# Patient Record
Sex: FEMALE | Race: WHITE | NOT HISPANIC OR LATINO | Employment: OTHER | URBAN - METROPOLITAN AREA
[De-identification: names, ages, dates, MRNs, and addresses within clinical notes are randomized per-mention and may not be internally consistent; named-entity substitution may affect disease eponyms.]

---

## 2017-01-10 ENCOUNTER — PATIENT MESSAGE (OUTPATIENT)
Dept: MEDICAL GROUP | Facility: CLINIC | Age: 52
End: 2017-01-10

## 2017-01-10 ENCOUNTER — OFFICE VISIT (OUTPATIENT)
Dept: MEDICAL GROUP | Facility: CLINIC | Age: 52
End: 2017-01-10
Payer: COMMERCIAL

## 2017-01-10 VITALS
TEMPERATURE: 98.8 F | BODY MASS INDEX: 40.02 KG/M2 | WEIGHT: 255 LBS | DIASTOLIC BLOOD PRESSURE: 70 MMHG | HEIGHT: 67 IN | HEART RATE: 70 BPM | OXYGEN SATURATION: 91 % | SYSTOLIC BLOOD PRESSURE: 126 MMHG

## 2017-01-10 DIAGNOSIS — E55.9 VITAMIN D DEFICIENCY DISEASE: ICD-10-CM

## 2017-01-10 DIAGNOSIS — E03.9 IDIOPATHIC HYPOTHYROIDISM: ICD-10-CM

## 2017-01-10 DIAGNOSIS — Z15.89 METHYLENETETRAHYDROFOLATE REDUCTASE (MTHFR) GENE MUTATION: ICD-10-CM

## 2017-01-10 DIAGNOSIS — E66.9 OBESITY (BMI 35.0-39.9 WITHOUT COMORBIDITY): ICD-10-CM

## 2017-01-10 DIAGNOSIS — E04.1 THYROID NODULE: ICD-10-CM

## 2017-01-10 DIAGNOSIS — E78.5 DYSLIPIDEMIA, GOAL LDL BELOW 130: ICD-10-CM

## 2017-01-10 DIAGNOSIS — K20.0 EOSINOPHILIC ESOPHAGITIS: ICD-10-CM

## 2017-01-10 PROCEDURE — 99214 OFFICE O/P EST MOD 30 MIN: CPT | Performed by: FAMILY MEDICINE

## 2017-01-10 RX ORDER — RANITIDINE 150 MG/1
150 TABLET ORAL 2 TIMES DAILY
Qty: 60 TAB | Refills: 11 | Status: SHIPPED | OUTPATIENT
Start: 2017-01-10 | End: 2017-03-29

## 2017-01-10 NOTE — PROGRESS NOTES
CC: Here to discuss her medical situation including her eosinophilic esophagitis and genetic test results    HPI:   Tanvi presents today with the following.    1. Eosinophilic esophagitis  She has been paying attention to the foods she eats. She is not feeling as well today. She feels that is probably because she ate corn last night. She is finding that she has increased reactions to corn and soy as well as the week and lactulose which she has known about before. She continues to eat a morning fast food burrito. Discussed that this tortilla for the burrito is wheat. Discussed making home food where she knows what is in it.  Discussed the need to maintain acid reduction while she is trying to heal the esophagitis. She does not want to continue with the proton pump inhibitors. Discussed the H2 blockers may be more helpful    2. Methylenetetrahydrofolate reductase (MTHFR) gene mutation ([facility])  Discussed this gene mutation. Discussed that the bottom line for this as far as I understand it is too increased folate consumption, especially cooked greens. Discussed her other genetic results. After reviewing the genetic results she stopped the atorvastatin as she is a slow metabolizer.  She also stopped the omeprazole because it was a potential source of conflict with the atorvastatin.      3. Thyroid nodule  She had thyroid ultrasound back in August which showed a small nodule. At that time we had discussed repeat ultrasound in 6 months. Her genetic results show a 5 fold increased risk for thyroid cancer. It does not show what kind of cancer or what the baseline risk is. However overall she is due for repeat US.  She actually feels a little less fatigued than usual. She denies trouble swallowing.    4. Idiopathic hypothyroidism  She does have hypothyroidism. She continues the levothyroxin. She brings up whether she should be on a mixed T3-T4 tomorrow. Discussed with her that I do not like these drugs as they are a little  less predictable and also turn off the body's ability to make T3 out of T4. We will stick with the synthetic for now.    5. Vitamin D deficiency disease  She continues vitamin D supplementation. She has run out and is trying to find a vitamin D supplement but is gluten-free. Discussed emphasizing good calcium sources in the diet and discussing with the nutritionist when she meets with her tomorrow calcium in the diet that does not involve lactose.    6. Obesity (BMI 35.0-39.9 without comorbidity) ([facility])  Discussed her overweight. This is a major concern to her. She is hoping that with the improvement in diet her weight and overall health will improve. I think this is a good goal. She is trying to increase her activity as well.    7. Dyslipidemia, goal LDL below 130  Denies chest pain, chest pressure, palpitations or exertional shortness of breath. She is a never smoker. She is due for blood test rechecked. She hopes that with her change in diet she will not need the statins. She has elected to stop the atorvastatin for now. She has had no cardiovascular events.  I concur in this decision for now.    She also stopped the wellbutrin as she did not feel right.  She feels better off it.  She continues the sertraline.        Patient Active Problem List    Diagnosis Date Noted   • Dyslipidemia, goal LDL below 130      Priority: High   • Eosinophilic esophagitis    • Oxygen desaturation during sleep    • Thyroid nodule 10/14/2016   • Central sleep apnea 10/10/2016   • Obstructive sleep apnea 10/10/2016   • Acquired deflected nasal septum 09/09/2016   • Hypertrophy of both inferior nasal turbinates 09/09/2016   • Conductive hearing loss, unilateral 04/03/2015   • Cholesteatoma of middle ear and mastoid    • Obesity (BMI 35.0-39.9 without comorbidity) ([facility]) 07/14/2014   • Family history of early CAD    • Idiopathic hypothyroidism 07/08/2013   • Reactive airway disease with wheezing 08/17/2012   • Vitamin D deficiency disease   "  • Depression    • Lumbar disc narrowing 07/14/2009       Current Outpatient Prescriptions   Medication Sig Dispense Refill   • hydrocodone-acetaminophen (NORCO) 5-325 MG Tab per tablet      • levothyroxine (SYNTHROID) 25 MCG Tab Take 1 Tab by mouth Every morning on an empty stomach. 30 Tab 6   • beclomethasone (QVAR) 40 MCG/ACT inhaler Inhale 2 Puffs by mouth 2 Times a Day. 1 Inhaler 5   • fluticasone (FLONASE) 50 MCG/ACT nasal spray      • albuterol (PROVENTIL HFA) 108 (90 BASE) MCG/ACT Aero Soln inhalation aerosol Inhale 2 Puffs by mouth every four hours as needed for Shortness of Breath (Wheezing). 1 Inhaler 0   • omeprazole (PRILOSEC) 20 MG delayed-release capsule Take 20 mg by mouth every day.     • Misc. Devices Misc This is an order for nocturnal oxygen, 2L nasal prongs.  Dx:  Nocturnal hypoxia, lowest 59% on overnight study.  G47.34           MARIA DOLORES 99 months   NPI [id number] 1 Each 0   • sertraline (ZOLOFT) 100 MG Tab TAKE 2 TABLETS BY MOUTH EVERY  Tab 2   • atorvastatin (LIPITOR) 40 MG Tab Take 1 Tab by mouth every day. 90 Tab 3   • buPROPion SR (WELLBUTRIN SR) 200 MG SR tablet Take 1 Tab by mouth every morning. 90 Tab 3   • Cholecalciferol (VITAMIN D) 2000 UNIT CAPS Take 2,000 Units by mouth every day. 60 Cap 6     No current facility-administered medications for this visit.         Allergies as of 01/10/2017 - Kofi as Reviewed 01/10/2017   Allergen Reaction Noted   • Doxycycline  01/29/2014   • Pcn [penicillins] Unspecified 07/09/2009   • Keflex [cephalexin] Unspecified 09/21/2016        ROS: As per HPI.    /70 mmHg  Pulse 70  Temp(Src) 37.1 °C (98.8 °F)  Ht 1.702 m (5' 7.01\")  Wt 115.667 kg (255 lb)  BMI 39.93 kg/m2  SpO2 91%  LMP 02/26/1995    Physical Exam:  Gen:         Alert and oriented, No apparent distress.  Lucid and fluent.  Neck:       Full range of motion, no JVD or carotid bruits appreciated. No cervical adenopathy appreciated. No palpable thyromegaly or neck mass " appreciated.   Lungs:     Clear to auscultation bilaterally, good air movement  CV:          Regular rate and rhythm. No murmurs, rubs or gallops.               Ext:          No clubbing, cyanosis, no peripheral edema.    Her 21 and me and genetic testing results are reviewed.  Statin metabolism problems identified.  Lactose intolerance reinforced  MTHFR mutation homozygote.    EGD results and esophageal biopsy results are reviewed again.      Assessment and Plan.   51 y.o. female with the following issues.    1. Eosinophilic esophagitis  Changed to ranitidine one twice a day. Discussed dietary changes and determination of what are the triggers for her.  She will discuss this with the dietitian as well. Lab orders are discussed and placed.  - ranitidine (ZANTAC) 150 MG Tab; Take 1 Tab by mouth 2 times a day.  Dispense: 60 Tab; Refill: 11  - COMP METABOLIC PANEL; Future  - CBC WITHOUT DIFFERENTIAL; Future    2. Methylenetetrahydrofolate reductase (MTHFR) gene mutation ([facility])  Discussed increased leafy greens and other sources of folate in the diet. Discussed that bioavailability is typically better when they are cooked. Discussed incorporating cooked greens into many foods. Lab orders discussed and placed. Other genetic results are discussed in detail as well.  - COMP METABOLIC PANEL; Future    3. Thyroid nodule  Repeat ultrasound as discussed above is ordered. Lab orders discussed and placed as well.  - US-SOFT TISSUES OF HEAD - NECK; Future  - TSH; Future    4. Idiopathic hypothyroidism  Laboratory orders discussed and placed. She will continue on her current dose.  - TSH; Future    5. Vitamin D deficiency disease  Laboratory discussed and placed  - VITAMIN D,25 HYDROXY; Future    6. Obesity (BMI 35.0-39.9 without comorbidity) ([facility])  Dietary changes, increased exercise and improved fiber in the diet are discussed. She will meet with nutritionist. Lab orders are discussed and placed.  - COMP METABOLIC PANEL;  Future    7. Dyslipidemia, goal LDL below 130  No signs or symptoms of active cardiovascular disease are appreciated. She does have high lipid numbers in the past but had a reasonably good lipid numbers when her diet was better. There is the hope that with improved diet she will no longer have to take the statins. She has no history of active cardiovascular disease and it is unclear without the lipid lowering agents are necessary for her at all.   Repeat lab orders once her better diet is established are discussed.  - COMP METABOLIC PANEL; Future  - LIPID PROFILE; Future

## 2017-01-10 NOTE — MR AVS SNAPSHOT
"        Tanvi Krueger   1/10/2017 8:20 AM   Office Visit   MRN: [id number]    Department:  [facility]   Dept Phone:  [phone]    Description:  Female : 1965   Provider:  Rebecca Ferro M.D.           Reason for Visit     Results     Thyroid Problem     Gastrophageal Reflux           Allergies as of 1/10/2017     Allergen Noted Reactions    Doxycycline 2014       Wheezing; diarrhea    Pcn [Penicillins] 2009   Unspecified    Unknown reaction as child    Keflex [Cephalexin] 2016   Unspecified    Yeast Infection      You were diagnosed with     Eosinophilic esophagitis   [530.13.ICD-9-CM]       Methylenetetrahydrofolate reductase (MTHFR) gene mutation ([facility])   [845764]       Thyroid nodule   [527222]       Idiopathic hypothyroidism   [2417984]       Vitamin D deficiency disease   [531063]       Obesity (BMI 35.0-39.9 without comorbidity) ([facility])   [439764]       Dyslipidemia, goal LDL below 130   [658351]         Vital Signs     Blood Pressure Pulse Temperature Height Weight Body Mass Index    126/70 mmHg 70 37.1 °C (98.8 °F) 1.702 m (5' 7.01\") 115.667 kg (255 lb) 39.93 kg/m2    Oxygen Saturation Last Menstrual Period Smoking Status             91% 1995 Never Smoker          Basic Information     Date Of Birth Sex Race Ethnicity Preferred Language    1965 Female White Non- English      Your appointments     2017  8:00 AM   New Patient with Doc Vargas RD   [facility])    [address] [id number] [phone]           It is the patient's responsibility to check with your Insurance for benefit coverage for visit / visits.  24 hours notice is required for all appointment changes or cancellation.  Please arrive 20 min. before your appointment time  Please bring the following with you: 1)Picture Id 2) Insurance card 3) Completed Forms if New Patient  If scheduled for DIABETES VISIT please also " brin) Medications 2) Meter 3) Blood glucose logs 4) Any recent labs if you have them  If scheduled for NUTRITION VISIT please also brin) 2-3 days of detailed food intake logs 2) Blood glucose monitor and blood glucose logs (if you have them)            Mar 07, 2017  2:40 PM   Established Patient Pul with ROBYN Douglas   [facility] Pulmonary Medicine (--)    [address]  [address]  [place] [id number] [phone]            Mar 17, 2017  9:00 AM   New Patient with Ben Weldon M.D.   [facility] Neurology (--)    [address], [address]  [place] [id number] [phone]           Please bring Photo ID, Insurance Cards, All Medication Bottles and copies of any legal documents (such as Living Will, Power of ) If speaking a language besides English please bring an adult . Please arrive 30 minutes prior for check in and registration. You will be receiving a confirmation call a few days before your appointment from our automated call confirmation system.              Problem List              ICD-10-CM Priority Class Noted - Resolved    Lumbar disc narrowing M51.36   2009 - Present    Dyslipidemia, goal LDL below 130 E78.5 High  Unknown - Present    Depression F32.9   Unknown - Present    Vitamin D deficiency disease E55.9   Unknown - Present    Reactive airway disease with wheezing J45.909   2012 - Present    Idiopathic hypothyroidism E03.9   2013 - Present    Family history of early CAD Z82.49   Unknown - Present    Obesity (BMI 35.0-39.9 without comorbidity) ([facility]) E66.9   2014 - Present    Cholesteatoma of middle ear and mastoid H71.20   Unknown - Present    Conductive hearing loss, unilateral H90.2   4/3/2015 - Present    Acquired deflected nasal septum J34.2   2016 - Present    Hypertrophy of both inferior nasal turbinates J34.3   2016 - Present    Central sleep apnea G47.31   10/10/2016 - Present    Obstructive sleep apnea  G47.33   10/10/2016 - Present    Thyroid nodule E04.1   10/14/2016 - Present    Eosinophilic esophagitis K20.0   Unknown - Present    Oxygen desaturation during sleep G47.34   Unknown - Present      Health Maintenance        Date Due Completion Dates    MAMMOGRAM 3/1/2017 3/1/2016, 8/5/2014, 2/12/2014, 2/4/2014, 8/21/2012, 6/23/2011, 6/23/2011 (Prv Comp), 3/31/2006    Override on 6/23/2011: Previously completed    IMM DTaP/Tdap/Td Vaccine (2 - Td) 11/1/2022 11/1/2012    COLONOSCOPY 4/21/2024 4/21/2014            Current Immunizations     Influenza TIV (IM) 9/23/2015, 10/1/2012    Influenza Vaccine Quad Inj (Preserved) 12/12/2016  9:06 AM    Tdap Vaccine 11/1/2012      Below and/or attached are the medications your provider expects you to take. Review all of your home medications and newly ordered medications with your provider and/or pharmacist. Follow medication instructions as directed by your provider and/or pharmacist. Please keep your medication list with you and share with your provider. Update the information when medications are discontinued, doses are changed, or new medications (including over-the-counter products) are added; and carry medication information at all times in the event of emergency situations     Allergies:  DOXYCYCLINE - (reactions not documented)     PCN - Unspecified     KEFLEX - Unspecified               Medications  Valid as of: January 10, 2017 - 11:26 AM    Generic Name Brand Name Tablet Size Instructions for use    Albuterol Sulfate (Aero Soln) albuterol 108 (90 BASE) MCG/ACT Inhale 2 Puffs by mouth every four hours as needed for Shortness of Breath (Wheezing).        Beclomethasone Dipropionate (Aero Soln) QVAR 40 MCG/ACT Inhale 2 Puffs by mouth 2 Times a Day.        Cholecalciferol (Cap) Vitamin D 2000 UNITS Take 2,000 Units by mouth every day.        Fluticasone Propionate (Suspension) FLONASE 50 MCG/ACT         Hydrocodone-Acetaminophen (Tab) NORCO 5-325 MG         Levothyroxine  Sodium (Tab) SYNTHROID 25 MCG Take 1 Tab by mouth Every morning on an empty stomach.        Misc. Devices (Misc) Misc. Devices  This is an order for nocturnal oxygen, 2L nasal prongs.  Dx:  Nocturnal hypoxia, lowest 59% on overnight study.  G47.34           MARIA DOLORES 99 months   NPI [id number]        RaNITidine HCl (Tab) ZANTAC 150 MG Take 1 Tab by mouth 2 times a day.        Sertraline HCl (Tab) ZOLOFT 100 MG TAKE 2 TABLETS BY MOUTH EVERY DAY        .                 Medicines prescribed today were sent to:     [facility] - [address]    [address] [place] [address]    Phone: [phone] Fax: [phone]    Open 24 Hours?: No    [facility] - [place] - [address] [facility]    [address] [facility] [address]    Phone: [phone] Fax: [phone]    Open 24 Hours?: No      Medication refill instructions:       If your prescription bottle indicates you have medication refills left, it is not necessary to call your provider’s office. Please contact your pharmacy and they will refill your medication.    If your prescription bottle indicates you do not have any refills left, you may request refills at any time through one of the following ways: The online [employer] system (except Urgent Care), by calling your provider’s office, or by asking your pharmacy to contact your provider’s office with a refill request. Medication refills are processed only during regular business hours and may not be available until the next business day. Your provider may request additional information or to have a follow-up visit with you prior to refilling your medication.   *Please Note: Medication refills are assigned a new Rx number when refilled electronically. Your pharmacy may indicate that no refills were authorized even though a new prescription for the same medication is available at the pharmacy. Please request the medicine by name with the pharmacy  before contacting your provider for a refill.        Your To Do List     Future Labs/Procedures Complete By Expires    CBC WITHOUT DIFFERENTIAL  As directed 7/13/2017    COMP METABOLIC PANEL  As directed 1/11/2018    LIPID PROFILE  As directed 1/11/2018    TSH  As directed 1/11/2018    US-SOFT TISSUES OF HEAD - NECK  As directed 7/13/2017    VITAMIN D,25 HYDROXY  As directed 1/11/2018         MyChart Access Code: Activation code not generated  Current [facility] Status: Active

## 2017-01-11 ENCOUNTER — PATIENT MESSAGE (OUTPATIENT)
Dept: MEDICAL GROUP | Facility: CLINIC | Age: 52
End: 2017-01-11

## 2017-01-11 ENCOUNTER — NON-PROVIDER VISIT (OUTPATIENT)
Dept: HEALTH INFORMATION MANAGEMENT | Facility: MEDICAL CENTER | Age: 52
End: 2017-01-11
Payer: COMMERCIAL

## 2017-01-11 DIAGNOSIS — K20.0 EOSINOPHILIC ESOPHAGITIS: ICD-10-CM

## 2017-01-11 PROCEDURE — 97802 MEDICAL NUTRITION INDIV IN: CPT | Performed by: DIETITIAN, REGISTERED

## 2017-01-11 NOTE — PROGRESS NOTES
1/11/2017    Rebecca Ferro M.D.  51 y.o.   Time in/out: 822 - 685    Subjective:  -Wants help with elimination diet for EE    Nutrition Diagnosis (PES Statement)    Food and nutrition related knowledge deficit related to elimination diet for EE as evidenced by patient statement.    Biochemical data, medical test and procedures  Lab Results   Component Value Date/Time    GLYCOHEMOGLOBIN 5.5 03/14/2014 07:01 AM     Lab Results   Component Value Date/Time    CHOLESTEROL, 04/29/2016 07:02 AM    * 04/29/2016 07:02 AM    HDL 46 04/29/2016 07:02 AM    TRIGLYCERIDES 176* 04/29/2016 07:02 AM         Nutrition Intervention  Meal and Snack  Recommend a general/healthful diet    Comprehensive Nutrition education Instruction or training leading to in-depth nutrition related knowledge about:  Theraputic diet for elimination diet for EE    Monitoring & Evaluation Plan  Behavioral-Environmental:  Behavior:  Elimination of all 6 foods  Physical activity:  Increase as tolerated    Physical Signs / Symptoms:  Other:  Improvement of symptoms      Assessment Notes:  I helped Tanvi navigate the elimination of dairy, eggs, nuts, shellfish, soy, wheat, and corn as these can cause problems for people with EE.  I showed her different products she can try to completely eliminate these foods for the next 3-4 weeks.  After that time, she can trial some foods she had previously eliminated to see if they cause a reaction for her; corn sounds like it is something that is causing reactions and she has been eating a lot of corn in her gluten free products.  I have aksed her to keep a list of foods that she knows causes a reaction and then stay away from them once she has determined their effects on her.  She is going to try to eat foods from scratch as much as she can and if she is going to eat out she will do her research on the foods before going to the restaurant.  She is going to contact me with any questions she may have.   F/u prn.

## 2017-01-11 NOTE — MR AVS SNAPSHOT
Tanvi Krueger   2017 8:00 AM   Appointment   MRN: [id number]    Department:  Health [facility]   Dept Phone:  [phone]    Description:  Female : 1965   Provider:  Doc Vargas RD           Allergies as of 2017     Allergen Noted Reactions    Doxycycline 2014       Wheezing; diarrhea    Pcn [Penicillins] 2009   Unspecified    Unknown reaction as child    Keflex [Cephalexin] 2016   Unspecified    Yeast Infection      Vital Signs     Last Menstrual Period Smoking Status                1995 Never Smoker           Basic Information     Date Of Birth Sex Race Ethnicity Preferred Language    1965 Female White Non- English      Your appointments     2017  1:00 PM   US [id number] with [facility] US 1   [facility] IMAGING - ULTRASOUND - [facility] ([facility])    [facility]  [address]  [place] [address]   [phone]            Mar 07, 2017  2:40 PM   Established Patient Pul with ROBYN Douglas   [facility] (--)    [address]  [address]  [place] [address]   [phone]            Mar 17, 2017  9:00 AM   New Patient with Ben Weldon M.D.   [facility] (--)    [address] [facility] [address], [address]  [place] [id number] [phone]           Please bring Photo ID, Insurance Cards, All Medication Bottles and copies of any legal documents (such as Living Will, Power of ) If speaking a language besides English please bring an adult . Please arrive 30 minutes prior for check in and registration. You will be receiving a confirmation call a few days before your appointment from our automated call confirmation system.              Problem List              ICD-10-CM Priority Class Noted - Resolved    Lumbar disc narrowing M51.36   2009 - Present    Dyslipidemia, goal LDL below 130 E78.5 High  Unknown - Present    Depression F32.9   Unknown -  Present    Vitamin D deficiency disease E55.9   Unknown - Present    Reactive airway disease with wheezing J45.909   8/17/2012 - Present    Idiopathic hypothyroidism E03.9   7/8/2013 - Present    Family history of early CAD Z82.49   Unknown - Present    Obesity (BMI 35.0-39.9 without comorbidity) ([facility]) E66.9   7/14/2014 - Present    Cholesteatoma of middle ear and mastoid H71.20   Unknown - Present    Conductive hearing loss, unilateral H90.2   4/3/2015 - Present    Acquired deflected nasal septum J34.2   9/9/2016 - Present    Hypertrophy of both inferior nasal turbinates J34.3   9/9/2016 - Present    Central sleep apnea G47.31   10/10/2016 - Present    Obstructive sleep apnea G47.33   10/10/2016 - Present    Thyroid nodule E04.1   10/14/2016 - Present    Eosinophilic esophagitis K20.0   Unknown - Present    Oxygen desaturation during sleep G47.34   Unknown - Present      Health Maintenance        Date Due Completion Dates    MAMMOGRAM 3/1/2017 3/1/2016, 8/5/2014, 2/12/2014, 2/4/2014, 8/21/2012, 6/23/2011, 6/23/2011 (Prv Comp), 3/31/2006    Override on 6/23/2011: Previously completed    IMM DTaP/Tdap/Td Vaccine (2 - Td) 11/1/2022 11/1/2012    COLONOSCOPY 4/21/2024 4/21/2014            Current Immunizations     Influenza TIV (IM) 9/23/2015, 10/1/2012    Influenza Vaccine Quad Inj (Preserved) 12/12/2016  9:06 AM    Tdap Vaccine 11/1/2012      Below and/or attached are the medications your provider expects you to take. Review all of your home medications and newly ordered medications with your provider and/or pharmacist. Follow medication instructions as directed by your provider and/or pharmacist. Please keep your medication list with you and share with your provider. Update the information when medications are discontinued, doses are changed, or new medications (including over-the-counter products) are added; and carry medication information at all times in the event of emergency situations     Allergies:   DOXYCYCLINE - (reactions not documented)     PCN - Unspecified     KEFLEX - Unspecified               Medications  Valid as of: January 11, 2017 -  9:12 AM    Generic Name Brand Name Tablet Size Instructions for use    Albuterol Sulfate (Aero Soln) albuterol 108 (90 BASE) MCG/ACT Inhale 2 Puffs by mouth every four hours as needed for Shortness of Breath (Wheezing).        Beclomethasone Dipropionate (Aero Soln) QVAR 40 MCG/ACT Inhale 2 Puffs by mouth 2 Times a Day.        Cholecalciferol (Cap) Vitamin D 2000 UNITS Take 2,000 Units by mouth every day.        Fluticasone Propionate (Suspension) FLONASE 50 MCG/ACT         Hydrocodone-Acetaminophen (Tab) NORCO 5-325 MG         Levothyroxine Sodium (Tab) SYNTHROID 25 MCG Take 1 Tab by mouth Every morning on an empty stomach.        Misc. Devices (Misc) Misc. Devices  This is an order for nocturnal oxygen, 2L nasal prongs.  Dx:  Nocturnal hypoxia, lowest 59% on overnight study.  G47.34           MARIA DOLORES 99 months   NPI [id number]        RaNITidine HCl (Tab) ZANTAC 150 MG Take 1 Tab by mouth 2 times a day.        Sertraline HCl (Tab) ZOLOFT 100 MG TAKE 2 TABLETS BY MOUTH EVERY DAY        .                 Medicines prescribed today were sent to:     [facility] [place] [address]    [address] [place] [address]    Phone: [phone] Fax: [phone]    Open 24 Hours?: No    [id number] - [place] - [address]    [address] [facility] [address]    Phone: [phone] Fax: [phone]    Open 24 Hours?: No      Medication refill instructions:       If your prescription bottle indicates you have medication refills left, it is not necessary to call your provider’s office. Please contact your pharmacy and they will refill your medication.    If your prescription bottle indicates you do not have any refills left, you may request refills at any time through one of the following ways: The online  [employer] system (except Urgent Care), by calling your provider’s office, or by asking your pharmacy to contact your provider’s office with a refill request. Medication refills are processed only during regular business hours and may not be available until the next business day. Your provider may request additional information or to have a follow-up visit with you prior to refilling your medication.   *Please Note: Medication refills are assigned a new Rx number when refilled electronically. Your pharmacy may indicate that no refills were authorized even though a new prescription for the same medication is available at the pharmacy. Please request the medicine by name with the pharmacy before contacting your provider for a refill.           [employer] Access Code: Activation code not generated  Current [employer] Status: Active

## 2017-01-12 NOTE — TELEPHONE ENCOUNTER
From: Tanvi Krueger  To: Rebecca Ferro M.D.  Sent: 1/11/2017 4:01 PM PST  Subject: RE:dietitian referral    One more thing. He suggested I take a multi-vitamin. With the changes we made yesterday med/supplements my urine is now yellow. So do I need a multi-vitamin (don't want to overdo the vitamin d and B 100) and/or a probiotic for digestion issues. FYI corn has now been kicked to the curb and I'm researching menus. My  is on board!! Have a great afternoon.  ----- Message -----  From: Rebecca Ferro M.D.  Sent: 12/22/2016 7:00 PM PST  To: Tanvi Krueger  Subject: dietitian referral    I do think I found a way through the insurance to get you to a dietitian to discuss this diet. I have placed a referral.

## 2017-01-23 ENCOUNTER — PATIENT MESSAGE (OUTPATIENT)
Dept: MEDICAL GROUP | Facility: CLINIC | Age: 52
End: 2017-01-23

## 2017-01-24 ENCOUNTER — PATIENT MESSAGE (OUTPATIENT)
Dept: MEDICAL GROUP | Facility: CLINIC | Age: 52
End: 2017-01-24

## 2017-01-24 DIAGNOSIS — R22.1 THROAT SWELLING: ICD-10-CM

## 2017-01-24 DIAGNOSIS — J38.7 LARYNGEAL ULCERATION: ICD-10-CM

## 2017-01-24 NOTE — TELEPHONE ENCOUNTER
Message from patient:  I saw speech pathologist Tawny Petersen at [facility] today for an oral endoscopy. She will be providing photos and report by the end of the week. She told me to see Dr. Trinidad hedrick. I have an appt February 2nd. She saw sores all over the back of the tongue and also a raw area in the back of the tongue cavity as well as the tongue was VERY swollen. Also determined that I do have aspirations and I'm not sure what else. It's a lot to take in.      I have placed an urgent referral to Dr. Abdi.

## 2017-01-25 ENCOUNTER — PATIENT MESSAGE (OUTPATIENT)
Dept: MEDICAL GROUP | Facility: CLINIC | Age: 52
End: 2017-01-25

## 2017-01-25 NOTE — TELEPHONE ENCOUNTER
From: Tanvi Krueger  To: Rebecca Ferro M.D.  Sent: 1/25/2017 6:46 AM PST  Subject: RE:WOW     Evans here. [employer] blog People with a [id number](G) (equivalent to [id number] in the study) had nearly four times higher odds of developing V617F-positive myelofibrosis (MPN) compared to people without the disease. '[employer] recently replicated this association, though we see a smaller effect — in our database, people with a G at [id number] have about two times the odds of developing V617F-positive MPN compared to people without. I have JAK2 Gene  ----- Message -----  From: Rebecca Ferro M.D.  Sent: 1/24/2017 3:07 PM PST  To: Tanvi Krueger  Subject: WOW    Glad, very glad, that you went there. What a surprising result. I have placed and urgent referral to Dr. caceres.

## 2017-01-30 ENCOUNTER — PATIENT MESSAGE (OUTPATIENT)
Dept: MEDICAL GROUP | Facility: CLINIC | Age: 52
End: 2017-01-30

## 2017-01-30 DIAGNOSIS — E06.3 HASHIMOTO'S THYROIDITIS: ICD-10-CM

## 2017-01-31 ENCOUNTER — HOSPITAL ENCOUNTER (OUTPATIENT)
Dept: RADIOLOGY | Facility: MEDICAL CENTER | Age: 52
End: 2017-01-31
Attending: FAMILY MEDICINE
Payer: COMMERCIAL

## 2017-01-31 ENCOUNTER — PATIENT MESSAGE (OUTPATIENT)
Dept: MEDICAL GROUP | Facility: CLINIC | Age: 52
End: 2017-01-31

## 2017-01-31 DIAGNOSIS — E04.1 THYROID NODULE: ICD-10-CM

## 2017-01-31 PROCEDURE — 76536 US EXAM OF HEAD AND NECK: CPT

## 2017-02-02 ENCOUNTER — PATIENT MESSAGE (OUTPATIENT)
Dept: MEDICAL GROUP | Facility: CLINIC | Age: 52
End: 2017-02-02

## 2017-02-03 ENCOUNTER — PATIENT MESSAGE (OUTPATIENT)
Dept: MEDICAL GROUP | Facility: CLINIC | Age: 52
End: 2017-02-03

## 2017-02-03 NOTE — TELEPHONE ENCOUNTER
"From: Tanvi Krueger  To: Rebecca Ferro M.D.  Sent: 2/2/2017 10:52 PM PST  Subject: RE:Dr. Abdi    Thank you for working together to help me. Something is not right and I'm trying to be proactive and not be reactive. I will have the biopsy.. Can it wait a month or should I do it asap? Thank you for your patience and it is all overwhelming. Have a relaxing weekend.  ----- Message -----  From: Rebecca Ferro M.D.  Sent: 2/2/2017 10:39 PM PST  To: Tanvi Krueger  Subject: Dr. Abdi    He called and discussed these problems with me at length. I cannot describe how relieved I am that you do not have laryngeal ulcers. They are rare and VERY bad news. I also feel an endocrinologist to help us sort out the thyroid issue is a very good idea. I agree that true allergy to levothyroxine is very rare as this is very close to our natural hormone. So called \"natural\" compounded hormones are typically made from pig thyroid and contain a lot of off proteins which are more likely to be allergenic. Unfortunately, I do agree that he needs to biopsy that lesion to make sure you do not have a problem. I truly think this will help us sort this all out. I am sure it is overwhelming.  "

## 2017-02-04 NOTE — TELEPHONE ENCOUNTER
From: Tanvi Krueger  To: Rebecca Ferro M.D.  Sent: 2/3/2017 5:04 PM PST  Subject: RE:biopsy    It's scheduled 2/17/17 his office.  ----- Message -----  From: Rebecca Ferro M.D.  Sent: 2/3/2017 12:53 PM PST  To: Tanvi Krueger  Subject: biopsy    I would like you to get that done soon. If you must wait a month, okay. However, I would prefer sooner.

## 2017-02-06 ENCOUNTER — PATIENT MESSAGE (OUTPATIENT)
Dept: MEDICAL GROUP | Facility: CLINIC | Age: 52
End: 2017-02-06

## 2017-02-06 DIAGNOSIS — F32.A DEPRESSION, UNSPECIFIED DEPRESSION TYPE: ICD-10-CM

## 2017-02-06 RX ORDER — SERTRALINE HYDROCHLORIDE 100 MG/1
TABLET, FILM COATED ORAL
Qty: 180 TAB | Refills: 2 | Status: SHIPPED | OUTPATIENT
Start: 2017-02-06 | End: 2017-11-17 | Stop reason: SDUPTHER

## 2017-02-07 ENCOUNTER — TELEPHONE (OUTPATIENT)
Dept: PULMONOLOGY | Facility: HOSPICE | Age: 52
End: 2017-02-07

## 2017-02-07 NOTE — TELEPHONE ENCOUNTER
Regarding: Non-Urgent Medical Question  Contact: [phone]  ----- Message from Fatou Ordonez, Med Ass't sent at 2/7/2017  7:59 AM PST -----       ----- Message from Tanvi Krueger to ROBYN Douglas sent at 2/6/2017  5:14 PM -----   I have a cold and I'm having trouble wearing the avs mask as my cheeks hurt (The left one is the worst) and yes head hurts too.  Last night I tossed and turned all night.  My concern is im suppose to wear it at least 4 hrs a night. I see yara Constantino next month.  I'll send this to yara Constantino also.  Any guidance?

## 2017-02-07 NOTE — TELEPHONE ENCOUNTER
Fatou please call to clarify her symptoms. Is she using her Flonase nasal spray at night? This should help with any sinus congestion. We can also send an order to her DME to fit for a new mask if the mask is an issue. Please offer OV if she has concerns over infectious process.

## 2017-02-11 ENCOUNTER — OFFICE VISIT (OUTPATIENT)
Dept: URGENT CARE | Facility: CLINIC | Age: 52
End: 2017-02-11
Payer: COMMERCIAL

## 2017-02-11 VITALS
TEMPERATURE: 97.6 F | DIASTOLIC BLOOD PRESSURE: 78 MMHG | SYSTOLIC BLOOD PRESSURE: 118 MMHG | HEART RATE: 72 BPM | OXYGEN SATURATION: 98 % | RESPIRATION RATE: 16 BRPM

## 2017-02-11 DIAGNOSIS — J01.00 ACUTE MAXILLARY SINUSITIS, RECURRENCE NOT SPECIFIED: ICD-10-CM

## 2017-02-11 DIAGNOSIS — R05.9 COUGH: ICD-10-CM

## 2017-02-11 PROCEDURE — 99214 OFFICE O/P EST MOD 30 MIN: CPT | Performed by: PHYSICIAN ASSISTANT

## 2017-02-11 RX ORDER — SULFAMETHOXAZOLE AND TRIMETHOPRIM 800; 160 MG/1; MG/1
1 TABLET ORAL EVERY 12 HOURS
Qty: 20 TAB | Refills: 0 | Status: SHIPPED | OUTPATIENT
Start: 2017-02-11 | End: 2017-02-21

## 2017-02-11 ASSESSMENT — ENCOUNTER SYMPTOMS
SHORTNESS OF BREATH: 0
VOMITING: 0
SORE THROAT: 1
FEVER: 0
COUGH: 1
ABDOMINAL PAIN: 0
NAUSEA: 0

## 2017-02-11 NOTE — PROGRESS NOTES
Subjective:      Tanvi Krueger is a 51 y.o. female who presents with Cough            Cough  Associated symptoms include chills, ear pain, a sore throat and wheezing ( very mild). Pertinent negatives include no fever, rash or shortness of breath.   last one week of loss of voice and sorethroat, mild cough, denies fever, c/o chills, c/o sinus press and ear pain bilat, denies nausea/voimting/abdpain/rash, some diarrhea, no blood per stool, PMH of sinusitis - PMH of asthma - mild increased wheeze of last week, doesn't use inhalers for sleep apnea, bronchitis in 2015, denies PMH of pneumonia.    Review of Systems   Constitutional: Positive for chills. Negative for fever.   HENT: Positive for congestion, ear pain and sore throat.    Respiratory: Positive for cough and wheezing ( very mild). Negative for shortness of breath.    Gastrointestinal: Positive for diarrhea. Negative for nausea, vomiting, abdominal pain and blood in stool.   Skin: Negative for rash.     PMH:  has a past medical history of Dyslipidemia, goal LDL below 130; Menopausal symptoms; Lumbar disc narrowing (7/14/2009); Cholesteatoma of middle ear and mastoid(385.33) (1992 surgery); Vitamin d deficiency; Recurrent sinus infections; Hypothyroidism; Family history of early CAD; Elevated glycohemoglobin; Snoring (09-); Anesthesia; History of endometriosis; Asthma; Depression; Bowel habit changes; Hiatus hernia syndrome; Oxygen desaturation during sleep; Tonsillitis; Eosinophilic esophagitis; and History of chickenpox.  MEDS:   Current outpatient prescriptions:   •  sertraline (ZOLOFT) 100 MG Tab, TAKE 2 TABLETS BY MOUTH EVERY DAY, Disp: 180 Tab, Rfl: 2  •  ranitidine (ZANTAC) 150 MG Tab, Take 1 Tab by mouth 2 times a day., Disp: 60 Tab, Rfl: 11  •  hydrocodone-acetaminophen (NORCO) 5-325 MG Tab per tablet, , Disp: , Rfl:   •  beclomethasone (QVAR) 40 MCG/ACT inhaler, Inhale 2 Puffs by mouth 2 Times a Day., Disp: 1 Inhaler, Rfl: 5  •  fluticasone  (FLONASE) 50 MCG/ACT nasal spray, , Disp: , Rfl:   •  Cholecalciferol (VITAMIN D) 2000 UNIT CAPS, Take 2,000 Units by mouth every day., Disp: 60 Cap, Rfl: 6  •  albuterol (PROVENTIL HFA) 108 (90 BASE) MCG/ACT Aero Soln inhalation aerosol, Inhale 2 Puffs by mouth every four hours as needed for Shortness of Breath (Wheezing)., Disp: 1 Inhaler, Rfl: 0  •  Misc. Devices Misc, This is an order for nocturnal oxygen, 2L nasal prongs.  Dx:  Nocturnal hypoxia, lowest 59% on overnight study.  G47.34           MARIA DOLORES 99 months   NPI [id number], Disp: 1 Each, Rfl: 0  ALLERGIES:   Allergies   Allergen Reactions   • Doxycycline      Wheezing; diarrhea   • Pcn [Penicillins] Unspecified     Unknown reaction as child   • Keflex [Cephalexin] Unspecified     Yeast Infection     SURGHX:   Past Surgical History   Procedure Laterality Date   • Myringotomy  4/30/2009     Performed by MAXIMUS WHITE at SURGERY SAME DAY [facility] ORS   • Abdominal hysterectomy total  1997     ovaries are gone   • Tympanotomy  6/24/2010     Performed by MAXIMUS WHITE at SURGERY SAME DAY [facility] ORS   • Exam under anesthesia  6/24/2010     Performed by MAXIMUS WHITE at SURGERY SAME DAY [facility] ORS   • Myringotomy  4/20/2012     Performed by RYANNE ALICIA at SURGERY [facility]   • Ear middle exploration  4/3/2015     Performed by Naresh Abdi M.D. at SURGERY SAME DAY [facility] ORS   • Ossicular reconstruction  4/3/2015     Performed by Naresh Abdi M.D. at SURGERY SAME DAY [facility] ORS   • Septoplasty  9/9/2016     Procedure: SEPTOPLASTY;  Surgeon: Naresh Abdi M.D.;  Location: SURGERY SAME DAY [facility];  Service:    • Turbinoplasty Bilateral 9/9/2016     Procedure: TURBINOPLASTY;  Surgeon: Naresh Abdi M.D.;  Location: SURGERY SAME DAY [facility];  Service:    • Hysterectomy laparoscopy     • Arthroscopy, knee     • Tonsillectomy     • Sinuscope     • Other  2016     sinus surgery   • Thyroidectomy total Left 10/14/2016      Procedure: LEFT THROIDECTOMY;  Surgeon: Naresh Abdi M.D.;  Location: SURGERY SAME DAY [facility];  Service:      SOCHX:  reports that she has never smoked. She has never used smokeless tobacco. She reports that she does not drink alcohol or use illicit drugs.  FH: Family history was reviewed, no pertinent findings to report    I have worn a mask for the entire encounter with this patient.        Objective:     /78 mmHg  Pulse 72  Temp(Src) 36.4 °C (97.6 °F)  Resp 16  SpO2 98%  LMP 02/26/1995     Physical Exam   Constitutional: She is oriented to person, place, and time. She appears well-developed and well-nourished. No distress.   HENT:   Head: Normocephalic and atraumatic.   Right Ear: External ear and ear canal normal. Tympanic membrane is bulging. Tympanic membrane is not erythematous.   Left Ear: External ear and ear canal normal. Tympanic membrane is bulging. Tympanic membrane is not erythematous.   Nose: Right sinus exhibits maxillary sinus tenderness. Right sinus exhibits no frontal sinus tenderness. Left sinus exhibits maxillary sinus tenderness. Left sinus exhibits no frontal sinus tenderness.   Mouth/Throat: Uvula is midline and mucous membranes are normal. Oropharyngeal exudate, posterior oropharyngeal edema and posterior oropharyngeal erythema present. No tonsillar abscesses.   Eyes: Conjunctivae and lids are normal. Right eye exhibits no discharge. Left eye exhibits no discharge. No scleral icterus.   Neck: Neck supple.   Pulmonary/Chest: Effort normal and breath sounds normal. No respiratory distress. She has no decreased breath sounds. She has no wheezes. She has no rhonchi. She has no rales.   Musculoskeletal: Normal range of motion.   Lymphadenopathy:     She has cervical adenopathy.   Neurological: She is alert and oriented to person, place, and time. She is not disoriented.   Skin: Skin is warm and dry. She is not diaphoretic. No erythema. No pallor.   Psychiatric: Her speech is  normal and behavior is normal.   Nursing note and vitals reviewed.              Assessment/Plan:     1. Acute maxillary sinusitis, recurrence not specified  Supportive care is reviewed with patient/caregiver - recommend to push PO fluids and electrolytes, Nsaids/tylenol, netti pot/saline irrig, humidifier in home, flonase, ponaris, antihistamines,  take full course of Rx, take with probiotics, observe for resolution  Return to clinic with lack of resolution or progression of symptoms.    - sulfamethoxazole-trimethoprim (BACTRIM DS) 800-160 MG tablet; Take 1 Tab by mouth every 12 hours for 10 days.  Dispense: 20 Tab; Refill: 0    2. Cough

## 2017-02-11 NOTE — MR AVS SNAPSHOT
Tanvi Krueger   2017 12:00 PM   Office Visit   MRN: [id number]    Department:  [facility]   Dept Phone:  [phone]    Description:  Female : 1965   Provider:  Ganesh Payan PA-C           Reason for Visit     Cough congestion, fever, sore throat, partial voice X 1 week       Allergies as of 2017     Allergen Noted Reactions    Doxycycline 2014       Wheezing; diarrhea    Pcn [Penicillins] 2009   Unspecified    Unknown reaction as child    Keflex [Cephalexin] 2016   Unspecified    Yeast Infection      You were diagnosed with     Cough   [786.2.ICD-9-CM]       Acute maxillary sinusitis, recurrence not specified   [2069350]         Vital Signs     Blood Pressure Pulse Temperature Respirations Oxygen Saturation Last Menstrual Period    118/78 mmHg 72 36.4 °C (97.6 °F) 16 98% 1995    Smoking Status                   Never Smoker            Basic Information     Date Of Birth Sex Race Ethnicity Preferred Language    1965 Female White Non- English      Your appointments     Mar 07, 2017  2:40 PM   Established Patient Pul with ROBYN Douglas   [facility] Pulmonary Medicine (--)    [address]  [address]  [facility] [id number] [phone]            Mar 17, 2017  9:00 AM   New Patient with Ben Weldon M.D.   [facility] Neurology (--)    [address] [facility], [address]  [facility] [id number] [phone]           Please bring Photo ID, Insurance Cards, All Medication Bottles and copies of any legal documents (such as Living Will, Power of ) If speaking a language besides English please bring an adult . Please arrive 30 minutes prior for check in and registration. You will be receiving a confirmation call a few days before your appointment from our automated call confirmation system.              Problem List              ICD-10-CM Priority Class Noted - Resolved    Lumbar disc narrowing M51.36    7/14/2009 - Present    Dyslipidemia, goal LDL below 130 E78.5 High  Unknown - Present    Depression F32.9   Unknown - Present    Vitamin D deficiency disease E55.9   Unknown - Present    Reactive airway disease with wheezing J45.909   8/17/2012 - Present    Hashimoto's thyroiditis E06.3   7/8/2013 - Present    Family history of early CAD Z82.49   Unknown - Present    Obesity (BMI 35.0-39.9 without comorbidity) (HCC) E66.9   7/14/2014 - Present    Cholesteatoma of middle ear and mastoid H71.20   Unknown - Present    Conductive hearing loss, unilateral H90.2   4/3/2015 - Present    Acquired deflected nasal septum J34.2   9/9/2016 - Present    Hypertrophy of both inferior nasal turbinates J34.3   9/9/2016 - Present    Central sleep apnea G47.31   10/10/2016 - Present    Obstructive sleep apnea G47.33   10/10/2016 - Present    Thyroid nodule E04.1   10/14/2016 - Present    Eosinophilic esophagitis K20.0   Unknown - Present    Oxygen desaturation during sleep G47.34   Unknown - Present      Health Maintenance        Date Due Completion Dates    MAMMOGRAM 3/1/2017 3/1/2016, 8/5/2014, 2/12/2014, 2/4/2014, 8/21/2012, 6/23/2011, 6/23/2011 (Prv Comp), 3/31/2006    Override on 6/23/2011: Previously completed    IMM DTaP/Tdap/Td Vaccine (2 - Td) 11/1/2022 11/1/2012    COLONOSCOPY 4/21/2024 4/21/2014            Current Immunizations     Influenza TIV (IM) 9/23/2015, 10/1/2012    Influenza Vaccine Quad Inj (Preserved) 12/12/2016  9:06 AM    Tdap Vaccine 11/1/2012      Below and/or attached are the medications your provider expects you to take. Review all of your home medications and newly ordered medications with your provider and/or pharmacist. Follow medication instructions as directed by your provider and/or pharmacist. Please keep your medication list with you and share with your provider. Update the information when medications are discontinued, doses are changed, or new medications (including over-the-counter products) are  added; and carry medication information at all times in the event of emergency situations     Allergies:  DOXYCYCLINE - (reactions not documented)     PCN - Unspecified     KEFLEX - Unspecified               Medications  Valid as of: February 11, 2017 -  3:48 PM    Generic Name Brand Name Tablet Size Instructions for use    Albuterol Sulfate (Aero Soln) albuterol 108 (90 BASE) MCG/ACT Inhale 2 Puffs by mouth every four hours as needed for Shortness of Breath (Wheezing).        Beclomethasone Dipropionate (Aero Soln) QVAR 40 MCG/ACT Inhale 2 Puffs by mouth 2 Times a Day.        Cholecalciferol (Cap) Vitamin D 2000 UNITS Take 2,000 Units by mouth every day.        Fluticasone Propionate (Suspension) FLONASE 50 MCG/ACT         Hydrocodone-Acetaminophen (Tab) NORCO 5-325 MG         Misc. Devices (Misc) Misc. Devices  This is an order for nocturnal oxygen, 2L nasal prongs.  Dx:  Nocturnal hypoxia, lowest 59% on overnight study.  G47.34           MARIA DOLORES 99 months   NPI [id number]        RaNITidine HCl (Tab) ZANTAC 150 MG Take 1 Tab by mouth 2 times a day.        Sertraline HCl (Tab) ZOLOFT 100 MG TAKE 2 TABLETS BY MOUTH EVERY DAY        Sulfamethoxazole-Trimethoprim (Tab) BACTRIM -160 MG Take 1 Tab by mouth every 12 hours for 10 days.        .                 Medicines prescribed today were sent to:     [facility] [address] [place] [address] - [id number] [place] [address] & [place] [id number] [facility] [address]    Phone: [phone] Fax: [phone]    Open 24 Hours?: No      Medication refill instructions:       If your prescription bottle indicates you have medication refills left, it is not necessary to call your provider’s office. Please contact your pharmacy and they will refill your medication.    If your prescription bottle indicates you do not have any refills left, you may request refills at any time through one of the following ways: The online [employer] system (except Urgent  Care), by calling your provider’s office, or by asking your pharmacy to contact your provider’s office with a refill request. Medication refills are processed only during regular business hours and may not be available until the next business day. Your provider may request additional information or to have a follow-up visit with you prior to refilling your medication.   *Please Note: Medication refills are assigned a new Rx number when refilled electronically. Your pharmacy may indicate that no refills were authorized even though a new prescription for the same medication is available at the pharmacy. Please request the medicine by name with the pharmacy before contacting your provider for a refill.           [employer] Access Code: Activation code not generated  Current [facility] Status: Active

## 2017-02-13 ASSESSMENT — ENCOUNTER SYMPTOMS
CHILLS: 1
BLOOD IN STOOL: 0
WHEEZING: 1
DIARRHEA: 1

## 2017-02-18 ENCOUNTER — PATIENT MESSAGE (OUTPATIENT)
Dept: MEDICAL GROUP | Facility: CLINIC | Age: 52
End: 2017-02-18

## 2017-02-18 NOTE — TELEPHONE ENCOUNTER
"From: Tanvi Krueger  To: Rebecca Ferro M.D.  Sent: 2/18/2017 12:30 AM PST  Subject: RE:biopsy    Had the nasal biopsy today. He didn't like what he saw meaning he's concerned. Should have results next week. He said it could be \"inflammation, infection or tumor\". I felt physical relief when he cut that out. My  said he saw the relief instantly on my face. So now time to sleep and relax this weekend.  ----- Message -----  From: Rebecca Ferro M.D.  Sent: 2/3/2017 6:07 PM PST  To: Tanvi Krueger  Subject: biopsy    Very good, should hopefully be nothing but some inflammation. However, better to know.  "

## 2017-02-26 ENCOUNTER — PATIENT MESSAGE (OUTPATIENT)
Dept: MEDICAL GROUP | Facility: CLINIC | Age: 52
End: 2017-02-26

## 2017-03-04 ENCOUNTER — PATIENT MESSAGE (OUTPATIENT)
Dept: MEDICAL GROUP | Facility: CLINIC | Age: 52
End: 2017-03-04

## 2017-03-07 ENCOUNTER — OFFICE VISIT (OUTPATIENT)
Dept: PULMONOLOGY | Facility: HOSPICE | Age: 52
End: 2017-03-07
Payer: COMMERCIAL

## 2017-03-07 VITALS
SYSTOLIC BLOOD PRESSURE: 122 MMHG | HEIGHT: 68 IN | TEMPERATURE: 97.3 F | DIASTOLIC BLOOD PRESSURE: 78 MMHG | BODY MASS INDEX: 39.1 KG/M2 | OXYGEN SATURATION: 96 % | HEART RATE: 69 BPM | WEIGHT: 258 LBS | RESPIRATION RATE: 16 BRPM

## 2017-03-07 DIAGNOSIS — R09.02 HYPOXEMIA: ICD-10-CM

## 2017-03-07 DIAGNOSIS — J45.30 REACTIVE AIRWAY DISEASE WITH WHEEZING, MILD PERSISTENT, UNCOMPLICATED: ICD-10-CM

## 2017-03-07 DIAGNOSIS — E66.9 OBESITY (BMI 35.0-39.9 WITHOUT COMORBIDITY): ICD-10-CM

## 2017-03-07 DIAGNOSIS — C85.90 LYMPHOMA, UNSPECIFIED BODY REGION, UNSPECIFIED LYMPHOMA TYPE (HCC): ICD-10-CM

## 2017-03-07 DIAGNOSIS — G47.31 CENTRAL SLEEP APNEA: ICD-10-CM

## 2017-03-07 PROCEDURE — 99214 OFFICE O/P EST MOD 30 MIN: CPT | Performed by: NURSE PRACTITIONER

## 2017-03-07 RX ORDER — OMEPRAZOLE 20 MG/1
CAPSULE, DELAYED RELEASE ORAL
COMMUNITY
Start: 2016-12-28 | End: 2017-01-01

## 2017-03-07 RX ORDER — ATORVASTATIN CALCIUM 40 MG/1
TABLET, FILM COATED ORAL
COMMUNITY
Start: 2016-12-28 | End: 2017-01-01

## 2017-03-07 RX ORDER — LEVOTHYROXINE SODIUM 0.03 MG/1
TABLET ORAL
COMMUNITY
Start: 2017-01-13 | End: 2017-02-01

## 2017-03-07 NOTE — PATIENT INSTRUCTIONS
Plan:    1) Pending CT sinus and neck with ENT follow up. She has concerns over lymphoma in her differential. Order for CT chest with contrast. Request consult records from Dr. Abdi, ENT.   2) Continue ASV with 2 LPM 02 bleed in. Repeat CNOX with 02 bleed in. Sleep hygiene discussed. Weight loss recommended.   3) She will remain off the Qvar. Encouraged to continue Proventil HFA inhaler as needed and prior to exercise. We discussed stepwise treatment approach to Asthma.  4) Up to date on Pneumovax 23 and Influenza vaccines.  5) Follow up after CT chest and CNOX, sooner if needed.

## 2017-03-07 NOTE — PROGRESS NOTES
Chief Complaint   Patient presents with   • Apnea       HPI:  Tanvi Krueger is a 51 y.o. year old female here today for follow-up on her complex sleep apnea. PSG indicated severe obstructive sleep apnea hypopnea was identified. The AHI was 81.4, the minimum saturation 79%, and saturations were less than 90% for 72.6% of the recording. The patient underwent a Pap titration. During treatment with Pap, central apneas comprised 64.4% of the total number of events. Neither CPAP nor bilevel were effective in normalizing the respiratory events secondary to treatment emergent central apneas. She has a history of loud nocturnal snoring, frequent nocturnal awakenings and mild daytime fatigue. In lab ASV titration on 10/17/2016 indicates an incomplete titration to ASV pressures tried. On the final ASV pressure of 7/4/16 her AHI was reduced to 36.8 with a mean 02 saturation of 89.6% with a low 02 86%. She was started on ASV 10/19/2016 with an EPAP of 9 and a min/max PS of 4/16. Compliance card download indicates an AHI of 2.6 with an average use of 5-6 hours at night. She has some issues with her mask interface and was referred back to her DME to be fit for a new mask. She feels her new mask is more comfortable. Overnight oximetry was performed 12/13/2016 which indicated a mean 02 saturation of 89.8% with 90 minutes spent with saturations less than 88%. Oxygen bleed in was added at 2 LPM. She does feel she tolerates the pressure. Overall she feels she sleeps better on therapy and wakes more refreshed. She denies any morning headaches.     In February 2016 she presented to the emergency department with chest pain. She underwent a cardiac evaluation including a stress test on February 25th 2016 that demonstrated a preserved left ventricular systolic ejection fraction of 73% and apparently did not suggest significant coronary disease. In the course of her evaluation continuous nocturnal oximetry was done on June 7, 2016. That  study demonstrates cyclic drops in saturation to as low as 53% on room air. She spent 334 minutes with a saturation less than 89%.    Her past medical history is notable for Asthma and allergy with symptoms mostly during the spring and fall pollen season. She is a lifelong nonsmoker. She underwent nasal septoplasty by Dr. Abdi. PFT's 10/4/2016 indicated an FEV1 1.83 L, 59% predicted with an FEV1/FVC ratio of 74, TLC of 91% and a DLCO of 101% predicted with a significant bronchodilator response. She was placed on Qvar 40 mcg 2 puffs INH bid along with an Albuterol HFA Inhaler. She states she recently stopped all of her medications including inhalers, except for her Zoloft which she remains on. She states the inhalers were not helping her symptoms. She denies current dyspnea. She denies current cough or mucous production.  She notes rare wheezing. She feels cold weather is a trigger. She denies any fevers or chills. She does have post nasal drip and is undergoing work up with ENT. She states she had a nasopharyngeal biopsy which was abnormal and she is pending a CT neck and sinus. She would like a CT chest as the differential on the biopsy includes lymphoma and she has a significant family of cancer and is worried about this.       Past Medical History   Diagnosis Date   • Dyslipidemia, goal LDL below 130    • Menopausal symptoms    • Lumbar disc narrowing 7/14/2009   • Cholesteatoma of middle ear and mastoid(385.33) 1992 surgery     mastoidectomy, prosthesis   • Vitamin d deficiency    • Recurrent sinus infections    • Hypothyroidism    • Family history of early CAD    • Elevated glycohemoglobin    • Snoring 09-     having sleep study soon   • Anesthesia      nausea/vomiting   • History of endometriosis    • Asthma      allergy related   • Depression    • Bowel habit changes      constipation   • Hiatus hernia syndrome    • Oxygen desaturation during sleep      O2 2 liters HS   • Tonsillitis    • Eosinophilic  esophagitis    • History of chickenpox        Past Surgical History   Procedure Laterality Date   • Myringotomy  4/30/2009     Performed by MAXIMUS WHITE at SURGERY SAME DAY [facility] ORS   • Abdominal hysterectomy total  1997     ovaries are gone   • Tympanotomy  6/24/2010     Performed by MAXIMUS WHITE at SURGERY SAME DAY [facility] ORS   • Exam under anesthesia  6/24/2010     Performed by MAXIMUS WHITE at SURGERY SAME DAY [facility]   • Myringotomy  4/20/2012     Performed by RYANNE ALICIA at [facility]GERY [facility]   • Ear middle exploration  4/3/2015     Performed by Naresh Abdi M.D. at SURGERY SAME DAY [facility]   • Ossicular reconstruction  4/3/2015     Performed by Naresh Abdi M.D. at SURGERY SAME DAY [facility]   • Septoplasty  9/9/2016     Procedure: SEPTOPLASTY;  Surgeon: Naresh Abdi M.D.;  Location: SURGERY SAME DAY [facility];  Service:    • Turbinoplasty Bilateral 9/9/2016     Procedure: TURBINOPLASTY;  Surgeon: Naresh Abdi M.D.;  Location: SURGERY SAME DAY [facility];  Service:    • Hysterectomy laparoscopy     • Arthroscopy, knee     • Tonsillectomy     • Sinuscope     • Other  2016     sinus surgery   • Thyroidectomy total Left 10/14/2016     Procedure: LEFT THROIDECTOMY;  Surgeon: Naresh Abdi M.D.;  Location: SURGERY SAME DAY [facility];  Service:        Family History   Problem Relation Age of Onset   • Cancer Mother 61     lung, smoker   • Heart Disease Mother 44     early heart attack   • Heart Disease Father 60     cabg x 3   • Hyperlipidemia Father    • Psychiatry Father      schizophrenia   • Hyperlipidemia Brother    • Cancer Maternal Grandmother 62     lung, non-smoker   • Psychiatry Maternal Grandmother      depression, severe   • Psychiatry Other      depression, great uncle   • Sleep Apnea Neg Hx        Social History     Social History   • Marital Status:      Spouse Name: N/A   • Number of Children: N/A   • Years of Education: N/A      Occupational History   • Not on file.     Social History Main Topics   • Smoking status: Never Smoker    • Smokeless tobacco: Never Used   • Alcohol Use: No   • Drug Use: No   • Sexual Activity:     Partners: Male     Other Topics Concern   • Not on file     Social History Narrative     ROS:  Constitutional: Denies fevers, chills, sweats, fatigue, weight loss  Eyes: Denies glasses, vision loss, pain, drainage, double vision  Ears/Nose/Mouth/Throat: Denies  ear ache, difficulty hearing, sore throat, persistent hoarseness, decayed teeth/toothache  Cardiovascular: Denies chest pain, tightness, palpitations, swelling in feet/legs, fainting, difficulty breathing when laying down  Respiratory: See HPI   GI: Denies heartburn, difficulty swallowing, nausea, vomiting, abdominal pain, diarrhea, constipation  : Denies frequent urination, painful urination  Integumentary: Denies rashes, lumps or color changes  MSK: Denies painful joints, sore muscles, and back pain.   Neurological: Denies frequent headaches, dizziness, weakness  Sleep: See HPI       Current Outpatient Prescriptions on File Prior to Visit   Medication Sig Dispense Refill   • sertraline (ZOLOFT) 100 MG Tab TAKE 2 TABLETS BY MOUTH EVERY  Tab 2   • ranitidine (ZANTAC) 150 MG Tab Take 1 Tab by mouth 2 times a day. 60 Tab 11   • hydrocodone-acetaminophen (NORCO) 5-325 MG Tab per tablet      • beclomethasone (QVAR) 40 MCG/ACT inhaler Inhale 2 Puffs by mouth 2 Times a Day. 1 Inhaler 5   • fluticasone (FLONASE) 50 MCG/ACT nasal spray      • albuterol (PROVENTIL HFA) 108 (90 BASE) MCG/ACT Aero Soln inhalation aerosol Inhale 2 Puffs by mouth every four hours as needed for Shortness of Breath (Wheezing). 1 Inhaler 0   • Misc. Devices Misc This is an order for nocturnal oxygen, 2L nasal prongs.  Dx:  Nocturnal hypoxia, lowest 59% on overnight study.  G47.34           MARIA DOLORES 99 months   NPI [id number] 1 Each 0   • Cholecalciferol (VITAMIN D) 2000 UNIT CAPS Take  "2,000 Units by mouth every day. 60 Cap 6     No current facility-administered medications on file prior to visit.     Doxycycline; Pcn; and Keflex    Blood pressure 122/78, pulse 69, temperature 36.3 °C (97.3 °F), resp. rate 16, height 1.727 m (5' 7.99\"), weight 117.028 kg (258 lb), last menstrual period 02/26/1995, SpO2 96 %.  PE:   Appearance: Well developed, well nourished, no acute distress  Eyes: PERRL, EOM intact, sclera white, conjunctiva moist  Ears: no lesions or deformities  Hearing: grossly intact  Nose: no lesions or deformities  Oropharynx: tongue normal, posterior pharynx without erythema or exudate  Neck: supple, trachea midline, no masses   Respiratory effort: no intercostal retractions or use of accessory muscles  Lung auscultation: no rales, rhonchi or wheezes  Heart auscultation: no murmur rub or gallop  Extremities: no cyanosis or edema  Abdomen: soft ,non tender, no masses  Gait and Station: normal  Digits and nails: no clubbing, cyanosis, petechiae or nodes.  Cranial nerves: grossly intact  Skin: no rashes, lesions or ulcers noted  Orientation: Oriented to time, person and place  Mood and affect: mood and affect appropriate, normal interaction with examiner  Judgement: Intact          Assessment:  1. Central sleep apnea  OVERNIGHT OXIMETRY   2. Obesity (BMI 35.0-39.9 without comorbidity) ([facility])     3. Reactive airway disease with wheezing, mild persistent, uncomplicated     4. Lymphoma, unspecified body region, unspecified lymphoma type ([facility])  CT-CHEST (THORAX) WITH    BUN+CREAT    Undergoing work up with ENT, abnormal nasopharynx biospy with Lymphoma in differential    5. Hypoxemia  OVERNIGHT OXIMETRY         Plan:    1) Pending CT sinus and neck with ENT follow up. She has concerns over lymphoma in her differential. Order for CT chest with contrast. Request consult records from Dr. bAdi, ENT.   2) Continue ASV with 2 LPM 02 bleed in. Repeat CNOX with 02 bleed in. Sleep hygiene discussed. " Weight loss recommended.   3) She will remain off the Qvar. Encouraged to continue Proventil HFA inhaler as needed and prior to exercise. We discussed stepwise treatment approach to Asthma.  4) Up to date on Pneumovax 23 and Influenza vaccines.  5) Follow up after CT chest and CNOX, sooner if needed.

## 2017-03-07 NOTE — MR AVS SNAPSHOT
"        Tanvi Krueger   3/7/2017 2:40 PM   Office Visit   MRN: [id number]    Department:  [facility]   Dept Phone:  [phone]    Description:  Female : 1965   Provider:  ROBYN Douglas           Reason for Visit     Apnea           Allergies as of 3/7/2017     Allergen Noted Reactions    Doxycycline 2014       Wheezing; diarrhea    Pcn [Penicillins] 2009   Unspecified    Unknown reaction as child    Keflex [Cephalexin] 2016   Unspecified    Yeast Infection      You were diagnosed with     Central sleep apnea   [202342]       Obesity (BMI 35.0-39.9 without comorbidity) (Formerly [facility])   [165180]       Reactive airway disease with wheezing, mild persistent, uncomplicated   [5259879]       Lymphoma, unspecified body region, unspecified lymphoma type (CMS-HCC)   [1688391]   Undergoing work up with ENT, abnormal nasopharynx biospy with Lymphoma in differential     Hypoxemia   [799.02.ICD-9-CM]         Vital Signs     Blood Pressure Pulse Temperature Respirations Height Weight    122/78 mmHg 69 36.3 °C (97.3 °F) 16 1.727 m (5' 7.99\") 117.028 kg (258 lb)    Body Mass Index Oxygen Saturation Last Menstrual Period Smoking Status          39.24 kg/m2 96% 1995 Never Smoker         Basic Information     Date Of Birth Sex Race Ethnicity Preferred Language    1965 Female White Non- English      Your appointments     Mar 17, 2017  9:00 AM   New Patient with Ben Weldon M.D.   [facility] (--)    [address] [place] [address], [address]  [facility] [id number] [phone]           Please bring Photo ID, Insurance Cards, All Medication Bottles and copies of any legal documents (such as Living Will, Power of ) If speaking a language besides English please bring an adult . Please arrive 30 minutes prior for check in and registration. You will be receiving a confirmation call a few days before your appointment from our automated call confirmation " system.            Mar 18, 2017  8:00 AM   CT-SOFT TISSUE NECK WITH with [facility] CT 1   [facility] [place]    [address]  [place] [id number] [phone]           Usually done with contrast.            Apr 17, 2017  4:00 PM   Established Patient Pul with ROBYN Douglas   [facility] (--)    [address]  [address]  [place] [id number] [phone]            May 01, 2017  4:20 PM   New Patient with Shahbaz Jon M.D.   [facility] ([facility])    [address]  [place] [id number] [phone]           Please bring Photo ID, Insurance Cards, All Medication Bottles and copies of any legal documents (such as Living Will, Power of ) If speaking a language besides English please bring an adult . Please arrive 30 minutes prior for check in and registration. You will be receiving a confirmation call a few days before your appointment from our automated call confirmation system.              Problem List              ICD-10-CM Priority Class Noted - Resolved    Lumbar disc narrowing M51.36   7/14/2009 - Present    Dyslipidemia, goal LDL below 130 E78.5 High  Unknown - Present    Depression F32.9   Unknown - Present    Vitamin D deficiency disease E55.9   Unknown - Present    Reactive airway disease with wheezing J45.909   8/17/2012 - Present    Hashimoto's thyroiditis E06.3   7/8/2013 - Present    Family history of early CAD Z82.49   Unknown - Present    Obesity (BMI 35.0-39.9 without comorbidity) ([facility]) E66.9   7/14/2014 - Present    Cholesteatoma of middle ear and mastoid H71.20   Unknown - Present    Conductive hearing loss, unilateral H90.2   4/3/2015 - Present    Acquired deflected nasal septum J34.2   9/9/2016 - Present    Hypertrophy of both inferior nasal turbinates J34.3   9/9/2016 - Present    Central sleep apnea G47.31   10/10/2016 - Present    Obstructive sleep  apnea G47.33   10/10/2016 - Present    Thyroid nodule E04.1   10/14/2016 - Present    Eosinophilic esophagitis K20.0   Unknown - Present    Oxygen desaturation during sleep G47.34   Unknown - Present      Health Maintenance        Date Due Completion Dates    MAMMOGRAM 3/1/2017 3/1/2016, 8/5/2014, 2/12/2014, 2/4/2014, 8/21/2012, 6/23/2011, 6/23/2011 (Prv Comp), 3/31/2006    Override on 6/23/2011: Previously completed    IMM DTaP/Tdap/Td Vaccine (2 - Td) 11/1/2022 11/1/2012    COLONOSCOPY 4/21/2024 4/21/2014            Current Immunizations     Influenza TIV (IM) 9/23/2015, 10/1/2012    Influenza Vaccine Quad Inj (Preserved) 12/12/2016  9:06 AM    Tdap Vaccine 11/1/2012      Below and/or attached are the medications your provider expects you to take. Review all of your home medications and newly ordered medications with your provider and/or pharmacist. Follow medication instructions as directed by your provider and/or pharmacist. Please keep your medication list with you and share with your provider. Update the information when medications are discontinued, doses are changed, or new medications (including over-the-counter products) are added; and carry medication information at all times in the event of emergency situations     Allergies:  DOXYCYCLINE - (reactions not documented)     PCN - Unspecified     KEFLEX - Unspecified               Medications  Valid as of: March 07, 2017 -  3:22 PM    Generic Name Brand Name Tablet Size Instructions for use    Albuterol Sulfate (Aero Soln) albuterol 108 (90 BASE) MCG/ACT Inhale 2 Puffs by mouth every four hours as needed for Shortness of Breath (Wheezing).        Beclomethasone Dipropionate (Aero Soln) QVAR 40 MCG/ACT Inhale 2 Puffs by mouth 2 Times a Day.        Cholecalciferol (Cap) Vitamin D 2000 UNITS Take 2,000 Units by mouth every day.        Fluticasone Propionate (Suspension) FLONASE 50 MCG/ACT         Hydrocodone-Acetaminophen (Tab) NORCO 5-325 MG         Misc.  Devices (Misc) Misc. Devices  This is an order for nocturnal oxygen, 2L nasal prongs.  Dx:  Nocturnal hypoxia, lowest 59% on overnight study.  G47.34           MARIA DOLORES 99 months   NPI [id number]        RaNITidine HCl (Tab) ZANTAC 150 MG Take 1 Tab by mouth 2 times a day.        Sertraline HCl (Tab) ZOLOFT 100 MG TAKE 2 TABLETS BY MOUTH EVERY DAY        .                 Medicines prescribed today were sent to:     [facility] [address] [place] [address] [facility]    [address] [facility] [place] [address]    Phone: [phone] Fax: [phone]    Open 24 Hours?: No      Medication refill instructions:       If your prescription bottle indicates you have medication refills left, it is not necessary to call your provider’s office. Please contact your pharmacy and they will refill your medication.    If your prescription bottle indicates you do not have any refills left, you may request refills at any time through one of the following ways: The online [facility] system (except Urgent Care), by calling your provider’s office, or by asking your pharmacy to contact your provider’s office with a refill request. Medication refills are processed only during regular business hours and may not be available until the next business day. Your provider may request additional information or to have a follow-up visit with you prior to refilling your medication.   *Please Note: Medication refills are assigned a new Rx number when refilled electronically. Your pharmacy may indicate that no refills were authorized even though a new prescription for the same medication is available at the pharmacy. Please request the medicine by name with the pharmacy before contacting your provider for a refill.           [employer] Access Code: Activation code not generated  Current [facility] Status: Active

## 2017-03-08 ENCOUNTER — HOSPITAL ENCOUNTER (OUTPATIENT)
Dept: LAB | Facility: MEDICAL CENTER | Age: 52
End: 2017-03-08
Attending: NURSE PRACTITIONER
Payer: COMMERCIAL

## 2017-03-08 ENCOUNTER — HOSPITAL ENCOUNTER (OUTPATIENT)
Dept: LAB | Facility: MEDICAL CENTER | Age: 52
End: 2017-03-08
Attending: FAMILY MEDICINE
Payer: COMMERCIAL

## 2017-03-08 DIAGNOSIS — E78.5 DYSLIPIDEMIA, GOAL LDL BELOW 130: ICD-10-CM

## 2017-03-08 DIAGNOSIS — E66.9 OBESITY (BMI 35.0-39.9 WITHOUT COMORBIDITY): ICD-10-CM

## 2017-03-08 DIAGNOSIS — E55.9 VITAMIN D DEFICIENCY DISEASE: ICD-10-CM

## 2017-03-08 DIAGNOSIS — E06.3 HASHIMOTO'S THYROIDITIS: ICD-10-CM

## 2017-03-08 DIAGNOSIS — Z15.89 METHYLENETETRAHYDROFOLATE REDUCTASE (MTHFR) GENE MUTATION: ICD-10-CM

## 2017-03-08 DIAGNOSIS — E04.1 THYROID NODULE: ICD-10-CM

## 2017-03-08 DIAGNOSIS — E03.9 IDIOPATHIC HYPOTHYROIDISM: ICD-10-CM

## 2017-03-08 DIAGNOSIS — K20.0 EOSINOPHILIC ESOPHAGITIS: ICD-10-CM

## 2017-03-08 LAB
25(OH)D3 SERPL-MCNC: 28 NG/ML (ref 30–100)
ALBUMIN SERPL BCP-MCNC: 4 G/DL (ref 3.2–4.9)
ALBUMIN/GLOB SERPL: 1.4 G/DL
ALP SERPL-CCNC: 82 U/L (ref 30–99)
ALT SERPL-CCNC: 28 U/L (ref 2–50)
ANION GAP SERPL CALC-SCNC: 5 MMOL/L (ref 0–11.9)
AST SERPL-CCNC: 21 U/L (ref 12–45)
BILIRUB SERPL-MCNC: 0.4 MG/DL (ref 0.1–1.5)
BUN SERPL-MCNC: 16 MG/DL (ref 8–22)
BUN SERPL-MCNC: 16 MG/DL (ref 8–22)
CALCIUM SERPL-MCNC: 9.6 MG/DL (ref 8.5–10.5)
CHLORIDE SERPL-SCNC: 108 MMOL/L (ref 96–112)
CHOLEST SERPL-MCNC: 323 MG/DL (ref 100–199)
CO2 SERPL-SCNC: 28 MMOL/L (ref 20–33)
CREAT SERPL-MCNC: 0.99 MG/DL (ref 0.5–1.4)
CREAT SERPL-MCNC: 0.99 MG/DL (ref 0.5–1.4)
ERYTHROCYTE [DISTWIDTH] IN BLOOD BY AUTOMATED COUNT: 47.5 FL (ref 35.9–50)
GLOBULIN SER CALC-MCNC: 2.9 G/DL (ref 1.9–3.5)
GLUCOSE SERPL-MCNC: 103 MG/DL (ref 65–99)
HCT VFR BLD AUTO: 41.5 % (ref 37–47)
HDLC SERPL-MCNC: 52 MG/DL
HGB BLD-MCNC: 12.5 G/DL (ref 12–16)
LDLC SERPL CALC-MCNC: 241 MG/DL
MCH RBC QN AUTO: 25.6 PG (ref 27–33)
MCHC RBC AUTO-ENTMCNC: 30.1 G/DL (ref 33.6–35)
MCV RBC AUTO: 84.9 FL (ref 81.4–97.8)
PLATELET # BLD AUTO: 228 K/UL (ref 164–446)
PMV BLD AUTO: 10.4 FL (ref 9–12.9)
POTASSIUM SERPL-SCNC: 3.8 MMOL/L (ref 3.6–5.5)
PROT SERPL-MCNC: 6.9 G/DL (ref 6–8.2)
RBC # BLD AUTO: 4.89 M/UL (ref 4.2–5.4)
SODIUM SERPL-SCNC: 141 MMOL/L (ref 135–145)
T4 FREE SERPL-MCNC: 0.7 NG/DL (ref 0.53–1.43)
TRIGL SERPL-MCNC: 152 MG/DL (ref 0–149)
TSH SERPL DL<=0.005 MIU/L-ACNC: 8.46 UIU/ML (ref 0.3–3.7)
WBC # BLD AUTO: 7.1 K/UL (ref 4.8–10.8)

## 2017-03-08 PROCEDURE — 82306 VITAMIN D 25 HYDROXY: CPT

## 2017-03-08 PROCEDURE — 36415 COLL VENOUS BLD VENIPUNCTURE: CPT

## 2017-03-08 PROCEDURE — 80053 COMPREHEN METABOLIC PANEL: CPT

## 2017-03-08 PROCEDURE — 85027 COMPLETE CBC AUTOMATED: CPT

## 2017-03-08 PROCEDURE — 80061 LIPID PANEL: CPT

## 2017-03-08 PROCEDURE — 84520 ASSAY OF UREA NITROGEN: CPT

## 2017-03-08 PROCEDURE — 84443 ASSAY THYROID STIM HORMONE: CPT

## 2017-03-08 PROCEDURE — 82565 ASSAY OF CREATININE: CPT

## 2017-03-08 PROCEDURE — 84439 ASSAY OF FREE THYROXINE: CPT

## 2017-03-09 ENCOUNTER — PATIENT MESSAGE (OUTPATIENT)
Dept: MEDICAL GROUP | Facility: CLINIC | Age: 52
End: 2017-03-09

## 2017-03-10 ENCOUNTER — PATIENT MESSAGE (OUTPATIENT)
Dept: MEDICAL GROUP | Facility: CLINIC | Age: 52
End: 2017-03-10

## 2017-03-10 ENCOUNTER — TELEPHONE (OUTPATIENT)
Dept: MEDICAL GROUP | Facility: CLINIC | Age: 52
End: 2017-03-10

## 2017-03-10 ENCOUNTER — PATIENT MESSAGE (OUTPATIENT)
Dept: PULMONOLOGY | Facility: HOSPICE | Age: 52
End: 2017-03-10

## 2017-03-10 NOTE — TELEPHONE ENCOUNTER
"Called patient, informed her \"Your blood count shows no anemia. Your platelets are normal. Your vitamin D has improved a little. Please keep taking the supplements. Your thyroid result is very abnormal. Your TSH is high which implies that your circulating thyroid hormone dose is too low. Your free T4 dose is also on the low end of normal. I believe your thyroid is no longer making adequate amounts of thyroid hormone and you would benefit from a thyroid supplement.  I feel the synthetic thyroid supplements are the best.  The natural supplements tend to turn off your natural thyroid production.  Your fasting blood sugar is slightly elevated. Your blood salts and liver enzymes are normal. Your kidney function has improved. Your cholesterol is very high. I suggest resuming the atorvastatin.\"    Pt stated she understood  "

## 2017-03-10 NOTE — TELEPHONE ENCOUNTER
----- Message from Your Healthcare Team sent at 3/10/2017  1:45 PM PST -----  Regarding: Non-Urgent Medical Question  Contact: [phone]  I have asked Dr. Jensen office to send over the pathology reports to your office.

## 2017-03-10 NOTE — TELEPHONE ENCOUNTER
----- Message from Rebecca Ferro M.D. sent at 3/9/2017  7:30 PM PST -----  Your blood count shows no anemia. Your platelets are normal. Your vitamin D has improved a little. Please keep taking the supplements. Your thyroid result is very abnormal. Your TSH is high which implies that your circulating thyroid hormone dose is too low. Your free T4 dose is also on the low end of normal. I believe your thyroid is no longer making adequate amounts of thyroid hormone and you would benefit from a thyroid supplement.  I feel the synthetic thyroid supplements are the best.  The natural supplements tend to turn off your natural thyroid production.  Your fasting blood sugar is slightly elevated. Your blood salts and liver enzymes are normal. Your kidney function has improved. Your cholesterol is very high. I suggest resuming the atorvastatin.

## 2017-03-11 ENCOUNTER — PATIENT MESSAGE (OUTPATIENT)
Dept: MEDICAL GROUP | Facility: CLINIC | Age: 52
End: 2017-03-11

## 2017-03-11 NOTE — TELEPHONE ENCOUNTER
From: Tanvi Krueger  To: Rebecca Ferro M.D.  Sent: 3/10/2017 6:28 PM PST  Subject: RE:medications, imaging    I will keep you informed. The reason I stopped taking the meds is because I was not feeling well taking them. Glands are swollen so I am hoping for some answers. Can we do a test to see what sensitivities I have with meds? Just a thought. Have a great weekend.  ----- Message -----  From: Rebecca Ferro M.D.  Sent: 3/10/2017 6:11 PM PST  To: Tanvi Krueger  Subject: medications, imaging    I am glad you are restarting those medications. The CT scans will be available to me and do not need to be scanned to me. All that I request is that you let me know when you have completed them.

## 2017-03-12 NOTE — TELEPHONE ENCOUNTER
From: Tanvi Krueger  To: Rebecca Ferro M.D.  Sent: 3/11/2017 5:45 PM PST  Subject: RE:medications, imaging    I have taken the atorvastatin and synthroid for two days. I have diarrhea and the electrical current feeling when I have diarrhea. Also, I have the feeling back of the med getting stuck. I have not had any of these ill effects since I stopped the meds. I think the problem is the atorvastatin.  ----- Message -----  From: Rebecca Ferro M.D.  Sent: 3/10/2017 6:11 PM PST  To: Tanvi Krueger  Subject: medications, imaging    I am glad you are restarting those medications. The CT scans will be available to me and do not need to be scanned to me. All that I request is that you let me know when you have completed them.

## 2017-03-17 ENCOUNTER — OFFICE VISIT (OUTPATIENT)
Dept: NEUROLOGY | Facility: MEDICAL CENTER | Age: 52
End: 2017-03-17
Payer: COMMERCIAL

## 2017-03-17 ENCOUNTER — HOSPITAL ENCOUNTER (OUTPATIENT)
Dept: LAB | Facility: MEDICAL CENTER | Age: 52
End: 2017-03-17
Attending: PSYCHIATRY & NEUROLOGY
Payer: COMMERCIAL

## 2017-03-17 VITALS
OXYGEN SATURATION: 97 % | SYSTOLIC BLOOD PRESSURE: 118 MMHG | WEIGHT: 258 LBS | HEIGHT: 68 IN | DIASTOLIC BLOOD PRESSURE: 86 MMHG | BODY MASS INDEX: 39.1 KG/M2 | TEMPERATURE: 98.4 F | HEART RATE: 71 BPM

## 2017-03-17 DIAGNOSIS — E06.3 HASHIMOTO'S THYROIDITIS: ICD-10-CM

## 2017-03-17 DIAGNOSIS — K20.0 EOSINOPHILIC ESOPHAGITIS: ICD-10-CM

## 2017-03-17 DIAGNOSIS — G47.34 OXYGEN DESATURATION DURING SLEEP: ICD-10-CM

## 2017-03-17 DIAGNOSIS — G47.33 OBSTRUCTIVE SLEEP APNEA: ICD-10-CM

## 2017-03-17 DIAGNOSIS — G47.31 CENTRAL SLEEP APNEA: ICD-10-CM

## 2017-03-17 LAB
25(OH)D3 SERPL-MCNC: 23 NG/ML (ref 30–100)
CRP SERPL HS-MCNC: 4.5 MG/L (ref 0–7.5)
THYROPEROXIDASE AB SERPL-ACNC: 0.8 IU/ML (ref 0–9)

## 2017-03-17 PROCEDURE — 86147 CARDIOLIPIN ANTIBODY EA IG: CPT | Mod: 91

## 2017-03-17 PROCEDURE — 86800 THYROGLOBULIN ANTIBODY: CPT

## 2017-03-17 PROCEDURE — 99243 OFF/OP CNSLTJ NEW/EST LOW 30: CPT | Performed by: PSYCHIATRY & NEUROLOGY

## 2017-03-17 PROCEDURE — 83516 IMMUNOASSAY NONANTIBODY: CPT

## 2017-03-17 PROCEDURE — 82306 VITAMIN D 25 HYDROXY: CPT

## 2017-03-17 PROCEDURE — 86376 MICROSOMAL ANTIBODY EACH: CPT

## 2017-03-17 PROCEDURE — 86141 C-REACTIVE PROTEIN HS: CPT

## 2017-03-17 PROCEDURE — 36415 COLL VENOUS BLD VENIPUNCTURE: CPT

## 2017-03-17 RX ORDER — LEVOTHYROXINE SODIUM 88 UG/1
88 TABLET ORAL
COMMUNITY
End: 2017-05-30 | Stop reason: SDUPTHER

## 2017-03-17 RX ORDER — MULTIVIT WITH MINERALS/LUTEIN
1 TABLET ORAL DAILY
COMMUNITY
End: 2017-03-29

## 2017-03-17 NOTE — PATIENT INSTRUCTIONS
IMPRESSION:    1. Trouble of speech and finding words for one year-- spells like-- lasting for seconds  2. 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

## 2017-03-17 NOTE — PROGRESS NOTES
NEUROLOGY NOTE    Referring Physician  Rebecca Ferro      CHIEF COMPLAINT:  Speech problems and word finding difficulty for one year  Silent aspiration  Hx of thyroid problems   Chief Complaint   Patient presents with   • New Patient     speech issues       PRESENT ILLNESS:   Speech problems and word finding difficulty for one year  Silent aspiration  Hx of thyroid problems     She is seeing ENT doctor, ---     For few seconds, could not find the words she would like to say    PAST MEDICAL HISTORY:  Past Medical History   Diagnosis Date   • Dyslipidemia, goal LDL below 130    • Menopausal symptoms    • Lumbar disc narrowing 7/14/2009   • Cholesteatoma of middle ear and mastoid(385.33) 1992 surgery     mastoidectomy, prosthesis   • Vitamin d deficiency    • Recurrent sinus infections    • Hypothyroidism    • Family history of early CAD    • Elevated glycohemoglobin    • Snoring 09-     having sleep study soon   • Anesthesia      nausea/vomiting   • History of endometriosis    • Asthma      allergy related   • Depression    • Bowel habit changes      constipation   • Hiatus hernia syndrome    • Oxygen desaturation during sleep      O2 2 liters HS   • Tonsillitis    • Eosinophilic esophagitis    • History of chickenpox        PAST SURGICAL HISTORY:  Past Surgical History   Procedure Laterality Date   • Myringotomy  4/30/2009     Performed by MAXIMUS WHITE at SURGERY SAME DAY [facility] ORS   • Abdominal hysterectomy total  1997     ovaries are gone   • Tympanotomy  6/24/2010     Performed by MAXIMUS WHITE at SURGERY SAME DAY [facility] ORS   • Exam under anesthesia  6/24/2010     Performed by MAXIMUS WHITE at SURGERY SAME DAY [facility] ORS   • Myringotomy  4/20/2012     Performed by RYANNE ALICIA at SURGERY [facility]   • Ear middle exploration  4/3/2015     Performed by Naresh Abdi M.D. at SURGERY SAME DAY [facility] ORS   • Ossicular reconstruction  4/3/2015     Performed by Naresh Abdi  M.D. at SURGERY SAME DAY [facility]   • Septoplasty  9/9/2016     Procedure: SEPTOPLASTY;  Surgeon: Naresh Abdi M.D.;  Location: SURGERY SAME DAY [facility];  Service:    • Turbinoplasty Bilateral 9/9/2016     Procedure: TURBINOPLASTY;  Surgeon: Naresh Abdi M.D.;  Location: SURGERY SAME DAY [facility];  Service:    • Hysterectomy laparoscopy     • Arthroscopy, knee     • Tonsillectomy     • Sinuscope     • Other  2016     sinus surgery   • Thyroidectomy total Left 10/14/2016     Procedure: LEFT THROIDECTOMY;  Surgeon: Naresh Abdi M.D.;  Location: SURGERY SAME DAY [facility];  Service:        FAMILY HISTORY:  Family History   Problem Relation Age of Onset   • Cancer Mother 61     lung, smoker   • Heart Disease Mother 44     early heart attack   • Heart Disease Father 60     cabg x 3   • Hyperlipidemia Father    • Psychiatry Father      schizophrenia   • Hyperlipidemia Brother    • Cancer Maternal Grandmother 62     lung, non-smoker   • Psychiatry Maternal Grandmother      depression, severe   • Psychiatry Other      depression, great uncle   • Sleep Apnea Neg Hx        SOCIAL HISTORY:  Social History     Social History   • Marital Status:      Spouse Name: N/A   • Number of Children: N/A   • Years of Education: N/A     Occupational History   • Not on file.     Social History Main Topics   • Smoking status: Never Smoker    • Smokeless tobacco: Never Used   • Alcohol Use: No   • Drug Use: No   • Sexual Activity:     Partners: Male     Other Topics Concern   • Not on file     Social History Narrative     ALLERGIES:  Allergies   Allergen Reactions   • Doxycycline      Wheezing; diarrhea   • Pcn [Penicillins] Unspecified     Unknown reaction as child   • Keflex [Cephalexin] Unspecified     Yeast Infection     TOBHX  History   Smoking status   • Never Smoker    Smokeless tobacco   • Never Used     ALCHX  History   Alcohol Use No     DRUGHX  History   Drug Use No           MEDICATIONS:  Current  "Outpatient Prescriptions   Medication   • levothyroxine (SYNTHROID) 88 MCG Tab   • vitamin E (VITAMIN E) 1000 UNIT Cap   • sertraline (ZOLOFT) 100 MG Tab   • albuterol (PROVENTIL HFA) 108 (90 BASE) MCG/ACT Aero Soln inhalation aerosol   • Cholecalciferol (VITAMIN D) 2000 UNIT CAPS   • ranitidine (ZANTAC) 150 MG Tab   • hydrocodone-acetaminophen (NORCO) 5-325 MG Tab per tablet   • beclomethasone (QVAR) 40 MCG/ACT inhaler   • fluticasone (FLONASE) 50 MCG/ACT nasal spray   • Misc. Devices Misc     No current facility-administered medications for this visit.       REVIEW OF SYSTEM:    Constitutional: Denies fevers, Denies weight changes   Eyes: Denies changes in vision, no eye pain   Ears/Nose/Throat/Mouth: Denies nasal congestion or sore throat   Cardiovascular: Denies chest pain or palpitations   Respiratory: central sleep apnea  Gastrointestinal/Hepatic: Denies abdominal pain, nausea, vomiting, diarrhea, constipation or GI bleeding   Genitourinary: Denies bladder dysfunction, dysuria or frequency   Musculoskeletal/Rheum: Denies joint pain and swelling   Skin/Breast: Denies rash, denies breast lumps or discharge   Neurological: speech problems and word finding difficulty  Psychiatric: denies mood disorder   Endocrine: thyroid dysfunction   Heme/Oncology/Lymph Nodes: Denies enlarged lymph nodes, denies brusing or known bleeding disorder   Allergic/Immunologic: Denies hx of allergies         PHYSICAL AND NEUROLOGICAL EXMAINATIONS:  VITAL SIGNS: /86 mmHg  Pulse 71  Temp(Src) 36.9 °C (98.4 °F)  Ht 1.727 m (5' 7.99\")  Wt 117.028 kg (258 lb)  BMI 39.24 kg/m2  SpO2 97%  LMP 02/26/1995  CURRENT WEIGHT:   BMI: Body mass index is 39.24 kg/(m^2).  PREVIOUS WEIGHTS:  Wt Readings from Last 25 Encounters:   03/17/17 117.028 kg (258 lb)   03/07/17 117.028 kg (258 lb)   01/10/17 115.667 kg (255 lb)   12/22/16 116.121 kg (256 lb)   12/12/16 117.935 kg (260 lb)   11/18/16 115.667 kg (255 lb)   10/19/16 115.667 kg (255 lb) "   10/14/16 115.3 kg (254 lb 3.1 oz)   10/10/16 115.667 kg (255 lb)   10/04/16 115.667 kg (255 lb)   09/21/16 116.121 kg (256 lb)   09/09/16 117.8 kg (259 lb 11.2 oz)   09/09/16 115.6 kg (254 lb 13.6 oz)   03/15/16 112.946 kg (249 lb)   02/25/16 112.492 kg (248 lb)   02/23/16 112.764 kg (248 lb 9.6 oz)   11/18/15 110.678 kg (244 lb)   11/16/15 108.863 kg (240 lb)   04/08/15 108.863 kg (240 lb)   03/30/15 113 kg (249 lb 1.9 oz)   12/23/14 107.956 kg (238 lb)   11/16/14 107.956 kg (238 lb)   11/13/14 107.049 kg (236 lb)   07/14/14 105.688 kg (233 lb)   06/26/14 107.049 kg (236 lb)       General appearance of patient: WDWN(+) NAD(+)    EYES  o Fundus : Papilledem(-) Exudates(-) Hemorrhage(-)  Nervous System  Orientation to time, place and person(+)  Memory normal(-)  Language: aphasia(-)  Knowledge: past(+) Current(+)  Attention(+)  Cranial Nerves  • Nerve 2: intact  • Nerve 3,4,6: intact  • Nerve 5 : intact  • Nerve 7: intact  • Nerve 8: impaired over left  • Nerve 9 & 10: intact  • Nerve 11: intact  • Nerve 12: intact  Muscle Power and muscle tone: symmetric, normal in upper and lower  Sensory System: Pin sensation intact(+)  Reflexes: symmetric throughout  Cerebellar Function FNP normal   Gait : Steady(+) TandemGait steady(+)  Heart and Vascular  Peripheral Vasucular system : Edema (-) Swelling(-)  RHB, Breathing sound clear  abdomen bowel sound normoactive  Extremities freely moveable  Joints no contracture  Neck pain     NEUROIMAGING: I reviewed the MRI/CT of brain       IMPRESSION:    1. Trouble of speech and finding words for one year-- spells like-- lasting for seconds  2. Silent aspiration 2016,  Central Sleep Apnea 2016 ( now on nocturnal O2 + ASV), Thyroid disease-Hashimoto- 2016, Cholesteatoma, replacement of ossicle bones,       PLAN/RECOMMENDATIONS:    Explain to the patient --- the problems of speech and word finding difficult could be secondary to brain malfunction ( not neuromuscle weakness since the  patient's trouble lies in finding the words instead of speaking out)    Could be mild forms of Hashimoto encephalitis  Will get  EEG  Blood tests    We will see Krueger in 2 months      MRI of brain-- Dec 2016-- not remarkable        SIGNATURE:  Ben Weldon      CC:  Rebecca Ferro M.D.

## 2017-03-17 NOTE — MR AVS SNAPSHOT
"        Tanvi Krueger   3/17/2017 9:00 AM   Office Visit   MRN: [id number]    Department:  [facility]   Dept Phone:  [phone]    Description:  Female : 1965   Provider:  Ben Weldon M.D.           Reason for Visit     New Patient speech issues      Allergies as of 3/17/2017     Allergen Noted Reactions    Doxycycline 2014       Wheezing; diarrhea    Pcn [Penicillins] 2009   Unspecified    Unknown reaction as child    Keflex [Cephalexin] 2016   Unspecified    Yeast Infection      You were diagnosed with     Hashimoto's thyroiditis   [570915]       Eosinophilic esophagitis   [530.13.ICD-9-CM]       Oxygen desaturation during sleep   [584485]       Central sleep apnea   [621451]       Obstructive sleep apnea   [811596]         Vital Signs     Blood Pressure Pulse Temperature Height Weight Body Mass Index    118/86 mmHg 71 36.9 °C (98.4 °F) 1.727 m (5' 7.99\") 117.028 kg (258 lb) 39.24 kg/m2    Oxygen Saturation Last Menstrual Period Smoking Status             97% 1995 Never Smoker          Basic Information     Date Of Birth Sex Race Ethnicity Preferred Language    1965 Female White Non- English      Your appointments     Mar 19, 2017  8:00 AM   CT-SOFT TISSUE NECK WITH with [facility] CT 1   [facility]    [address]  [place] [address]   [phone]           Usually done with contrast.            Mar 19, 2017  8:30 AM   CT BODY WITH with [facility]   [facility]    [address] [place] [address]   [phone]           Taking medications as regularly scheduled is strongly encouraged.  *For Abdominal CT-Patient needs to  oral contrast and instruction from the department at least 2 hours prior to exam. Patient may  contrast at any imaging facility.            2017  4:00 PM   Established Patient Pul with ROBYN Douglas " [facility] (--)    [address]  [address]  [place] [id number] [phone]            May 01, 2017  4:20 PM   New Patient with Shahbaz Jon M.D.   [facility] ([facility])    [address]  [place] [id number] [phone]           Please bring Photo ID, Insurance Cards, All Medication Bottles and copies of any legal documents (such as Living Will, Power of ) If speaking a language besides English please bring an adult . Please arrive 30 minutes prior for check in and registration. You will be receiving a confirmation call a few days before your appointment from our automated call confirmation system.            Jun 20, 2017  8:20 AM   Follow Up Visit with Ben Weldon M.D.   [facility] (--)    [address], [address]  [place] [address] [phone]           You will be receiving a confirmation call a few days before your appointment from our automated call confirmation system.              Problem List              ICD-10-CM Priority Class Noted - Resolved    Lumbar disc narrowing M51.36   7/14/2009 - Present    Dyslipidemia, goal LDL below 130 E78.5 High  Unknown - Present    Depression F32.9   Unknown - Present    Vitamin D deficiency disease E55.9   Unknown - Present    Reactive airway disease with wheezing J45.909   8/17/2012 - Present    Hashimoto's thyroiditis E06.3   7/8/2013 - Present    Family history of early CAD Z82.49   Unknown - Present    Obesity (BMI 35.0-39.9 without comorbidity) ([facility]) E66.9   7/14/2014 - Present    Cholesteatoma of middle ear and mastoid H71.20   Unknown - Present    Conductive hearing loss, unilateral H90.2   4/3/2015 - Present    Acquired deflected nasal septum J34.2   9/9/2016 - Present    Hypertrophy of both inferior nasal turbinates J34.3   9/9/2016 - Present    Central sleep apnea G47.31   10/10/2016 - Present    Obstructive sleep apnea G47.33   10/10/2016 -  Present    Thyroid nodule E04.1   10/14/2016 - Present    Eosinophilic esophagitis K20.0   Unknown - Present    Oxygen desaturation during sleep G47.34   Unknown - Present      Health Maintenance        Date Due Completion Dates    MAMMOGRAM 3/1/2017 3/1/2016, 8/5/2014, 2/12/2014, 2/4/2014, 8/21/2012, 6/23/2011, 6/23/2011 (Prv Comp), 3/31/2006    Override on 6/23/2011: Previously completed    IMM DTaP/Tdap/Td Vaccine (2 - Td) 11/1/2022 11/1/2012    COLONOSCOPY 4/21/2024 4/21/2014            Current Immunizations     Influenza TIV (IM) 9/23/2015, 10/1/2012    Influenza Vaccine Quad Inj (Preserved) 12/12/2016  9:06 AM    Tdap Vaccine 11/1/2012      Below and/or attached are the medications your provider expects you to take. Review all of your home medications and newly ordered medications with your provider and/or pharmacist. Follow medication instructions as directed by your provider and/or pharmacist. Please keep your medication list with you and share with your provider. Update the information when medications are discontinued, doses are changed, or new medications (including over-the-counter products) are added; and carry medication information at all times in the event of emergency situations     Allergies:  DOXYCYCLINE - (reactions not documented)     PCN - Unspecified     KEFLEX - Unspecified               Medications  Valid as of: March 17, 2017 -  9:45 AM    Generic Name Brand Name Tablet Size Instructions for use    Albuterol Sulfate (Aero Soln) albuterol 108 (90 BASE) MCG/ACT Inhale 2 Puffs by mouth every four hours as needed for Shortness of Breath (Wheezing).        Beclomethasone Dipropionate (Aero Soln) QVAR 40 MCG/ACT Inhale 2 Puffs by mouth 2 Times a Day.        Cholecalciferol (Cap) Vitamin D 2000 UNITS Take 2,000 Units by mouth every day.        Fluticasone Propionate (Suspension) FLONASE 50 MCG/ACT         Hydrocodone-Acetaminophen (Tab) NORCO 5-325 MG         Levothyroxine Sodium (Tab) SYNTHROID  88 MCG Take 88 mcg by mouth Every morning on an empty stomach.        Misc. Devices (Misc) Misc. Devices  This is an order for nocturnal oxygen, 2L nasal prongs.  Dx:  Nocturnal hypoxia, lowest 59% on overnight study.  G47.34           MARIA DOLORES 99 months   NPI [id number]        RaNITidine HCl (Tab) ZANTAC 150 MG Take 1 Tab by mouth 2 times a day.        Sertraline HCl (Tab) ZOLOFT 100 MG TAKE 2 TABLETS BY MOUTH EVERY DAY        Vitamin E (Cap) VITAMIN E 1000 UNIT Take 1 Cap by mouth every day.        .                 Medicines prescribed today were sent to:     [facility] [address] [place] [address], [place] - [address] [facility]    [address] [place] [address]    Phone: [phone] Fax: [phone]    Open 24 Hours?: No      Medication refill instructions:       If your prescription bottle indicates you have medication refills left, it is not necessary to call your provider’s office. Please contact your pharmacy and they will refill your medication.    If your prescription bottle indicates you do not have any refills left, you may request refills at any time through one of the following ways: The online [employer] system (except Urgent Care), by calling your provider’s office, or by asking your pharmacy to contact your provider’s office with a refill request. Medication refills are processed only during regular business hours and may not be available until the next business day. Your provider may request additional information or to have a follow-up visit with you prior to refilling your medication.   *Please Note: Medication refills are assigned a new Rx number when refilled electronically. Your pharmacy may indicate that no refills were authorized even though a new prescription for the same medication is available at the pharmacy. Please request the medicine by name with the pharmacy before contacting your provider for a refill.        Your To Do List     Future Labs/Procedures  Complete By Expires    MARCELO ANTIBODY WITH REFLEX  As directed 3/18/2018    RHEUMATOID ARTHRITIS FACTOR  As directed 3/18/2018    VITAMIN B12  As directed 3/18/2018    WESTERGREN SED RATE  As directed 3/18/2018      Referral     A referral request has been sent to our patient care coordination department. Please allow 3-5 business days for us to process this request and contact you either by phone or mail. If you do not hear from us by the 5th business day, please call us at [phone].        Instructions        IMPRESSION:    1. Trouble of speech and finding words for one year-- spells like-- lasting for seconds  2. 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              Palingen Access Code: Activation code not generated  Current Palingen Status: Active

## 2017-03-19 ENCOUNTER — HOSPITAL ENCOUNTER (OUTPATIENT)
Dept: RADIOLOGY | Facility: MEDICAL CENTER | Age: 52
End: 2017-03-19
Attending: NURSE PRACTITIONER
Payer: COMMERCIAL

## 2017-03-19 ENCOUNTER — HOSPITAL ENCOUNTER (OUTPATIENT)
Dept: RADIOLOGY | Facility: MEDICAL CENTER | Age: 52
End: 2017-03-19
Attending: OTOLARYNGOLOGY
Payer: COMMERCIAL

## 2017-03-19 DIAGNOSIS — R22.1 MASS OF PARAPHARYNGEAL SPACE: ICD-10-CM

## 2017-03-19 LAB
CARDIOLIPIN IGA SER IA-ACNC: 1 APL (ref 0–11)
CARDIOLIPIN IGG SER IA-ACNC: 3 GPL (ref 0–14)
CARDIOLIPIN IGM SER IA-ACNC: 2 MPL (ref 0–12)

## 2017-03-19 PROCEDURE — 70491 CT SOFT TISSUE NECK W/DYE: CPT

## 2017-03-19 PROCEDURE — 700117 HCHG RX CONTRAST REV CODE 255: Performed by: OTOLARYNGOLOGY

## 2017-03-19 PROCEDURE — 71260 CT THORAX DX C+: CPT

## 2017-03-19 PROCEDURE — 700117 HCHG RX CONTRAST REV CODE 255: Performed by: NURSE PRACTITIONER

## 2017-03-19 RX ADMIN — IOHEXOL 75 ML: 350 INJECTION, SOLUTION INTRAVENOUS at 08:45

## 2017-03-20 ENCOUNTER — TELEPHONE (OUTPATIENT)
Dept: NEUROLOGY | Facility: MEDICAL CENTER | Age: 52
End: 2017-03-20

## 2017-03-20 LAB — THYROGLOB AB SERPL-ACNC: <0.9 IU/ML (ref 0–4)

## 2017-03-20 NOTE — TELEPHONE ENCOUNTER
Due to Vit D deficiency, please take vit D-3 2000 unit-4000 unit daily. YP    Called and spoke with pt. All information was given. She is already taking Vit D 2000 units. She will increase it to 4000 units daily now. She will keep her already scheduled appointments. LESLEE

## 2017-03-20 NOTE — TELEPHONE ENCOUNTER
Due to Vit D deficiency, please take vit D-3 2000 unit-4000 unit daily. YP    Called and LM for pt to return my call to discuss all information given. LESLEE

## 2017-03-21 ENCOUNTER — PATIENT MESSAGE (OUTPATIENT)
Dept: MEDICAL GROUP | Facility: CLINIC | Age: 52
End: 2017-03-21

## 2017-03-21 ENCOUNTER — TELEPHONE (OUTPATIENT)
Dept: SLEEP MEDICINE | Facility: MEDICAL CENTER | Age: 52
End: 2017-03-21

## 2017-03-21 NOTE — TELEPHONE ENCOUNTER
Patient sent [employer] message asking for CT scan results, done through [employer] 03/19/2017.    Please Advise

## 2017-03-21 NOTE — TELEPHONE ENCOUNTER
CT scan overall looks fine. Lymph nodes look good, there is one that is slightly prominent, but radiologist is not concerned about malignancy. Nothing we need to act on now. Does patient have any respiratory symptoms currently - cough/phlegm/wheezing? I just want to make sure.

## 2017-03-21 NOTE — TELEPHONE ENCOUNTER
Advised patient of below message through [employer]. Asked Eunice Khan's question. Will await to hear back from her.

## 2017-03-22 ENCOUNTER — PATIENT MESSAGE (OUTPATIENT)
Dept: NEUROLOGY | Facility: MEDICAL CENTER | Age: 52
End: 2017-03-22

## 2017-03-22 ENCOUNTER — PATIENT MESSAGE (OUTPATIENT)
Dept: MEDICAL GROUP | Facility: CLINIC | Age: 52
End: 2017-03-22

## 2017-03-22 LAB — GAD65 AB SER IA-ACNC: <5 IU/ML (ref 0–5)

## 2017-03-22 NOTE — TELEPHONE ENCOUNTER
Patient states she always has drainage, sinus problems. She does ENT for that and doesn't need anything from us at this time.

## 2017-03-23 ENCOUNTER — PATIENT MESSAGE (OUTPATIENT)
Dept: MEDICAL GROUP | Facility: CLINIC | Age: 52
End: 2017-03-23

## 2017-03-23 ENCOUNTER — TELEPHONE (OUTPATIENT)
Dept: NEUROLOGY | Facility: MEDICAL CENTER | Age: 52
End: 2017-03-23

## 2017-03-23 NOTE — TELEPHONE ENCOUNTER
"Thanks the patient for notifying us   Wish her treatment of \"tumor\" is successful     Patient's word finding difficulty might be secondary to the tumor?   At times, tumor cells may spread into nervous system. YP    E-mail sent to nitin CAMILO  "

## 2017-03-23 NOTE — TELEPHONE ENCOUNTER
Pt is sending this message via [employer] e-mail. Please advise. Thank you. KA    Dr. Weldon,  I wanted to make you aware of Cat Scan with contrast results since we are trying to figure out some issues.  Dr. Ferro is my primary care physician and I informed her of my questions regarding the results.  any other thoughts or input is appreciated.   Thank you.  Tanvi Krueger

## 2017-03-23 NOTE — TELEPHONE ENCOUNTER
From: Tanvi Krueger  To: Rebecca Ferro M.D.  Sent: 3/23/2017 9:52 AM PDT  Subject: RE:never received report on the cancer biopsy    I have it with me. Where do I send it so you know you get it?  ----- Message -----  From: Rebecca Ferro M.D.  Sent: 3/23/2017 9:09 AM PDT  To: Tanvi Krueger  Subject: never received report on the cancer biopsy    You are right, there isn't very good coordination so far. I do not have any results from your ear nose and throat physician. I do see the note and agree that the CT of the lungs looks pretty good overall as long as you are not having a productive cough, fever or sweats.

## 2017-03-23 NOTE — TELEPHONE ENCOUNTER
From: Tanvi Krueger  To: Rebecca Ferro M.D.  Sent: 3/23/2017 8:55 AM PDT  Subject: RE:CT results    See responses from Fatou Pina in my inbox.....I don't think it made it very far ...... I don't think my doctors are working together . Did you ever see the report from my biopsy showing the results of the stain with cancer markers (I know that doesn't mean I have it) just trying to be proactive.  ----- Message -----  From: Rebecca Ferro M.D.  Sent: 3/23/2017 8:46 AM PDT  To: Tanvi Krueger  Subject: CT results    The results in the lung are vague. This might represent an infection. What does pulmonology say?

## 2017-03-23 NOTE — TELEPHONE ENCOUNTER
Please get the biopsy report It was done through [employer] on 2/17. CLIA [id number]. Phone # [phone]

## 2017-03-23 NOTE — TELEPHONE ENCOUNTER
From: Tanvi Krueger  To: Rebecca Ferro M.D.  Sent: 3/23/2017 9:54 AM PDT  Subject: RE:never received report on the cancer biopsy    It was done through [employer] on 2/17. MINA [id number]. Phone # [phone]  ----- Message -----  From: Rebecca Ferro M.D.  Sent: 3/23/2017 9:09 AM PDT  To: Tanvi Krueger  Subject: never received report on the cancer biopsy    You are right, there isn't very good coordination so far. I do not have any results from your ear nose and throat physician. I do see the note and agree that the CT of the lungs looks pretty good overall as long as you are not having a productive cough, fever or sweats.

## 2017-03-24 NOTE — TELEPHONE ENCOUNTER
From: Tanvi Krueger  To: Rebecca Ferro M.D.  Sent: 3/23/2017 5:43 PM PDT  Subject: RE:biopsy of the mass    Ok I will see if I can get in. My concerns are if I have cancer, if the chest issue is a break away of the neck. Should I go ahead and get my mammogram?   Yes I am cautiously concerned and greatly appreciate your patience and advice.  ----- Message -----  From: Rebecca Ferro M.D.  Sent: 3/23/2017 5:37 PM PDT  To: Tanvi Krueger  Subject: biopsy of the mass    I have to say I also agree that sounds like the best plan. You might want to call the office and see if you can get a follow-up appointment.

## 2017-03-24 NOTE — TELEPHONE ENCOUNTER
From: Tanvi Krueger  To: Rebecca Ferro M.D.  Sent: 3/23/2017 5:26 PM PDT  Subject: RE:oxygen results    Thank you for the update. I don't have an appt. he said do the scan and then we'd probably have to do a biopsy. I left a message yesterday asking him to call me about the results and as of now no response. He wasn't aware of the amendment until I called him weeks ago and asked about it. He said he'd call the company for interpretation. No response back. I'm hoping the removal of the neck mass will remedy the apnea issue. I can hope at least. I don't know what to do at this point.  ----- Message -----  From: Rebecca Ferro M.D.  Sent: 3/23/2017 5:10 PM PDT  To: Tanvi Krueger  Subject: oxygen results    I have received your overnight pulse ox results. You do go down to an oxygen level of 85% even on the 2 L of oxygen that you were on. However, this is momentary and not very significant. Most of the night you were over 90%. I do not think the oxygen needs to be adjusted. I have not received the biopsy result yet. I think it needs to be posted in the chart so that should be done by midday tomorrow. When do you have an appointment with the ear nose and throat doctor again?

## 2017-03-24 NOTE — TELEPHONE ENCOUNTER
From: Tanvi Krueger  To: Rebecca Ferro M.D.  Sent: 3/23/2017 9:56 AM PDT  Subject: Oximetery results    I did an oxygen test on Sunday night 3/19. I do not have results yet. I had night sweats one night last week.  ----- Message -----  From: Rebecca Ferro M.D.  Sent: 3/23/2017 9:09 AM PDT  To: Tanvi Krueger  Subject: never received report on the cancer biopsy    You are right, there isn't very good coordination so far. I do not have any results from your ear nose and throat physician. I do see the note and agree that the CT of the lungs looks pretty good overall as long as you are not having a productive cough, fever or sweats.

## 2017-03-28 DIAGNOSIS — R93.89 ABNORMAL CT SCAN, CHEST: ICD-10-CM

## 2017-03-29 ENCOUNTER — PATIENT MESSAGE (OUTPATIENT)
Dept: MEDICAL GROUP | Facility: CLINIC | Age: 52
End: 2017-03-29

## 2017-03-29 ENCOUNTER — APPOINTMENT (OUTPATIENT)
Dept: ADMISSIONS | Facility: MEDICAL CENTER | Age: 52
End: 2017-03-29
Attending: OTOLARYNGOLOGY
Payer: COMMERCIAL

## 2017-03-29 ENCOUNTER — PATIENT MESSAGE (OUTPATIENT)
Dept: NEUROLOGY | Facility: MEDICAL CENTER | Age: 52
End: 2017-03-29

## 2017-03-29 NOTE — TELEPHONE ENCOUNTER
"From: Tanvi Pickens  To: Angy Bañuelos, Med Ass't  Sent: 3/29/2017 6:21 AM PDT  Subject: Test Result Question    Marine Travis Are all of the results back from my blood work? Just checking. Also I will keep my appointments with Dr. Weldon and see him at my next appt. thank you.  ----- Message -----  From: Angy Bañuelos, Med Ass't  Sent: 3/23/2017 8:48 AM PDT  To: Tanvi Pickens  Subject: RE: Test Result Question    Good morning Tanvi, here is the reply to you e-mail from Dr. Weldon. Thank you. Angy CROFT for Dr. Weldon.      \" Thanks the patient for notifying us   Wish her treatment of \"tumor\" is successful     Patient's word finding difficulty might be secondary to the tumor?   At times, tumor cells may spread into nervous system       ----- Message -----   From: TANVI PICKENS   Sent: 3/22/2017 10:51 PM PDT   To: Ben Weldon M.D.  Subject: Test Result Question    Dr. Weldon, I wanted to make you aware of Cat Scan with contrast results since we are trying to figure out some issues. Dr. Ferro is my primary care physician and I informed her of my questions regarding the results. any other thoughts or input is appreciated. Thank you. Tanvi Pickens  "

## 2017-03-29 NOTE — TELEPHONE ENCOUNTER
"From: Tanvi Valenciad  To: Angy Bañuelos, Med Ass't  Sent: 3/29/2017 8:37 AM PDT  Subject: Test Result Question    Yes I am taking it twice, once ini The morning and one in the evening. Thank you!  ----- Message -----  From: Angy Bañuelos, Med Ass't  Sent: 3/29/2017 8:33 AM PDT  To: Tanvi Pickens  Subject: RE: Test Result Question    Yes do please keep your appointment. The blood work came back with in Normal limits except your Vit D level it was low at 23 and the range is 30 -100. You are still taking a Vit D-3 supplement over the counter 2000 units twice a day faithfully? If you are please increase it to 4000 units twice a day. Please let me know. Thank you. Angy CROFT for Dr. Weldon.     ----- Message -----   From: TANVI PICKENS   Sent: 3/29/2017 6:21 AM PDT   To: Angy REYESRashad Jayon, Med Ass't  Subject: Test Result Question    Marine Travis Are all of the results back from my blood work? Just checking. Also I will keep my appointments with Dr. Weldon and see him at my next appt. thank you.  ----- Message -----  From: Angy REYESRashad Edda, Med Ass't  Sent: 3/23/2017 8:48 AM PDT  To: Tnavi Pickens  Subject: RE: Test Result Question    Good morning Tanvi, here is the reply to you e-mail from Dr. Weldon. Thank you. Angy CROFT for Dr. Weldon.      \" Thanks the patient for notifying us   Wish her treatment of \"tumor\" is successful     Patient's word finding difficulty might be secondary to the tumor?   At times, tumor cells may spread into nervous system       ----- Message -----   From: TANVI PICKENS   Sent: 3/22/2017 10:51 PM PDT   To: Ben Weldon M.D.  Subject: Test Result Question    Dr. Weldon, I wanted to make you aware of Cat Scan with contrast results since we are trying to figure out some issues. Dr. Ferro is my primary care physician and I informed her of my questions regarding the results. any other thoughts or input is appreciated. Thank you. Tanvi Pickens"

## 2017-03-31 ENCOUNTER — HOSPITAL ENCOUNTER (OUTPATIENT)
Facility: MEDICAL CENTER | Age: 52
End: 2017-03-31
Attending: OTOLARYNGOLOGY | Admitting: OTOLARYNGOLOGY
Payer: COMMERCIAL

## 2017-03-31 VITALS
HEART RATE: 94 BPM | BODY MASS INDEX: 39.66 KG/M2 | DIASTOLIC BLOOD PRESSURE: 85 MMHG | RESPIRATION RATE: 16 BRPM | OXYGEN SATURATION: 93 % | TEMPERATURE: 99.6 F | SYSTOLIC BLOOD PRESSURE: 140 MMHG | WEIGHT: 261.69 LBS | HEIGHT: 68 IN

## 2017-03-31 PROBLEM — J35.1 HYPERTROPHY OF TONSILS ALONE: Status: ACTIVE | Noted: 2017-03-31

## 2017-03-31 PROBLEM — J31.1 CHRONIC NASOPHARYNGITIS: Status: ACTIVE | Noted: 2017-03-31

## 2017-03-31 PROCEDURE — 700111 HCHG RX REV CODE 636 W/ 250 OVERRIDE (IP)

## 2017-03-31 PROCEDURE — A9270 NON-COVERED ITEM OR SERVICE: HCPCS

## 2017-03-31 PROCEDURE — 700101 HCHG RX REV CODE 250

## 2017-03-31 PROCEDURE — 88185 FLOWCYTOMETRY/TC ADD-ON: CPT | Mod: 91

## 2017-03-31 PROCEDURE — 501838 HCHG SUTURE GENERAL: Performed by: OTOLARYNGOLOGY

## 2017-03-31 PROCEDURE — 501536 HCHG TEETHGUARD (ENT): Performed by: OTOLARYNGOLOGY

## 2017-03-31 PROCEDURE — 500331 HCHG COTTONOID, SURG PATTIE: Performed by: OTOLARYNGOLOGY

## 2017-03-31 PROCEDURE — 700102 HCHG RX REV CODE 250 W/ 637 OVERRIDE(OP)

## 2017-03-31 PROCEDURE — 110371 HCHG SHELL REV 272: Performed by: OTOLARYNGOLOGY

## 2017-03-31 PROCEDURE — 160002 HCHG RECOVERY MINUTES (STAT): Performed by: OTOLARYNGOLOGY

## 2017-03-31 PROCEDURE — 502573 HCHG PACK, ENT: Performed by: OTOLARYNGOLOGY

## 2017-03-31 PROCEDURE — 88184 FLOWCYTOMETRY/ TC 1 MARKER: CPT

## 2017-03-31 PROCEDURE — 502240 HCHG MISC OR SUPPLY RC 0272: Performed by: OTOLARYNGOLOGY

## 2017-03-31 PROCEDURE — 160035 HCHG PACU - 1ST 60 MINS PHASE I: Performed by: OTOLARYNGOLOGY

## 2017-03-31 PROCEDURE — 88305 TISSUE EXAM BY PATHOLOGIST: CPT | Mod: 59

## 2017-03-31 PROCEDURE — 160009 HCHG ANES TIME/MIN: Performed by: OTOLARYNGOLOGY

## 2017-03-31 PROCEDURE — A4606 OXYGEN PROBE USED W OXIMETER: HCPCS | Performed by: OTOLARYNGOLOGY

## 2017-03-31 PROCEDURE — 160036 HCHG PACU - EA ADDL 30 MINS PHASE I: Performed by: OTOLARYNGOLOGY

## 2017-03-31 PROCEDURE — 110382 HCHG SHELL REV 271: Performed by: OTOLARYNGOLOGY

## 2017-03-31 PROCEDURE — 160039 HCHG SURGERY MINUTES - EA ADDL 1 MIN LEVEL 3: Performed by: OTOLARYNGOLOGY

## 2017-03-31 PROCEDURE — 160028 HCHG SURGERY MINUTES - 1ST 30 MINS LEVEL 3: Performed by: OTOLARYNGOLOGY

## 2017-03-31 PROCEDURE — 160048 HCHG OR STATISTICAL LEVEL 1-5: Performed by: OTOLARYNGOLOGY

## 2017-03-31 RX ORDER — SODIUM CHLORIDE, SODIUM LACTATE, POTASSIUM CHLORIDE, CALCIUM CHLORIDE 600; 310; 30; 20 MG/100ML; MG/100ML; MG/100ML; MG/100ML
1000 INJECTION, SOLUTION INTRAVENOUS ONCE
Status: COMPLETED | OUTPATIENT
Start: 2017-03-31 | End: 2017-03-31

## 2017-03-31 RX ORDER — HYDROCODONE BITARTRATE AND ACETAMINOPHEN 5; 325 MG/1; MG/1
1-2 TABLET ORAL EVERY 4 HOURS PRN
Qty: 40 TAB | Refills: 0 | Status: SHIPPED | OUTPATIENT
Start: 2017-03-31 | End: 2017-06-13

## 2017-03-31 RX ORDER — OXYMETAZOLINE HYDROCHLORIDE 0.05 G/100ML
SPRAY NASAL
Status: COMPLETED
Start: 2017-03-31 | End: 2017-03-31

## 2017-03-31 RX ORDER — OXYCODONE HCL 5 MG/5 ML
SOLUTION, ORAL ORAL
Status: COMPLETED
Start: 2017-03-31 | End: 2017-03-31

## 2017-03-31 RX ORDER — ONDANSETRON 4 MG/1
4 TABLET, ORALLY DISINTEGRATING ORAL EVERY 8 HOURS PRN
Qty: 20 TAB | Refills: 0 | Status: SHIPPED | OUTPATIENT
Start: 2017-03-31 | End: 2017-06-13

## 2017-03-31 RX ORDER — LIDOCAINE HYDROCHLORIDE 10 MG/ML
INJECTION, SOLUTION INFILTRATION; PERINEURAL
Status: DISCONTINUED
Start: 2017-03-31 | End: 2017-03-31 | Stop reason: HOSPADM

## 2017-03-31 RX ADMIN — OXYCODONE HYDROCHLORIDE 5 MG: 5 SOLUTION ORAL at 13:00

## 2017-03-31 RX ADMIN — FENTANYL CITRATE 50 MCG: 50 INJECTION, SOLUTION INTRAMUSCULAR; INTRAVENOUS at 12:45

## 2017-03-31 RX ADMIN — OXYMETAZOLINE HYDROCHLORIDE 2 SPRAY: 5 SPRAY NASAL at 09:45

## 2017-03-31 RX ADMIN — SODIUM CHLORIDE, SODIUM LACTATE, POTASSIUM CHLORIDE, CALCIUM CHLORIDE 1000 ML: 600; 310; 30; 20 INJECTION, SOLUTION INTRAVENOUS at 10:25

## 2017-03-31 ASSESSMENT — PAIN SCALES - GENERAL
PAINLEVEL_OUTOF10: 6
PAINLEVEL_OUTOF10: 5
PAINLEVEL_OUTOF10: 4
PAINLEVEL_OUTOF10: 0
PAINLEVEL_OUTOF10: 5
PAINLEVEL_OUTOF10: 3
PAINLEVEL_OUTOF10: 5
PAINLEVEL_OUTOF10: 3

## 2017-03-31 NOTE — OP REPORT
DATE OF SERVICE:  03/31/2017    PREOPERATIVE DIAGNOSES:  1.  Right nasopharyngeal mass/chronic nasal pharyngitis.  2.  Base of tongue mass.    OPERATIVE FINDINGS:  1.  The right nasopharynx continues to be erythematous and inflamed.  Biopsies   were taken today.    2.  Base of tongue was found to have large lingual tonsils, has a slight   firmness to the tonsil on the right side, biopsies were taken in this area.    No discrete mass was noted.  No ulcerations seen today.    DESCRIPTION OF PROCEDURE:  Patient was intubated by anesthesia.  Once adequate   levels of anesthesia were achieved, head of bed was rotated 90 degrees   towards the surgeon.  Head wrap and eye protection and upper tooth protector   were placed.  The base of tongue was palpated.  No discrete mass or lesion was   noted.  A direct laryngoscopy was then performed.  Base of tongue was   visualized along with the vallecula in all areas.  No discrete mass or lesion   was noted.  There was a general friability to the lingual tonsil.  Bilateral   piriformis were inspected and the scope was inserted further down to the level   of the glottis.  It was then removed back to the vallecula and base of tongue   was again addressed and biopsies were taken on the right as well as just at   the center on the left of the tongue and sent for specimen.  Hemostasis was   then achieved with an Afrin-soaked pledgets and pressure.  Attention was then   turned to the nasal cavity where a nasopharyngoscopy was performed using a   rigid scope through the right nostril and biopsies were taken of the inflamed   right nasopharynx.  Again, Afrin-soaked pledgets were then used to achieve   good hemostasis with pressure.  Then the nasal cavity and nasopharynx were   suctioned of any bloody contents.  Oral cavity and oropharynx were suctioned   of any bloody contents.  Head wrap was removed along with the upper tooth   protector and patient was turned back over to anesthesia for  emergence.    COMPLICATIONS:  None.    SPONGE COUNT:  Correct.    IV FLUIDS GIVEN:  600 mL of lactated ringer.    ESTIMATED BLOOD LOSS:  15 mL    ANESTHESIA TYPE:  General.    ANESTHESIOLOGIST:  Dr. Patel.    SPECIMENS:  1.  Right nasopharynx.  2.  Right base of tongue.  3.  Central base of tongue.       ____________________________________     MD ANA Mcgee / EDMUNDO    DD:  03/31/2017 12:18:16  DT:  03/31/2017 14:08:14    D#:  [id number]  Job#:  [id number]

## 2017-03-31 NOTE — OR NURSING
1224 Received pt from OR, received report from Dr. Patel. Pt has oral airway in place, removed upon arrival to PACU. Pt coughed up small amount of bloody mucus.     1245 Pt waking up c/o pain 7/10 to throat, medicated with fent IV.     1300 Pt medicated with oxy for pain 5/10.     1305 Pt titrated to RA.     1335 Pt meets criteria for discharge, pt up and dressing.     1344 Pt up to bathroom. Able to ambulate without difficulty.     1358 Patient and  state they are ready to discharge home. IV removed. Instructions given. Declined wheelchair out.

## 2017-03-31 NOTE — DISCHARGE INSTRUCTIONS
ACTIVITY: Rest and take it easy for the first 24 hours.  A responsible adult is recommended to remain with you during that time.  It is normal to feel sleepy.  We encourage you to not do anything that requires balance, judgment or coordination.    MILD FLU-LIKE SYMPTOMS ARE NORMAL. YOU MAY EXPERIENCE GENERALIZED MUSCLE ACHES, THROAT IRRITATION, HEADACHE AND/OR SOME NAUSEA.    FOR 24 HOURS DO NOT:  Drive, operate machinery or run household appliances.  Drink beer or alcoholic beverages.   Make important decisions or sign legal documents.    SPECIAL INSTRUCTIONS: *Refer to laryngoscopy instructions**    DIET: To avoid nausea, slowly advance diet as tolerated, avoiding spicy or greasy foods for the first day.  Add more substantial food to your diet according to your physician's instructions.  Babies can be fed formula or breast milk as soon as they are hungry.  INCREASE FLUIDS AND FIBER TO AVOID CONSTIPATION.    SURGICAL DRESSING/BATHING: *May shower today**    FOLLOW-UP APPOINTMENT:  A follow-up appointment should be arranged with your doctor in *call to schedule**.    You should CALL YOUR PHYSICIAN if you develop:  Fever greater than 101 degrees F.  Pain not relieved by medication, or persistent nausea or vomiting.  Excessive bleeding (blood soaking through dressing) or unexpected drainage from the wound.  Extreme redness or swelling around the incision site, drainage of pus or foul smelling drainage.  Inability to urinate or empty your bladder within 8 hours.  Problems with breathing or chest pain.    You should call 911 if you develop problems with breathing or chest pain.  If you are unable to contact your doctor or surgical center, you should go to the nearest emergency room or urgent care center.    Physician's telephone #: *Dr. Abdi [phone]**    If any questions arise, call your doctor.  If your doctor is not available, please feel free to call the Surgical Center at [phone].  The Center is open  Monday through Friday from 7AM to 7PM.  You can also call the HEALTH HOTLINE open 24 hours/day, 7 days/week and speak to a nurse at [phone], or toll free at [phone].    A registered nurse may call you a few days after your surgery to see how you are doing after your procedure.    MEDICATIONS: Resume taking daily medication.  Take prescribed pain medication with food.  If no medication is prescribed, you may take non-aspirin pain medication if needed.  PAIN MEDICATION CAN BE VERY CONSTIPATING.  Take a stool softener or laxative such as senokot, pericolace, or milk of magnesia if needed.    Prescription given for *pain medication and nausea medication**.  Last pain medication given at *1:00 pm; next dose due at 5:00 pm**.    If your physician has prescribed pain medication that includes Acetaminophen (Tylenol), do not take additional Acetaminophen (Tylenol) while taking the prescribed medication.    Depression / Suicide Risk    As you are discharged from this [facility] facility, it is important to learn how to keep safe from harming yourself.    Recognize the warning signs:  · Abrupt changes in personality, positive or negative- including increase in energy   · Giving away possessions  · Change in eating patterns- significant weight changes-  positive or negative  · Change in sleeping patterns- unable to sleep or sleeping all the time   · Unwillingness or inability to communicate  · Depression  · Unusual sadness, discouragement and loneliness  · Talk of wanting to die  · Neglect of personal appearance   · Rebelliousness- reckless behavior  · Withdrawal from people/activities they love  · Confusion- inability to concentrate     If you or a loved one observes any of these behaviors or has concerns about self-harm, here's what you can do:  · Talk about it- your feelings and reasons for harming yourself  · Remove any means that you might use to hurt yourself (examples: pills, rope, extension cords,  firearm)  · Get professional help from the community (Mental Health, Substance Abuse, psychological counseling)  · Do not be alone:Call your Safe Contact- someone whom you trust who will be there for you.  · Call your local CRISIS HOTLINE 912-9642 or 938-601-8170  · Call your local Children's Mobile Crisis Response Team [place] (152) 580-7213 or www.[employer]  · Call the toll free National Suicide Prevention Hotlines   · National Suicide Prevention Lifeline 930-842-BKGN (8421)  · National Hope Line Network 800-SUICIDE (668-0591)

## 2017-03-31 NOTE — IP AVS SNAPSHOT
3/31/2017          Tanvi Krueger  [address]  [place] [address]    Dear Tanvi:    [facility] wants to ensure your discharge home is safe and you or your loved ones have had all your questions answered regarding your care after you leave the hospital.    You may receive a telephone call within two days of your discharge.  This call is to make certain you understand your discharge instructions as well as ensure we provided you with the best care possible during your stay with us.     The call will only last approximately 3-5 minutes and will be done by a nurse.    Once again, we want to ensure your discharge home is safe and that you have a clear understanding of any next steps in your care.  If you have any questions or concerns, please do not hesitate to contact us, we are here for you.  Thank you for choosing [facility] for your healthcare needs.    Sincerely,    Jayden Miller    [facility]

## 2017-03-31 NOTE — IP AVS SNAPSHOT
" Home Care Instructions                                                                                                                Name:Tanvi Krueger  Medical Record Number:[id number]  CSN: [id number]    YOB: 1965   Age: 51 y.o.  Sex: female  HT:1.727 m (5' 8\") WT: 118.7 kg (261 lb 11 oz)          Admit Date: 3/31/2017     Discharge Date:   Today's Date: 3/31/2017  Attending Doctor:  Naresh Abdi M.D.                  Allergies:  Doxycycline; Pcn; and Keflex                Discharge Instructions         ACTIVITY: Rest and take it easy for the first 24 hours.  A responsible adult is recommended to remain with you during that time.  It is normal to feel sleepy.  We encourage you to not do anything that requires balance, judgment or coordination.    MILD FLU-LIKE SYMPTOMS ARE NORMAL. YOU MAY EXPERIENCE GENERALIZED MUSCLE ACHES, THROAT IRRITATION, HEADACHE AND/OR SOME NAUSEA.    FOR 24 HOURS DO NOT:  Drive, operate machinery or run household appliances.  Drink beer or alcoholic beverages.   Make important decisions or sign legal documents.    SPECIAL INSTRUCTIONS: *Refer to laryngoscopy instructions**    DIET: To avoid nausea, slowly advance diet as tolerated, avoiding spicy or greasy foods for the first day.  Add more substantial food to your diet according to your physician's instructions.  Babies can be fed formula or breast milk as soon as they are hungry.  INCREASE FLUIDS AND FIBER TO AVOID CONSTIPATION.    SURGICAL DRESSING/BATHING: *May shower today**    FOLLOW-UP APPOINTMENT:  A follow-up appointment should be arranged with your doctor in *call to schedule**.    You should CALL YOUR PHYSICIAN if you develop:  Fever greater than 101 degrees F.  Pain not relieved by medication, or persistent nausea or vomiting.  Excessive bleeding (blood soaking through dressing) or unexpected drainage from the wound.  Extreme redness or swelling around the incision site, drainage of pus or foul smelling " drainage.  Inability to urinate or empty your bladder within 8 hours.  Problems with breathing or chest pain.    You should call 911 if you develop problems with breathing or chest pain.  If you are unable to contact your doctor or surgical center, you should go to the nearest emergency room or urgent care center.    Physician's telephone #: *Dr. Abdi [phone]**    If any questions arise, call your doctor.  If your doctor is not available, please feel free to call the Surgical Center at [phone].  The Center is open Monday through Friday from 7AM to 7PM.  You can also call the [employer] HOTLINE open 24 hours/day, 7 days/week and speak to a nurse at [phone], or toll free at [phone].    A registered nurse may call you a few days after your surgery to see how you are doing after your procedure.    MEDICATIONS: Resume taking daily medication.  Take prescribed pain medication with food.  If no medication is prescribed, you may take non-aspirin pain medication if needed.  PAIN MEDICATION CAN BE VERY CONSTIPATING.  Take a stool softener or laxative such as senokot, pericolace, or milk of magnesia if needed.    Prescription given for *pain medication and nausea medication**.  Last pain medication given at *1:00 pm; next dose due at 5:00 pm**.    If your physician has prescribed pain medication that includes Acetaminophen (Tylenol), do not take additional Acetaminophen (Tylenol) while taking the prescribed medication.    Depression / Suicide Risk    As you are discharged from this [facility] facility, it is important to learn how to keep safe from harming yourself.    Recognize the warning signs:  · Abrupt changes in personality, positive or negative- including increase in energy   · Giving away possessions  · Change in eating patterns- significant weight changes-  positive or negative  · Change in sleeping patterns- unable to sleep or sleeping all the time   · Unwillingness or inability to  communicate  · Depression  · Unusual sadness, discouragement and loneliness  · Talk of wanting to die  · Neglect of personal appearance   · Rebelliousness- reckless behavior  · Withdrawal from people/activities they love  · Confusion- inability to concentrate     If you or a loved one observes any of these behaviors or has concerns about self-harm, here's what you can do:  · Talk about it- your feelings and reasons for harming yourself  · Remove any means that you might use to hurt yourself (examples: pills, rope, extension cords, firearm)  · Get professional help from the community (Mental Health, Substance Abuse, psychological counseling)  · Do not be alone:Call your Safe Contact- someone whom you trust who will be there for you.  · Call your local CRISIS HOTLINE 637-8044 or 248-034-0162  · Call your local Children's Mobile Crisis Response Team [place] (513) 767-1368 or www.[employer]  · Call the toll free National Suicide Prevention Hotlines   · National Suicide Prevention Lifeline 909-706-PXCP (3152)  · National Hope Line Network 800-SUICIDE (481-6694)       Medication List      START taking these medications        Instructions    hydrocodone-acetaminophen 5-325 MG Tabs per tablet   Commonly known as:  NORCO    Take 1-2 Tabs by mouth every four hours as needed.   Dose:  1-2 Tab       ondansetron 4 MG Tbdp   Commonly known as:  ZOFRAN ODT    Take 1 Tab by mouth every 8 hours as needed for Nausea/Vomiting.   Dose:  4 mg         CHANGE how you take these medications        Instructions    Vitamin D 2000 UNITS Caps   What changed:  when to take this    Take 2,000 Units by mouth every day.   Dose:  2000 Units         CONTINUE taking these medications        Instructions    levothyroxine 88 MCG Tabs   Commonly known as:  SYNTHROID    Take 88 mcg by mouth Every morning on an empty stomach.   Dose:  88 mcg       Misc. Devices Misc    This is an order for nocturnal oxygen, 2L nasal prongs.  Dx:  Nocturnal  hypoxia, lowest 59% on overnight study.  G47.34           MARIA DOLORES 99 months   NPI [id number]       sertraline 100 MG Tabs   Commonly known as:  ZOLOFT    TAKE 2 TABLETS BY MOUTH EVERY DAY       VITAMIN B COMPLEX PO    Take  by mouth every day.               Medication Information     Above and/or attached are the medications your physician expects you to take upon discharge. Review all of your home medications and newly ordered medications with your doctor and/or pharmacist. Follow medication instructions as directed by your doctor and/or pharmacist. Please keep your medication list with you and share with your physician. Update the information when medications are discontinued, doses are changed, or new medications (including over-the-counter products) are added; and carry medication information at all times in the event of emergency situations.        Resources     Quit Smoking / Tobacco Use:    I understand the use of any tobacco products increases my chance of suffering from future heart disease or stroke and could cause other illnesses which may shorten my life. Quitting the use of tobacco products is the single most important thing I can do to improve my health. For further information on smoking / tobacco cessation call a Toll Free Quit Line at 1-473.236.6198 (*National Cancer [place]) or 1-987.260.3628 (American Lung Association) or you can access the web based program at www.lungusa.org.    [place] Tobacco Users Help Line:  (759) 406-3411       Toll Free: 1-110.277.4946  Quit Tobacco Program [facility] (214)544-2037    Crisis Hotline:    [place] Crisis Hotline:  5-451-HDXPKSW or 1-317.392.7611    [place] Crisis Hotline:    1-814.734.6724 or 095-775-0384    Discharge Survey:   Thank you for choosing [facility]. We hope we did everything we could to make your hospital stay a pleasant one. You may be receiving a survey and we would appreciate your time and participation in answering the  questions. Your input is very valuable to us in our efforts to improve our service to our patients and their families.            Signatures     My signature on this form indicates that:    1. I acknowledge receipt and understanding of these Home Care Instruction.  2. My questions regarding this information have been answered to my satisfaction.  3. I have formulated a plan with my discharge nurse to obtain my prescribed medications for home.    __________________________________      __________________________________                   Patient Signature                                 Guardian/Responsible Adult Signature      __________________________________                 __________       ________                       Nurse Signature                                               Date                 Time

## 2017-04-05 ENCOUNTER — PATIENT MESSAGE (OUTPATIENT)
Dept: MEDICAL GROUP | Facility: CLINIC | Age: 52
End: 2017-04-05

## 2017-04-06 NOTE — TELEPHONE ENCOUNTER
From: Tanvi Krueger  To: Rebecca Ferro M.D.  Sent: 4/5/2017 1:24 PM PDT  Subject: 2nd biopsy report    I had two biopsys last week. Have you seen the results? I've not heard from Dr. Abdi yet. Thank you!  ----- Message -----  From: Rebecca Ferro M.D.  Sent: 3/29/2017 5:50 PM PDT  To: Tanvi Krueger  Subject: biopsy report    I have received the report and read it. The results could mean several things. Some of them are benign, simply a reactive lymphoid tissue. There is a possibility of it being something a little more concerning and I am glad that you and your ENT are discussing looking at this further.

## 2017-04-10 ENCOUNTER — PATIENT MESSAGE (OUTPATIENT)
Dept: MEDICAL GROUP | Facility: CLINIC | Age: 52
End: 2017-04-10

## 2017-04-10 DIAGNOSIS — E06.3 HASHIMOTO'S THYROIDITIS: ICD-10-CM

## 2017-04-10 DIAGNOSIS — R79.89 ELEVATED TSH: ICD-10-CM

## 2017-04-13 ENCOUNTER — PATIENT MESSAGE (OUTPATIENT)
Dept: MEDICAL GROUP | Facility: CLINIC | Age: 52
End: 2017-04-13

## 2017-04-13 ENCOUNTER — PATIENT MESSAGE (OUTPATIENT)
Dept: PULMONOLOGY | Facility: HOSPICE | Age: 52
End: 2017-04-13

## 2017-04-13 NOTE — TELEPHONE ENCOUNTER
From: Tanvi Krueger  To: Rebecca Ferro M.D.  Sent: 4/13/2017 6:50 AM PDT  Subject: Update    I have sought out a naturopathic doctor . She says due to my genetics mthfr mutation I need to be taking the methlyfolate b complex. Also now taking supplements omega 3, A & K and thyroid support. Since the biopsy by Dr. Abdi and he scooped out lingual tonsil I have stopped using the asv cpap/oxygen machines. I do NOT wake exhausted and last night I had a dream. I'm trying to let my body heal and see if the supplements help.  ----- Message -----  From: Rebecca Ferro M.D.  Sent: 4/11/2017 8:46 AM PDT  To: Tanvi Krueger  Subject: compounded thyroid    I would really prefer not to do that. The reason is that if we switch to that and it does not work well for you or is unaffordable then when we switch back your body has lost the ability to convert from T4-T3 for 6 months or more. I would prefer to recheck your TSH once you have been on the higher dose at least 6 weeks. I will place that order. I think we need to get your TSH at goal on the medication you are taking now before we decide.

## 2017-04-13 NOTE — TELEPHONE ENCOUNTER
From: Tanvi Krueger  To: ROBYN Douglas  Sent: 4/13/2017 6:53 AM PDT  Subject: Non-Urgent Medical Question    UPDATE: I have sought out a naturopathic doctor . She says due to my genetics mthfr mutation I need to be taking the methlyfolate b complex. Also now taking supplements omega 3, A & K and thyroid support. Since the biopsy by Dr. Abdi and he scooped out lingual tonsil I have stopped using the asv cpap/oxygen machines. I do NOT wake exhausted and last night I had a dream. I'm trying to let my body heal and see if the supplements help.

## 2017-04-13 NOTE — TELEPHONE ENCOUNTER
Please let Tanvi know that given the severity of her sleep apnea and hypoxemia that I cannot advise her stopping ASV and oxygen. She is pending an appointment with me 4/17/2017 and we can discuss this in more detail. If she feels her sleep apnea has improved with ENT treatment and her supplements we can always re study to determine if her sleep apnea has improved and what her treatment options would be.

## 2017-04-17 ENCOUNTER — OFFICE VISIT (OUTPATIENT)
Dept: PULMONOLOGY | Facility: HOSPICE | Age: 52
End: 2017-04-17
Payer: COMMERCIAL

## 2017-04-17 VITALS
BODY MASS INDEX: 40.49 KG/M2 | HEART RATE: 80 BPM | RESPIRATION RATE: 16 BRPM | WEIGHT: 258 LBS | TEMPERATURE: 98.6 F | OXYGEN SATURATION: 93 % | SYSTOLIC BLOOD PRESSURE: 124 MMHG | DIASTOLIC BLOOD PRESSURE: 78 MMHG | HEIGHT: 67 IN

## 2017-04-17 DIAGNOSIS — E66.9 OBESITY (BMI 35.0-39.9 WITHOUT COMORBIDITY): ICD-10-CM

## 2017-04-17 DIAGNOSIS — J39.2 NASOPHARYNGEAL MASS: ICD-10-CM

## 2017-04-17 DIAGNOSIS — R93.89 ABNORMAL CT SCAN, CHEST: ICD-10-CM

## 2017-04-17 DIAGNOSIS — J45.30 REACTIVE AIRWAY DISEASE WITH WHEEZING, MILD PERSISTENT, UNCOMPLICATED: ICD-10-CM

## 2017-04-17 DIAGNOSIS — G47.31 CENTRAL SLEEP APNEA: ICD-10-CM

## 2017-04-17 PROCEDURE — 99214 OFFICE O/P EST MOD 30 MIN: CPT | Performed by: NURSE PRACTITIONER

## 2017-04-17 NOTE — MR AVS SNAPSHOT
"        Tanvi Krueger   2017 4:00 PM   Office Visit   MRN: [id number]    Department:  [facility]   Dept Phone:  [phone]    Description:  Female : 1965   Provider:  ROBYN Douglas           Reason for Visit     Apnea ASV    Other Need to order in lab sleep study to see if she can get off ASV      Allergies as of 2017     Allergen Noted Reactions    Doxycycline 2014       Wheezing; diarrhea    Pcn [Penicillins] 2009   Unspecified    Unknown reaction as child    Keflex [Cephalexin] 2016   Unspecified    Yeast Infection      You were diagnosed with     Central sleep apnea   [878133]       Obesity (BMI 35.0-39.9 without comorbidity) ([facility])   [887244]       Reactive airway disease with wheezing, mild persistent, uncomplicated   [1514317]       Nasopharyngeal mass   [630817]       Abnormal CT scan, chest   [767072]         Vital Signs     Blood Pressure Pulse Temperature Respirations Height Weight    124/78 mmHg 80 37 °C (98.6 °F) 16 1.702 m (5' 7.01\") 117.028 kg (258 lb)    Body Mass Index Oxygen Saturation Last Menstrual Period Smoking Status          40.40 kg/m2 93% 1995 Never Smoker         Basic Information     Date Of Birth Sex Race Ethnicity Preferred Language    1965 Female White Non- English      Your appointments     May 31, 2017  9:00 AM   ELECTROENCEPHALOGRAPHY with NEURODIAGNOSTIC LAB [facility] (--)    [address] [facility] [facility] [address]  [place] [address] [phone]           Limit caffeine. Clean, dry hair, no gels, oils, or hairsprays. If testing for seizures or spells, please allow no more than 4 hours of sleep the night before the exam (sleep 12am-4am).            2017  8:20 AM   Follow Up Visit with Ben Weldon M.D.   [facility] (--)    [address], [address]  [place] [address] [phone]           You will be receiving a confirmation call a few days before your " appointment from our automated call confirmation system.              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.20   Unknown - Present    Conductive hearing loss, unilateral H90.2   4/3/2015 - Present    Acquired deflected nasal septum J34.2   9/9/2016 - Present    Hypertrophy of both inferior nasal turbinates J34.3   9/9/2016 - Present    Central sleep apnea G47.31   10/10/2016 - Present    Obstructive sleep apnea G47.33   10/10/2016 - Present    Thyroid nodule E04.1   10/14/2016 - Present    Eosinophilic esophagitis K20.0   Unknown - Present    Oxygen desaturation during sleep G47.34   Unknown - Present    Chronic nasopharyngitis J31.1   3/31/2017 - Present    Hypertrophy of tonsils alone J35.1   3/31/2017 - Present      Health Maintenance        Date Due Completion Dates    MAMMOGRAM 3/1/2017 3/1/2016, 8/5/2014, 2/12/2014, 2/4/2014, 8/21/2012, 6/23/2011, 6/23/2011 (Prv Comp), 3/31/2006    Override on 6/23/2011: Previously completed    IMM DTaP/Tdap/Td Vaccine (2 - Td) 11/1/2022 11/1/2012    COLONOSCOPY 4/21/2024 4/21/2014            Current Immunizations     Influenza TIV (IM) 9/23/2015, 10/1/2012    Influenza Vaccine Quad Inj (Preserved) 12/12/2016  9:06 AM    Tdap Vaccine 11/1/2012      Below and/or attached are the medications your provider expects you to take. Review all of your home medications and newly ordered medications with your provider and/or pharmacist.  Follow medication instructions as directed by your provider and/or pharmacist. Please keep your medication list with you and share with your provider. Update the information when medications are discontinued, doses are changed, or new medications (including over-the-counter products) are added; and carry medication information at all times in the event of emergency situations     Allergies:  DOXYCYCLINE - (reactions not documented)     PCN - Unspecified     KEFLEX - Unspecified               Medications  Valid as of: April 17, 2017 -  4:34 PM    Generic Name Brand Name Tablet Size Instructions for use    B Complex Vitamins   Take  by mouth every day.        Cholecalciferol (Cap) Vitamin D 2000 UNITS Take 2,000 Units by mouth every day.        Hydrocodone-Acetaminophen (Tab) NORCO 5-325 MG Take 1-2 Tabs by mouth every four hours as needed.        Levothyroxine Sodium (Tab) SYNTHROID 88 MCG Take 88 mcg by mouth Every morning on an empty stomach.        Misc. Devices (Misc) Misc. Devices  This is an order for nocturnal oxygen, 2L nasal prongs.  Dx:  Nocturnal hypoxia, lowest 59% on overnight study.  G47.34           MARIA DOLORES 99 months   NPI [id number]        Ondansetron (TABLET DISPERSIBLE) ZOFRAN ODT 4 MG Take 1 Tab by mouth every 8 hours as needed for Nausea/Vomiting.        Sertraline HCl (Tab) ZOLOFT 100 MG TAKE 2 TABLETS BY MOUTH EVERY DAY        .                 Medicines prescribed today were sent to:     [facility] [address] [place] [address] - [id number] [place] [address] & [facility]    [address] [facility] [address]    Phone: [phone] Fax: [phone]    Open 24 Hours?: No      Medication refill instructions:       If your prescription bottle indicates you have medication refills left, it is not necessary to call your provider’s office. Please contact your pharmacy and they will refill your medication.    If your prescription bottle indicates you do not have any refills left, you may  request refills at any time through one of the following ways: The online [employer] system (except Urgent Care), by calling your provider’s office, or by asking your pharmacy to contact your provider’s office with a refill request. Medication refills are processed only during regular business hours and may not be available until the next business day. Your provider may request additional information or to have a follow-up visit with you prior to refilling your medication.   *Please Note: Medication refills are assigned a new Rx number when refilled electronically. Your pharmacy may indicate that no refills were authorized even though a new prescription for the same medication is available at the pharmacy. Please request the medicine by name with the pharmacy before contacting your provider for a refill.           [employer] Access Code: Activation code not generated  Current [employer] Status: Active

## 2017-04-17 NOTE — PROGRESS NOTES
Chief Complaint   Patient presents with   • Apnea     ASV   • Other     Need to order in lab sleep study to see if she can get off ASV       HPI:  Tanvi Krueger is a 51 y.o. year old female here today for follow-up on her complex sleep apnea. PSG indicated severe obstructive sleep apnea hypopnea was identified. The AHI was 81.4, the minimum saturation 79%, and saturations were less than 90% for 72.6% of the recording. The patient underwent a Pap titration. During treatment with Pap, central apneas comprised 64.4% of the total number of events. Neither CPAP nor bilevel were effective in normalizing the respiratory events secondary to treatment emergent central apneas. She has a history of loud nocturnal snoring, frequent nocturnal awakenings and mild daytime fatigue. In lab ASV titration on 10/17/2016 indicates an incomplete titration to ASV pressures tried. On the final ASV pressure of 7/4/16 her AHI was reduced to 36.8 with a mean 02 saturation of 89.6% with a low 02 86%. She was started on ASV 10/19/2016 with an EPAP of 9 and a min/max PS of 4/16. Compliance card download at her last office visit indicated an AHI of 2.6 with an average use of 5-6 hours at night. Overnight oximetry was performed 12/13/2016 which indicated a mean 02 saturation of 89.8% with 90 minutes spent with saturations less than 88%. Oxygen bleed in was added at 2 LPM. Recently she has been unable to tolerate her ASV and would like to see about having a repeat sleep study to see if she still needs treatment.     Her past medical history is notable for Asthma and allergy with symptoms mostly during the spring and fall pollen season. She is a lifelong nonsmoker. She underwent nasal septoplasty by Dr. Abdi. PFT's 10/4/2016 indicated an FEV1 1.83 L, 59% predicted with an FEV1/FVC ratio of 74, TLC of 91% and a DLCO of 101% predicted with a significant bronchodilator response. She was placed on Qvar 40 mcg 2 puffs INH bid along with an  Albuterol HFA Inhaler. She stopped these prior to her last office visit. She states the inhalers were not helping her symptoms. She denies current dyspnea. She denies current cough or mucous production.  She notes rare wheezing. She feels cold weather is a trigger. She denies any fevers or chills. She does have post nasal drip and is undergoing work up with ENT. She states she had a nasopharyngeal biopsy which was abnormal and she is pending a CT neck and sinus. She requested a CT chest as the differential on the biopsy included lymphoma. She does have a significant family history of cancer.   CT chest was performed 3/19/2017 and indicates;   1.  Linear opacity with slightly irregular margins in the anterior lingula which may represent subtle infiltrate, atelectasis, or scar. No prior studies are available for comparison.  2.  7.5 mm short axis diameter lymph node in the anterior mediastinum.  3.  Possible mild fatty infiltration of the liver.  This was reviewed with Dr. Kike Espinoza who recommended a 6 month CT chest which has been ordered.   She also has had a CT neck 3/19/2017 which indicates;  3.3 x 2.7 x 1.6 cm enhancing mass in the posterior oropharynx extending to the hypopharynx in the midline and right of midline, occupying the vallecular space and causing mass effect on surrounding structures.  She had a biopsy of the tongue and nasopharynx 3/31/2017;  Path report;  A. Right base of tongue:         Benign squamous mucosa with underlying hyperplastic lymphoid          tissue (lingual tonsil).         No morphologic or flow cytometric evidence of malignancy.         See comment.  B. Central base of tongue:         Benign squamous mucosa with lymphoid aggregates.         No malignancy identified.  C. Right nasopharynx:         Benign squamous mucosa with underlying hyperplastic lymphoid          tissue (adenoid tissue).         No morphologic or flow cytometric evidence of malignancy.         See  "comment.    She is feeling better since the surgical biopsy. She is sleeping better at night. She notes light snoring. She denies trouble falling asleep or maintaining. She is sleeping on average 6-7 hours at night. She is waking more refreshed. She feels her energy levels have improved. She denies any morning headache. She has tried her Servo since surgery, but is unable to tolerate the pressures.     Past Medical History   Diagnosis Date   • Dyslipidemia, goal LDL below 130    • Menopausal symptoms    • Lumbar disc narrowing 7/14/2009   • Cholesteatoma of middle ear and mastoid(385.33) 1992 surgery     mastoidectomy, prosthesis   • Vitamin d deficiency    • Recurrent sinus infections    • Hypothyroidism    • Family history of early CAD    • Elevated glycohemoglobin    • Anesthesia      nausea/vomiting   • History of endometriosis    • Asthma      allergy related   • Depression    • Bowel habit changes      constipation   • Oxygen desaturation during sleep      O2 2 liters HS   • Tonsillitis    • Eosinophilic esophagitis    • History of chickenpox    • Breath shortness    • Arthritis    • Anemia    • Pneumonia      hx   • Snoring    • Hiatus hernia syndrome      \"was told I had one\"   • Sleep apnea      ASV with 2L oxygen at night (sean)        Past Surgical History   Procedure Laterality Date   • Myringotomy  4/30/2009     Performed by MAXIMUS WHITE at SURGERY SAME DAY [facility] ORS   • Abdominal hysterectomy total  1997     ovaries are gone   • Tympanotomy  6/24/2010     Performed by MAXIMUS WHITE at SURGERY SAME DAY [facility] ORS   • Exam under anesthesia  6/24/2010     Performed by MAXIMUS WHITE at SURGERY SAME DAY [facility] ORS   • Myringotomy  4/20/2012     Performed by RYANNE ALICIA at SURGERY [facility]   • Ear middle exploration  4/3/2015     Performed by Naresh Abdi M.D. at SURGERY SAME DAY [facility] ORS   • Ossicular reconstruction  4/3/2015     Performed by Naresh Abdi M.D. at " SURGERY SAME DAY [facility] ORS   • Septoplasty  9/9/2016     Procedure: SEPTOPLASTY;  Surgeon: Naresh Abdi M.D.;  Location: SURGERY SAME DAY [facility];  Service:    • Turbinoplasty Bilateral 9/9/2016     Procedure: TURBINOPLASTY;  Surgeon: Naresh Abdi M.D.;  Location: SURGERY SAME DAY [facility];  Service:    • Hysterectomy laparoscopy     • Arthroscopy, knee     • Tonsillectomy     • Sinuscope     • Other  2016     sinus surgery   • Thyroidectomy total Left 10/14/2016     Procedure: LEFT THROIDECTOMY;  Surgeon: Naresh Abdi M.D.;  Location: SURGERY SAME DAY [facility];  Service:    • Laryngoscopy N/A 3/31/2017     Procedure: LARYNGOSCOPY - DIRECT W/BIOPSY BASE OF TONGUE AND NASOPHARYNGOSCOPY W/BIOPSY;  Surgeon: Naresh Abdi M.D.;  Location: SURGERY SAME DAY [facility];  Service:        Family History   Problem Relation Age of Onset   • Cancer Mother 61     lung, smoker   • Heart Disease Mother 44     early heart attack   • Heart Disease Father 60     cabg x 3   • Hyperlipidemia Father    • Psychiatry Father      schizophrenia   • Hyperlipidemia Brother    • Cancer Maternal Grandmother 62     lung, non-smoker   • Psychiatry Maternal Grandmother      depression, severe   • Psychiatry Other      depression, great uncle   • Sleep Apnea Neg Hx        Social History     Social History   • Marital Status:      Spouse Name: N/A   • Number of Children: N/A   • Years of Education: N/A     Occupational History   • Not on file.     Social History Main Topics   • Smoking status: Never Smoker    • Smokeless tobacco: Never Used   • Alcohol Use: No   • Drug Use: No   • Sexual Activity:     Partners: Male     Other Topics Concern   • Not on file     Social History Narrative       ROS:  Constitutional: Denies fevers, chills, sweats,  weight loss  Eyes: Denies vision loss, pain, drainage, double vision  Ears/Nose/Mouth/Throat: Denies ear ache, difficulty hearing, sore throat, persistent hoarseness,  "decayed teeth/toothache  Cardiovascular: Denies chest pain, tightness, palpitations, swelling in feet/legs, fainting, difficulty breathing when laying down  Respiratory: See HPI   GI: Denies heartburn, difficulty swallowing, nausea, vomiting, abdominal pain, diarrhea, constipation  : Denies frequent urination, painful urination  Integumentary: Denies rashes, lumps or color changes  MSK: Denies painful joints, sore muscles, and back pain.   Neurological: Denies frequent headaches, dizziness, weakness  Sleep: See HPI       Current Outpatient Prescriptions on File Prior to Visit   Medication Sig Dispense Refill   • B Complex Vitamins (VITAMIN B COMPLEX PO) Take  by mouth every day.     • levothyroxine (SYNTHROID) 88 MCG Tab Take 88 mcg by mouth Every morning on an empty stomach.     • sertraline (ZOLOFT) 100 MG Tab TAKE 2 TABLETS BY MOUTH EVERY  Tab 2   • Misc. Devices Misc This is an order for nocturnal oxygen, 2L nasal prongs.  Dx:  Nocturnal hypoxia, lowest 59% on overnight study.  G47.34           MARIA DOLORES 99 months   NPI [id number] 1 Each 0   • Cholecalciferol (VITAMIN D) 2000 UNIT CAPS Take 2,000 Units by mouth every day. (Patient taking differently: Take 2,000 Units by mouth 2 Times a Day.) 60 Cap 6   • hydrocodone-acetaminophen (NORCO) 5-325 MG Tab per tablet Take 1-2 Tabs by mouth every four hours as needed. 40 Tab 0   • ondansetron (ZOFRAN ODT) 4 MG TABLET DISPERSIBLE Take 1 Tab by mouth every 8 hours as needed for Nausea/Vomiting. 20 Tab 0     No current facility-administered medications on file prior to visit.     Doxycycline; Pcn; and Keflex    Blood pressure 124/78, pulse 80, temperature 37 °C (98.6 °F), resp. rate 16, height 1.702 m (5' 7.01\"), weight 117.028 kg (258 lb), last menstrual period 02/26/1995, SpO2 93 %.  PE:   Appearance: Well developed, well nourished, no acute distress  Eyes: PERRL, EOM intact, sclera white, conjunctiva moist  Ears: no lesions or deformities  Hearing: grossly " intact  Nose: no lesions or deformities  Oropharynx: tongue normal, posterior pharynx without erythema or exudate  Mallampati Classification: class 2   Neck: supple, trachea midline, no masses   Respiratory effort: no intercostal retractions or use of accessory muscles  Lung auscultation: no rales, rhonchi or wheezes  Heart auscultation: no murmur rub or gallop  Extremities: no cyanosis or edema  Abdomen: soft ,non tender, no masses  Gait and Station: normal  Digits and nails: no clubbing, cyanosis, petechiae or nodes.  Cranial nerves: grossly intact  Skin: no rashes, lesions or ulcers noted  Orientation: Oriented to time, person and place  Mood and affect: mood and affect appropriate, normal interaction with examiner  Judgement: Intact          Assessment:  1. Central sleep apnea     2. Obesity (BMI 35.0-39.9 without comorbidity) (HCC)     3. Reactive airway disease with wheezing, mild persistent, uncomplicated     4. Nasopharyngeal mass     5. Abnormal CT scan, chest           Plan:    1) She has had tolerance issues with her ASV since her recent ENT surgical biopsy. She also had a previous nasal septum repair. Previous sleep study indicated severe obstructive sleep apnea with treatment induced centrals. Order for an in lab Polysomnogram now to re assess severity of her sleep apnea and determine if pressure needs have changed. Recommend split night titration if indicated/. Consider starting titration on CPAP.  2) Continue to follow with ENT.   3) Remain off Qvar. Encouraged to continue Proventil HFA inhaler as needed and prior to exercise.  4) Pending repeat CT chest in September 2017. Would recommend updating PFT's at that time as well.   5) Follow up promptly after Polysomnogram, sooner if needed.

## 2017-04-17 NOTE — PATIENT INSTRUCTIONS
Plan:    1) She has had tolerance issues with her ASV since her recent ENT surgical biopsy. She also had a previous nasal septum repair. Previous sleep study indicated severe obstructive sleep apnea with treatment induced centrals. Order for an in lab Polysomnogram now to re assess severity of her sleep apnea and determine if pressure needs have changed. Recommend split night titration if indicated/. Consider starting titration on CPAP.  2) Continue to follow with ENT.   3) Remain off Qvar. Encouraged to continue Proventil HFA inhaler as needed and prior to exercise.  4) Pending repeat CT chest in September 2017. Would recommend updating PFT's at that time as well.   5) Follow up promptly after Polysomnogram, sooner if needed.

## 2017-05-17 ENCOUNTER — HOSPITAL ENCOUNTER (OUTPATIENT)
Dept: LAB | Facility: MEDICAL CENTER | Age: 52
End: 2017-05-17
Payer: COMMERCIAL

## 2017-05-17 LAB
ESTRADIOL SERPL-MCNC: 30 PG/ML
PROGEST SERPL-MCNC: 0.32 NG/ML

## 2017-05-17 PROCEDURE — 82670 ASSAY OF TOTAL ESTRADIOL: CPT

## 2017-05-17 PROCEDURE — 84144 ASSAY OF PROGESTERONE: CPT

## 2017-05-17 PROCEDURE — 36415 COLL VENOUS BLD VENIPUNCTURE: CPT

## 2017-05-19 ENCOUNTER — PATIENT MESSAGE (OUTPATIENT)
Dept: MEDICAL GROUP | Facility: CLINIC | Age: 52
End: 2017-05-19

## 2017-05-19 DIAGNOSIS — E06.3 HASHIMOTO'S THYROIDITIS: ICD-10-CM

## 2017-05-23 ENCOUNTER — HOSPITAL ENCOUNTER (OUTPATIENT)
Dept: RADIOLOGY | Facility: MEDICAL CENTER | Age: 52
End: 2017-05-23
Attending: FAMILY MEDICINE
Payer: COMMERCIAL

## 2017-05-23 ENCOUNTER — HOSPITAL ENCOUNTER (OUTPATIENT)
Dept: LAB | Facility: MEDICAL CENTER | Age: 52
End: 2017-05-23
Attending: FAMILY MEDICINE
Payer: COMMERCIAL

## 2017-05-23 DIAGNOSIS — Z12.31 VISIT FOR SCREENING MAMMOGRAM: ICD-10-CM

## 2017-05-23 DIAGNOSIS — E06.3 HASHIMOTO'S THYROIDITIS: ICD-10-CM

## 2017-05-23 PROCEDURE — 84443 ASSAY THYROID STIM HORMONE: CPT

## 2017-05-23 PROCEDURE — 77063 BREAST TOMOSYNTHESIS BI: CPT

## 2017-05-23 PROCEDURE — 36415 COLL VENOUS BLD VENIPUNCTURE: CPT

## 2017-05-29 LAB — TEST NAME 95000: NORMAL

## 2017-05-30 ENCOUNTER — PATIENT MESSAGE (OUTPATIENT)
Dept: MEDICAL GROUP | Facility: CLINIC | Age: 52
End: 2017-05-30

## 2017-05-30 DIAGNOSIS — E06.3 HASHIMOTO'S THYROIDITIS: ICD-10-CM

## 2017-05-30 RX ORDER — LEVOTHYROXINE SODIUM 0.12 MG/1
125 TABLET ORAL
Qty: 30 TAB | Refills: 6 | Status: SHIPPED | OUTPATIENT
Start: 2017-05-30 | End: 2017-08-02 | Stop reason: SDUPTHER

## 2017-05-30 NOTE — TELEPHONE ENCOUNTER
From: Tanvi Krueger  To: Rebecca Ferro M.D.  Sent: 5/30/2017 9:35 AM PDT  Subject: thyroid    Per thyroid results. I am taking both of the 88 and 25 tablets with no stopping per your advice. FYI. I have eeg tomorrow with Dr. barksdale. I'm having some issues with depression and am still taking the Zoloft (2 at night). After the eeg results I may need further consult re: depression issue.  ----- Message -----  From: Rebecca Ferro M.D.  Sent: 5/19/2017 3:27 PM PDT  To: Tanvi Krueger  Subject: your hormone levels and thyroid    The hormone levels are normal. I have ordered the thyroid cascade from [facility]. You can go to [facility] lab to get that drawn and it will be sent to [place].

## 2017-05-31 ENCOUNTER — NON-PROVIDER VISIT (OUTPATIENT)
Dept: NEUROLOGY | Facility: MEDICAL CENTER | Age: 52
End: 2017-05-31
Payer: COMMERCIAL

## 2017-05-31 DIAGNOSIS — E06.3 HASHIMOTO'S THYROIDITIS: ICD-10-CM

## 2017-05-31 PROCEDURE — 95819 EEG AWAKE AND ASLEEP: CPT | Performed by: PSYCHIATRY & NEUROLOGY

## 2017-05-31 NOTE — MR AVS SNAPSHOT
Tanvi Krueger   2017 9:00 AM   Non-Provider Visit   MRN: [id number]    Department:  [facility]   Dept Phone:  [phone]    Description:  Female : 1965   Provider:  NEURODIAGNOSTIC LAB [facility]           Allergies as of 2017     Allergen Noted Reactions    Doxycycline 2014       Wheezing; diarrhea    Pcn [Penicillins] 2009   Unspecified    Unknown reaction as child    Keflex [Cephalexin] 2016   Unspecified    Yeast Infection      You were diagnosed with     Hashimoto's thyroiditis   [[id number]]         Vital Signs     Last Menstrual Period Smoking Status                1995 Never Smoker           Basic Information     Date Of Birth Sex Race Ethnicity Preferred Language    1965 Female White Non- English      Your appointments     2017  8:20 AM   Follow Up Visit with Ben Weldon M.D.   [facility] (--)    [address], [address]  [place] [address] [phone]           You will be receiving a confirmation call a few days before your appointment from our automated call confirmation system.            2017  8:10 PM   Sleep Study Diagnostic with SLEEP TECH   [facility] (--)    [address]  [facility]  [place] [id number] [phone]            2017  8:40 AM   Follow UP with GERBER Renteria   [facility] (--)    [address]  [facility] [address]  [place] [id number] [phone]              Problem List              ICD-10-CM Priority Class Noted - Resolved    Lumbar disc narrowing M51.36   2009 - Present    Dyslipidemia, goal LDL below 130 E78.5 High  Unknown - Present    Depression F32.9   Unknown - Present    Vitamin D deficiency disease E55.9   Unknown - Present    Reactive airway disease with wheezing J45.909   2012 - Present    Hashimoto's thyroiditis E06.3   2013 - Present    Family history of early CAD Z82.49    Unknown - Present    Obesity (BMI 35.0-39.9 without comorbidity) ([facility]) E66.9   7/14/2014 - Present    Cholesteatoma of middle ear and mastoid H71.20   Unknown - Present    Conductive hearing loss, unilateral H90.2   4/3/2015 - Present    Acquired deflected nasal septum J34.2   9/9/2016 - Present    Hypertrophy of both inferior nasal turbinates J34.3   9/9/2016 - Present    Central sleep apnea G47.31   10/10/2016 - Present    Obstructive sleep apnea G47.33   10/10/2016 - Present    Thyroid nodule E04.1   10/14/2016 - Present    Eosinophilic esophagitis K20.0   Unknown - Present    Oxygen desaturation during sleep G47.34   Unknown - Present    Chronic nasopharyngitis J31.1   3/31/2017 - Present    Hypertrophy of tonsils alone J35.1   3/31/2017 - Present      Health Maintenance        Date Due Completion Dates    MAMMOGRAM 5/23/2018 5/23/2017, 3/1/2016, 8/5/2014, 2/12/2014, 2/4/2014, 8/21/2012, 6/23/2011, 6/23/2011 (Prv Comp), 3/31/2006    Override on 6/23/2011: Previously completed    IMM DTaP/Tdap/Td Vaccine (2 - Td) 11/1/2022 11/1/2012    COLONOSCOPY 4/21/2024 4/21/2014            Current Immunizations     Influenza TIV (IM) 9/23/2015, 10/1/2012    Influenza Vaccine Quad Inj (Preserved) 12/12/2016  9:06 AM    Tdap Vaccine 11/1/2012      Below and/or attached are the medications your provider expects you to take. Review all of your home medications and newly ordered medications with your provider and/or pharmacist. Follow medication instructions as directed by your provider and/or pharmacist. Please keep your medication list with you and share with your provider. Update the information when medications are discontinued, doses are changed, or new medications (including over-the-counter products) are added; and carry medication information at all times in the event of emergency situations     Allergies:  DOXYCYCLINE - (reactions not documented)     PCN - Unspecified     KEFLEX - Unspecified               Medications   Valid as of: May 31, 2017 - 11:26 AM    Generic Name Brand Name Tablet Size Instructions for use    B Complex Vitamins   Take  by mouth every day.        Cholecalciferol (Cap) Vitamin D 2000 UNITS Take 2,000 Units by mouth every day.        Hydrocodone-Acetaminophen (Tab) NORCO 5-325 MG Take 1-2 Tabs by mouth every four hours as needed.        Levothyroxine Sodium (Tab) SYNTHROID 125 MCG Take 1 Tab by mouth Every morning on an empty stomach.        Misc. Devices (Misc) Misc. Devices  This is an order for nocturnal oxygen, 2L nasal prongs.  Dx:  Nocturnal hypoxia, lowest 59% on overnight study.  G47.34           MARIA DOLORES 99 months   NPI [id number]        Ondansetron (TABLET DISPERSIBLE) ZOFRAN ODT 4 MG Take 1 Tab by mouth every 8 hours as needed for Nausea/Vomiting.        Sertraline HCl (Tab) ZOLOFT 100 MG TAKE 2 TABLETS BY MOUTH EVERY DAY        .                 Medicines prescribed today were sent to:     [facility] [address] [place] [address] [facility]    [address] [facility] [address]    Phone: [phone] Fax: [phone]    Open 24 Hours?: No      Medication refill instructions:       If your prescription bottle indicates you have medication refills left, it is not necessary to call your provider’s office. Please contact your pharmacy and they will refill your medication.    If your prescription bottle indicates you do not have any refills left, you may request refills at any time through one of the following ways: The online [employer] system (except Urgent Care), by calling your provider’s office, or by asking your pharmacy to contact your provider’s office with a refill request. Medication refills are processed only during regular business hours and may not be available until the next business day. Your provider may request additional information or to have a follow-up visit with you prior to refilling your medication.   *Please Note: Medication refills  are assigned a new Rx number when refilled electronically. Your pharmacy may indicate that no refills were authorized even though a new prescription for the same medication is available at the pharmacy. Please request the medicine by name with the pharmacy before contacting your provider for a refill.           [employer] Access Code: Activation code not generated  Current [place] Status: Active

## 2017-05-31 NOTE — TELEPHONE ENCOUNTER
From: Tanvi Krueger  To: Rebecca Ferro M.D.  Sent: 5/30/2017 1:31 PM PDT  Subject: RE:information noted    Let's take it to 125 and could you please call in the new rx. Thank you! Tanvi  ----- Message -----  From: Rebecca Ferro M.D.  Sent: 5/30/2017 12:35 PM PDT  To: Tanvi Krueger  Subject: information noted    The thyroid then is now in the normal range on around 113 per day. We could increase slightly to 125 which might help your depression a little. Please let me know your thoughts.

## 2017-06-04 ENCOUNTER — PATIENT MESSAGE (OUTPATIENT)
Dept: MEDICAL GROUP | Facility: CLINIC | Age: 52
End: 2017-06-04

## 2017-06-05 ENCOUNTER — PATIENT MESSAGE (OUTPATIENT)
Dept: MEDICAL GROUP | Facility: CLINIC | Age: 52
End: 2017-06-05

## 2017-06-06 NOTE — PROCEDURES
DATE OF SERVICE:  05/31/2017    ROUTINE ELECTROENCEPHALOGRAM REPORT        INDICATION:       A 51-year-old patient.  Patient has speech difficulty, problem finding words   for over 1 year.  History of Hashimoto's thyroid disease.    TECHNIQUE: 30 channel routine electroencephalogram (EEG) was performed in accordance with the international 10-20 system. The study was reviewed in bipolar and referential montages. The recording examined the patient during wakeful and drowsy state(s).     DESCRIPTION OF THE RECORD:    The EEG background consists of posterior dominant alpha rhythm 10-11 Hz.    There are intermittent generalized sharp waves burst, could last around 10   seconds or so and at times, the sharp waves close to the epileptiform sharp   waves and not localize them very well, may be more prominent over the frontal   region and even when the patient is awake with frontal sharp waves and   generalized delta burst 3-4 Hz or so and quite prominent and stage I sleep was   obtained.  Intervention procedure did not produce any new abnormality.    ACTIVATION PROCEDURES:      Hyperventilation and Photic Stimulation were done    ICTAL AND/OR INTERICTAL FINDINGS:      Intermittent generalized sharp waves burst, could last around 10   seconds or so and at times, the sharp waves close to the epileptiform sharp   waves and not localize them very well, may be more prominent over the frontal   region and even when the patient is awake with frontal sharp waves and   generalized delta burst 3-4 Hz or so and quite prominent and stage I sleep was   obtained. No clinical events or seizures were reported or recorded during the study.      EKG: sampling of the EKG recording demonstrated sinus rhythm.        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       ____________________________________     MD JOSE SINHA    DD:  06/06/2017 05:48:51  DT:  06/06/2017 06:29:16    D#:  [id number]  Job#:  [id number]

## 2017-06-07 ENCOUNTER — PATIENT MESSAGE (OUTPATIENT)
Dept: MEDICAL GROUP | Facility: CLINIC | Age: 52
End: 2017-06-07

## 2017-06-07 ENCOUNTER — PATIENT MESSAGE (OUTPATIENT)
Dept: NEUROLOGY | Facility: MEDICAL CENTER | Age: 52
End: 2017-06-07

## 2017-06-08 ENCOUNTER — PATIENT MESSAGE (OUTPATIENT)
Dept: MEDICAL GROUP | Facility: CLINIC | Age: 52
End: 2017-06-08

## 2017-06-09 ENCOUNTER — TELEPHONE (OUTPATIENT)
Dept: NEUROLOGY | Facility: MEDICAL CENTER | Age: 52
End: 2017-06-09

## 2017-06-09 ENCOUNTER — PATIENT MESSAGE (OUTPATIENT)
Dept: MEDICAL GROUP | Facility: CLINIC | Age: 52
End: 2017-06-09

## 2017-06-09 DIAGNOSIS — G40.909 SEIZURE CEREBRAL (HCC): ICD-10-CM

## 2017-06-09 RX ORDER — LEVETIRACETAM 250 MG/1
125 TABLET ORAL 2 TIMES DAILY
Qty: 30 TAB | Refills: 3 | Status: SHIPPED | OUTPATIENT
Start: 2017-06-09 | End: 2017-06-20

## 2017-06-09 NOTE — TELEPHONE ENCOUNTER
Pt calling, had EEG done 5/31/17. She has an appt on 6/20/17, but pt is concerned about driving she states drives to [place] every day. Also asking if any additional tests need to be done before her up coming appt ? Her PCP suggested pt reduce the antidepressant by half.

## 2017-06-09 NOTE — TELEPHONE ENCOUNTER
Message  Received: Today       Ben Weldon M.D.  Justine Garcia, [place]       Caller: Unspecified (Today, 9:03 AM)                     If only she does not have spells of passing out, legally she remain qualified to drive     In case these speech disturbance are frequent, we could start therapeutic trial   Keppra 125mg before sleep for 2 weeks, then up to 125mg two times per day since the third week---- order in EPIC-- when to start will be up to her, we could discuss this issue in the next visit       Pt was informed. She will keep appt with Dr. Weldon on 6/20/17.

## 2017-06-09 NOTE — TELEPHONE ENCOUNTER
So I've seen the EEG results. At least my concerns have been validated. Now I just need to know what to expect medically and if there is anything we can do to slow any further issues or symptoms. If there's any other testing that needs to be done, etc. I'd like to push forward and get these in the process. Appreciate your guidance. This is being sent to Chelle Weldon and Kasie.                         Patient very concerned about possible seizures, should she be driving is there any more testing that can be done before her June 20, 2017 appt.

## 2017-06-10 NOTE — TELEPHONE ENCOUNTER
From: Tanvi Krueger  To: Rebecca Ferro M.D.  Sent: 6/9/2017 1:52 PM PDT  Subject: RE:Zoloft and electric shock feelings    Thank you for responding. Do you recommend taking one Zoloft tablet per day? Am I having seizures? Do I need to be concerned that something might happen while driving? I just want to make sure if I should be concerned for safety reasons. I work in [employer]. I appreciate the communication as I'm not sure what's happening. Thank you!!  ----- Message -----  From: Rebecca Ferro M.D.  Sent: 6/9/2017 12:30 PM PDT  To: Tanvi Jiménez Evans  Subject: Zoloft and electric shock feelings    All the SSRIs such as Zoloft can lower seizure threshold. I doubt that the Zoloft is doing this as a side effect of the medication directly but since he might have an underlying seizure issue this could be worsening your symptoms. I strongly suggest reducing the dose of the Zoloft.

## 2017-06-13 ENCOUNTER — HOSPITAL ENCOUNTER (EMERGENCY)
Facility: MEDICAL CENTER | Age: 52
End: 2017-06-13
Attending: EMERGENCY MEDICINE
Payer: COMMERCIAL

## 2017-06-13 VITALS
BODY MASS INDEX: 40.83 KG/M2 | HEART RATE: 86 BPM | RESPIRATION RATE: 18 BRPM | DIASTOLIC BLOOD PRESSURE: 93 MMHG | TEMPERATURE: 99 F | WEIGHT: 269.4 LBS | OXYGEN SATURATION: 100 % | HEIGHT: 68 IN | SYSTOLIC BLOOD PRESSURE: 146 MMHG

## 2017-06-13 DIAGNOSIS — H66.002 ACUTE SUPPURATIVE OTITIS MEDIA OF LEFT EAR WITHOUT SPONTANEOUS RUPTURE OF TYMPANIC MEMBRANE, RECURRENCE NOT SPECIFIED: ICD-10-CM

## 2017-06-13 DIAGNOSIS — T16.2XXA EAR FOREIGN BODY, LEFT, INITIAL ENCOUNTER: ICD-10-CM

## 2017-06-13 DIAGNOSIS — H60.312 ACUTE DIFFUSE OTITIS EXTERNA OF LEFT EAR: ICD-10-CM

## 2017-06-13 PROCEDURE — 99283 EMERGENCY DEPT VISIT LOW MDM: CPT

## 2017-06-13 PROCEDURE — 69200 CLEAR OUTER EAR CANAL: CPT

## 2017-06-13 RX ORDER — NEOMYCIN SULFATE, POLYMYXIN B SULFATE AND HYDROCORTISONE 10; 3.5; 1 MG/ML; MG/ML; [USP'U]/ML
3 SUSPENSION/ DROPS AURICULAR (OTIC) 3 TIMES DAILY
Qty: 5 ML | Refills: 0 | Status: SHIPPED | OUTPATIENT
Start: 2017-06-13 | End: 2017-06-18

## 2017-06-13 RX ORDER — AZITHROMYCIN 250 MG/1
TABLET, FILM COATED ORAL
Qty: 6 TAB | Refills: 0 | Status: SHIPPED | OUTPATIENT
Start: 2017-06-13 | End: 2017-06-13

## 2017-06-13 RX ORDER — AZITHROMYCIN 250 MG/1
TABLET, FILM COATED ORAL
Qty: 6 TAB | Refills: 0 | Status: SHIPPED | OUTPATIENT
Start: 2017-06-13 | End: 2017-07-03

## 2017-06-13 ASSESSMENT — PAIN SCALES - GENERAL: PAINLEVEL_OUTOF10: 2

## 2017-06-13 NOTE — ED AVS SNAPSHOT
Home Care Instructions                                                                                                                Tanvi Krueger   MRN: [id number]    Department:  [facility], Emergency Dept   Date of Visit:  6/13/2017            [facility], Emergency Dept    [address]    [place] [address]    Phone:  [phone]      You were seen by     Severiano Del Angel M.D.      Your Diagnosis Was     Ear foreign body, left, initial encounter     T16.2XXA       Medication Information     Review all of your home medications and newly ordered medications with your primary doctor and/or pharmacist as soon as possible. Follow medication instructions as directed by your doctor and/or pharmacist.     Please keep your complete medication list with you and share with your physician. Update the information when medications are discontinued, doses are changed, or new medications (including over-the-counter products) are added; and carry medication information at all times in the event of emergency situations.               Medication List      START taking these medications        Instructions    Morning Afternoon Evening Bedtime    azithromycin 250 MG Tabs   Commonly known as:  ZITHROMAX        Take two tabs by mouth on day one, then one tab by mouth daily on days 2-5.                        neomycin-polymyxin-HC 3.5-25803-7 Susp   Commonly known as:  PEDIOTIC HC        Place 3 Drops in ear 3 times a day for 5 days.   Dose:  3 Drop                          ASK your doctor about these medications        Instructions    Morning Afternoon Evening Bedtime    levetiracetam 250 MG tablet   Commonly known as:  KEPPRA        Doctor's comments:  Keppra 125mg before sleep for 2 weeks, then up to 250mg two times per day since the third week   Take 0.5 Tabs by mouth 2 times a day.   Dose:  125 mg                        levothyroxine 125 MCG Tabs   Commonly known as:   SYNTHROID        Take 1 Tab by mouth Every morning on an empty stomach.   Dose:  125 mcg                        Misc. Devices Misc        This is an order for nocturnal oxygen, 2L nasal prongs.  Dx:  Nocturnal hypoxia, lowest 59% on overnight study.  G47.34           MARIA DOLORES 99 months   NPI [id number]                        sertraline 100 MG Tabs   Commonly known as:  ZOLOFT        TAKE 2 TABLETS BY MOUTH EVERY DAY                        VITAMIN B COMPLEX PO        Take  by mouth every day.                        Vitamin D 2000 UNITS Caps        Take 2,000 Units by mouth every day.   Dose:  2000 Units                             Where to Get Your Medications      You can get these medications from any pharmacy     Bring a paper prescription for each of these medications    - azithromycin 250 MG Tabs  - neomycin-polymyxin-HC 3.5-49943-3 Susp            Patient Information     Patient Information    Following emergency treatment: all patient requiring follow-up care must return either to a private physician or a clinic if your condition worsens before you are able to obtain further medical attention, please return to the emergency room.     Billing Information    At [facility], we work to make the billing process streamlined for our patients.  Our Representatives are here to answer any questions you may have regarding your hospital bill.  If you have insurance coverage and have supplied your insurance information to us, we will submit a claim to your insurer on your behalf.  Should you have any questions regarding your bill, we can be reached online or by phone as follows:  Online: You are able pay your bills online or live chat with our representatives about any billing questions you may have. We are here to help Monday - Friday from 8:00am to 7:30pm and 9:00am - 12:00pm on Saturdays.  Please visit [email or web address]  for more information.   Phone:  [phone] or  [phone]    Please note that your emergency physician, surgeon, pathologist, radiologist, anesthesiologist, and other specialists are not employed by [facility] and will therefore bill separately for their services.  Please contact them directly for any questions concerning their bills at the numbers below:     Emergency Physician Services:  [phone]  [facility]:  [phone]  [facility]:  [phone]  [facility]:  [phone]    1. Your final bill may vary from the amount quoted upon discharge if all procedures are not complete at that time, or if your doctor has additional procedures of which we are not aware. You will receive an additional bill if you return to the Emergency Department at [facility] for suture removal regardless of the facility of which the sutures were placed.     2. Please arrange for settlement of this account at the emergency registration.    3. All self-pay accounts are due in full at the time of treatment.  If you are unable to meet this obligation then payment is expected within 4-5 days.     4. If you have had radiology studies (CT, X-ray, Ultrasound, MRI), you have received a preliminary result during your emergency department visit. Please contact the radiology department [phone] to receive a copy of your final result. Please discuss the Final result with your primary physician or with the follow up physician provided.     Crisis Hotline:  [place] Crisis Hotline:  1-250-JMUEVWI or 1-905.113.6617  [place] Crisis Hotline:    1-751.101.4613 or 026-407-5650         ED Discharge Follow Up Questions    1. In order to provide you with very good care, we would like to follow up with a phone call in the next few days.  May we have your permission to contact you?     YES /  NO    2. What is the best phone number to call you? (       )_____-__________    3. What is the best time to call you?      Morning  /  Afternoon  /   Evening                   Patient Signature:  ____________________________________________________________    Date:  ____________________________________________________________      Your appointments     Jun 20, 2017  8:20 AM   Follow Up Visit with Ben Weldon M.D.   [facility] Neurology (--)    [address], [address]  [facility] [address]   [phone]           You will be receiving a confirmation call a few days before your appointment from our automated call confirmation system.            Jun 27, 2017  8:10 PM   Sleep Study Diagnostic with SLEEP TECH   [facility] Sleep Medicine (--)    [address]  [place] [address]   [phone]            Jun 30, 2017  8:40 AM   Follow UP with GERBER Renteria   [facility] Sleep Medicine (--)    [address]  [place] [address]   [phone]

## 2017-06-13 NOTE — ED AVS SNAPSHOT
[employer] Access Code: Activation code not generated  Current [employer] Status: Active    [employer]  A secure, online tool to manage your health information     [employer]’s [employer]® is a secure, online tool that connects you to your personalized health information from the privacy of your home -- day or night - making it very easy for you to manage your healthcare. Once the activation process is completed, you can even access your medical information using the [employer] lori, which is available for free in the Apple Lori store or Google Play store.     [employer] provides the following levels of access (as shown below):   My Chart Features   [facility] Primary Care Doctor [facility]  Specialists [facility]  Urgent  Care [facility]  Primary Care  Doctor   Email your healthcare team securely and privately 24/7 X X X X   Manage appointments: schedule your next appointment; view details of past/upcoming appointments X      Request prescription refills. X      View recent personal medical records, including lab and immunizations X X X X   View health record, including health history, allergies, medications X X X X   Read reports about your outpatient visits, procedures, consult and ER notes X X X X   See your discharge summary, which is a recap of your hospital and/or ER visit that includes your diagnosis, lab results, and care plan. X X       How to register for [employer]:  1. Go to  [email or web address].  2. Click on the Sign Up Now box, which takes you to the New Member Sign Up page. You will need to provide the following information:  a. Enter your [employer] Access Code exactly as it appears at the top of this page. (You will not need to use this code after you’ve completed the sign-up process. If you do not sign up before the expiration date, you must request a new code.)   b. Enter your date of birth.   c. Enter your home email address.   d. Click Submit, and follow the next screen’s instructions.  3. Create a [employer] ID. This will  be your [employer] login ID and cannot be changed, so think of one that is secure and easy to remember.  4. Create a [employer] password. You can change your password at any time.  5. Enter your Password Reset Question and Answer. This can be used at a later time if you forget your password.   6. Enter your e-mail address. This allows you to receive e-mail notifications when new information is available in [employer].  7. Click Sign Up. You can now view your health information.    For assistance activating your [employer] account, call [phone]

## 2017-06-14 NOTE — ED NOTES
Pt given discharge instructions and prescriptions, verbalized understanding to information provided including follow up care, denied questions/concerns.  Pt ambulated from ER.

## 2017-06-14 NOTE — ED NOTES
Pt bib family with c/o of foreign object lodged in her left ear. Pt states piece of her hearing aid fell off and she isn't able to extract it.

## 2017-06-14 NOTE — ED PROVIDER NOTES
"ED Provider Note    CHIEF COMPLAINT  Chief Complaint   Patient presents with   • Foreign Body in Ear       HPI  Tanvi Krueger is a 51 y.o. female who presents *to the ER complaining of left ear pain secondary to a foreign body in her left ear.  The patient got a nearby worsening in her ear for about last hour and a half.  This is removed.  The patient also concerned about infections, anxious, and foul-smell from her left ear when she takes her hearing aid out in the day.  She had earache and was concerned about ear infection.  No fevers or chills.  No sore throat, runny nose or cough.    REVIEW OF SYSTEMS  See HPI for further details.  Constitutional: No fevers or chills.  PAST MEDICAL HISTORY  Past Medical History   Diagnosis Date   • Dyslipidemia, goal LDL below 130    • Menopausal symptoms    • Lumbar disc narrowing 7/14/2009   • Cholesteatoma of middle ear and mastoid 1992 surgery     mastoidectomy, prosthesis   • Vitamin d deficiency    • Recurrent sinus infections    • Hypothyroidism    • Family history of early CAD    • Elevated glycohemoglobin    • Anesthesia      nausea/vomiting   • History of endometriosis    • Asthma      allergy related   • Depression    • Bowel habit changes      constipation   • Oxygen desaturation during sleep      O2 2 liters HS   • Tonsillitis    • Eosinophilic esophagitis    • History of chickenpox    • Breath shortness    • Arthritis    • Anemia    • Pneumonia      hx   • Snoring    • Hiatus hernia syndrome      \"was told I had one\"   • Sleep apnea      ASV with 2L oxygen at night (sean)        FAMILY HISTORY  Family History   Problem Relation Age of Onset   • Cancer Mother 61     lung, smoker   • Heart Disease Mother 44     early heart attack   • Heart Disease Father 60     cabg x 3   • Hyperlipidemia Father    • Psychiatry Father      schizophrenia   • Hyperlipidemia Brother    • Cancer Maternal Grandmother 62     lung, non-smoker   • Psychiatry Maternal Grandmother      " depression, severe   • Psychiatry Other      depression, great uncle   • Sleep Apnea Neg Hx        SOCIAL HISTORY  Social History     Social History   • Marital Status:      Spouse Name: N/A   • Number of Children: N/A   • Years of Education: N/A     Social History Main Topics   • Smoking status: Never Smoker    • Smokeless tobacco: Never Used   • Alcohol Use: No   • Drug Use: No   • Sexual Activity:     Partners: Male     Other Topics Concern   • None     Social History Narrative       SURGICAL HISTORY  Past Surgical History   Procedure Laterality Date   • Myringotomy  4/30/2009     Performed by MAXIMUS WHITE at SURGERY SAME DAY [facility] ORS   • Abdominal hysterectomy total  1997     ovaries are gone   • Tympanotomy  6/24/2010     Performed by MAXIMUS WHITE at SURGERY SAME DAY [facility] ORS   • Exam under anesthesia  6/24/2010     Performed by MAXIMUS WHITE at SURGERY SAME DAY [facility] ORS   • Myringotomy  4/20/2012     Performed by RYANNE ALICIA at SURGERY [facility]   • Ear middle exploration  4/3/2015     Performed by Naresh Abdi M.D. at SURGERY SAME DAY [facility] ORS   • Ossicular reconstruction  4/3/2015     Performed by Naresh Abdi M.D. at SURGERY SAME DAY [facility]   • Septoplasty  9/9/2016     Procedure: SEPTOPLASTY;  Surgeon: Naresh Abdi M.D.;  Location: SURGERY SAME DAY [facility];  Service:    • Turbinoplasty Bilateral 9/9/2016     Procedure: TURBINOPLASTY;  Surgeon: Naresh Abdi M.D.;  Location: SURGERY SAME DAY [facility];  Service:    • Hysterectomy laparoscopy     • Arthroscopy, knee     • Tonsillectomy     • Sinuscope     • Other  2016     sinus surgery   • Thyroidectomy total Left 10/14/2016     Procedure: LEFT THROIDECTOMY;  Surgeon: Naresh Abdi M.D.;  Location: SURGERY SAME DAY [facility];  Service:    • Laryngoscopy N/A 3/31/2017     Procedure: LARYNGOSCOPY - DIRECT W/BIOPSY BASE OF TONGUE AND NASOPHARYNGOSCOPY W/BIOPSY;  Surgeon: Naresh Abdi  "M.D.;  Location: SURGERY SAME DAY [facility];  Service:        CURRENT MEDICATIONS  Home Medications     Reviewed by Tomas Brantley R.N. (Registered Nurse) on 06/13/17 at 2025  Med List Status: Complete    Medication Last Dose Status    B Complex Vitamins (VITAMIN B COMPLEX PO) 4/17/2017 Active    Cholecalciferol (VITAMIN D) 2000 UNIT CAPS 4/17/2017 Active    levetiracetam (KEPPRA) 250 MG tablet  Active    levothyroxine (SYNTHROID) 125 MCG Tab  Active    Misc. Devices Misc 4/17/2017 Active    sertraline (ZOLOFT) 100 MG Tab 4/17/2017 Active                ALLERGIES  Allergies   Allergen Reactions   • Doxycycline      Wheezing; diarrhea   • Pcn [Penicillins] Unspecified     Unknown reaction as child   • Keflex [Cephalexin] Unspecified     Yeast Infection       PHYSICAL EXAM  VITAL SIGNS: /93 mmHg  Pulse 86  Temp(Src) 37.2 °C (99 °F)  Resp 18  Ht 1.727 m (5' 8\")  Wt 122.2 kg (269 lb 6.4 oz)  BMI 40.97 kg/m2  SpO2 100%  LMP 02/26/1995     Constitutional: Well developed, Well nourished, No acute distress, Non-toxic appearance.   HENT: Normocephalic, Atraumatic, Bilateral external ears normal, Oropharynx moist, No oral exudates, Nose normal.  Right TM is normal.  Left TM is retracted with an effusion.  Also looks purulent.  The canal slightly swollen.  This is examined after the foreign bodies removed.  This is easily removed externally.  Eyes: PERRL, EOMI, Conjunctiva normal, No discharge.   Neck: Normal range of motion, No tenderness, Supple, No stridor.   Cardiovascular: Normal heart rate, Normal rhythm, No murmurs, No rubs, No gallops.   Thorax & Lungs: Normal breath sounds, No respiratory distress, No wheezing.     Musculoskeletal: Good range of motion in all major joints. No tenderness to palpation or major deformities noted.   Neurologic: Alert No focal deficits noted.   Psychiatric: Affect normal      COURSE & MEDICAL DECISION MAKING  Pertinent Labs & Imaging studies reviewed. (See chart for " details)  Ear foreign bodies removed.    On repeat exam, she has an ear infection.  Also otitis media with otitis externa, especially concerning with the drainage smell is coming from her left ear.  She is put on Cortisporin otic solution and azithromycin.  Because of her antibiotic allergies.  She is to follow-up with her ENT doctor.  She can return for pain, drainage, or other concern.  She is discharged in good condition.  Questions are answered.    FINAL IMPRESSION  1. Ear foreign body, left, initial encounter    2. Acute diffuse otitis externa of left ear    3. Acute suppurative otitis media of left ear without spontaneous rupture of tympanic membrane, recurrence not specified             Electronically signed by: Severiano Del Angel, 6/13/2017 8:57 PM

## 2017-06-20 ENCOUNTER — PATIENT MESSAGE (OUTPATIENT)
Dept: NEUROLOGY | Facility: MEDICAL CENTER | Age: 52
End: 2017-06-20

## 2017-06-20 ENCOUNTER — OFFICE VISIT (OUTPATIENT)
Dept: NEUROLOGY | Facility: MEDICAL CENTER | Age: 52
End: 2017-06-20
Payer: COMMERCIAL

## 2017-06-20 VITALS
HEIGHT: 67 IN | BODY MASS INDEX: 40.9 KG/M2 | RESPIRATION RATE: 18 BRPM | WEIGHT: 260.6 LBS | HEART RATE: 74 BPM | TEMPERATURE: 99 F | SYSTOLIC BLOOD PRESSURE: 120 MMHG | OXYGEN SATURATION: 93 % | DIASTOLIC BLOOD PRESSURE: 76 MMHG

## 2017-06-20 DIAGNOSIS — E06.3 HASHIMOTO'S THYROIDITIS: ICD-10-CM

## 2017-06-20 DIAGNOSIS — G40.909 SEIZURE CEREBRAL (HCC): ICD-10-CM

## 2017-06-20 PROCEDURE — 99214 OFFICE O/P EST MOD 30 MIN: CPT | Performed by: PSYCHIATRY & NEUROLOGY

## 2017-06-20 RX ORDER — ZONISAMIDE 100 MG/1
200 CAPSULE ORAL DAILY
Qty: 60 CAP | Refills: 3 | Status: SHIPPED | OUTPATIENT
Start: 2017-06-20 | End: 2017-08-10

## 2017-06-20 NOTE — PATIENT INSTRUCTIONS
IMPRESSION:    1. Trouble of speech and finding words for one year-- spells like-- lasting for seconds  2. Silent aspiration 2016,  Central Sleep Apnea 2016 ( now on nocturnal O2 + ASV), Thyroid disease-Hashimoto- 2016, Cholesteatoma, replacement of ossicle bones since childhood  3. Abnormal EEG    PLAN/RECOMMENDATIONS:    Explain to the patient --- the problems of speech and word finding difficult could be secondary to brain malfunction ( not neuromuscle weakness since the patient's trouble lies in finding the words instead of speaking out)      Since the patient could not tolerate Keppra  Even Keppra 125mg made her sleepy  We would try Zonegran 100mg before sleep for 2 weeks  Then up to Zonegran 200mg before sleep since the third week  Please stop if any side effects-- please call us if not effective    In case the patient still could not get benefits from zonegran-- we will invite her to be admitted to hospital         ________________________________________________________________________    Fish Oil -- Omega 3 1000mg 3# daily  Try Daily Probiotic too  Vit D-3 4000 unit daily  ________________________________________________________________________          MRI of brain-- Dec 2016-- not remarkable        SIGNATURE:  Ben Weldon      CC:  Rebecca Ferro M.D.        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       ____________________________________     MD JOSE SINHA / EDMUNDO    DD:  06/06/2017 05:48:51  DT:  06/06/2017 06:29:16    D#:  [id number]  Job#:  [id number]

## 2017-06-20 NOTE — PROGRESS NOTES
"NEUROLOGY NOTE    Referring Physician  Rebecca Ferro      CHIEF COMPLAINT:  Speech problems and word finding difficulty for one year  Silent aspiration  Hx of thyroid problems   Chief Complaint   Patient presents with   • Follow-Up     Hashimoto's thyoiditis       PRESENT ILLNESS:   Speech problems and word finding difficulty for one year  Silent aspiration  Hx of thyroid problems     She is seeing ENT doctor, ---     For few seconds, could not find the words she would like to say    PAST MEDICAL HISTORY:  Past Medical History   Diagnosis Date   • Dyslipidemia, goal LDL below 130    • Menopausal symptoms    • Lumbar disc narrowing 7/14/2009   • Cholesteatoma of middle ear and mastoid 1992 surgery     mastoidectomy, prosthesis   • Vitamin d deficiency    • Recurrent sinus infections    • Hypothyroidism    • Family history of early CAD    • Elevated glycohemoglobin    • Anesthesia      nausea/vomiting   • History of endometriosis    • Asthma      allergy related   • Depression    • Bowel habit changes      constipation   • Oxygen desaturation during sleep      O2 2 liters HS   • Tonsillitis    • Eosinophilic esophagitis    • History of chickenpox    • Breath shortness    • Arthritis    • Anemia    • Pneumonia      hx   • Snoring    • Hiatus hernia syndrome      \"was told I had one\"   • Sleep apnea      ASV with 2L oxygen at night (sean)        PAST SURGICAL HISTORY:  Past Surgical History   Procedure Laterality Date   • Myringotomy  4/30/2009     Performed by MAXIMUS WHITE at SURGERY SAME DAY [facility] ORS   • Abdominal hysterectomy total  1997     ovaries are gone   • Tympanotomy  6/24/2010     Performed by MXAIMUS WHITE at SURGERY SAME DAY [facility] ORS   • Exam under anesthesia  6/24/2010     Performed by MAXIMUS WHITE at SURGERY SAME DAY [facility] ORS   • Myringotomy  4/20/2012     Performed by RYANNE ALICIA at SURGERY [facility] ORS   • Ear middle exploration  4/3/2015     Performed by Naresh PACE" NELIDA Abdi at SURGERY SAME DAY [facility]   • Ossicular reconstruction  4/3/2015     Performed by Naresh Abdi M.D. at SURGERY SAME DAY [facility]   • Septoplasty  9/9/2016     Procedure: SEPTOPLASTY;  Surgeon: Naresh Abdi M.D.;  Location: SURGERY SAME DAY [facility];  Service:    • Turbinoplasty Bilateral 9/9/2016     Procedure: TURBINOPLASTY;  Surgeon: Naresh Abdi M.D.;  Location: SURGERY SAME DAY [facility];  Service:    • Hysterectomy laparoscopy     • Arthroscopy, knee     • Tonsillectomy     • Sinuscope     • Other  2016     sinus surgery   • Thyroidectomy total Left 10/14/2016     Procedure: LEFT THROIDECTOMY;  Surgeon: Naresh Abdi M.D.;  Location: SURGERY SAME DAY [facility];  Service:    • Laryngoscopy N/A 3/31/2017     Procedure: LARYNGOSCOPY - DIRECT W/BIOPSY BASE OF TONGUE AND NASOPHARYNGOSCOPY W/BIOPSY;  Surgeon: Naresh Abdi M.D.;  Location: SURGERY SAME DAY [facility];  Service:        FAMILY HISTORY:  Family History   Problem Relation Age of Onset   • Cancer Mother 61     lung, smoker   • Heart Disease Mother 44     early heart attack   • Heart Disease Father 60     cabg x 3   • Hyperlipidemia Father    • Psychiatry Father      schizophrenia   • Hyperlipidemia Brother    • Cancer Maternal Grandmother 62     lung, non-smoker   • Psychiatry Maternal Grandmother      depression, severe   • Psychiatry Other      depression, great uncle   • Sleep Apnea Neg Hx        SOCIAL HISTORY:  Social History     Social History   • Marital Status:      Spouse Name: N/A   • Number of Children: N/A   • Years of Education: N/A     Occupational History   • Not on file.     Social History Main Topics   • Smoking status: Never Smoker    • Smokeless tobacco: Never Used   • Alcohol Use: No   • Drug Use: No   • Sexual Activity:     Partners: Male     Other Topics Concern   • Not on file     Social History Narrative     ALLERGIES:  Allergies   Allergen Reactions   • Doxycycline       "Wheezing; diarrhea   • Pcn [Penicillins] Unspecified     Unknown reaction as child   • Keflex [Cephalexin] Unspecified     Yeast Infection     TOBHX  History   Smoking status   • Never Smoker    Smokeless tobacco   • Never Used     ALCHX  History   Alcohol Use No     DRUGHX  History   Drug Use No           MEDICATIONS:  Current Outpatient Prescriptions   Medication   • azithromycin (ZITHROMAX) 250 MG Tab   • levetiracetam (KEPPRA) 250 MG tablet   • levothyroxine (SYNTHROID) 125 MCG Tab   • B Complex Vitamins (VITAMIN B COMPLEX PO)   • sertraline (ZOLOFT) 100 MG Tab   • Cholecalciferol (VITAMIN D) 2000 UNIT CAPS   • Misc. Devices Misc     No current facility-administered medications for this visit.       REVIEW OF SYSTEM:    Constitutional: Denies fevers, Denies weight changes   Eyes: Denies changes in vision, no eye pain   Ears/Nose/Throat/Mouth: Denies nasal congestion or sore throat   Cardiovascular: Denies chest pain or palpitations   Respiratory: central sleep apnea  Gastrointestinal/Hepatic: Denies abdominal pain, nausea, vomiting, diarrhea, constipation or GI bleeding   Genitourinary: Denies bladder dysfunction, dysuria or frequency   Musculoskeletal/Rheum: Denies joint pain and swelling   Skin/Breast: Denies rash, denies breast lumps or discharge   Neurological: speech problems and word finding difficulty, hearing aids  Psychiatric: denies mood disorder   Endocrine: thyroid dysfunction   Heme/Oncology/Lymph Nodes: Denies enlarged lymph nodes, denies brusing or known bleeding disorder   Allergic/Immunologic: Denies hx of allergies         PHYSICAL AND NEUROLOGICAL EXMAINATIONS:  VITAL SIGNS: /76 mmHg  Pulse 74  Temp(Src) 37.2 °C (99 °F)  Resp 18  Ht 1.702 m (5' 7\")  Wt 118.207 kg (260 lb 9.6 oz)  BMI 40.81 kg/m2  SpO2 93%  LMP 02/26/1995  CURRENT WEIGHT:   BMI: Body mass index is 40.81 kg/(m^2).  PREVIOUS WEIGHTS:  Wt Readings from Last 25 Encounters:   06/20/17 118.207 kg (260 lb 9.6 oz) "   06/13/17 122.2 kg (269 lb 6.4 oz)   04/17/17 117.028 kg (258 lb)   03/31/17 118.7 kg (261 lb 11 oz)   03/17/17 117.028 kg (258 lb)   03/07/17 117.028 kg (258 lb)   01/10/17 115.667 kg (255 lb)   12/22/16 116.121 kg (256 lb)   12/12/16 117.935 kg (260 lb)   11/18/16 115.667 kg (255 lb)   10/19/16 115.667 kg (255 lb)   10/14/16 115.3 kg (254 lb 3.1 oz)   10/10/16 115.667 kg (255 lb)   10/04/16 115.667 kg (255 lb)   09/21/16 116.121 kg (256 lb)   09/09/16 117.8 kg (259 lb 11.2 oz)   09/09/16 115.6 kg (254 lb 13.6 oz)   03/15/16 112.946 kg (249 lb)   02/25/16 112.492 kg (248 lb)   02/23/16 112.764 kg (248 lb 9.6 oz)   11/18/15 110.678 kg (244 lb)   11/16/15 108.863 kg (240 lb)   04/08/15 108.863 kg (240 lb)   03/30/15 113 kg (249 lb 1.9 oz)   12/23/14 107.956 kg (238 lb)       General appearance of patient: WDWN(+) NAD(+)    EYES  o Fundus : Papilledem(-) Exudates(-) Hemorrhage(-)  Nervous System  Orientation to time, place and person(+)  Memory normal(-)  Language: aphasia(-)  Knowledge: past(+) Current(+)  Attention(+)  Cranial Nerves  • Nerve 2: intact  • Nerve 3,4,6: intact  • Nerve 5 : intact  • Nerve 7: intact  • Nerve 8: impaired over left  • Nerve 9 & 10: intact  • Nerve 11: intact  • Nerve 12: intact  Muscle Power and muscle tone: symmetric, normal in upper and lower  Sensory System: Pin sensation intact(+)  Reflexes: symmetric throughout  Cerebellar Function FNP normal   Gait : Steady(+) TandemGait steady(+)  Heart and Vascular  Peripheral Vasucular system : Edema (-) Swelling(-)  RHB, Breathing sound clear  abdomen bowel sound normoactive  Extremities freely moveable  Joints no contracture  Neck pain     NEUROIMAGING: I reviewed the MRI/CT of brain       Keppra 125mg made her sleepy    IMPRESSION:    1. Trouble of speech and finding words for one year-- spells like-- lasting for seconds  2. Silent aspiration 2016,  Central Sleep Apnea 2016 ( now on nocturnal O2 + ASV), Thyroid disease-Hashimoto- 2016,  Cholesteatoma, replacement of ossicle bones since childhood  3. Abnormal EEG      PLAN/RECOMMENDATIONS:    Explain to the patient --- the problems of speech and word finding difficult could be secondary to brain malfunction ( not neuromuscle weakness since the patient's trouble lies in finding the words instead of speaking out)      Since the patient could not tolerate Keppra  Even Keppra 125mg made her sleepy  We would try Zonegran 100mg before sleep for 2 weeks  Then up to Zonegran 200mg before sleep since the third week  Please stop if any side effects-- please call us if not effective    In case the patient still could not get benefits from zonegran-- we will invite her to be admitted to hospital         ________________________________________________________________________    Fish Oil -- Omega 3 1000mg 3# daily  Try Daily Probiotic too  Vit D-3 4000 unit daily  ________________________________________________________________________          MRI of brain-- Dec 2016-- not remarkable        SIGNATURE:  Ben Weldon      CC:  Rebecca Ferro M.D.        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       ____________________________________     MD JOSE SINHA / EDMUNDO    DD:  06/06/2017 05:48:51  DT:  06/06/2017 06:29:16    D#:  [id number]  Job#:  [id number]

## 2017-06-20 NOTE — MR AVS SNAPSHOT
"        Tanvi Krueger   2017 8:20 AM   Office Visit   MRN: [id number]    Department:  [facility]   Dept Phone:  [phone]    Description:  Female : 1965   Provider:  Ben Weldon M.D.           Reason for Visit     Follow-Up Hashimoto's thyoiditis      Allergies as of 2017     Allergen Noted Reactions    Doxycycline 2014       Wheezing; diarrhea    Pcn [Penicillins] 2009   Unspecified    Unknown reaction as child    Keflex [Cephalexin] 2016   Unspecified    Yeast Infection      You were diagnosed with     Hashimoto's thyroiditis   [749697]       Seizure cerebral   [379252]         Vital Signs     Blood Pressure Pulse Temperature Respirations Height Weight    120/76 mmHg 74 37.2 °C (99 °F) 18 1.702 m (5' 7\") 118.207 kg (260 lb 9.6 oz)    Body Mass Index Oxygen Saturation Last Menstrual Period Smoking Status          40.81 kg/m2 93% 1995 Never Smoker         Basic Information     Date Of Birth Sex Race Ethnicity Preferred Language    1965 Female White Non- English      Your appointments     2017  8:10 PM   Sleep Study Diagnostic with SLEEP TECH   [facility] (--)    [address]  [facility] [address]  [place] [address]   [phone]            2017  8:40 AM   Follow UP with GERBER Renteria   [facility] (--)    [address]  [place] [address]   [phone]              Problem List              ICD-10-CM Priority Class Noted - Resolved    Lumbar disc narrowing M51.36   2009 - Present    Dyslipidemia, goal LDL below 130 E78.5 High  Unknown - Present    Depression F32.9   Unknown - Present    Vitamin D deficiency disease E55.9   Unknown - Present    Reactive airway disease with wheezing J45.909   2012 - Present    Hashimoto's thyroiditis E06.3   2013 - Present    Family history of early CAD Z82.49   Unknown - Present    Obesity (BMI 35.0-39.9 " without comorbidity) ([facility]) E66.9   7/14/2014 - Present    Cholesteatoma of middle ear and mastoid H71.20   Unknown - Present    Conductive hearing loss, unilateral H90.2   4/3/2015 - Present    Acquired deflected nasal septum J34.2   9/9/2016 - Present    Hypertrophy of both inferior nasal turbinates J34.3   9/9/2016 - Present    Central sleep apnea G47.31   10/10/2016 - Present    Obstructive sleep apnea G47.33   10/10/2016 - Present    Thyroid nodule E04.1   10/14/2016 - Present    Eosinophilic esophagitis K20.0   Unknown - Present    Oxygen desaturation during sleep G47.34   Unknown - Present    Chronic nasopharyngitis J31.1   3/31/2017 - Present    Hypertrophy of tonsils alone J35.1   3/31/2017 - Present    Seizure cerebral I67.89   6/20/2017 - Present      Health Maintenance        Date Due Completion Dates    MAMMOGRAM 5/23/2018 5/23/2017, 3/1/2016, 8/5/2014, 2/12/2014, 2/4/2014, 8/21/2012, 6/23/2011, 6/23/2011 (Prv Comp), 3/31/2006    Override on 6/23/2011: Previously completed    IMM DTaP/Tdap/Td Vaccine (2 - Td) 11/1/2022 11/1/2012    COLONOSCOPY 4/21/2024 4/21/2014            Current Immunizations     Influenza TIV (IM) 9/23/2015, 10/1/2012    Influenza Vaccine Quad Inj (Preserved) 12/12/2016  9:06 AM    Tdap Vaccine 11/1/2012      Below and/or attached are the medications your provider expects you to take. Review all of your home medications and newly ordered medications with your provider and/or pharmacist. Follow medication instructions as directed by your provider and/or pharmacist. Please keep your medication list with you and share with your provider. Update the information when medications are discontinued, doses are changed, or new medications (including over-the-counter products) are added; and carry medication information at all times in the event of emergency situations     Allergies:  DOXYCYCLINE - (reactions not documented)     PCN - Unspecified     KEFLEX - Unspecified                  Medications  Valid as of: June 20, 2017 -  9:07 AM    Generic Name Brand Name Tablet Size Instructions for use    Azithromycin (Tab) ZITHROMAX 250 MG Take two tabs by mouth on day one, then one tab by mouth daily on days 2-5.        B Complex Vitamins   Take  by mouth every day.        Cholecalciferol (Cap) Vitamin D 2000 UNITS Take 2,000 Units by mouth every day.        Levothyroxine Sodium (Tab) SYNTHROID 125 MCG Take 1 Tab by mouth Every morning on an empty stomach.        Misc. Devices (Misc) Misc. Devices  This is an order for nocturnal oxygen, 2L nasal prongs.  Dx:  Nocturnal hypoxia, lowest 59% on overnight study.  G47.34           MARIA DOLORES 99 months   NPI [id number]        Sertraline HCl (Tab) ZOLOFT 100 MG TAKE 2 TABLETS BY MOUTH EVERY DAY        Zonisamide (Cap) ZONEGRAN 100 MG Take 2 Caps by mouth every day.        .                 Medicines prescribed today were sent to:     [facility] [address] [place] [id number] [facility] [address] [place] [id number] [facility] [address]    Phone: [phone] Fax: [phone]    Open 24 Hours?: No      Medication refill instructions:       If your prescription bottle indicates you have medication refills left, it is not necessary to call your provider’s office. Please contact your pharmacy and they will refill your medication.    If your prescription bottle indicates you do not have any refills left, you may request refills at any time through one of the following ways: The online [employer] system (except Urgent Care), by calling your provider’s office, or by asking your pharmacy to contact your provider’s office with a refill request. Medication refills are processed only during regular business hours and may not be available until the next business day. Your provider may request additional information or to have a follow-up visit with you prior to refilling your medication.   *Please Note: Medication refills are assigned a new  Rx number when refilled electronically. Your pharmacy may indicate that no refills were authorized even though a new prescription for the same medication is available at the pharmacy. Please request the medicine by name with the pharmacy before contacting your provider for a refill.        Instructions      IMPRESSION:    1. Trouble of speech and finding words for one year-- spells like-- lasting for seconds  2. Silent aspiration 2016,  Central Sleep Apnea 2016 ( now on nocturnal O2 + ASV), Thyroid disease-Hashimoto- 2016, Cholesteatoma, replacement of ossicle bones since childhood  3. Abnormal EEG    PLAN/RECOMMENDATIONS:    Explain to the patient --- the problems of speech and word finding difficult could be secondary to brain malfunction ( not neuromuscle weakness since the patient's trouble lies in finding the words instead of speaking out)      Since the patient could not tolerate Keppra  Even Keppra 125mg made her sleepy  We would try Zonegran 100mg before sleep for 2 weeks  Then up to Zonegran 200mg before sleep since the third week  Please stop if any side effects-- please call us if not effective    In case the patient still could not get benefits from zonegran-- we will invite her to be admitted to hospital         ________________________________________________________________________    Fish Oil -- Omega 3 1000mg 3# daily  Try Daily Probiotic too  Vit D-3 4000 unit daily  ________________________________________________________________________          MRI of brain-- Dec 2016-- not remarkable        SIGNATURE:  Ben Weldon      CC:  Rebecca Ferro M.D.        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       ____________________________________     MD JOSE SINHA /  EDMUNDO    DD:  06/06/2017 05:48:51  DT:  06/06/2017 06:29:16    D#:  [id number]  Job#:  [id number]                      MyChart Access Code: Activation code not generated  Current MyChart Status: Active

## 2017-06-21 NOTE — TELEPHONE ENCOUNTER
From: Tanvi Krueger  To: Rancho Cast Ass't  Sent: 6/20/2017 8:28 PM PDT  Subject: Test Result Question    Hi. I called you back. I had my appt and saw  June 20th. Is there something we need to discuss? I'm in meeting from 9am -12:00. If you can call me prior to 9:00 am I'd appreciate that. Or you can call my  and give him info. [phone]. Thank you  ----- Message -----  From: Rancho Cast Ass't  Sent: 6/20/2017 3:06 PM PDT  To: Tanvi Krueger  Subject: RE: Test Result Question    Please call me at [phone]    ----- Message -----   From: TANVI KRUEGER   Sent: 6/7/2017 4:17 PM PDT   To: Ben Weldon M.D.  Subject: Test Result Question    So I've seen the EEG results. At least my concerns have been validated. Now I just need to know what to expect medically and if there is anything we can do to slow any further issues or symptoms. If there's any other testing that needs to be done, etc. I'd like to push forward and get these in the process. Appreciate your guidance. This is being sent to Chelle Weldon and Kasie.

## 2017-06-27 ENCOUNTER — SLEEP STUDY (OUTPATIENT)
Dept: SLEEP MEDICINE | Facility: MEDICAL CENTER | Age: 52
End: 2017-06-27
Attending: NURSE PRACTITIONER
Payer: COMMERCIAL

## 2017-06-27 PROCEDURE — 95811 POLYSOM 6/>YRS CPAP 4/> PARM: CPT | Performed by: INTERNAL MEDICINE

## 2017-06-27 NOTE — MR AVS SNAPSHOT
Tanvi Krueger   2017 8:10 PM   Sleep Study   MRN: [id number]    Department:  [facility]   Dept Phone:  [phone]    Description:  Female : 1965   Provider:  SLEEP [employer]           Allergies as of 2017     Allergen Noted Reactions    Doxycycline 2014       Wheezing; diarrhea    Pcn [Penicillins] 2009   Unspecified    Unknown reaction as child    Keflex [Cephalexin] 2016   Unspecified    Yeast Infection      Vital Signs     Last Menstrual Period Smoking Status                1995 Never Smoker           Basic Information     Date Of Birth Sex Race Ethnicity Preferred Language    1965 Female White Non- English      Your appointments     2017  8:40 AM   Follow UP with GERBER Renteria   [facility] Sleep Medicine (--)    [address]  [place] [id number] [phone]            Oct 06, 2017  8:40 AM   Follow Up Visit with Ben Weldon M.D.   [facility] Neurology (--)    [address], [address]  [facility] [address] [phone]           You will be receiving a confirmation call a few days before your appointment from our automated call confirmation system.              Problem List              ICD-10-CM Priority Class Noted - Resolved    Lumbar disc narrowing M51.36   2009 - Present    Dyslipidemia, goal LDL below 130 E78.5 High  Unknown - Present    Depression F32.9   Unknown - Present    Vitamin D deficiency disease E55.9   Unknown - Present    Reactive airway disease with wheezing J45.909   2012 - Present    Hashimoto's thyroiditis E06.3   2013 - Present    Family history of early CAD Z82.49   Unknown - Present    Obesity (BMI 35.0-39.9 without comorbidity) ([facility]) E66.9   2014 - Present    Cholesteatoma of middle ear and mastoid H71.20   Unknown - Present    Conductive hearing loss, unilateral H90.2   4/3/2015 - Present    Acquired deflected nasal septum J34.2    9/9/2016 - Present    Hypertrophy of both inferior nasal turbinates J34.3   9/9/2016 - Present    Central sleep apnea G47.31   10/10/2016 - Present    Obstructive sleep apnea G47.33   10/10/2016 - Present    Thyroid nodule E04.1   10/14/2016 - Present    Eosinophilic esophagitis K20.0   Unknown - Present    Oxygen desaturation during sleep G47.34   Unknown - Present    Chronic nasopharyngitis J31.1   3/31/2017 - Present    Hypertrophy of tonsils alone J35.1   3/31/2017 - Present    Seizure cerebral I67.89   6/20/2017 - Present      Health Maintenance        Date Due Completion Dates    MAMMOGRAM 5/23/2018 5/23/2017, 3/1/2016, 8/5/2014, 2/12/2014, 2/4/2014, 8/21/2012, 6/23/2011, 6/23/2011 (Prv Comp), 3/31/2006    Override on 6/23/2011: Previously completed    IMM DTaP/Tdap/Td Vaccine (2 - Td) 11/1/2022 11/1/2012    COLONOSCOPY 4/21/2024 4/21/2014            Current Immunizations     Influenza TIV (IM) 9/23/2015, 10/1/2012    Influenza Vaccine Quad Inj (Preserved) 12/12/2016  9:06 AM    Tdap Vaccine 11/1/2012      Below and/or attached are the medications your provider expects you to take. Review all of your home medications and newly ordered medications with your provider and/or pharmacist. Follow medication instructions as directed by your provider and/or pharmacist. Please keep your medication list with you and share with your provider. Update the information when medications are discontinued, doses are changed, or new medications (including over-the-counter products) are added; and carry medication information at all times in the event of emergency situations     Allergies:  DOXYCYCLINE - (reactions not documented)     PCN - Unspecified     KEFLEX - Unspecified               Medications  Valid as of: June 28, 2017 -  1:35 AM    Generic Name Brand Name Tablet Size Instructions for use    Azithromycin (Tab) ZITHROMAX 250 MG Take two tabs by mouth on day one, then one tab by mouth daily on days 2-5.        B Complex  Vitamins   Take  by mouth every day.        Cholecalciferol (Cap) Vitamin D 2000 UNITS Take 2,000 Units by mouth every day.        Levothyroxine Sodium (Tab) SYNTHROID 125 MCG Take 1 Tab by mouth Every morning on an empty stomach.        Misc. Devices (Misc) Misc. Devices  This is an order for nocturnal oxygen, 2L nasal prongs.  Dx:  Nocturnal hypoxia, lowest 59% on overnight study.  G47.34           MARIA DOLORES 99 months   NPI [id number]        Sertraline HCl (Tab) ZOLOFT 100 MG TAKE 2 TABLETS BY MOUTH EVERY DAY        Zonisamide (Cap) ZONEGRAN 100 MG Take 2 Caps by mouth every day.        .                 Medicines prescribed today were sent to:     [facility] [address] [place] [id number] Located within [facility]    [address] [facility] [address]    Phone: [phone] Fax: [phone]    Open 24 Hours?: No      Medication refill instructions:       If your prescription bottle indicates you have medication refills left, it is not necessary to call your provider’s office. Please contact your pharmacy and they will refill your medication.    If your prescription bottle indicates you do not have any refills left, you may request refills at any time through one of the following ways: The online Perfect system (except Urgent Care), by calling your provider’s office, or by asking your pharmacy to contact your provider’s office with a refill request. Medication refills are processed only during regular business hours and may not be available until the next business day. Your provider may request additional information or to have a follow-up visit with you prior to refilling your medication.   *Please Note: Medication refills are assigned a new Rx number when refilled electronically. Your pharmacy may indicate that no refills were authorized even though a new prescription for the same medication is available at the pharmacy. Please request the medicine by name with the pharmacy before  contacting your provider for a refill.           MyChart Access Code: Activation code not generated  Current MyChart Status: Active

## 2017-06-28 NOTE — PROCEDURES
CLINICAL COMMENTS:  The patient underwent a split night polysomnogram with a CPAP titration using the standard montage for measurement of parameters of sleep, respiratory events, movement abnormalities, heart rate and rhythm. A microphone was used to monitor snoring.    INTERPRETATION: The diagnostic recording time was 198.7 minutes with a sleep period of 198.7 minutes.  Total sleep time was 197.0 minutes with a sleep efficiency of 99.1%.  The sleep latency was 0.0 minutes, and REM latency was 159.0 minutes.  The patient had 18 arousals in total, for an arousal index of 5.5.        RESPIRATORY: The patient had 3 apneas in total.  Of these, 0 were obstructive apneas, and 3 were central apneas.  This resulted in an apnea index (AI) of 0.9.  The patient had 69 hypopneas in total, which resulted in a hypopnea index of 21.0.  The overall AHI was 21.9, while the AHI during REM was 74.4.  The supine AHI = N/A.    OXIMETRY: Oxygen saturation monitoring showed a mean SpO2 of 87.6% for the diagnostic part of the study, with a minimum oxygen saturation of 59.0%.  Oxygen saturations were below 89% for 87.0% of sleep time.    CARDIAC: The highest heart rate for the first part of the study was 100.0 beats per minute.  The average heart rate during sleep was 81.6 bpm, while the highest heart rate was 95.0 bpm.    LIMB MOVEMENTS: There were a total of 0 periodic limb movements during sleep, of which 0 were PLMS arousals.  This resulted in a PLMS index of 0.0 and a PLMS arousal index of 0.0.    TREATMENT    Treatment recording time was 258.7 minutes with a total sleep time of 209.0min.  The patient had an arousal index of 7.8.      RESPIRATORY: The patient had 0 obstructive apneas, 7 central apneas, and 9 hypopneas for an overall AHI was 4.6.    OXIMETRY: The mean SpO2 during treatment was 89.9%, with a minimum oxygen saturation of 75.0%.      CPAP was tried from 5 to 9 cmH2O. SUMMER Sandra this is when she was actually is brought  the inner malnutrition.    Impression: New    This was an overnight diagnostic polysomnogram with a subsequent Pap titration.    During the diagnostic phase, the patient had an excellent sleep efficiency of 99.1% and one REM period. The sleep stage durations revealed 1.7 minutes of wake after sleep onset, one minute of stage I sleep, 156.5 minutes of stage II sleep, no stage III sleep, and 39.5 minutes of REM. The sleep latency was 0.0 minutes which was abnormally short and the REM latency was prolonged at 159 minutes. The patient did not have any significant periodic limb movements. Moderate obstructive sleep apnea hypopnea was found. The apnea hypopnea index was 21.9, the mean event duration was 19.9 seconds, and the longest event lasted 61.8 seconds. The patient had one RERA. REM sleep aggravated the sleep disordered breathing as reflected by the REM index of 75.9.    Once the patient met the split-night protocol, the technician performed a positive airway pressure titration. The patient chose to use a medium Eson mask with heated humidification. CPAP was initiated 6 cm and increased to a maximum of 9 cm. The patient did best on CPAP at 9 cm where supine REM sleep was achieved, the apnea hypopnea index was 0.0, the minimum saturation 85%, and the mean saturation 91.1%.    Recommendation:    Recommend CPAP at 9 cm using a mask of choice and heated humidification followed by clinical and data card review in 6-8 weeks.

## 2017-06-29 ENCOUNTER — TELEPHONE (OUTPATIENT)
Dept: NEUROLOGY | Facility: MEDICAL CENTER | Age: 52
End: 2017-06-29

## 2017-06-29 ENCOUNTER — PATIENT MESSAGE (OUTPATIENT)
Dept: MEDICAL GROUP | Facility: CLINIC | Age: 52
End: 2017-06-29

## 2017-06-29 NOTE — TELEPHONE ENCOUNTER
----- Message -----      From: Tanvi Krueger      Sent: 6/29/2017  11:03 AM        To: Neurology [facility]   Subject: Non-Urgent Medical Question                       I had the sleep study done and results posted yesterday.  I am now using  the oxygen and cpap machine again.   If you'd like to read the results.  Let me know if you have any comments as I want to make sure my medical treatments are coordinated and not over lapping. Thank you.          Sent via [employer]

## 2017-06-30 ENCOUNTER — SLEEP CENTER VISIT (OUTPATIENT)
Dept: SLEEP MEDICINE | Facility: MEDICAL CENTER | Age: 52
End: 2017-06-30
Payer: COMMERCIAL

## 2017-06-30 VITALS
HEIGHT: 67 IN | BODY MASS INDEX: 41.12 KG/M2 | RESPIRATION RATE: 15 BRPM | WEIGHT: 262 LBS | OXYGEN SATURATION: 91 % | SYSTOLIC BLOOD PRESSURE: 122 MMHG | DIASTOLIC BLOOD PRESSURE: 82 MMHG | TEMPERATURE: 98.1 F | HEART RATE: 70 BPM

## 2017-06-30 DIAGNOSIS — R93.89 ABNORMAL CT SCAN: ICD-10-CM

## 2017-06-30 DIAGNOSIS — J45.30 REACTIVE AIRWAY DISEASE WITH WHEEZING, MILD PERSISTENT, UNCOMPLICATED: ICD-10-CM

## 2017-06-30 DIAGNOSIS — J39.2 NASOPHARYNGEAL MASS, BENIGN: ICD-10-CM

## 2017-06-30 DIAGNOSIS — G47.31 CENTRAL SLEEP APNEA: ICD-10-CM

## 2017-06-30 DIAGNOSIS — G47.33 OBSTRUCTIVE SLEEP APNEA: ICD-10-CM

## 2017-06-30 DIAGNOSIS — E66.9 OBESITY (BMI 35.0-39.9 WITHOUT COMORBIDITY): ICD-10-CM

## 2017-06-30 DIAGNOSIS — E78.5 DYSLIPIDEMIA, GOAL LDL BELOW 130: ICD-10-CM

## 2017-06-30 PROCEDURE — 99214 OFFICE O/P EST MOD 30 MIN: CPT | Performed by: NURSE PRACTITIONER

## 2017-06-30 NOTE — PROGRESS NOTES
Chief Complaint   Patient presents with   • Results     SS         HPI: This patient is a 52 y.o. female, who presents for a sleep study results. She has a complicated medical history. History includes asthma, seasonal allergies, lifelong smoker, neck mass on CT (see below), abnormal chest ct of unclear significance (see below), abnormal EEG now followed by neurology on Zonegran, depression on zoloft.    In regards to sleep apnea, initial PSG indicates obstructive sleep apnea with an AHI of 81.4, minimum oxygen saturation of 79 %. She failed trial of CPAP, had significant number of central events. She had an in lab titration to ASV on October 17, 2016. She was initiated on ASV, but had persistent desaturations. O2 was added to ASV in December 2016. She had difficult time tolerating ASV pressure due to recent neck mass/biopsy (see below). She returned April 17 2017 requesting repeat sleep study to determine if she still needed to use ASV. PSG June 27 2017 indicates moderate NIDHI with an AHI of 21.9, minimum saturation of 59%. Events were entirely hypopneas. Patient was successfully titrated to CPAP 9 cm H2O. She achieved 20 minutes of REM, AHI was reduced to 0, mean saturation 91.9%. Over the past 2 nights patient resumed ASV, compliance download indicates an AHI of 2.7. She has tolerated ASV the last 2 nights and feels sleep symptoms are improved.      In regards to asthma, PFT's 10/4/2016 indicated an FEV1 1.83 L, 59% predicted with an FEV1/FVC ratio of 74, TLC of 91% and a DLCO of 101% predicted with a significant bronchodilator response. She has intolerance to ICS. She uses albuterol if necessary. She continues to have exertional dyspnea, occasional wheeze. Denies hemoptysis, cough, fever, chills.    In regards to abnormal chest CT, Patient sees ENT. Had nasopharyngeal biopsy which was abnormal, apparently differentials included lymphoma. She does have a significant family history of cancer.  This prompted a chest  "CT March 19, 2017 which indicates.  1.  Linear opacity with slightly irregular margins in the anterior lingula which may represent subtle infiltrate, atelectasis, or scar. No prior studies are available for comparison.  2.  7.5 mm short axis diameter lymph node in the anterior mediastinum.  3.  Possible mild fatty infiltration of the liver.  This was reviewed with Dr. Kike Espinoza who recommended a 6 month CT chest which has been ordered (due Sept 2017)      In regards to neck mass, She also has had a CT neck 3/19/2017 which indicates;  3.3 x 2.7 x 1.6 cm enhancing mass in the posterior oropharynx extending to the hypopharynx. Biopsy was negative for malignancy.          Past Medical History   Diagnosis Date   • Dyslipidemia, goal LDL below 130    • Menopausal symptoms    • Lumbar disc narrowing 7/14/2009   • Cholesteatoma of middle ear and mastoid 1992 surgery     mastoidectomy, prosthesis   • Vitamin d deficiency    • Recurrent sinus infections    • Hypothyroidism    • Family history of early CAD    • Elevated glycohemoglobin    • Anesthesia      nausea/vomiting   • History of endometriosis    • Asthma      allergy related   • Depression    • Bowel habit changes      constipation   • Oxygen desaturation during sleep      O2 2 liters HS   • Tonsillitis    • Eosinophilic esophagitis    • History of chickenpox    • Breath shortness    • Arthritis    • Anemia    • Pneumonia      hx   • Snoring    • Hiatus hernia syndrome      \"was told I had one\"   • Sleep apnea      ASV with 2L oxygen at night (sean)        Social History   Substance Use Topics   • Smoking status: Never Smoker    • Smokeless tobacco: Never Used   • Alcohol Use: No       Family History   Problem Relation Age of Onset   • Cancer Mother 61     lung, smoker   • Heart Disease Mother 44     early heart attack   • Heart Disease Father 60     cabg x 3   • Hyperlipidemia Father    • Psychiatry Father      schizophrenia   • Hyperlipidemia Brother    • Cancer " "Maternal Grandmother 62     lung, non-smoker   • Psychiatry Maternal Grandmother      depression, severe   • Psychiatry Other      depression, great uncle   • Sleep Apnea Neg Hx        Current medications as of today   Current Outpatient Prescriptions   Medication Sig Dispense Refill   • zonisamide (ZONEGRAN) 100 MG Cap Take 2 Caps by mouth every day. 60 Cap 3   • levothyroxine (SYNTHROID) 125 MCG Tab Take 1 Tab by mouth Every morning on an empty stomach. 30 Tab 6   • B Complex Vitamins (VITAMIN B COMPLEX PO) Take  by mouth every day.     • sertraline (ZOLOFT) 100 MG Tab TAKE 2 TABLETS BY MOUTH EVERY DAY (Patient taking differently: TAKE 1 TABLETS BY MOUTH EVERY DAY) 180 Tab 2   • Cholecalciferol (VITAMIN D) 2000 UNIT CAPS Take 2,000 Units by mouth every day. (Patient taking differently: Take 2,000 Units by mouth 2 Times a Day.) 60 Cap 6   • azithromycin (ZITHROMAX) 250 MG Tab Take two tabs by mouth on day one, then one tab by mouth daily on days 2-5. (Patient not taking: Reported on 6/30/2017) 6 Tab 0   • Misc. Devices Misc This is an order for nocturnal oxygen, 2L nasal prongs.  Dx:  Nocturnal hypoxia, lowest 59% on overnight study.  G47.34           MARIA DOLORES 99 months   NPI [id number] 1 Each 0     No current facility-administered medications for this visit.       Allergies: Doxycycline; Pcn; and Keflex    Blood pressure 122/82, pulse 70, temperature 36.7 °C (98.1 °F), resp. rate 15, height 1.702 m (5' 7.01\"), weight 118.842 kg (262 lb), last menstrual period 02/26/1995, SpO2 91 %.      ROS:   Constitutional: Denies fevers, chills, night sweats, weight loss. Positive chronic fatigue.  HEENT: Denies earache, difficulty hearing, tinnitus, nasal congestion, hoarseness  Cardiovascular: Denies chest pain, tightness, palpitations, orthopnea or edema  Respiratory: See history of present illness  Sleep: See HPI  GI: Denies heartburn, dysphagia, nausea, abdominal pain, diarrhea or constipation  : Denies frequent urination, " hematuria, discharge or painful urination  Musculoskeletal: Denies back pain, painful joints, sore muscles  Neurological: Denies weakness or headaches  Skin: No rashes    Physical exam:   Appearance: Obese, in no acute distress  HEENT: Normocephalic, atraumatic, white sclera, PERRLA  Respiratory: no intercostal retractions or accessory muscle use   Lungs auscultation: Clear to auscultation bilaterally  Cardiovascular: Regular rate rhythm no murmurs, rubs or gallops  Gait: Normal  Digits: No clubbing, cyanosis  Motor: No focal deficits  Orientation: Oriented to time, person and place    Diagnosis:  1. Reactive airway disease with wheezing, mild persistent, uncomplicated  AMB PULMONARY FUNCTION TEST/LAB   2. Central sleep apnea  OVERNIGHT OXIMETRY   3. Dyslipidemia, goal LDL below 130     4. Obesity (BMI 35.0-39.9 without comorbidity) ([facility])     5. Obstructive sleep apnea     6. Abnormal CT scan     7. Nasopharyngeal mass, benign         Plan:  I reviewed testing in detail with the patient. Former titration indicated need for ASV. Most recent titration however, shows adequate treatment with CPAP. Patient just restarted ASV and is tolerating pressure. Compliance report shows well treated apnea with ASV, AHI of 2.7. I encouraged her to use ASV with O2 bleed in consistently.    1. Continue ASV with nocturnal O2, bring compliance card to next OV.  2. Verify nocturnal saturations with ASV and O2  3. Update PFTs for dyspnea, consider LABA/LAMA combo, patient intolerant to ICS  4. Continue albuterol as needed  5. CT chest September 6. Follow-up after the above testing to review  7. If patient remains intolerant to ASV, can consider switching to CPAP 9 cm H2O with O2 bleed in.  8. F/U with ENT, PCP & Neuro regularly

## 2017-06-30 NOTE — MR AVS SNAPSHOT
"        Tanvi Krueger   2017 8:40 AM   Sleep Center Visit   MRN: [id number]    Department:  [facility]   Dept Phone:  [phone]    Description:  Female : 1965   Provider:  GERBER Renteria           Reason for Visit     Results SS      Allergies as of 2017     Allergen Noted Reactions    Doxycycline 2014       Wheezing; diarrhea    Pcn [Penicillins] 2009   Unspecified    Unknown reaction as child    Keflex [Cephalexin] 2016   Unspecified    Yeast Infection      You were diagnosed with     Reactive airway disease with wheezing, mild persistent, uncomplicated   [1986998]       Central sleep apnea   [401227]       Oxygen desaturation during sleep   [411576]         Vital Signs     Blood Pressure Pulse Temperature Respirations Height Weight    122/82 mmHg 70 36.7 °C (98.1 °F) 15 1.702 m (5' 7.01\") 118.842 kg (262 lb)    Body Mass Index Oxygen Saturation Last Menstrual Period Smoking Status          41.03 kg/m2 91% 1995 Never Smoker         Basic Information     Date Of Birth Sex Race Ethnicity Preferred Language    1965 Female White Non- English      Your appointments     Aug 11, 2017  2:00 PM   Overnight Oximetry with SLEEP CLINIC   [facility] Sleep Medicine (--)    [address]  [place] [address] [phone]            Aug 28, 2017  9:00 AM   Pulmonary Function Test with PFT-RM3   [facility] Pulmonary Medicine (--)    [address]  [address]  [place] [address] [phone]            Aug 28, 2017 10:20 AM   Established Patient Pul with ARIEL RenteriaRKISHA   [facility] Pulmonary Medicine (--)    [address]  [address]  [place] [address] [phone]            Oct 06, 2017  8:40 AM   Follow Up Visit with Ben Weldon M.D.   [facility] Neurology (--)    [address], [address]  [place] [address] [phone]           You will be receiving a confirmation call a " few days before your appointment from our automated call confirmation system.              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.20   Unknown - Present    Conductive hearing loss, unilateral H90.2   4/3/2015 - Present    Acquired deflected nasal septum J34.2   9/9/2016 - Present    Hypertrophy of both inferior nasal turbinates J34.3   9/9/2016 - Present    Central sleep apnea G47.31   10/10/2016 - Present    Obstructive sleep apnea G47.33   10/10/2016 - Present    Thyroid nodule E04.1   10/14/2016 - Present    Eosinophilic esophagitis K20.0   Unknown - Present    Oxygen desaturation during sleep G47.34   Unknown - Present    Chronic nasopharyngitis J31.1   3/31/2017 - Present    Hypertrophy of tonsils alone J35.1   3/31/2017 - Present    Seizure cerebral I67.89   6/20/2017 - Present      Health Maintenance        Date Due Completion Dates    MAMMOGRAM 5/23/2018 5/23/2017, 3/1/2016, 8/5/2014, 2/12/2014, 2/4/2014, 8/21/2012, 6/23/2011, 6/23/2011 (Prv Comp), 3/31/2006    Override on 6/23/2011: Previously completed    IMM DTaP/Tdap/Td Vaccine (2 - Td) 11/1/2022 11/1/2012    COLONOSCOPY 4/21/2024 4/21/2014            Current Immunizations     Influenza TIV (IM) 9/23/2015, 10/1/2012    Influenza Vaccine Quad Inj (Preserved) 12/12/2016  9:06 AM    Tdap Vaccine 11/1/2012      Below and/or attached are the medications your provider expects you to take. Review all of your home  medications and newly ordered medications with your provider and/or pharmacist. Follow medication instructions as directed by your provider and/or pharmacist. Please keep your medication list with you and share with your provider. Update the information when medications are discontinued, doses are changed, or new medications (including over-the-counter products) are added; and carry medication information at all times in the event of emergency situations     Allergies:  DOXYCYCLINE - (reactions not documented)     PCN - Unspecified     KEFLEX - Unspecified               Medications  Valid as of: June 30, 2017 -  9:28 AM    Generic Name Brand Name Tablet Size Instructions for use    Azithromycin (Tab) ZITHROMAX 250 MG Take two tabs by mouth on day one, then one tab by mouth daily on days 2-5.        B Complex Vitamins   Take  by mouth every day.        Cholecalciferol (Cap) Vitamin D 2000 UNITS Take 2,000 Units by mouth every day.        Levothyroxine Sodium (Tab) SYNTHROID 125 MCG Take 1 Tab by mouth Every morning on an empty stomach.        Misc. Devices (Misc) Misc. Devices  This is an order for nocturnal oxygen, 2L nasal prongs.  Dx:  Nocturnal hypoxia, lowest 59% on overnight study.  G47.34           MARIA DOLORES 99 months   NPI [id number]        Sertraline HCl (Tab) ZOLOFT 100 MG TAKE 2 TABLETS BY MOUTH EVERY DAY        Zonisamide (Cap) ZONEGRAN 100 MG Take 2 Caps by mouth every day.        .                 Medicines prescribed today were sent to:     [facility] [address] [place] [address] [facility]    [address] [address]    Phone: [phone] Fax: [phone]    Open 24 Hours?: No      Medication refill instructions:       If your prescription bottle indicates you have medication refills left, it is not necessary to call your provider’s office. Please contact your pharmacy and they will refill your medication.    If your prescription bottle  indicates you do not have any refills left, you may request refills at any time through one of the following ways: The online [employer] system (except Urgent Care), by calling your provider’s office, or by asking your pharmacy to contact your provider’s office with a refill request. Medication refills are processed only during regular business hours and may not be available until the next business day. Your provider may request additional information or to have a follow-up visit with you prior to refilling your medication.   *Please Note: Medication refills are assigned a new Rx number when refilled electronically. Your pharmacy may indicate that no refills were authorized even though a new prescription for the same medication is available at the pharmacy. Please request the medicine by name with the pharmacy before contacting your provider for a refill.        Your To Do List     Future Labs/Procedures Complete By Expires    AMB PULMONARY FUNCTION TEST/LAB  As directed 6/30/2018    OVERNIGHT OXIMETRY  As directed 6/30/2018      Instructions    1. CT chest in September  2. PFTs at next visit  3. Continue ASV, use consistently with O2 at night  4. Verify overnight oximetry on ASV and O2  5. Follow-up in September to review PFT, overnight oximetry and CT          [employer] Access Code: Activation code not generated  Current [employer] Status: Active

## 2017-06-30 NOTE — PATIENT INSTRUCTIONS
1. CT chest in September  2. PFTs at next visit  3. Continue ASV, use consistently with O2 at night  4. Verify overnight oximetry on ASV and O2  5. Follow-up in September to review PFT, overnight oximetry and CT

## 2017-06-30 NOTE — TELEPHONE ENCOUNTER
Ben Weldon M.D.  Sunni Razo, Med Ass't        Caller: Unspecified (Yesterday, 3:07 PM)                       Great   Keep us posted   It is nice to send a copy of sleep study and we could scan into EPIC            Previous Messages          Called pt. Sleep study is in chart in [facility] w/ Dr Peters

## 2017-07-03 ENCOUNTER — PATIENT MESSAGE (OUTPATIENT)
Dept: MEDICAL GROUP | Facility: CLINIC | Age: 52
End: 2017-07-03

## 2017-07-03 ENCOUNTER — SUPERVISING PHYSICIAN REVIEW (OUTPATIENT)
Dept: MEDICAL GROUP | Facility: CLINIC | Age: 52
End: 2017-07-03

## 2017-07-03 ENCOUNTER — OFFICE VISIT (OUTPATIENT)
Dept: MEDICAL GROUP | Facility: CLINIC | Age: 52
End: 2017-07-03
Payer: COMMERCIAL

## 2017-07-03 ENCOUNTER — HOSPITAL ENCOUNTER (OUTPATIENT)
Dept: LAB | Facility: MEDICAL CENTER | Age: 52
End: 2017-07-03
Attending: FAMILY MEDICINE
Payer: COMMERCIAL

## 2017-07-03 VITALS
DIASTOLIC BLOOD PRESSURE: 80 MMHG | SYSTOLIC BLOOD PRESSURE: 128 MMHG | RESPIRATION RATE: 16 BRPM | BODY MASS INDEX: 38.06 KG/M2 | OXYGEN SATURATION: 99 % | HEART RATE: 64 BPM | TEMPERATURE: 98.4 F | WEIGHT: 257 LBS | HEIGHT: 69 IN

## 2017-07-03 DIAGNOSIS — F33.9 EPISODE OF RECURRENT MAJOR DEPRESSIVE DISORDER, UNSPECIFIED DEPRESSION EPISODE SEVERITY (HCC): ICD-10-CM

## 2017-07-03 DIAGNOSIS — F41.9 ANXIETY: ICD-10-CM

## 2017-07-03 DIAGNOSIS — R41.0 CONFUSION: ICD-10-CM

## 2017-07-03 DIAGNOSIS — G40.909 SEIZURE CEREBRAL (HCC): ICD-10-CM

## 2017-07-03 LAB
APPEARANCE UR: CLEAR
BACTERIA #/AREA URNS HPF: ABNORMAL /HPF
BASOPHILS # BLD AUTO: 0.9 % (ref 0–1.8)
BASOPHILS # BLD: 0.07 K/UL (ref 0–0.12)
BILIRUB UR QL STRIP.AUTO: NEGATIVE
COLOR UR: YELLOW
CULTURE IF INDICATED INDCX: YES UA CULTURE
EOSINOPHIL # BLD AUTO: 0.15 K/UL (ref 0–0.51)
EOSINOPHIL NFR BLD: 2 % (ref 0–6.9)
EPI CELLS #/AREA URNS HPF: NEGATIVE /HPF
ERYTHROCYTE [DISTWIDTH] IN BLOOD BY AUTOMATED COUNT: 46.7 FL (ref 35.9–50)
GLUCOSE UR STRIP.AUTO-MCNC: NEGATIVE MG/DL
HCT VFR BLD AUTO: 41.5 % (ref 37–47)
HGB BLD-MCNC: 12.8 G/DL (ref 12–16)
HYALINE CASTS #/AREA URNS LPF: ABNORMAL /LPF
IMM GRANULOCYTES # BLD AUTO: 0.03 K/UL (ref 0–0.11)
IMM GRANULOCYTES NFR BLD AUTO: 0.4 % (ref 0–0.9)
KETONES UR STRIP.AUTO-MCNC: NEGATIVE MG/DL
LEUKOCYTE ESTERASE UR QL STRIP.AUTO: ABNORMAL
LYMPHOCYTES # BLD AUTO: 2.59 K/UL (ref 1–4.8)
LYMPHOCYTES NFR BLD: 34.7 % (ref 22–41)
MCH RBC QN AUTO: 25.6 PG (ref 27–33)
MCHC RBC AUTO-ENTMCNC: 30.8 G/DL (ref 33.6–35)
MCV RBC AUTO: 83 FL (ref 81.4–97.8)
MICRO URNS: ABNORMAL
MONOCYTES # BLD AUTO: 0.41 K/UL (ref 0–0.85)
MONOCYTES NFR BLD AUTO: 5.5 % (ref 0–13.4)
NEUTROPHILS # BLD AUTO: 4.21 K/UL (ref 2–7.15)
NEUTROPHILS NFR BLD: 56.5 % (ref 44–72)
NITRITE UR QL STRIP.AUTO: NEGATIVE
NRBC # BLD AUTO: 0 K/UL
NRBC BLD AUTO-RTO: 0 /100 WBC
PH UR STRIP.AUTO: 6.5 [PH]
PLATELET # BLD AUTO: 253 K/UL (ref 164–446)
PMV BLD AUTO: 11 FL (ref 9–12.9)
PROT UR QL STRIP: NEGATIVE MG/DL
RBC # BLD AUTO: 5 M/UL (ref 4.2–5.4)
RBC # URNS HPF: ABNORMAL /HPF
RBC UR QL AUTO: NEGATIVE
SP GR UR STRIP.AUTO: 1.01
WBC # BLD AUTO: 7.5 K/UL (ref 4.8–10.8)
WBC #/AREA URNS HPF: ABNORMAL /HPF

## 2017-07-03 PROCEDURE — 36415 COLL VENOUS BLD VENIPUNCTURE: CPT

## 2017-07-03 PROCEDURE — 87086 URINE CULTURE/COLONY COUNT: CPT

## 2017-07-03 PROCEDURE — 85025 COMPLETE CBC W/AUTO DIFF WBC: CPT

## 2017-07-03 PROCEDURE — 81001 URINALYSIS AUTO W/SCOPE: CPT

## 2017-07-03 PROCEDURE — 99214 OFFICE O/P EST MOD 30 MIN: CPT | Performed by: NURSE PRACTITIONER

## 2017-07-03 RX ORDER — BUSPIRONE HYDROCHLORIDE 10 MG/1
10 TABLET ORAL 2 TIMES DAILY
Qty: 90 TAB | Status: CANCELLED | OUTPATIENT
Start: 2017-07-03

## 2017-07-03 RX ORDER — HYDROXYZINE HYDROCHLORIDE 25 MG/1
25 TABLET, FILM COATED ORAL 3 TIMES DAILY PRN
Qty: 30 TAB | Refills: 1 | Status: SHIPPED | OUTPATIENT
Start: 2017-07-03 | End: 2017-08-28

## 2017-07-03 NOTE — MR AVS SNAPSHOT
"        Tanvi Krueger   7/3/2017 1:40 PM   Office Visit   MRN: [id number]    Department:  [facility]   Dept Phone:  [phone]    Description:  Female : 1965   Provider:  ARIEL HayRKISHA           Reason for Visit     Blurred Vision dizziness, not feeling well, not sleeping well. See Neurologist and started medication but doesnt feel well. Confused, memory issues; all x 1 week.       Allergies as of 7/3/2017     Allergen Noted Reactions    Doxycycline 2014       Wheezing; diarrhea    Pcn [Penicillins] 2009   Unspecified    Unknown reaction as child    Keflex [Cephalexin] 2016   Unspecified    Yeast Infection      You were diagnosed with     Seizure cerebral   [964461]       Anxiety   [615423]       Episode of recurrent major depressive disorder, unspecified depression episode severity (CMS-HCC)   [0540156]         Vital Signs     Blood Pressure Pulse Temperature Respirations Height Weight    128/80 mmHg 64 36.9 °C (98.4 °F) 16 1.74 m (5' 8.5\") 116.574 kg (257 lb)    Body Mass Index Oxygen Saturation Last Menstrual Period Breastfeeding? Smoking Status       38.50 kg/m2 99% 1995 No Never Smoker        Basic Information     Date Of Birth Sex Race Ethnicity Preferred Language    1965 Female White Non- English      Your appointments     Aug 11, 2017  2:00 PM   Overnight Oximetry with SLEEP CLINIC   [facility] Sleep Medicine (--)    [address]  [place] [address] [phone]            Aug 28, 2017  9:00 AM   Pulmonary Function Test with PFT-RM3   [facility] Pulmonary Medicine (--)    [address]  [address]  [place] [address] [phone]            Aug 28, 2017 10:20 AM   Established Patient Pul with DAVID RenteriaP.RKISHA   [facility] Pulmonary Medicine (--)    [address]  [address]  [place] [address] [phone]            Oct 06, 2017  8:40 AM   Follow Up Visit with Ben Weldon M.D.   "   [facility]rology (--)    [address], [address]  [place] [address] [phone]           You will be receiving a confirmation call a few days before your appointment from our automated call confirmation system.              Problem List              ICD-10-CM Priority Class Noted - Resolved    Lumbar disc narrowing M51.36   7/14/2009 - Present    Dyslipidemia, goal LDL below 130 E78.5 High  Unknown - Present    Depression F32.9   Unknown - Present    Vitamin D deficiency disease E55.9   Unknown - Present    Reactive airway disease with wheezing J45.909   8/17/2012 - Present    Hashimoto's thyroiditis E06.3   7/8/2013 - Present    Family history of early CAD Z82.49   Unknown - Present    Obesity (BMI 35.0-39.9 without comorbidity) ([facility]) E66.9   7/14/2014 - Present    Cholesteatoma of middle ear and mastoid H71.20   Unknown - Present    Conductive hearing loss, unilateral H90.2   4/3/2015 - Present    Acquired deflected nasal septum J34.2   9/9/2016 - Present    Hypertrophy of both inferior nasal turbinates J34.3   9/9/2016 - Present    Central sleep apnea G47.31   10/10/2016 - Present    Obstructive sleep apnea G47.33   10/10/2016 - Present    Thyroid nodule E04.1   10/14/2016 - Present    Eosinophilic esophagitis K20.0   Unknown - Present    Oxygen desaturation during sleep G47.34   Unknown - Present    Chronic nasopharyngitis J31.1   3/31/2017 - Present    Hypertrophy of tonsils alone J35.1   3/31/2017 - Present    Seizure cerebral I67.89   6/20/2017 - Present    Nasopharyngeal mass, 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   6/30/2017 - Present    Abnormal CT scan R93.8   6/30/2017 - Present      Health Maintenance        Date Due Completion Dates    IMM INFLUENZA (1) 9/1/2017 12/12/2016, 9/23/2015, 10/1/2012    MAMMOGRAM 5/23/2018 5/23/2017, 3/1/2016, 8/5/2014, 2/12/2014, 2/4/2014, 8/21/2012, 6/23/2011, 6/23/2011 (Prv Comp), 3/31/2006    Override on 6/23/2011: Previously completed    IMM DTaP/Tdap/Td Vaccine (2 - Td)  11/1/2022 11/1/2012    COLONOSCOPY 4/21/2024 4/21/2014            Current Immunizations     Influenza TIV (IM) 9/23/2015, 10/1/2012    Influenza Vaccine Quad Inj (Preserved) 12/12/2016  9:06 AM    Tdap Vaccine 11/1/2012      Below and/or attached are the medications your provider expects you to take. Review all of your home medications and newly ordered medications with your provider and/or pharmacist. Follow medication instructions as directed by your provider and/or pharmacist. Please keep your medication list with you and share with your provider. Update the information when medications are discontinued, doses are changed, or new medications (including over-the-counter products) are added; and carry medication information at all times in the event of emergency situations     Allergies:  DOXYCYCLINE - (reactions not documented)     PCN - Unspecified     KEFLEX - Unspecified               Medications  Valid as of: July 03, 2017 -  2:50 PM    Generic Name Brand Name Tablet Size Instructions for use    B Complex Vitamins   Take  by mouth every day.        Cholecalciferol (Cap) Vitamin D 2000 UNITS Take 2,000 Units by mouth every day.        HydrOXYzine HCl (Tab) ATARAX 25 MG Take 1 Tab by mouth 3 times a day as needed for Anxiety.        Levothyroxine Sodium (Tab) SYNTHROID 125 MCG Take 1 Tab by mouth Every morning on an empty stomach.        Misc. Devices (Misc) Misc. Devices  This is an order for nocturnal oxygen, 2L nasal prongs.  Dx:  Nocturnal hypoxia, lowest 59% on overnight study.  G47.34           MARIA DOLORES 99 months   NPI [id number]        Sertraline HCl (Tab) ZOLOFT 100 MG TAKE 2 TABLETS BY MOUTH EVERY DAY        Zonisamide (Cap) ZONEGRAN 100 MG Take 2 Caps by mouth every day.        .                 Medicines prescribed today were sent to:     [id number] - [place], [place] - [address] [facility]    [address] [place] [address]    Phone: [phone] Fax:  [phone]    Open 24 Hours?: No      Medication refill instructions:       If your prescription bottle indicates you have medication refills left, it is not necessary to call your provider’s office. Please contact your pharmacy and they will refill your medication.    If your prescription bottle indicates you do not have any refills left, you may request refills at any time through one of the following ways: The online [employer] system (except Urgent Care), by calling your provider’s office, or by asking your pharmacy to contact your provider’s office with a refill request. Medication refills are processed only during regular business hours and may not be available until the next business day. Your provider may request additional information or to have a follow-up visit with you prior to refilling your medication.   *Please Note: Medication refills are assigned a new Rx number when refilled electronically. Your pharmacy may indicate that no refills were authorized even though a new prescription for the same medication is available at the pharmacy. Please request the medicine by name with the pharmacy before contacting your provider for a refill.        Referral     A referral request has been sent to our patient care coordination department. Please allow 3-5 business days for us to process this request and contact you either by phone or mail. If you do not hear from us by the 5th business day, please call us at [phone].           [employer] Access Code: Activation code not generated  Current [employer] Status: Active

## 2017-07-03 NOTE — PROGRESS NOTES
I have reviewed and agree with history, assessment and plan for office encounter on 7/3/17 with midlevel provider: ANDREW Duffy.  Face to face encounter/direct observation: No  Suggested changes to plan or follow-up: none, discussed patient's situation together  Rebecca Ferro M.D.

## 2017-07-03 NOTE — TELEPHONE ENCOUNTER
From: Tanvi Krueger  To: Rebecca Ferro M.D.  Sent: 7/3/2017 12:42 PM PDT  Subject: RE:confusion    I am going to see Sophia Márquez in your office in an hour.  ----- Message -----  From: Rebecca Ferro M.D.  Sent: 7/3/2017 11:32 AM PDT  To: Tanvi Krueger  Subject: confusion    Please consider going to urgent care or ER. I have also sent to the lab an order for a urinalysis with culture if it's normal and a blood count to make sure that you don't have an infection.

## 2017-07-03 NOTE — PROGRESS NOTES
"CC: Blurred Vision        HPI:     Tanvi is a patient of Dr. Ferro, all problems are new to me todaypresents today for the followin. Seizure cerebral  Currently followed by neurology and having medication adjustment. Previously tried on Keppra which he only was able to take for 2-3 days and has stopped related side effects. She was then switched to zonegran on . The same day about appointment patient commented that she had symptoms of confusion fatigue problems with word finding.   She still complains of these problems and has concerns that this could be related to the medication.    Due to some misunderstanding unfortunately she never started low-dose Zonegran, she immediately went to 200 mg at night.    2. Anxiety/Episode of recurrent major depressive disorder, unspecified depression episode severity (CMS-HCC)  Recently went down from 200-100 mg of her sertraline. She has noticed some increased depression and anxiety with this. Mostly she describes anxiety \"in her chest\" related to situational things at work which never previously to bother her. She has had some increased anxiety regarding her health with the above.  She has not use anything other than sertraline for depression and anxiety previously.    She has previously attended therapy which was usually helpful in the beginning and overtime wasn't. Spoke with her sister on the phone yesterday and her sister recommended her to try some therapy    Current Outpatient Prescriptions   Medication Sig Dispense Refill   • hydrOXYzine (ATARAX) 25 MG Tab Take 1 Tab by mouth 3 times a day as needed for Anxiety. 30 Tab 1   • zonisamide (ZONEGRAN) 100 MG Cap Take 2 Caps by mouth every day. 60 Cap 3   • levothyroxine (SYNTHROID) 125 MCG Tab Take 1 Tab by mouth Every morning on an empty stomach. 30 Tab 6   • B Complex Vitamins (VITAMIN B COMPLEX PO) Take  by mouth every day.     • sertraline (ZOLOFT) 100 MG Tab TAKE 2 TABLETS BY MOUTH EVERY DAY (Patient taking " "differently: TAKE 1 TABLETS BY MOUTH EVERY DAY) 180 Tab 2   • Misc. Devices Misc This is an order for nocturnal oxygen, 2L nasal prongs.  Dx:  Nocturnal hypoxia, lowest 59% on overnight study.  G47.34           MARIA DOLORES 99 months   NPI [id number] 1 Each 0   • Cholecalciferol (VITAMIN D) 2000 UNIT CAPS Take 2,000 Units by mouth every day. (Patient taking differently: Take 2,000 Units by mouth 2 Times a Day.) 60 Cap 6     No current facility-administered medications for this visit.     Social History   Substance Use Topics   • Smoking status: Never Smoker    • Smokeless tobacco: Never Used   • Alcohol Use: No     I reviewed patients allergies, problem list and medications today in EPIC.    ROS: Any/all pertinent positives listed in the HPI, otherwise all others reviewed are negative today.      /80 mmHg  Pulse 64  Temp(Src) 36.9 °C (98.4 °F)  Resp 16  Ht 1.74 m (5' 8.5\")  Wt 116.574 kg (257 lb)  BMI 38.50 kg/m2  SpO2 99%  LMP 02/26/1995  Breastfeeding? No    Exam:  Gen: Alert and oriented, No apparent distress. WDWN  Psych: A+Ox3, normal affect and mood  Skin: Warm, dry and intact. Good turgor   No rashes in visible areas.  Eye: Conjunctiva clear, lids normal  ENMT: Lips without lesions, good dentition  Lungs: Clear to auscultation bilaterally, no rales or rhonchi   Unlabored respiratory effort.   CV: Regular rate and rhythm, S1, S2. No murmurs.   No Edema  Clock test: Normal    Assessment and Plan.   52 y.o. female with the following issues.    1. Seizure cerebral  Stable. Reassurance to the patient. We discussed seizure medication at length. She is awaiting reply from her neurologist's office, Dr. Weldon, in regard to how to adjust this medication further as she called their office earlier today.  She has additional pending labs from her primary care that were drawn today.    2. Anxiety  Stable. Referral placed.  Conferred with her primary care and we'll start try hydroxyzine for anxiety in the meantime  - " hydrOXYzine (ATARAX) 25 MG Tab; Take 1 Tab by mouth 3 times a day as needed for Anxiety.  Dispense: 30 Tab; Refill: 1  - REFERRAL TO PSYCHOLOGY    3. Episode of recurrent major depressive disorder, unspecified depression episode severity (CMS-HCC)  Stable. Referral placed after discussion with the patient  - REFERRAL TO PSYCHOLOGY

## 2017-07-05 ENCOUNTER — PATIENT MESSAGE (OUTPATIENT)
Dept: MEDICAL GROUP | Facility: CLINIC | Age: 52
End: 2017-07-05

## 2017-07-05 LAB
BACTERIA UR CULT: ABNORMAL
SIGNIFICANT IND 70042: ABNORMAL
SOURCE SOURCE: ABNORMAL

## 2017-07-11 ENCOUNTER — OFFICE VISIT (OUTPATIENT)
Dept: BEHAVIORAL HEALTH | Facility: PHYSICIAN GROUP | Age: 52
End: 2017-07-11
Payer: COMMERCIAL

## 2017-07-11 DIAGNOSIS — F33.1 MAJOR DEPRESSIVE DISORDER, RECURRENT EPISODE, MODERATE (HCC): ICD-10-CM

## 2017-07-11 DIAGNOSIS — F41.1 GENERALIZED ANXIETY DISORDER: ICD-10-CM

## 2017-07-11 PROBLEM — F41.9 ANXIETY DISORDER: Status: ACTIVE | Noted: 2017-07-11

## 2017-07-11 PROCEDURE — 90791 PSYCH DIAGNOSTIC EVALUATION: CPT | Performed by: PSYCHOLOGIST
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
## 2017-07-12 ENCOUNTER — PATIENT MESSAGE (OUTPATIENT)
Dept: MEDICAL GROUP | Facility: CLINIC | Age: 52
End: 2017-07-12

## 2017-07-12 DIAGNOSIS — K20.0 EOSINOPHILIC ESOPHAGITIS: ICD-10-CM

## 2017-07-12 DIAGNOSIS — F33.1 MAJOR DEPRESSIVE DISORDER, RECURRENT EPISODE, MODERATE (HCC): ICD-10-CM

## 2017-07-12 DIAGNOSIS — G40.909 SEIZURE CEREBRAL (HCC): ICD-10-CM

## 2017-07-12 DIAGNOSIS — F41.1 GENERALIZED ANXIETY DISORDER: ICD-10-CM

## 2017-07-13 ENCOUNTER — TELEPHONE (OUTPATIENT)
Dept: NEUROLOGY | Facility: MEDICAL CENTER | Age: 52
End: 2017-07-13

## 2017-07-13 ENCOUNTER — HOSPITAL ENCOUNTER (OUTPATIENT)
Dept: LAB | Facility: MEDICAL CENTER | Age: 52
End: 2017-07-13
Attending: FAMILY MEDICINE
Payer: COMMERCIAL

## 2017-07-13 DIAGNOSIS — F41.1 GENERALIZED ANXIETY DISORDER: ICD-10-CM

## 2017-07-13 DIAGNOSIS — G40.909 SEIZURE CEREBRAL (HCC): ICD-10-CM

## 2017-07-13 DIAGNOSIS — K20.0 EOSINOPHILIC ESOPHAGITIS: ICD-10-CM

## 2017-07-13 DIAGNOSIS — F33.1 MAJOR DEPRESSIVE DISORDER, RECURRENT EPISODE, MODERATE (HCC): ICD-10-CM

## 2017-07-13 PROCEDURE — 82175 ASSAY OF ARSENIC: CPT

## 2017-07-13 PROCEDURE — 82300 ASSAY OF CADMIUM: CPT

## 2017-07-13 PROCEDURE — 36415 COLL VENOUS BLD VENIPUNCTURE: CPT

## 2017-07-13 PROCEDURE — 83655 ASSAY OF LEAD: CPT

## 2017-07-13 PROCEDURE — 83825 ASSAY OF MERCURY: CPT

## 2017-07-13 NOTE — TELEPHONE ENCOUNTER
Wife call concerned EEG appt not scheduled until 10/30/2017. He is doing well and not having any issues.  I explained to wife we could put him  on a cancellation list, and if he has any issue to call and we can give him appt. She agreed and will call if he  needs anything.

## 2017-07-14 ENCOUNTER — PATIENT MESSAGE (OUTPATIENT)
Dept: MEDICAL GROUP | Facility: CLINIC | Age: 52
End: 2017-07-14

## 2017-07-14 NOTE — TELEPHONE ENCOUNTER
From: Tanvi Krueger  To: Rebecca Ferro M.D.  Sent: 7/14/2017 9:22 AM PDT  Subject: RE:further biochemical analysis?    I did the blood work yesterday. I am still doing research on biochemistry for mthfr testing so I don't waste your time. I never heard back from dr. Weldon so I assume theres no concern for the seizures still accuring. I do feel better, my head just feels weird occasionally off and on and I can tell something is going on. I'm using cpap and oxygen. And taking rx's as prescribed. Thank you again!  ----- Message -----  From: Rebecca Ferro M.D.  Sent: 7/12/2017 8:03 AM PDT  To: Tanvi Krueger  Subject: further biochemical analysis?    I am not aware that analysis. If you have details of that please send it to me. I will see if that can be ordered. I can do some metal testing including mercury and some heavy metals. I will place those lab orders.

## 2017-07-17 ENCOUNTER — TELEPHONE (OUTPATIENT)
Dept: MEDICAL GROUP | Facility: CLINIC | Age: 52
End: 2017-07-17

## 2017-07-17 NOTE — TELEPHONE ENCOUNTER
ESTABLISHED PATIENT PRE-VISIT PLANNING     Note: Patient will not be contacted if there is no indication to call.     1.  Reviewed notes from the last few office visits within the medical group: Yes    2.  If any orders were placed at last visit or intended to be done for this visit (i.e. 6 mos follow-up), do we have Results/Consult Notes?        •  Labs - Labs were not ordered at last office visit.       •  Imaging - Imaging was not ordered at last office visit.       •  Referrals - Referral ordered, patient was seen and consult notes are in chart. Care Teams updated  YES.    3. Is this appointment scheduled as a Hospital Follow-Up? No    4.  Immunizations were updated in Epic using WebIZ?: Epic matches WebIZ       •  Web Iz Recommendations: MMR Td (adult), adsorbed Hep A, adult Influenza w/preserv.      5.  Patient is due for the following Health Maintenance Topics:   There are no preventive care reminders to display for this patient.        6.  Patient was NOT informed to arrive 15 min prior to their scheduled appointment and bring in their medication bottles.

## 2017-07-18 ENCOUNTER — OFFICE VISIT (OUTPATIENT)
Dept: MEDICAL GROUP | Facility: CLINIC | Age: 52
End: 2017-07-18
Payer: COMMERCIAL

## 2017-07-18 VITALS
BODY MASS INDEX: 38.06 KG/M2 | HEIGHT: 69 IN | SYSTOLIC BLOOD PRESSURE: 122 MMHG | TEMPERATURE: 98.6 F | DIASTOLIC BLOOD PRESSURE: 80 MMHG | HEART RATE: 60 BPM | OXYGEN SATURATION: 98 % | RESPIRATION RATE: 16 BRPM | WEIGHT: 257 LBS

## 2017-07-18 DIAGNOSIS — E03.8 HYPOTHYROIDISM DUE TO HASHIMOTO'S THYROIDITIS: ICD-10-CM

## 2017-07-18 DIAGNOSIS — E06.3 HYPOTHYROIDISM DUE TO HASHIMOTO'S THYROIDITIS: ICD-10-CM

## 2017-07-18 DIAGNOSIS — H60.312 ACUTE DIFFUSE OTITIS EXTERNA OF LEFT EAR: ICD-10-CM

## 2017-07-18 DIAGNOSIS — Z15.89 HOMOZYGOUS MTHFR MUTATION A1298C: ICD-10-CM

## 2017-07-18 DIAGNOSIS — E06.3 HASHIMOTO'S THYROIDITIS: ICD-10-CM

## 2017-07-18 DIAGNOSIS — E78.5 DYSLIPIDEMIA, GOAL LDL BELOW 130: ICD-10-CM

## 2017-07-18 LAB
ARSENIC BLD-MCNC: <10 UG/L (ref 0–13)
CADMIUM BLD-MCNC: <1 UG/L (ref 0–5)
LEAD BLD-MCNC: <2 UG/DL (ref 0–4.9)
MERCURY BLD-MCNC: <3 UG/L (ref 0–10)

## 2017-07-18 PROCEDURE — 99214 OFFICE O/P EST MOD 30 MIN: CPT | Performed by: FAMILY MEDICINE

## 2017-07-18 RX ORDER — HYDROCORTISONE AND ACETIC ACID 20.75; 10.375 MG/ML; MG/ML
2 SOLUTION AURICULAR (OTIC) 2 TIMES DAILY
Qty: 1 BOTTLE | Refills: 0 | Status: SHIPPED | OUTPATIENT
Start: 2017-07-18 | End: 2017-08-02

## 2017-07-18 NOTE — PROGRESS NOTES
CC: Hypothyroidism, mutation affecting biochemical mix, thyroiditis and hypothyroidism    HPI:   Tanvi presents today with the following.  Patient is using the report from [facility] to try to figure out her medical problems.    1. Homozygous MTHFR mutation [id number] (CMS-HCC)  This is per her [facility] profile. This mutation is not supposed to affect homocystine but patient really wants that checked anyway. Discussed at length with patient that I don't know how much validity these tests really have. There seems to be some over reaching. She would still very much like this checked.    2. Hypothyroidism due to Hashimoto's thyroiditis  Patient reports good energy level on the medication. Patient denies insomnia, tremor or change in appetite.  Patient is taking the medication on an empty stomach in the morning and waiting at least 30 minutes before eating.  Last TSH in May was above target. Medication change was made and she needs follow-up labs. She feels somewhat better clinically but not yet well.    3. Left ear pain and drainage  She has had pain and drainage for over a week. There is a foul smell from the ear at times. This is very bothersome and frightening to her. Denies fever, occasional chills    4. Dyslipidemia, goal LDL below 130  Patient denies chest pain, chest pressure, palpitations or exertional shortness of breath. Patient denies muscle aches or muscle weakness from the omega-3 fatty acids medication. Unfortunately, she cannot tolerate statins. There is no documented problem from the statins but patient just feels these make her feel very tired. Patient is a never smoker. Patient takes no aspirin daily. Patient has no history of myocardial infarction, stroke or PVD.  The problem is clinically stable.        Patient Active Problem List    Diagnosis Date Noted   • Dyslipidemia, goal LDL below 130      Priority: High   • Homozygous MTHFR mutation [id number] (CMS-HCC) 07/18/2017   • Generalized anxiety disorder  07/11/2017   • Nasopharyngeal mass, benign 06/30/2017   • Abnormal CT scan 06/30/2017   • Seizure cerebral 06/20/2017   • Chronic nasopharyngitis 03/31/2017   • Hypertrophy of tonsils alone 03/31/2017   • Eosinophilic esophagitis    • Oxygen desaturation during sleep    • Thyroid nodule 10/14/2016   • Central sleep apnea 10/10/2016   • Obstructive sleep apnea 10/10/2016   • Acquired deflected nasal septum 09/09/2016   • Hypertrophy of both inferior nasal turbinates 09/09/2016   • Conductive hearing loss, unilateral 04/03/2015   • Cholesteatoma of middle ear and mastoid    • Obesity (BMI 35.0-39.9 without comorbidity) ([facility]) 07/14/2014   • Family history of early CAD    • Hashimoto's thyroiditis 07/08/2013   • Reactive airway disease with wheezing 08/17/2012   • Vitamin D deficiency disease    • Major depressive disorder, recurrent episode, moderate (CMS-[facility])    • Lumbar disc narrowing 07/14/2009       Current Outpatient Prescriptions   Medication Sig Dispense Refill   • acetic acid-hydrocortisone (VOSOL-HC) 1-2 % Solution Place 2 Drops in ear 2 times a day. 1 Bottle 0   • HM OMEGA-3-6-9 FATTY ACIDS Cap Take 1 Cap by mouth every day. 30 Cap 11   • hydrOXYzine (ATARAX) 25 MG Tab Take 1 Tab by mouth 3 times a day as needed for Anxiety. 30 Tab 1   • zonisamide (ZONEGRAN) 100 MG Cap Take 2 Caps by mouth every day. 60 Cap 3   • levothyroxine (SYNTHROID) 125 MCG Tab Take 1 Tab by mouth Every morning on an empty stomach. 30 Tab 6   • sertraline (ZOLOFT) 100 MG Tab TAKE 2 TABLETS BY MOUTH EVERY DAY (Patient taking differently: TAKE 1 TABLETS BY MOUTH EVERY DAY) 180 Tab 2   • Cholecalciferol (VITAMIN D) 2000 UNIT CAPS Take 2,000 Units by mouth every day. (Patient taking differently: Take 2,000 Units by mouth 2 Times a Day.) 60 Cap 6   • B Complex Vitamins (VITAMIN B COMPLEX PO) Take  by mouth every day.     • Misc. Devices Misc This is an order for nocturnal oxygen, 2L nasal prongs.  Dx:  Nocturnal hypoxia, lowest 59% on  "overnight study.  G47.34           MARIA DOLORES 99 months   NPI [id number] 1 Each 0     No current facility-administered medications for this visit.         Allergies as of 07/18/2017 - Kofi as Reviewed 07/18/2017   Allergen Reaction Noted   • Doxycycline  01/29/2014   • Pcn [penicillins] Unspecified 07/09/2009   • Keflex [cephalexin] Unspecified 09/21/2016        ROS: As per HPI.    /80 mmHg  Pulse 60  Temp(Src) 37 °C (98.6 °F)  Resp 16  Ht 1.74 m (5' 8.5\")  Wt 116.574 kg (257 lb)  BMI 38.50 kg/m2  SpO2 98%  LMP 02/26/1995    Physical Exam:  Gen:         Alert and oriented, No apparent distress. Lucid and fluent.  HEENT:   Left TM thickened, external canal is erythematous and thickened, no odor currently  Neck:       Supple, no JVD.  No thyromegaly.   Lungs:     Clear to auscultation bilaterally  CV:          Regular rate and rhythm. No murmurs, rubs or gallops.               Ext:          No clubbing, cyanosis, no edema.      Assessment and Plan.   52 y.o. female with the following issues.    1. Homozygous MTHFR mutation [id number] (CMS-[facility])  Order discussed and placed  - HOMOCYSTEINE; Future    2. Hashimoto's thyroiditis  Order discussed and placed  - THYROID CASCADE PROFILE; Future    3. Hypothyroidism due to Hashimoto's thyroiditis  Order discussed and placed  - THYROID CASCADE PROFILE; Future    4. Acute diffuse otitis externa of left ear  VoSoL HC discussed and order sent to pharmacy  - acetic acid-hydrocortisone (VOSOL-HC) 1-2 % Solution; Place 2 Drops in ear 2 times a day.  Dispense: 1 Bottle; Refill: 0    5. Dyslipidemia, goal LDL below 130  She will continue the supplementation.  - HM OMEGA-3-6-9 FATTY ACIDS Cap; Take 1 Cap by mouth every day.  Dispense: 30 Cap; Refill: 11          "

## 2017-07-18 NOTE — MR AVS SNAPSHOT
"        Tanvi Krueger   2017 8:40 AM   Office Visit   MRN: [id number]    Department:  [facility]   Dept Phone:  [phone]    Description:  Female : 1965   Provider:  Rebecca Ferro M.D.           Reason for Visit     Follow-Up results    Hypothyroidism           Allergies as of 2017     Allergen Noted Reactions    Doxycycline 2014       Wheezing; diarrhea    Pcn [Penicillins] 2009   Unspecified    Unknown reaction as child    Keflex [Cephalexin] 2016   Unspecified    Yeast Infection      You were diagnosed with     Homozygous MTHFR mutation R2184K (CMS-[facility])   [192784]       Hashimoto's thyroiditis   [727277]       Hypothyroidism due to Hashimoto's thyroiditis   [4173742]       Acute diffuse otitis externa of left ear   [3685788]       Dyslipidemia, goal LDL below 130   [564967]         Vital Signs     Blood Pressure Pulse Temperature Respirations Height Weight    122/80 mmHg 60 37 °C (98.6 °F) 16 1.74 m (5' 8.5\") 116.574 kg (257 lb)    Body Mass Index Oxygen Saturation Last Menstrual Period Smoking Status          38.50 kg/m2 98% 1995 Never Smoker         Basic Information     Date Of Birth Sex Race Ethnicity Preferred Language    1965 Female White Non- English      Your appointments     Aug 11, 2017  2:00 PM   Overnight Oximetry with SLEEP CLINIC   [facility] Sleep Medicine (--)    [address] [place] [address] A  [place] [address] [phone]            Aug 28, 2017  9:00 AM   Pulmonary Function Test with PFT-RM3   [facility] Pulmonary Medicine (--)    [address]  [address]  [place] [address] [phone]            Aug 28, 2017 10:20 AM   Established Patient Pul with GERBER Renteria   [facility] Pulmonary Medicine (--)    [address]  [address]  [place] [address] [phone]            Sep 29, 2017  3:00 PM   Individual Therapy with Beatriz Winn P.H.D.   [facility]   "    [address]  [facility] [address]   [phone]            Oct 06, 2017  8:40 AM   Follow Up Visit with Ben Weldon M.D.   [facility] Neurology (--)    [address], [address]  [facility] [address] [phone]           You will be receiving a confirmation call a few days before your appointment from our automated call confirmation system.            Oct 18, 2017  5:00 PM   Individual Therapy with Beatriz Winn P.H.D.   [facility] ([facility]    [address]  [facility] [address]   [phone]            Nov 01, 2017  5:00 PM   Individual Therapy with Beatriz Winn P.H.D.   [facility] ([facility]    [address]  [facility] [address]   [phone]            Nov 15, 2017  5:00 PM   Individual Therapy with Beatriz Winn P.H.D.   [facility]    [address]  [facility] [address] [phone]              Problem List              ICD-10-CM Priority Class Noted - Resolved    Lumbar disc narrowing M51.36   7/14/2009 - Present    Dyslipidemia, goal LDL below 130 E78.5 High  Unknown - Present    Major depressive disorder, recurrent episode, moderate (CMS-HCC) F33.1   Unknown - Present    Vitamin D deficiency disease E55.9   Unknown - Present    Reactive airway disease with wheezing J45.909   8/17/2012 - Present    Hashimoto's thyroiditis E06.3   7/8/2013 - Present    Family history of early CAD Z82.49   Unknown - Present    Obesity (BMI 35.0-39.9 without comorbidity) ([facility]) E66.9   7/14/2014 - Present    Cholesteatoma of middle ear and mastoid H71.20   Unknown - Present    Conductive hearing loss, unilateral H90.2   4/3/2015 - Present    Acquired deflected nasal septum J34.2   9/9/2016 - Present    Hypertrophy of both inferior nasal turbinates J34.3   9/9/2016 - Present    Central sleep apnea G47.31   10/10/2016 - Present    Obstructive sleep apnea G47.33   10/10/2016 - Present    Thyroid nodule E04.1    10/14/2016 - Present    Eosinophilic esophagitis K20.0   Unknown - Present    Oxygen desaturation during sleep G47.34   Unknown - Present    Chronic nasopharyngitis J31.1   3/31/2017 - Present    Hypertrophy of tonsils alone J35.1   3/31/2017 - Present    Seizure cerebral I67.89   6/20/2017 - Present    Nasopharyngeal mass, 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   6/30/2017 - Present    Abnormal CT scan R93.8   6/30/2017 - Present    Generalized anxiety disorder F41.1   7/11/2017 - Present    Homozygous MTHFR mutation Z3753E (CMS-[facility]) E72.12   7/18/2017 - Present      Health Maintenance        Date Due Completion Dates    IMM INFLUENZA (1) 9/1/2017 12/12/2016, 9/23/2015, 10/1/2012    MAMMOGRAM 5/23/2018 5/23/2017, 3/1/2016, 8/5/2014, 2/12/2014, 2/4/2014, 8/21/2012, 6/23/2011, 6/23/2011 (Prv Comp), 3/31/2006    Override on 6/23/2011: Previously completed    IMM DTaP/Tdap/Td Vaccine (2 - Td) 11/1/2022 11/1/2012    COLONOSCOPY 4/21/2024 4/21/2014            Current Immunizations     Influenza TIV (IM) 9/23/2015, 10/1/2012    Influenza Vaccine Quad Inj (Preserved) 12/12/2016  9:06 AM    Tdap Vaccine 11/1/2012      Below and/or attached are the medications your provider expects you to take. Review all of your home medications and newly ordered medications with your provider and/or pharmacist. Follow medication instructions as directed by your provider and/or pharmacist. Please keep your medication list with you and share with your provider. Update the information when medications are discontinued, doses are changed, or new medications (including over-the-counter products) are added; and carry medication information at all times in the event of emergency situations     Allergies:  DOXYCYCLINE - (reactions not documented)     PCN - Unspecified     KEFLEX - Unspecified               Medications  Valid as of: July 18, 2017 - 10:01 AM    Generic Name Brand Name Tablet Size Instructions for use    B Complex Vitamins   Take  by mouth every day.         Cholecalciferol (Cap) Vitamin D 2000 UNITS Take 2,000 Units by mouth every day.        Hydrocortisone-Acetic Acid (Solution) VOSOL-HC 1-2 % Place 2 Drops in ear 2 times a day.        HydrOXYzine HCl (Tab) ATARAX 25 MG Take 1 Tab by mouth 3 times a day as needed for Anxiety.        Levothyroxine Sodium (Tab) SYNTHROID 125 MCG Take 1 Tab by mouth Every morning on an empty stomach.        Misc. Devices (Misc) Misc. Devices  This is an order for nocturnal oxygen, 2L nasal prongs.  Dx:  Nocturnal hypoxia, lowest 59% on overnight study.  G47.34           MARIA DOLORES 99 months   NPI [id number]        Omega 3-6-9 Fatty Acids (Cap) HM OMEGA-3-6-9 FATTY ACIDS  Take 1 Cap by mouth every day.        Sertraline HCl (Tab) ZOLOFT 100 MG TAKE 2 TABLETS BY MOUTH EVERY DAY        Zonisamide (Cap) ZONEGRAN 100 MG Take 2 Caps by mouth every day.        .                 Medicines prescribed today were sent to:     [facility] [address] [place] [address] [id number] [place] [address] & [place] [id number] [facility] [address]    Phone: [phone] Fax: [phone]    Open 24 Hours?: No      Medication refill instructions:       If your prescription bottle indicates you have medication refills left, it is not necessary to call your provider’s office. Please contact your pharmacy and they will refill your medication.    If your prescription bottle indicates you do not have any refills left, you may request refills at any time through one of the following ways: The online [employer] system (except Urgent Care), by calling your provider’s office, or by asking your pharmacy to contact your provider’s office with a refill request. Medication refills are processed only during regular business hours and may not be available until the next business day. Your provider may request additional information or to have a follow-up visit with you prior to refilling your medication.   *Please Note: Medication refills are assigned a new  Rx number when refilled electronically. Your pharmacy may indicate that no refills were authorized even though a new prescription for the same medication is available at the pharmacy. Please request the medicine by name with the pharmacy before contacting your provider for a refill.        Your To Do List     Future Labs/Procedures Complete By Expires    HOMOCYSTEINE  As directed 7/18/2018    THYROID CASCADE PROFILE  As directed 7/18/2018         [employer] Access Code: Activation code not generated  Current [employer] Status: Active

## 2017-07-21 ENCOUNTER — HOSPITAL ENCOUNTER (OUTPATIENT)
Dept: LAB | Facility: MEDICAL CENTER | Age: 52
End: 2017-07-21
Attending: FAMILY MEDICINE
Payer: COMMERCIAL

## 2017-07-21 DIAGNOSIS — E06.3 HASHIMOTO'S THYROIDITIS: ICD-10-CM

## 2017-07-21 DIAGNOSIS — Z15.89 HOMOZYGOUS MTHFR MUTATION A1298C: ICD-10-CM

## 2017-07-21 DIAGNOSIS — E06.3 HYPOTHYROIDISM DUE TO HASHIMOTO'S THYROIDITIS: ICD-10-CM

## 2017-07-21 DIAGNOSIS — E03.8 HYPOTHYROIDISM DUE TO HASHIMOTO'S THYROIDITIS: ICD-10-CM

## 2017-07-21 LAB — HCYS SERPL-SCNC: 9.27 UMOL/L

## 2017-07-21 PROCEDURE — 36415 COLL VENOUS BLD VENIPUNCTURE: CPT

## 2017-07-21 PROCEDURE — 84443 ASSAY THYROID STIM HORMONE: CPT

## 2017-07-21 PROCEDURE — 83090 ASSAY OF HOMOCYSTEINE: CPT

## 2017-07-26 ENCOUNTER — HOSPITAL ENCOUNTER (OUTPATIENT)
Dept: RADIOLOGY | Facility: MEDICAL CENTER | Age: 52
End: 2017-07-26
Attending: FAMILY MEDICINE
Payer: COMMERCIAL

## 2017-07-26 ENCOUNTER — OFFICE VISIT (OUTPATIENT)
Dept: MEDICAL GROUP | Facility: CLINIC | Age: 52
End: 2017-07-26
Payer: COMMERCIAL

## 2017-07-26 VITALS
TEMPERATURE: 98.2 F | WEIGHT: 257 LBS | HEART RATE: 74 BPM | BODY MASS INDEX: 38.06 KG/M2 | SYSTOLIC BLOOD PRESSURE: 120 MMHG | DIASTOLIC BLOOD PRESSURE: 80 MMHG | RESPIRATION RATE: 16 BRPM | OXYGEN SATURATION: 98 % | HEIGHT: 69 IN

## 2017-07-26 DIAGNOSIS — E66.9 OBESITY (BMI 35.0-39.9 WITHOUT COMORBIDITY): ICD-10-CM

## 2017-07-26 DIAGNOSIS — M79.604 RIGHT LEG PAIN: ICD-10-CM

## 2017-07-26 PROCEDURE — 99213 OFFICE O/P EST LOW 20 MIN: CPT | Performed by: FAMILY MEDICINE

## 2017-07-26 PROCEDURE — 73590 X-RAY EXAM OF LOWER LEG: CPT | Mod: RT

## 2017-07-26 NOTE — PROGRESS NOTES
CC: leg pain, obesity    HPI:   Tanvi presents today with the following.    1. Right leg pain  Sudden onset pain day before yesterday. This is just below and lateral to the right knee.  When she walked around it got better after about 20 minutes and then it got worse.  Began again today, worse  Denies swelling.      2. Obesity (BMI 35.0-39.9 without comorbidity) ([facility])  Discussed her overweight.  She is finding it very hard to stick to a diet.  Her ability to exercise has been limited.      Patient Active Problem List    Diagnosis Date Noted   • Dyslipidemia, goal LDL below 130      Priority: High   • Homozygous MTHFR mutation J2117U (CMS-HCC) 07/18/2017   • Generalized anxiety disorder 07/11/2017   • Nasopharyngeal mass, benign 06/30/2017   • Abnormal CT scan 06/30/2017   • Seizure cerebral 06/20/2017   • Chronic nasopharyngitis 03/31/2017   • Hypertrophy of tonsils alone 03/31/2017   • Eosinophilic esophagitis    • Oxygen desaturation during sleep    • Thyroid nodule 10/14/2016   • Central sleep apnea 10/10/2016   • Obstructive sleep apnea 10/10/2016   • Acquired deflected nasal septum 09/09/2016   • Hypertrophy of both inferior nasal turbinates 09/09/2016   • Conductive hearing loss, unilateral 04/03/2015   • Cholesteatoma of middle ear and mastoid    • Obesity (BMI 35.0-39.9 without comorbidity) ([facility]) 07/14/2014   • Family history of early CAD    • Hashimoto's thyroiditis 07/08/2013   • Reactive airway disease with wheezing 08/17/2012   • Vitamin D deficiency disease    • Major depressive disorder, recurrent episode, moderate (CMS-HCC)    • Lumbar disc narrowing 07/14/2009       Current Outpatient Prescriptions   Medication Sig Dispense Refill   • acetic acid-hydrocortisone (VOSOL-HC) 1-2 % Solution Place 2 Drops in ear 2 times a day. 1 Bottle 0   • HM OMEGA-3-6-9 FATTY ACIDS Cap Take 1 Cap by mouth every day. 30 Cap 11   • hydrOXYzine (ATARAX) 25 MG Tab Take 1 Tab by mouth 3 times a day as needed for Anxiety.  "30 Tab 1   • zonisamide (ZONEGRAN) 100 MG Cap Take 2 Caps by mouth every day. 60 Cap 3   • levothyroxine (SYNTHROID) 125 MCG Tab Take 1 Tab by mouth Every morning on an empty stomach. 30 Tab 6   • B Complex Vitamins (VITAMIN B COMPLEX PO) Take  by mouth every day.     • sertraline (ZOLOFT) 100 MG Tab TAKE 2 TABLETS BY MOUTH EVERY DAY (Patient taking differently: TAKE 1 TABLETS BY MOUTH EVERY DAY) 180 Tab 2   • Misc. Devices Misc This is an order for nocturnal oxygen, 2L nasal prongs.  Dx:  Nocturnal hypoxia, lowest 59% on overnight study.  G47.34           MARIA DOLORES 99 months   NPI [id number] 1 Each 0   • Cholecalciferol (VITAMIN D) 2000 UNIT CAPS Take 2,000 Units by mouth every day. (Patient taking differently: Take 2,000 Units by mouth 2 Times a Day.) 60 Cap 6     No current facility-administered medications for this visit.         Allergies as of 07/26/2017 - Kofi as Reviewed 07/26/2017   Allergen Reaction Noted   • Doxycycline  01/29/2014   • Pcn [penicillins] Unspecified 07/09/2009   • Keflex [cephalexin] Unspecified 09/21/2016        ROS: As per HPI.    /80 mmHg  Pulse 74  Temp(Src) 36.8 °C (98.2 °F)  Resp 16  Ht 1.74 m (5' 8.5\")  Wt 116.574 kg (257 lb)  BMI 38.50 kg/m2  SpO2 98%  LMP 02/26/1995    Physical Exam:  Gen:         Alert and oriented, No apparent distress.  Lucid and fluent.  Neck:       Good ROM, no JVD or carotid bruits appreciated.    Lungs:     Clear to auscultation bilaterally  CV:          Regular rate and rhythm. No murmurs, rubs or gallops.               Ext:          No clubbing, cyanosis, no edema.      Assessment and Plan.   52 y.o. female with the following issues.    1. Right leg pain  Discussed x-ray.  The pain is very sharp and unusual.  - DX-TIBIA AND FIBULA RIGHT; Future    2. Obesity (BMI 35.0-39.9 without comorbidity) ([facility])  Discussed lower carbohydrate diet.  She is eating high carb such as croissant, rice or beans/tortillas with almost every meal.  - Patient identified " as having weight management issue.  Appropriate orders and counseling given.

## 2017-07-26 NOTE — MR AVS SNAPSHOT
"        Tanvi Krueger   2017 1:00 PM   Office Visit   MRN: [id number]    Department:  [facility]   Dept Phone:  [phone]    Description:  Female : 1965   Provider:  Rebecca Ferro M.D.           Reason for Visit     Knee Pain     Obesity           Allergies as of 2017     Allergen Noted Reactions    Doxycycline 2014       Wheezing; diarrhea    Pcn [Penicillins] 2009   Unspecified    Unknown reaction as child    Keflex [Cephalexin] 2016   Unspecified    Yeast Infection      You were diagnosed with     Right leg pain   [695379]       Obesity (BMI 35.0-39.9 without comorbidity) ([facility])   [733977]         Vital Signs     Blood Pressure Pulse Temperature Respirations Height Weight    120/80 mmHg 74 36.8 °C (98.2 °F) 16 1.74 m (5' 8.5\") 116.574 kg (257 lb)    Body Mass Index Oxygen Saturation Last Menstrual Period Smoking Status          38.50 kg/m2 98% 1995 Never Smoker         Basic Information     Date Of Birth Sex Race Ethnicity Preferred Language    1965 Female White Non- English      Your appointments     Aug 11, 2017  2:00 PM   Overnight Oximetry with SLEEP CLINIC   [facility] Sleep Medicine (--)    [address] [facility]  [address]  [place] [address] [phone]            Aug 28, 2017  9:00 AM   Pulmonary Function Test with PFT-RM3   [facility] Pulmonary Medicine (--)    [address]  [address]  [place] [address] [phone]            Aug 28, 2017 10:20 AM   Established Patient Pul with GERBER Renteria   [facility] Pulmonary Medicine (--)    [address]  [place] [address] [phone]            Sep 29, 2017  3:00 PM   Individual Therapy with Beatriz Winn P.H.D.   [facility]    [address]  [place] [address] [phone]            Oct 06, 2017  8:40 AM   Follow Up Visit with Ben Weldon M.D.   [facility] Neurology (--)    [address], [address]  [place] [address]   [phone]           You will be receiving a confirmation call a few days before your appointment from our automated call confirmation system.            Oct 18, 2017  5:00 PM   Individual Therapy with Beatriz Winn P.H.D.   [facility]    [address]  [facility] [address] [phone]            Nov 01, 2017  5:00 PM   Individual Therapy with Beatriz Winn P.H.D.   [facility] ([place]    [address]  [address]  [facility] [address] [phone]            Nov 15, 2017  5:00 PM   Individual Therapy with Beatriz Winn P.H.D.   [facility]    [address]  [address]  [facility] [address] [phone]              Problem List              ICD-10-CM Priority Class Noted - Resolved    Lumbar disc narrowing M51.36   7/14/2009 - Present    Dyslipidemia, goal LDL below 130 E78.5 High  Unknown - Present    Major depressive disorder, recurrent episode, moderate (CMS-HCC) F33.1   Unknown - Present    Vitamin D deficiency disease E55.9   Unknown - Present    Reactive airway disease with wheezing J45.909   8/17/2012 - Present    Hashimoto's thyroiditis E06.3   7/8/2013 - Present    Family history of early CAD Z82.49   Unknown - Present    Obesity (BMI 35.0-39.9 without comorbidity) ([facility]) E66.9   7/14/2014 - Present    Cholesteatoma of middle ear and mastoid H71.20   Unknown - Present    Conductive hearing loss, unilateral H90.2   4/3/2015 - Present    Acquired deflected nasal septum J34.2   9/9/2016 - Present    Hypertrophy of both inferior nasal turbinates J34.3   9/9/2016 - Present    Central sleep apnea G47.31   10/10/2016 - Present    Obstructive sleep apnea G47.33   10/10/2016 - Present    Thyroid nodule E04.1   10/14/2016 - Present    Eosinophilic esophagitis K20.0   Unknown - Present    Oxygen desaturation during sleep G47.34   Unknown - Present    Chronic nasopharyngitis J31.1   3/31/2017  - Present    Hypertrophy of tonsils alone J35.1   3/31/2017 - Present    Seizure cerebral I67.89   6/20/2017 - Present    Nasopharyngeal mass, 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   6/30/2017 - Present    Abnormal CT scan R93.8   6/30/2017 - Present    Generalized anxiety disorder F41.1   7/11/2017 - Present    Homozygous MTHFR mutation O5014N (CMS-[facility]) E72.12   7/18/2017 - Present      Health Maintenance        Date Due Completion Dates    IMM INFLUENZA (1) 9/1/2017 12/12/2016, 9/23/2015, 10/1/2012    MAMMOGRAM 5/23/2018 5/23/2017, 3/1/2016, 8/5/2014, 2/12/2014, 2/4/2014, 8/21/2012, 6/23/2011, 6/23/2011 (Prv Comp), 3/31/2006    Override on 6/23/2011: Previously completed    IMM DTaP/Tdap/Td Vaccine (2 - Td) 11/1/2022 11/1/2012    COLONOSCOPY 4/21/2024 4/21/2014            Current Immunizations     Influenza TIV (IM) 9/23/2015, 10/1/2012    Influenza Vaccine Quad Inj (Preserved) 12/12/2016  9:06 AM    Tdap Vaccine 11/1/2012      Below and/or attached are the medications your provider expects you to take. Review all of your home medications and newly ordered medications with your provider and/or pharmacist. Follow medication instructions as directed by your provider and/or pharmacist. Please keep your medication list with you and share with your provider. Update the information when medications are discontinued, doses are changed, or new medications (including over-the-counter products) are added; and carry medication information at all times in the event of emergency situations     Allergies:  DOXYCYCLINE - (reactions not documented)     PCN - Unspecified     KEFLEX - Unspecified               Medications  Valid as of: July 26, 2017 -  1:55 PM    Generic Name Brand Name Tablet Size Instructions for use    B Complex Vitamins   Take  by mouth every day.        Cholecalciferol (Cap) Vitamin D 2000 UNITS Take 2,000 Units by mouth every day.        Hydrocortisone-Acetic Acid (Solution) VOSOL-HC 1-2 % Place 2 Drops in ear 2 times a day.         HydrOXYzine HCl (Tab) ATARAX 25 MG Take 1 Tab by mouth 3 times a day as needed for Anxiety.        Levothyroxine Sodium (Tab) SYNTHROID 125 MCG Take 1 Tab by mouth Every morning on an empty stomach.        Misc. Devices (Misc) Misc. Devices  This is an order for nocturnal oxygen, 2L nasal prongs.  Dx:  Nocturnal hypoxia, lowest 59% on overnight study.  G47.34           MARIA DOLORES 99 months   NPI [id number]        Omega 3-6-9 Fatty Acids (Cap) HM OMEGA-3-6-9 FATTY ACIDS  Take 1 Cap by mouth every day.        Sertraline HCl (Tab) ZOLOFT 100 MG TAKE 2 TABLETS BY MOUTH EVERY DAY        Zonisamide (Cap) ZONEGRAN 100 MG Take 2 Caps by mouth every day.        .                 Medicines prescribed today were sent to:     [facility] [address] [place] [address] [facility]    [address] [facility] [address]    Phone: [phone] Fax: [phone]    Open 24 Hours?: No      Medication refill instructions:       If your prescription bottle indicates you have medication refills left, it is not necessary to call your provider’s office. Please contact your pharmacy and they will refill your medication.    If your prescription bottle indicates you do not have any refills left, you may request refills at any time through one of the following ways: The online [employer] system (except Urgent Care), by calling your provider’s office, or by asking your pharmacy to contact your provider’s office with a refill request. Medication refills are processed only during regular business hours and may not be available until the next business day. Your provider may request additional information or to have a follow-up visit with you prior to refilling your medication.   *Please Note: Medication refills are assigned a new Rx number when refilled electronically. Your pharmacy may indicate that no refills were authorized even though a new prescription for the same medication is available at the  pharmacy. Please request the medicine by name with the pharmacy before contacting your provider for a refill.        Your To Do List     Future Labs/Procedures Complete By Expires    DX-TIBIA AND FIBULA RIGHT  As directed 7/26/2018         [employer] Access Code: Activation code not generated  Current [employer] Status: Active

## 2017-07-28 LAB — TEST NAME 95000: NORMAL

## 2017-08-02 ENCOUNTER — PATIENT MESSAGE (OUTPATIENT)
Dept: MEDICAL GROUP | Facility: CLINIC | Age: 52
End: 2017-08-02

## 2017-08-02 ENCOUNTER — OFFICE VISIT (OUTPATIENT)
Dept: BEHAVIORAL HEALTH | Facility: PHYSICIAN GROUP | Age: 52
End: 2017-08-02
Payer: COMMERCIAL

## 2017-08-02 DIAGNOSIS — H60.312 ACUTE DIFFUSE OTITIS EXTERNA OF LEFT EAR: ICD-10-CM

## 2017-08-02 DIAGNOSIS — F33.1 MAJOR DEPRESSIVE DISORDER, RECURRENT EPISODE, MODERATE (HCC): ICD-10-CM

## 2017-08-02 DIAGNOSIS — E06.3 HASHIMOTO'S THYROIDITIS: ICD-10-CM

## 2017-08-02 DIAGNOSIS — F41.1 GENERALIZED ANXIETY DISORDER: ICD-10-CM

## 2017-08-02 PROCEDURE — 90834 PSYTX W PT 45 MINUTES: CPT | Performed by: PSYCHOLOGIST

## 2017-08-02 RX ORDER — LEVOTHYROXINE SODIUM 0.15 MG/1
150 TABLET ORAL
Qty: 30 TAB | Refills: 6 | Status: SHIPPED | OUTPATIENT
Start: 2017-08-02 | End: 2017-10-02

## 2017-08-02 RX ORDER — NEOMYCIN SULFATE, POLYMYXIN B SULFATE, HYDROCORTISONE 3.5; 10000; 1 MG/ML; [USP'U]/ML; MG/ML
2 SOLUTION/ DROPS AURICULAR (OTIC) 3 TIMES DAILY
Qty: 1 BOTTLE | Refills: 0 | Status: SHIPPED | OUTPATIENT
Start: 2017-08-02 | End: 2018-02-13

## 2017-08-02 NOTE — BH THERAPY
[facility]  Therapy Progress Note    Patient Name: Tanvi Krueger  Patient MRN: [id number]  Today's Date: 8/2/2017     Type of session:Individual psychotherapy  Length of session: 50 minutes  Persons in attendance:Patient    Subjective/New Info: Pt reports feeling better and more confident about her new work position - doesn't have as much fear that she will be sent back to old job. Pt gives some examples of how she is speaking up and addressing problems with people more. Pt grappling with sadness over her childhood experiences. She has reached out to a step sister and has been ignored. Now that parents are dead, Pt's thoughts about her past are coming up to be re-examined.    Objective/Observations:   Participation: Active verbal participation, Attentive and Engaged   Grooming: Casual   Cognition: Alert and Fully Oriented   Eye contact: Good   Mood: Depressed and Anxious   Affect: Sad, Anxious and Tearful   Thought process: Tangential   Speech: Rate within normal limits   Other:     Diagnoses:   1. Major depressive disorder, recurrent episode, moderate (CMS-HCC)    2. Generalized anxiety disorder         Current risk:   SUICIDE: Not applicable   Homicide: Not applicable   Self-harm: Not applicable   Relapse: Not applicable   Other:    Safety Plan reviewed? Not Indicated   If evidence of imminent risk is present, intervention/plan:     Therapeutic Intervention(s): Conflict clarification, Goal-setting, Stressors assessed and Supportive psychotherapy    Treatment Goal(s)/Objective(s) addressed: Tx plan:  - Utilize learned skills to manage mood and emotional suffering more effectively  - Utilize learned assertiveness skills to set boundaries & get needs met  - Identify goals/values and cultivate a meaningful life  - Process and reduce the effects of past trauma on life, functioning, & sense of self  - Learn to ameliorate effects of anxiety on life and functioning  - Increase behaviors of self-compassion  and self-care       Progress toward Treatment Goals: Mild improvement    Plan:  - Continue Individual therapy and Medication management    Beatriz Winn, Ph.D.  8/2/2017

## 2017-08-09 ENCOUNTER — PATIENT MESSAGE (OUTPATIENT)
Dept: MEDICAL GROUP | Facility: CLINIC | Age: 52
End: 2017-08-09

## 2017-08-10 ENCOUNTER — HOSPITAL ENCOUNTER (OUTPATIENT)
Dept: LAB | Facility: MEDICAL CENTER | Age: 52
End: 2017-08-10
Attending: PSYCHIATRY & NEUROLOGY
Payer: COMMERCIAL

## 2017-08-10 ENCOUNTER — OFFICE VISIT (OUTPATIENT)
Dept: NEUROLOGY | Facility: MEDICAL CENTER | Age: 52
End: 2017-08-10
Payer: COMMERCIAL

## 2017-08-10 VITALS
HEART RATE: 82 BPM | OXYGEN SATURATION: 96 % | TEMPERATURE: 99.5 F | SYSTOLIC BLOOD PRESSURE: 108 MMHG | HEIGHT: 69 IN | BODY MASS INDEX: 39.32 KG/M2 | DIASTOLIC BLOOD PRESSURE: 70 MMHG | WEIGHT: 265.5 LBS

## 2017-08-10 DIAGNOSIS — K20.0 EOSINOPHILIC ESOPHAGITIS: ICD-10-CM

## 2017-08-10 DIAGNOSIS — G40.909 SEIZURE CEREBRAL (HCC): ICD-10-CM

## 2017-08-10 DIAGNOSIS — R68.83 SHIVERING: ICD-10-CM

## 2017-08-10 LAB
BASOPHILS # BLD AUTO: 0.9 % (ref 0–1.8)
BASOPHILS # BLD: 0.07 K/UL (ref 0–0.12)
EOSINOPHIL # BLD AUTO: 0.16 K/UL (ref 0–0.51)
EOSINOPHIL NFR BLD: 2 % (ref 0–6.9)
ERYTHROCYTE [DISTWIDTH] IN BLOOD BY AUTOMATED COUNT: 46.8 FL (ref 35.9–50)
ERYTHROCYTE [SEDIMENTATION RATE] IN BLOOD BY WESTERGREN METHOD: 26 MM/HOUR (ref 0–30)
HCT VFR BLD AUTO: 43.4 % (ref 37–47)
HGB BLD-MCNC: 13.2 G/DL (ref 12–16)
IMM GRANULOCYTES # BLD AUTO: 0.04 K/UL (ref 0–0.11)
IMM GRANULOCYTES NFR BLD AUTO: 0.5 % (ref 0–0.9)
LYMPHOCYTES # BLD AUTO: 2.71 K/UL (ref 1–4.8)
LYMPHOCYTES NFR BLD: 34.4 % (ref 22–41)
MCH RBC QN AUTO: 25.5 PG (ref 27–33)
MCHC RBC AUTO-ENTMCNC: 30.4 G/DL (ref 33.6–35)
MCV RBC AUTO: 83.9 FL (ref 81.4–97.8)
MONOCYTES # BLD AUTO: 0.43 K/UL (ref 0–0.85)
MONOCYTES NFR BLD AUTO: 5.5 % (ref 0–13.4)
NEUTROPHILS # BLD AUTO: 4.46 K/UL (ref 2–7.15)
NEUTROPHILS NFR BLD: 56.7 % (ref 44–72)
NRBC # BLD AUTO: 0 K/UL
NRBC BLD AUTO-RTO: 0 /100 WBC
PLATELET # BLD AUTO: 265 K/UL (ref 164–446)
PMV BLD AUTO: 10.9 FL (ref 9–12.9)
RBC # BLD AUTO: 5.17 M/UL (ref 4.2–5.4)
RHEUMATOID FACT SER IA-ACNC: <10 IU/ML (ref 0–14)
THYROPEROXIDASE AB SERPL-ACNC: 0.4 IU/ML (ref 0–9)
WBC # BLD AUTO: 7.9 K/UL (ref 4.8–10.8)

## 2017-08-10 PROCEDURE — 85652 RBC SED RATE AUTOMATED: CPT

## 2017-08-10 PROCEDURE — 86431 RHEUMATOID FACTOR QUANT: CPT

## 2017-08-10 PROCEDURE — 99214 OFFICE O/P EST MOD 30 MIN: CPT | Performed by: PSYCHIATRY & NEUROLOGY

## 2017-08-10 PROCEDURE — 83516 IMMUNOASSAY NONANTIBODY: CPT

## 2017-08-10 PROCEDURE — 86140 C-REACTIVE PROTEIN: CPT

## 2017-08-10 PROCEDURE — 86800 THYROGLOBULIN ANTIBODY: CPT

## 2017-08-10 PROCEDURE — 86235 NUCLEAR ANTIGEN ANTIBODY: CPT | Mod: 91

## 2017-08-10 PROCEDURE — 86038 ANTINUCLEAR ANTIBODIES: CPT

## 2017-08-10 PROCEDURE — 85025 COMPLETE CBC W/AUTO DIFF WBC: CPT

## 2017-08-10 PROCEDURE — 36415 COLL VENOUS BLD VENIPUNCTURE: CPT

## 2017-08-10 PROCEDURE — 86376 MICROSOMAL ANTIBODY EACH: CPT

## 2017-08-10 NOTE — PROGRESS NOTES
"NEUROLOGY NOTE    Referring Physician  Rebecca Ferro      CHIEF COMPLAINT:  Speech problems and word finding difficulty for one year  Silent aspiration  Hx of thyroid problems   Since last visit, developed external ear canal discharges , shivering 10-12 times every day  Chief Complaint   Patient presents with   • Follow-Up     Seizure cerebral        PRESENT ILLNESS:   Speech problems and word finding difficulty for one year  Silent aspiration  Hx of thyroid problems   Since last visit, developed external ear canal discharges , shivering 10-12 times every day  She is seeing ENT doctor, ---     For few seconds, could not find the words she would like to say    PAST MEDICAL HISTORY:  Past Medical History   Diagnosis Date   • Dyslipidemia, goal LDL below 130    • Menopausal symptoms    • Lumbar disc narrowing 7/14/2009   • Cholesteatoma of middle ear and mastoid 1992 surgery     mastoidectomy, prosthesis   • Vitamin d deficiency    • Recurrent sinus infections    • Hypothyroidism due to Hashimoto's thyroiditis    • Family history of early CAD    • Elevated glycohemoglobin    • Anesthesia      nausea/vomiting   • History of endometriosis    • Asthma      allergy related   • Depression    • Bowel habit changes      constipation   • Oxygen desaturation during sleep      O2 2 liters HS   • Tonsillitis    • Eosinophilic esophagitis    • History of chickenpox    • Arthritis    • Anemia    • Pneumonia      hx   • Hiatus hernia syndrome      \"was told I had one\"   • Sleep apnea      ASV with 2L oxygen at night (sean)    • Anxiety    • History of partial thyroidectomy      partial thyroidectomy   • Seizure (CMS-HCC)        PAST SURGICAL HISTORY:  Past Surgical History   Procedure Laterality Date   • Myringotomy  4/30/2009     Performed by MAXIMUS WHITE at SURGERY SAME DAY [facility] ORS   • Abdominal hysterectomy total  1997     ovaries are gone   • Tympanotomy  6/24/2010     Performed by MAXIMUS WHITE at SURGERY SAME " DAY [facility]   • Exam under anesthesia  6/24/2010     Performed by MAXIMUS WHITE at SURGERY SAME DAY [facility]   • Myringotomy  4/20/2012     Performed by RYANNE ALICIA at SURGERY [facility]   • Ear middle exploration  4/3/2015     Performed by Naresh Abdi M.D. at SURGERY SAME DAY [facility]   • Ossicular reconstruction  4/3/2015     Performed by Naresh Abdi M.D. at SURGERY SAME DAY [facility]   • Septoplasty  9/9/2016     Procedure: SEPTOPLASTY;  Surgeon: Naresh Abdi M.D.;  Location: SURGERY SAME DAY [facility];  Service:    • Turbinoplasty Bilateral 9/9/2016     Procedure: TURBINOPLASTY;  Surgeon: Naresh Abdi M.D.;  Location: SURGERY SAME DAY [facility];  Service:    • Hysterectomy laparoscopy     • Arthroscopy, knee     • Tonsillectomy     • Sinuscope     • Other  2016     sinus surgery   • Thyroidectomy total Left 10/14/2016     Procedure: LEFT PARTIAL THROIDECTOMY;  Surgeon: Naresh Abdi M.D.;  Location: SURGERY SAME DAY [facility];  Service:    • Laryngoscopy N/A 3/31/2017     Procedure: LARYNGOSCOPY - DIRECT W/BIOPSY BASE OF TONGUE AND NASOPHARYNGOSCOPY W/BIOPSY;  Surgeon: Naresh Abdi M.D.;  Location: SURGERY SAME DAY [facility];  Service:        FAMILY HISTORY:  Family History   Problem Relation Age of Onset   • Cancer Mother 61     lung, smoker   • Heart Disease Mother 44     early heart attack   • Heart Disease Father 60     cabg x 3   • Hyperlipidemia Father    • Psychiatry Father      schizophrenia   • Hyperlipidemia Brother    • Cancer Maternal Grandmother 62     lung, non-smoker   • Psychiatry Maternal Grandmother      depression, severe   • Psychiatry Other      depression, great uncle   • Sleep Apnea Mother      possibly   • Anxiety disorder Sister    • Kidney Disease Father      renal failure, dialysis   • Dementia Father        SOCIAL HISTORY:  Social History     Social History   • Marital Status:      Spouse Name: N/A   • Number of  Children: N/A   • Years of Education: N/A     Occupational History   • Not on file.     Social History Main Topics   • Smoking status: Never Smoker    • Smokeless tobacco: Never Used   • Alcohol Use: No   • Drug Use: No   • Sexual Activity:     Partners: Male     Other Topics Concern   • Not on file     Social History Narrative     ALLERGIES:  Allergies   Allergen Reactions   • Doxycycline      Wheezing; diarrhea   • Pcn [Penicillins] Unspecified     Unknown reaction as child   • Keflex [Cephalexin] Unspecified     Yeast Infection     TOBHX  History   Smoking status   • Never Smoker    Smokeless tobacco   • Never Used     ALCHX  History   Alcohol Use No     DRUGHX  History   Drug Use No           MEDICATIONS:  Current Outpatient Prescriptions   Medication   • NEOMYCIN-POLYMYXIN-HC, OTIC, 1 % Solution   • levothyroxine (SYNTHROID) 150 MCG Tab   • HM OMEGA-3-6-9 FATTY ACIDS Cap   • zonisamide (ZONEGRAN) 100 MG Cap   • B Complex Vitamins (VITAMIN B COMPLEX PO)   • sertraline (ZOLOFT) 100 MG Tab   • Cholecalciferol (VITAMIN D) 2000 UNIT CAPS   • hydrOXYzine (ATARAX) 25 MG Tab   • Misc. Devices Misc     No current facility-administered medications for this visit.       REVIEW OF SYSTEM:    Constitutional: Denies fevers, Denies weight changes   Eyes: Denies changes in vision, no eye pain   Ears/Nose/Throat/Mouth: Denies nasal congestion or sore throat   Cardiovascular: Denies chest pain or palpitations   Respiratory: central sleep apnea  Gastrointestinal/Hepatic: Denies abdominal pain, nausea, vomiting, diarrhea, constipation or GI bleeding   Genitourinary: Denies bladder dysfunction, dysuria or frequency   Musculoskeletal/Rheum: Denies joint pain and swelling   Skin/Breast: Denies rash, denies breast lumps or discharge   Neurological: speech problems and word finding difficulty, hearing aids  Psychiatric: denies mood disorder   Endocrine: thyroid dysfunction   Heme/Oncology/Lymph Nodes: Denies enlarged lymph nodes,  "denies brusing or known bleeding disorder   Allergic/Immunologic: Denies hx of allergies         PHYSICAL AND NEUROLOGICAL EXMAINATIONS:  VITAL SIGNS: Pulse 82  Temp(Src) 37.5 °C (99.5 °F)  Ht 1.74 m (5' 8.5\")  Wt 120.43 kg (265 lb 8 oz)  BMI 39.78 kg/m2  SpO2 96%  LMP 02/26/1995  CURRENT WEIGHT:   BMI: Body mass index is 39.78 kg/(m^2).  PREVIOUS WEIGHTS:  Wt Readings from Last 25 Encounters:   08/10/17 120.43 kg (265 lb 8 oz)   07/26/17 116.574 kg (257 lb)   07/18/17 116.574 kg (257 lb)   07/03/17 116.574 kg (257 lb)   06/30/17 118.842 kg (262 lb)   06/20/17 118.207 kg (260 lb 9.6 oz)   06/13/17 122.2 kg (269 lb 6.4 oz)   04/17/17 117.028 kg (258 lb)   03/31/17 118.7 kg (261 lb 11 oz)   03/17/17 117.028 kg (258 lb)   03/07/17 117.028 kg (258 lb)   01/10/17 115.667 kg (255 lb)   12/22/16 116.121 kg (256 lb)   12/12/16 117.935 kg (260 lb)   11/18/16 115.667 kg (255 lb)   10/19/16 115.667 kg (255 lb)   10/14/16 115.3 kg (254 lb 3.1 oz)   10/10/16 115.667 kg (255 lb)   10/04/16 115.667 kg (255 lb)   09/21/16 116.121 kg (256 lb)   09/09/16 117.8 kg (259 lb 11.2 oz)   09/09/16 115.6 kg (254 lb 13.6 oz)   03/15/16 112.946 kg (249 lb)   02/25/16 112.492 kg (248 lb)   02/23/16 112.764 kg (248 lb 9.6 oz)       General appearance of patient: WDWN(+) NAD(+)    EYES  o Fundus : Papilledem(-) Exudates(-) Hemorrhage(-)  Nervous System  Orientation to time, place and person(+)  Memory normal(-)  Language: aphasia(-)  Knowledge: past(+) Current(+)  Attention(+)  Cranial Nerves  • Nerve 2: intact  • Nerve 3,4,6: intact  • Nerve 5 : intact  • Nerve 7: intact  • Nerve 8: impaired over left  • Nerve 9 & 10: intact  • Nerve 11: intact  • Nerve 12: intact  Muscle Power and muscle tone: symmetric, normal in upper and lower  Sensory System: Pin sensation intact(+)  Reflexes: symmetric throughout  Cerebellar Function FNP normal   Gait : Steady(+) TandemGait steady(+)  Heart and Vascular  Peripheral Vasucular system : Edema (-) " Swelling(-)  RHB, Breathing sound clear  abdomen bowel sound normoactive  Extremities freely moveable  Joints no contracture  Neck pain     NEUROIMAGING: I reviewed the MRI/CT of brain       Keppra 125mg made her sleepy    IMPRESSION:    1. Trouble of speech and finding words for one year-- spells like-- lasting for seconds  2. Silent aspiration 2016,  Central Sleep Apnea 2016 ( now on nocturnal O2 + ASV), Thyroid disease-Hashimoto- 2016, Cholesteatoma, replacement of ossicle bones since childhood  3. Abnormal EEG      PLAN/RECOMMENDATIONS:    Explain to the patient --- the problems of speech and word finding difficult could be secondary to brain malfunction ( not neuromuscle weakness since the patient's trouble lies in finding the words instead of speaking out)      the patient could not tolerate Keppra  Even Keppra 125mg made her sleepy    Zonegran did not help the speech problems plus the patient noticed that she could not sleep  One more thing, in the past 3 months, she developed external ear discharges -- she is going to see ENT doctor this PM  Please stop Zonegran for now      Since the patient could not get benefits from trial of medication -- keppra and zonegran -- we will invite her to be admitted to hospital 5 days  Please call us when the ear infection is under control, I may try to put her back on Zonegran again ( next time, we will try higher dosage and AM dose to avoid interference of sleep)       Also advise the following  ________________________________________________________________________    Fish Oil -- Omega 3 1000mg 3# daily  Try Daily Probiotic too  Vit D-3 4000 unit daily  ________________________________________________________________________          MRI of brain-- Dec 2016-- not remarkable        SIGNATURE:  Ben Weldon      CC:  Rebecca J Kasie, M.D.        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       ____________________________________     MD JOSE SINHA / EDMUNDO    DD:  06/06/2017 05:48:51  DT:  06/06/2017 06:29:16    D#:  [id number]  Job#:  [id number]

## 2017-08-10 NOTE — PATIENT INSTRUCTIONS
IMPRESSION:    1. Trouble of speech and finding words for one year-- spells like-- lasting for seconds  2. Silent aspiration 2016,  Central Sleep Apnea 2016 ( now on nocturnal O2 + ASV), Thyroid disease-Hashimoto- 2016, Cholesteatoma, replacement of ossicle bones since childhood  3. Abnormal EEG      PLAN/RECOMMENDATIONS:    Explain to the patient --- the problems of speech and word finding difficult could be secondary to brain malfunction ( not neuromuscle weakness since the patient's trouble lies in finding the words instead of speaking out)      the patient could not tolerate Keppra  Even Keppra 125mg made her sleepy    Zonegran did not help the speech problems plus the patient noticed that she could not sleep  One more thing, in the past 3 months, she developed external ear discharges -- she is going to see ENT doctor this PM  Please stop Zonegran for now      Since the patient could not get benefits from trial of medication -- keppra and zonegran -- we will invite her to be admitted to hospital 5 days  Please call us when the ear infection is under control, I may try to put her back on Zonegran again ( next time, we will try higher dosage and AM dose to avoid interference of sleep)       Also advise the following  ________________________________________________________________________    Fish Oil -- Omega 3 1000mg 3# daily  Try Daily Probiotic too  Vit D-3 4000 unit daily  ________________________________________________________________________

## 2017-08-10 NOTE — MR AVS SNAPSHOT
"        Tanvi Krueger   8/10/2017 10:20 AM   Office Visit   MRN: [id number]    Department:  [facility]   Dept Phone:  [phone]    Description:  Female : 1965   Provider:  Ben Weldon M.D.           Reason for Visit     Follow-Up Seizure cerebral       Allergies as of 8/10/2017     Allergen Noted Reactions    Doxycycline 2014       Wheezing; diarrhea    Pcn [Penicillins] 2009   Unspecified    Unknown reaction as child    Keflex [Cephalexin] 2016   Unspecified    Yeast Infection      You were diagnosed with     Seizure cerebral   [916819]       Shivering   [149801]       Eosinophilic esophagitis   [530.13.ICD-9-CM]         Vital Signs     Blood Pressure Pulse Temperature Height Weight Body Mass Index    108/70 mmHg 82 37.5 °C (99.5 °F) 1.74 m (5' 8.5\") 120.43 kg (265 lb 8 oz) 39.78 kg/m2    Oxygen Saturation Last Menstrual Period Smoking Status             96% 1995 Never Smoker          Basic Information     Date Of Birth Sex Race Ethnicity Preferred Language    1965 Female White Non- English      Your appointments     Aug 11, 2017  2:00 PM   Overnight Oximetry with SLEEP CLINIC   [facility] (--)    [address]  [address]  [place] [address] [phone]            Aug 28, 2017  9:00 AM   Pulmonary Function Test with PFT-RM3   [facility] (--)    [address]  [address]  [place] [address]   [phone]            Aug 28, 2017 10:20 AM   Established Patient Pul with GERBER Renteria   [facility] (--)    [address]  [address]  [place] [address] [phone]            Sep 29, 2017  3:00 PM   Individual Therapy with Beatriz Winn, Ph.D.   [facility]    [address]  [address]  [place] [address] [phone]            Oct 06, 2017  8:40 AM   Follow Up Visit with Ben Weldon M.D.   [facility] (--)    [address], [address]  [place] [address]   [phone]           You will be receiving a confirmation call a few days before your appointment from our automated call confirmation system.            Oct 18, 2017  5:00 PM   Individual Therapy with Beatriz Winn, Ph.D.   [facility] ([facility]    [address]  [place] [address] [phone]            Nov 01, 2017  5:00 PM   Individual Therapy with Beatriz Winn, Ph.D.   [facility] ([facility])    [address]  [address]  [place] [address]   [phone]            Nov 15, 2017  5:00 PM   Individual Therapy with Beatriz Winn, Ph.D.   [facility] ([facility])    [address]  [address]  [place] [id number] [phone]              Problem List              ICD-10-CM Priority Class Noted - Resolved    Lumbar disc narrowing M51.36   7/14/2009 - Present    Dyslipidemia, goal LDL below 130 E78.5 High  Unknown - Present    Major depressive disorder, recurrent episode, moderate (CMS-[facility]) F33.1   Unknown - Present    Vitamin D deficiency disease E55.9   Unknown - Present    Reactive airway disease with wheezing J45.909   8/17/2012 - Present    Family history of early CAD Z82.49   Unknown - Present    Obesity (BMI 35.0-39.9 without comorbidity) ([facility]) E66.9   7/14/2014 - Present    Cholesteatoma of middle ear and mastoid H71.20   Unknown - Present    Conductive hearing loss, unilateral H90.2   4/3/2015 - Present    Acquired deflected nasal septum J34.2   9/9/2016 - Present    Hypertrophy of both inferior nasal turbinates J34.3   9/9/2016 - Present    Central sleep apnea G47.31   10/10/2016 - Present    Obstructive sleep apnea G47.33   10/10/2016 - Present    Thyroid nodule E04.1   10/14/2016 - Present    Eosinophilic esophagitis K20.0   Unknown - Present    Oxygen desaturation during sleep G47.34   Unknown - Present    Chronic nasopharyngitis J31.1   3/31/2017 - Present    Hypertrophy of tonsils alone J35.1    3/31/2017 - Present    Seizure cerebral I67.89   6/20/2017 - Present    Nasopharyngeal mass, 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   6/30/2017 - Present    Abnormal CT scan R93.8   6/30/2017 - Present    Generalized anxiety disorder F41.1   7/11/2017 - Present    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   7/18/2017 - Present    Shivering R68.83   8/10/2017 - Present      Health Maintenance        Date Due Completion Dates    IMM INFLUENZA (1) 9/1/2017 12/12/2016, 9/23/2015, 10/1/2012    MAMMOGRAM 5/23/2018 5/23/2017, 3/1/2016, 8/5/2014, 2/12/2014, 2/4/2014, 8/21/2012, 6/23/2011, 6/23/2011 (Prv Comp), 3/31/2006    Override on 6/23/2011: Previously completed    IMM DTaP/Tdap/Td Vaccine (2 - Td) 11/1/2022 11/1/2012    COLONOSCOPY 4/21/2024 4/21/2014            Current Immunizations     Influenza TIV (IM) 9/23/2015, 10/1/2012    Influenza Vaccine Quad Inj (Preserved) 12/12/2016  9:06 AM    Tdap Vaccine 11/1/2012      Below and/or attached are the medications your provider expects you to take. Review all of your home medications and newly ordered medications with your provider and/or pharmacist. Follow medication instructions as directed by your provider and/or pharmacist. Please keep your medication list with you and share with your provider. Update the information when medications are discontinued, doses are changed, or new medications (including over-the-counter products) are added; and carry medication information at all times in the event of emergency situations     Allergies:  DOXYCYCLINE - (reactions not documented)     PCN - Unspecified     KEFLEX - Unspecified               Medications  Valid as of: August 10, 2017 - 11:01 AM    Generic Name Brand Name Tablet Size Instructions for use    B Complex Vitamins   Take  by mouth every day.        Cholecalciferol (Cap) Vitamin D 2000 UNITS Take 2,000 Units by mouth every day.        HydrOXYzine HCl (Tab) ATARAX 25 MG Take 1 Tab by mouth 3 times a day as needed for Anxiety.         Levothyroxine Sodium (Tab) SYNTHROID 150 MCG Take 1 Tab by mouth Every morning on an empty stomach.        Misc. Devices (Misc) Misc. Devices  This is an order for nocturnal oxygen, 2L nasal prongs.  Dx:  Nocturnal hypoxia, lowest 59% on overnight study.  G47.34           MARIA DOLORES 99 months   NPI [id number]        Neomycin-Polymyxin-HC (Solution) NEOMYCIN-POLYMYXIN-HC (OTIC) 1 % Place 2 Drops in ear 3 times a day.        Omega 3-6-9 Fatty Acids (Cap) HM OMEGA-3-6-9 FATTY ACIDS  Take 1 Cap by mouth every day.        Sertraline HCl (Tab) ZOLOFT 100 MG TAKE 2 TABLETS BY MOUTH EVERY DAY        .                 Medicines prescribed today were sent to:     [facility] [address] [place] [address] [facility]    [address] [address]    Phone: [phone] Fax: [phone]    Open 24 Hours?: No      Medication refill instructions:       If your prescription bottle indicates you have medication refills left, it is not necessary to call your provider’s office. Please contact your pharmacy and they will refill your medication.    If your prescription bottle indicates you do not have any refills left, you may request refills at any time through one of the following ways: The online [employer] system (except Urgent Care), by calling your provider’s office, or by asking your pharmacy to contact your provider’s office with a refill request. Medication refills are processed only during regular business hours and may not be available until the next business day. Your provider may request additional information or to have a follow-up visit with you prior to refilling your medication.   *Please Note: Medication refills are assigned a new Rx number when refilled electronically. Your pharmacy may indicate that no refills were authorized even though a new prescription for the same medication is available at the pharmacy. Please request the medicine by name with the pharmacy before  contacting your provider for a refill.        Your To Do List     Future Labs/Procedures Complete By Expires    MARCELO ANTIBODY WITH REFLEX  As directed 8/11/2018    ANTITHYROGLOBULIN AB  As directed 8/10/2018    CBC WITH DIFFERENTIAL  As directed 8/11/2018    CONNECTIVE TISSUE DISEASES PROFILE  As directed 8/10/2018    CRP QUANTITIVE (NON-CARDIAC)  As directed 8/10/2018    RHEUMATOID ARTHRITIS FACTOR  As directed 8/11/2018    THYROID PEROXIDASE  (TPO) AB  As directed 8/10/2018    WESTERGREN SED RATE  As directed 8/11/2018      Referral     A referral request has been sent to our patient care coordination department. Please allow 3-5 business days for us to process this request and contact you either by phone or mail. If you do not hear from us by the 5th business day, please call us at [phone].        Instructions      IMPRESSION:    1. Trouble of speech and finding words for one year-- spells like-- lasting for seconds  2. Silent aspiration 2016,  Central Sleep Apnea 2016 ( now on nocturnal O2 + ASV), Thyroid disease-Hashimoto- 2016, Cholesteatoma, replacement of ossicle bones since childhood  3. Abnormal EEG      PLAN/RECOMMENDATIONS:    Explain to the patient --- the problems of speech and word finding difficult could be secondary to brain malfunction ( not neuromuscle weakness since the patient's trouble lies in finding the words instead of speaking out)      the patient could not tolerate Keppra  Even Keppra 125mg made her sleepy    Zonegran did not help the speech problems plus the patient noticed that she could not sleep  One more thing, in the past 3 months, she developed external ear discharges -- she is going to see ENT doctor this PM  Please stop Zonegran for now      Since the patient could not get benefits from trial of medication -- keppra and zonegran -- we will invite her to be admitted to hospital 5 days  Please call us when the ear infection is under control, I may try to put her back on  Zonegran again ( next time, we will try higher dosage and AM dose to avoid interference of sleep)       Also advise the following  ________________________________________________________________________    Fish Oil -- Omega 3 1000mg 3# daily  Try Daily Probiotic too  Vit D-3 4000 unit daily  ________________________________________________________________________                MyChart Access Code: Activation code not generated  Current MyChart Status: Active

## 2017-08-11 ENCOUNTER — HOME STUDY (OUTPATIENT)
Dept: SLEEP MEDICINE | Facility: MEDICAL CENTER | Age: 52
End: 2017-08-11
Attending: NURSE PRACTITIONER
Payer: COMMERCIAL

## 2017-08-11 DIAGNOSIS — G47.31 CENTRAL SLEEP APNEA: ICD-10-CM

## 2017-08-11 LAB
CRP SERPL HS-MCNC: 0.76 MG/DL (ref 0–0.75)
THYROGLOB AB SERPL-ACNC: <0.9 IU/ML (ref 0–4)

## 2017-08-11 PROCEDURE — 94762 N-INVAS EAR/PLS OXIMTRY CONT: CPT | Performed by: INTERNAL MEDICINE

## 2017-08-11 NOTE — MR AVS SNAPSHOT
Tanvi Krueger   2017 2:00 PM   Appointment   MRN: [id number]    Department:  [facility]   Dept Phone:  [phone]    Description:  Female : 1965   Provider:  [facility]           Allergies as of 2017     Allergen Noted Reactions    Doxycycline 2014       Wheezing; diarrhea    Pcn [Penicillins] 2009   Unspecified    Unknown reaction as child    Keflex [Cephalexin] 2016   Unspecified    Yeast Infection      You were diagnosed with     Central sleep apnea   [773120]         Vital Signs     Last Menstrual Period Smoking Status                1995 Never Smoker           Basic Information     Date Of Birth Sex Race Ethnicity Preferred Language    1965 Female White Non- English      Your appointments     Aug 28, 2017  9:00 AM   Pulmonary Function Test with PFT-RM3   [facility] (--)    [address]  [address]  [place] [id number] [phone]            Aug 28, 2017 10:20 AM   Established Patient Pul with GERBER Renteria   [facility] (--)    [address]  [address]  [place] [id number] [phone]            Sep 29, 2017  3:00 PM   Individual Therapy with Beatriz Winn, Ph.D.   [facility]    [address] [facility]  [address]  [facility] [address] [phone]            Oct 06, 2017  8:40 AM   Follow Up Visit with Ben Weldon M.D.   [facility] (--)    [address] [place] [address] [address]  [facility] [address] [phone]           You will be receiving a confirmation call a few days before your appointment from our automated call confirmation system.            Oct 18, 2017  5:00 PM   Individual Therapy with Beatriz Winn, Ph.D.   [facility])    [address] [facility]  [address]  [facility] [id number] [phone]            2017  5:00 PM   Individual Therapy with Beatriz Winn, Ph.D.   [facility] ([facility]    [address]  [address]  [place] [id number] [phone]            Nov 15, 2017  5:00 PM   Individual Therapy with Beatriz Winn, Ph.D.   [facility] ([place])    [address]  [address]  [place] [address] [phone]              Problem List              ICD-10-CM Priority Class Noted - Resolved    Lumbar disc narrowing M51.36   7/14/2009 - Present    Dyslipidemia, goal LDL below 130 E78.5 High  Unknown - Present    Major depressive disorder, recurrent episode, moderate (CMS-HCC) F33.1   Unknown - Present    Vitamin D deficiency disease E55.9   Unknown - Present    Reactive airway disease with wheezing J45.909   8/17/2012 - Present    Family history of early CAD Z82.49   Unknown - Present    Obesity (BMI 35.0-39.9 without comorbidity) ([facility]) E66.9   7/14/2014 - Present    Cholesteatoma of middle ear and mastoid H71.20   Unknown - Present    Conductive hearing loss, unilateral H90.2   4/3/2015 - Present    Acquired deflected nasal septum J34.2   9/9/2016 - Present    Hypertrophy of both inferior nasal turbinates J34.3   9/9/2016 - Present    Central sleep apnea G47.31   10/10/2016 - Present    Obstructive sleep apnea G47.33   10/10/2016 - Present    Thyroid nodule E04.1   10/14/2016 - Present    Eosinophilic esophagitis K20.0   Unknown - Present    Oxygen desaturation during sleep G47.34   Unknown - Present    Chronic nasopharyngitis J31.1   3/31/2017 - Present    Hypertrophy of tonsils alone J35.1   3/31/2017 - Present    Seizure cerebral I67.89   6/20/2017 - Present    Nasopharyngeal mass, 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   6/30/2017 - Present    Abnormal CT scan R93.8   6/30/2017 - Present    Generalized anxiety disorder F41.1   7/11/2017 - Present    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   7/18/2017 - Present    Shivering R68.83   8/10/2017 - Present      Health Maintenance        Date Due Completion Dates    IMM INFLUENZA (1) 9/1/2017 12/12/2016, 9/23/2015, 10/1/2012    MAMMOGRAM 5/23/2018  5/23/2017, 3/1/2016, 8/5/2014, 2/12/2014, 2/4/2014, 8/21/2012, 6/23/2011, 6/23/2011 (Prv Comp), 3/31/2006    Override on 6/23/2011: Previously completed    IMM DTaP/Tdap/Td Vaccine (2 - Td) 11/1/2022 11/1/2012    COLONOSCOPY 4/21/2024 4/21/2014            Current Immunizations     Influenza TIV (IM) 9/23/2015, 10/1/2012    Influenza Vaccine Quad Inj (Preserved) 12/12/2016  9:06 AM    Tdap Vaccine 11/1/2012      Below and/or attached are the medications your provider expects you to take. Review all of your home medications and newly ordered medications with your provider and/or pharmacist. Follow medication instructions as directed by your provider and/or pharmacist. Please keep your medication list with you and share with your provider. Update the information when medications are discontinued, doses are changed, or new medications (including over-the-counter products) are added; and carry medication information at all times in the event of emergency situations     Allergies:  DOXYCYCLINE - (reactions not documented)     PCN - Unspecified     KEFLEX - Unspecified               Medications  Valid as of: August 11, 2017 -  2:46 PM    Generic Name Brand Name Tablet Size Instructions for use    B Complex Vitamins   Take  by mouth every day.        Cholecalciferol (Cap) Vitamin D 2000 UNITS Take 2,000 Units by mouth every day.        HydrOXYzine HCl (Tab) ATARAX 25 MG Take 1 Tab by mouth 3 times a day as needed for Anxiety.        Levothyroxine Sodium (Tab) SYNTHROID 150 MCG Take 1 Tab by mouth Every morning on an empty stomach.        Misc. Devices (Misc) Misc. Devices  This is an order for nocturnal oxygen, 2L nasal prongs.  Dx:  Nocturnal hypoxia, lowest 59% on overnight study.  G47.34           MARIA DOLORES 99 months   NPI [id number]        Neomycin-Polymyxin-HC (Solution) NEOMYCIN-POLYMYXIN-HC (OTIC) 1 % Place 2 Drops in ear 3 times a day.        Omega 3-6-9 Fatty Acids (Cap) HM OMEGA-3-6-9 FATTY ACIDS  Take 1 Cap by mouth  every day.        Sertraline HCl (Tab) ZOLOFT 100 MG TAKE 2 TABLETS BY MOUTH EVERY DAY        .                 Medicines prescribed today were sent to:     "[facility] [id number] [place] [address], [place] - [address] [facility]    [address] [facility] [place] [address]    Phone: [phone] Fax: [phone]    Open 24 Hours?: No      Medication refill instructions:       If your prescription bottle indicates you have medication refills left, it is not necessary to call your provider’s office. Please contact your pharmacy and they will refill your medication.    If your prescription bottle indicates you do not have any refills left, you may request refills at any time through one of the following ways: The online [employer] system (except Urgent Care), by calling your provider’s office, or by asking your pharmacy to contact your provider’s office with a refill request. Medication refills are processed only during regular business hours and may not be available until the next business day. Your provider may request additional information or to have a follow-up visit with you prior to refilling your medication.   *Please Note: Medication refills are assigned a new Rx number when refilled electronically. Your pharmacy may indicate that no refills were authorized even though a new prescription for the same medication is available at the pharmacy. Please request the medicine by name with the pharmacy before contacting your provider for a refill.           [employer] Access Code: Activation code not generated  Current [employer] Status: Active

## 2017-08-15 NOTE — PROCEDURES
This study was done on Servo adaptive BiPAP ventilation with an expiratory pressure of 9 cm water and pressure support of 4-16 cm water. The medical record indicates that the patient uses oxygen in addition to the ASV but that is not noted on this study report.    6 hours and 53 minutes of data are available for review and the information appears to be of good quality for analysis.    The basal arterial oxygen saturation is 91%. Saturation is reasonably well-maintained at 88% or above through most of the night with occasional drops to about 86%. Overall she spends 22% of the study time with a saturation below 90% but only four minutes with a saturation less than 88%. The average heart rate is 66 bpm.    Assessment:  Minimal nocturnal hypoxemia on ASV. Clinical correlation is required to determine whether the patient was using oxygen in addition to ASV on the night of the study. If she was, a modest increase in the oxygen flow rate might be considered.

## 2017-08-20 LAB
CENTROMERE IGG TITR SER IF: 1 AU/ML (ref 0–40)
ENA JO1 AB TITR SER: 0 AU/ML (ref 0–40)
ENA SCL70 IGG SER QL: 0 AU/ML (ref 0–40)
ENA SM IGG SER-ACNC: 0 AU/ML (ref 0–40)
ENA SS-B IGG SER IA-ACNC: 0 AU/ML (ref 0–40)
RIBOSOMAL P AB SER-ACNC: 7 AU/ML (ref 0–40)
SSA52 R0ENA AB IGG Q0420: 4 AU/ML (ref 0–40)
SSA60 R0ENA AB IGG Q0419: 29 AU/ML (ref 0–40)
U1 SNRNP IGG SER QL: 0 AU/ML (ref 0–40)

## 2017-08-22 LAB — NUCLEAR IGG SER QL IA: NORMAL

## 2017-08-27 ENCOUNTER — PATIENT MESSAGE (OUTPATIENT)
Dept: MEDICAL GROUP | Facility: CLINIC | Age: 52
End: 2017-08-27

## 2017-08-27 ENCOUNTER — HOSPITAL ENCOUNTER (OUTPATIENT)
Dept: RADIOLOGY | Facility: MEDICAL CENTER | Age: 52
End: 2017-08-27
Attending: NURSE PRACTITIONER
Payer: COMMERCIAL

## 2017-08-27 DIAGNOSIS — R93.89 ABNORMAL CT SCAN, CHEST: ICD-10-CM

## 2017-08-27 PROCEDURE — 700117 HCHG RX CONTRAST REV CODE 255: Performed by: NURSE PRACTITIONER

## 2017-08-27 PROCEDURE — 71260 CT THORAX DX C+: CPT

## 2017-08-27 RX ADMIN — IOHEXOL 80 ML: 350 INJECTION, SOLUTION INTRAVENOUS at 08:15

## 2017-08-28 ENCOUNTER — OFFICE VISIT (OUTPATIENT)
Dept: PULMONOLOGY | Facility: HOSPICE | Age: 52
End: 2017-08-28
Payer: COMMERCIAL

## 2017-08-28 ENCOUNTER — NON-PROVIDER VISIT (OUTPATIENT)
Dept: PULMONOLOGY | Facility: HOSPICE | Age: 52
End: 2017-08-28
Payer: COMMERCIAL

## 2017-08-28 VITALS
OXYGEN SATURATION: 98 % | HEART RATE: 63 BPM | BODY MASS INDEX: 40.16 KG/M2 | SYSTOLIC BLOOD PRESSURE: 105 MMHG | DIASTOLIC BLOOD PRESSURE: 70 MMHG | RESPIRATION RATE: 15 BRPM | WEIGHT: 265 LBS | HEIGHT: 68 IN

## 2017-08-28 VITALS — WEIGHT: 265 LBS | BODY MASS INDEX: 39.7 KG/M2

## 2017-08-28 DIAGNOSIS — J39.2 NASOPHARYNGEAL MASS, BENIGN: ICD-10-CM

## 2017-08-28 DIAGNOSIS — R93.89 ABNORMAL CT SCAN: ICD-10-CM

## 2017-08-28 DIAGNOSIS — R91.1 PULMONARY NODULE: ICD-10-CM

## 2017-08-28 DIAGNOSIS — E66.9 OBESITY (BMI 35.0-39.9 WITHOUT COMORBIDITY): ICD-10-CM

## 2017-08-28 DIAGNOSIS — R06.09 EXERTIONAL DYSPNEA: ICD-10-CM

## 2017-08-28 DIAGNOSIS — J45.40 REACTIVE AIRWAY DISEASE WITH WHEEZING, MODERATE PERSISTENT, UNCOMPLICATED: ICD-10-CM

## 2017-08-28 DIAGNOSIS — J45.30 REACTIVE AIRWAY DISEASE WITH WHEEZING, MILD PERSISTENT, UNCOMPLICATED: ICD-10-CM

## 2017-08-28 DIAGNOSIS — G47.31 CENTRAL SLEEP APNEA: ICD-10-CM

## 2017-08-28 PROCEDURE — 94060 EVALUATION OF WHEEZING: CPT | Performed by: INTERNAL MEDICINE

## 2017-08-28 PROCEDURE — 94726 PLETHYSMOGRAPHY LUNG VOLUMES: CPT | Performed by: INTERNAL MEDICINE

## 2017-08-28 PROCEDURE — 99214 OFFICE O/P EST MOD 30 MIN: CPT | Performed by: NURSE PRACTITIONER

## 2017-08-28 PROCEDURE — 94729 DIFFUSING CAPACITY: CPT | Performed by: INTERNAL MEDICINE

## 2017-08-28 ASSESSMENT — PULMONARY FUNCTION TESTS
FEV1/FVC_PERCENT_PREDICTED: 99
FVC: 2.73
FEV1/FVC_PERCENT_PREDICTED: 106
FEV1_PERCENT_CHANGE: 4
FEV1_PERCENT_CHANGE: 11
FVC_PERCENT_PREDICTED: 70
FEV1/FVC: 83.15
FEV1/FVC_PERCENT_CHANGE: 275
FEV1: 2.03
FEV1/FVC: 78
FEV1_PREDICTED: 3.05
FEV1_PERCENT_PREDICTED: 74
FEV1_PERCENT_PREDICTED: 66
FEV1: 2.27
FVC: 2.61
FEV1/FVC_PERCENT_PREDICTED: 78
FVC_PREDICTED: 3.89
FVC_PERCENT_PREDICTED: 67

## 2017-08-28 NOTE — PATIENT INSTRUCTIONS
1. Trial of anoro. Rx provided. One actuation daily.  2. Echocardiogram for dyspnea  3. Follow-up with ENT ASAP  4. CT chest in 6 months  5. Follow-up with pulmonary physician to review echocardiogram and discussed trial of Anoro  6. Continue ASV nightly  7. Influenza vaccination recommended in October

## 2017-08-28 NOTE — PROCEDURES
Technician: JENNYFER Casillas    Technician Comment:  Good patient effort & cooperation.  The results of this test meet the ATS/ERS standards for acceptability & reproducibility.  Test was performed on the [employer] Body Plethysmograph-Elite DX system.  Predicted values were [facility]-3 for spirometry, [facility] for DLCO, ITS for Lung Volumes.  The DLCO was uncorrected for Hgb.  A bronchodilator of Ventolin HFA -2puffs via spacer administered.      The FVC is 2.73 L or 70%, FEV1 is 2.27 L or 74%, FEV1/FVC 83%. Borderline bronchodilator response. TLC 91%. DLCO 112%.    Interpretation:  Spirometry suggests mild restriction secondary to BMI of 40.  Normal total lung capacity and gas transfer.  Borderline bronchodilator response.  Interpretation:

## 2017-08-28 NOTE — PROGRESS NOTES
Chief Complaint   Patient presents with   • Results     PFT / OPO / CT         HPI: This patient is a 52 y.o. female, Here for PFT, compliance download, overnight oximetry and CT results. She initially presented to our office for evaluation of sleep apnea but complained of worsening dyspnea which prompted pulmonary workup. She has a complicated medical history. History includes asthma, seasonal allergies, lifelong smoker, neck mass on CT (see below), abnormal chest CT, abnormal EEG now followed by neurology on Zonegran, depression on zoloft. Her mother  of lung CA.     In regards to sleep apnea, initial PSG indicates obstructive sleep apnea with an AHI of 81.4, minimum oxygen saturation of 79 %. She failed trial of CPAP, had significant number of central events. She had an in lab titration to ASV on 2016. She was initiated on ASV, but had persistent desaturations. O2 was added to ASV in 2016. She had difficult time tolerating ASV pressure. She returned 2017 requesting repeat sleep study to determine if she still needed to use ASV. PSG 2017 indicates moderate NIDHI with an AHI of 21.9, minimum saturation of 59%. Events were entirely hypopneas. Patient was successfully titrated to CPAP 9 cm H2O. She achieved 20 minutes of REM, AHI was reduced to 0, mean saturation 91.9%. She has actually resumed ASV, compliance download over the past 30 days indicates 96% compliance, average use of 6 hours 43 minutes per night, AHI of 2. She reports feeling well. No significant air leak. Overnight oximetry is adequate.     In regards to asthma, PFT's were obtained for complaints of worsening dyspnea and her stable from former indicating an FEV1 of 2.03 L 66% predicted, FEV1 FVC ratio of 78, TLC is normal, DLCO also normal at 112% predicted. She does have bronchodilator response particularly in the small airways. She has intolerance to ICS. She uses albuterol if necessary. She continues to have  "exertional dyspnea, occasional wheeze. Denies hemoptysis, cough, fever, chills.     PT had nasopharyngeal biopsy earlier this year which was abnormal, apparently differentials included lymphoma. Which prompted a chest CT March 19, 2017 which indicates.  1.  Linear opacity with slightly irregular margins in the anterior lingula which may represent subtle infiltrate, atelectasis, or scar. No prior studies are available for comparison.  2.  7.5 mm short axis diameter lymph node in the anterior mediastinum.  3.  Possible mild fatty infiltration of the liver.    F/U CT 8/27/17 indicates scar in the lingula, new small nodules in the right upper and lower lobes measuring up to 4 mm. No mediastinal mass or adenopathy.  Nodules will require follow-up in 6 months. This has been ordered for February 2018.      In regards to neck mass, She also has had a CT neck 3/19/2017 which indicates;  3.3 x 2.7 x 1.6 cm enhancing mass in the posterior oropharynx extending to the hypopharynx. Biopsy was negative for malignancy.  She has not followed up with ENT, I encouraged her to do so today.     Past Medical History:   Diagnosis Date   • Lumbar disc narrowing 7/14/2009   • Anemia    • Anesthesia     nausea/vomiting   • Anxiety    • Arthritis    • Asthma     allergy related   • Bowel habit changes     constipation   • Cholesteatoma of middle ear and mastoid 1992 surgery    mastoidectomy, prosthesis   • Depression    • Dyslipidemia, goal LDL below 130    • Elevated glycohemoglobin    • Eosinophilic esophagitis    • Family history of early CAD    • Hiatus hernia syndrome     \"was told I had one\"   • History of chickenpox    • History of endometriosis    • History of partial thyroidectomy     partial thyroidectomy   • Hypothyroidism due to Hashimoto's thyroiditis    • Menopausal symptoms    • Oxygen desaturation during sleep     O2 2 liters HS   • Pneumonia     hx   • Recurrent sinus infections    • Seizure (CMS-HCC)    • Sleep apnea     ASV " with 2L oxygen at night (sean)    • Tonsillitis    • Vitamin d deficiency        Social History   Substance Use Topics   • Smoking status: Never Smoker   • Smokeless tobacco: Never Used   • Alcohol use No       Family History   Problem Relation Age of Onset   • Cancer Mother 61     lung, smoker   • Heart Disease Mother 44     early heart attack   • Sleep Apnea Mother      possibly   • Heart Disease Father 60     cabg x 3   • Hyperlipidemia Father    • Psychiatry Father      schizophrenia   • Kidney Disease Father      renal failure, dialysis   • Dementia Father    • Hyperlipidemia Brother    • Cancer Maternal Grandmother 62     lung, non-smoker   • Psychiatry Maternal Grandmother      depression, severe   • Psychiatry Other      depression, great uncle   • Anxiety disorder Sister        Current medications as of today   Current Outpatient Prescriptions   Medication Sig Dispense Refill   • Umeclidinium-Vilanterol (ANORO ELLIPTA) 62.5-25 MCG/INH AEROSOL POWDER, BREATH ACTIVATED Take 1 Inhalation by mouth every day. 1 Each 0   • NEOMYCIN-POLYMYXIN-HC, OTIC, 1 % Solution Place 2 Drops in ear 3 times a day. 1 Bottle 0   • levothyroxine (SYNTHROID) 150 MCG Tab Take 1 Tab by mouth Every morning on an empty stomach. 30 Tab 6   • HM OMEGA-3-6-9 FATTY ACIDS Cap Take 1 Cap by mouth every day. 30 Cap 11   • B Complex Vitamins (VITAMIN B COMPLEX PO) Take  by mouth every day.     • sertraline (ZOLOFT) 100 MG Tab TAKE 2 TABLETS BY MOUTH EVERY DAY (Patient taking differently: TAKE 1 TABLETS BY MOUTH EVERY DAY) 180 Tab 2   • Misc. Devices Misc This is an order for nocturnal oxygen, 2L nasal prongs.  Dx:  Nocturnal hypoxia, lowest 59% on overnight study.  G47.34           MARIA DOLORES 99 months   NPI [id number] 1 Each 0   • Cholecalciferol (VITAMIN D) 2000 UNIT CAPS Take 2,000 Units by mouth every day. (Patient taking differently: Take 2,000 Units by mouth 2 Times a Day.) 60 Cap 6     No current facility-administered medications for this  "visit.        Allergies: Doxycycline; Pcn [penicillins]; and Keflex [cephalexin]    Blood pressure 105/70, pulse 63, resp. rate 15, height 1.727 m (5' 8\"), weight 120.2 kg (265 lb), last menstrual period 02/26/1995, SpO2 98 %.      ROS:   Constitutional: Denies fevers, chills, night sweats, weight loss or fatigue  Eyes: Denies pain, discharge/drainage  ENT: Denies tinnitus, hearing loss, sinusitis, hoarseness, epistaxis  Allergic: Denies Allergic rhinitis or hayfever  Respiratory:See history of present illness  Cardiovascular: Denies chest pain, tightness, palpitations, orthopnea or edema  Sleep: See HPI  GI/: Denies heartburn, nausea, vomiting, urinary incontinence, hematuria  Musculoskeletal: Denies back pain, painful joints, sore muscles  Neurological: Denies vertigo or headaches  Skin: Denies rashes, lesions  Psychiatric: Denies depression positive anxiety     Physical exam:   Constitutional: Obese, in no acute distress  Eyes: PERRLA  Mouth/Throat: Oropharynx is moist, clear, no lesions  Neck: supple, no masses  Respiratory: no intercostal retractions or accessory muscle use   Lungs auscultation: Clear to auscultation bilaterally  Cardiovascular: Regular rate rhythm no murmurs, rubs or gallops  Musculoskeletal: Normal gait, no clubbing or cyanosis  Skin: No rashes or lesions  Neuro: No focal deficit, cranial nerves grossly intact  Psychiatric: Oriented to time, person and place.     Diagnosis:  1. Obesity (BMI 35.0-39.9 without comorbidity) (HCC)     2. Central sleep apnea     3. Nasopharyngeal mass, benign     4. Abnormal CT scan  CT-CHEST (THORAX) W/O   5. Reactive airway disease with wheezing, moderate persistent, uncomplicated  Umeclidinium-Vilanterol (ANORO ELLIPTA) 62.5-25 MCG/INH AEROSOL POWDER, BREATH ACTIVATED   6. Exertional dyspnea  ECHOCARDIOGRAM COMP W/O CONT   7. Pulmonary nodule         Plan:  Patient is very anxious regarding her CT. I reassured her nodules likely related to inflammation, but " require close clinical follow-up. In regards to dyspnea we discussed trial Anoro given her intolerance to ICS. Would also like to obtain an echocardiogram.    1. Trial of Anoro. Rx provided. One actuation daily. Patient intolerant to ICS.  2. Echocardiogram for dyspnea  3. Follow-up with ENT (Dr Abdi) ASAP regarding neck mass  4. CT chest in 6 months to follow pulmonary nodules. Reviewed with Dr Locke in agreement with 6 month F/U  5. Follow-up with pulmonary physician to review echocardiogram and discussed trial of Anoro & for any further recommendation  6. Continue ASV nightly. If she complains of intolerance consider switching to CPAP 9 cm H2O. Recent titration was adequate with CPAP.  7. Influenza vaccination recommended in October

## 2017-09-09 ENCOUNTER — HOSPITAL ENCOUNTER (OUTPATIENT)
Dept: RADIOLOGY | Facility: MEDICAL CENTER | Age: 52
End: 2017-09-09
Attending: OTOLARYNGOLOGY
Payer: COMMERCIAL

## 2017-09-09 DIAGNOSIS — R22.1 NECK MASS: ICD-10-CM

## 2017-09-09 PROCEDURE — 70491 CT SOFT TISSUE NECK W/DYE: CPT

## 2017-09-09 PROCEDURE — 700117 HCHG RX CONTRAST REV CODE 255: Performed by: OTOLARYNGOLOGY

## 2017-09-09 RX ADMIN — IOHEXOL 80 ML: 350 INJECTION, SOLUTION INTRAVENOUS at 11:19

## 2017-09-11 ENCOUNTER — PATIENT MESSAGE (OUTPATIENT)
Dept: MEDICAL GROUP | Facility: CLINIC | Age: 52
End: 2017-09-11

## 2017-09-13 ENCOUNTER — PATIENT MESSAGE (OUTPATIENT)
Dept: MEDICAL GROUP | Facility: CLINIC | Age: 52
End: 2017-09-13

## 2017-09-13 NOTE — PROGRESS NOTES
Subjective:      Tanvi Krueger is a 52 y.o. female who presents with No chief complaint on file.            HPI    ROS       Objective:     LMP 03/01/1997      Physical Exam            Assessment/Plan:     There are no diagnoses linked to this encounter.

## 2017-09-16 ENCOUNTER — HOSPITAL ENCOUNTER (OUTPATIENT)
Dept: CARDIOLOGY | Facility: MEDICAL CENTER | Age: 52
End: 2017-09-16
Attending: NURSE PRACTITIONER
Payer: COMMERCIAL

## 2017-09-16 DIAGNOSIS — R06.09 EXERTIONAL DYSPNEA: ICD-10-CM

## 2017-09-16 PROCEDURE — 93306 TTE W/DOPPLER COMPLETE: CPT | Mod: 26 | Performed by: INTERNAL MEDICINE

## 2017-09-16 PROCEDURE — 93306 TTE W/DOPPLER COMPLETE: CPT

## 2017-09-17 ENCOUNTER — PATIENT MESSAGE (OUTPATIENT)
Dept: MEDICAL GROUP | Facility: CLINIC | Age: 52
End: 2017-09-17

## 2017-09-17 LAB
LV EJECT FRACT  99904: 60
LV EJECT FRACT MOD 2C 99903: 63.25
LV EJECT FRACT MOD 4C 99902: 68.99
LV EJECT FRACT MOD BP 99901: 67.91

## 2017-09-19 ENCOUNTER — TELEPHONE (OUTPATIENT)
Dept: PULMONOLOGY | Facility: HOSPICE | Age: 52
End: 2017-09-19

## 2017-09-19 NOTE — TELEPHONE ENCOUNTER
Pt called requesting the test results to the Echo she had done on 9/16/17.    Last OV: 8/28/17- Jasmyn Calwdell  Next OV: 12/4/17  Pulmonary nodules    Jasmyn Zaman is also attached to this message for she saw the pt last.

## 2017-09-20 NOTE — TELEPHONE ENCOUNTER
Called pt, left vm of Jasmyn Gutierrez's last message and for her to cb [phone] if she had any other questions.

## 2017-09-23 ENCOUNTER — PATIENT MESSAGE (OUTPATIENT)
Dept: MEDICAL GROUP | Facility: CLINIC | Age: 52
End: 2017-09-23

## 2017-09-29 ENCOUNTER — OFFICE VISIT (OUTPATIENT)
Dept: BEHAVIORAL HEALTH | Facility: PHYSICIAN GROUP | Age: 52
End: 2017-09-29
Payer: COMMERCIAL

## 2017-09-29 DIAGNOSIS — F41.1 GENERALIZED ANXIETY DISORDER: ICD-10-CM

## 2017-09-29 DIAGNOSIS — F33.1 MAJOR DEPRESSIVE DISORDER, RECURRENT EPISODE, MODERATE (HCC): ICD-10-CM

## 2017-09-29 PROCEDURE — 90834 PSYTX W PT 45 MINUTES: CPT | Performed by: PSYCHOLOGIST

## 2017-09-30 NOTE — BH THERAPY
[facility]  Therapy Progress Note    Patient Name: Tanvi Krueger  Patient MRN: [id number]  Today's Date: 9/29/2017     Type of session:Individual psychotherapy  Length of session: 50 minutes  Persons in attendance:Patient    Subjective/New Info: Pt reports feeling better and took a trip to visit the graves of dead family members and put a tombstone on mom's grave. Pt states that this has given her a chance to grieve and resolved old feelings about family. Pt is connecting with a distant cousin over the weekend. Pt feeling more confident in current job - getting positive feedback and support. Pt speaking up more with husb about her needs and what she wants him to change in order to meet her needs better. Relationship with daughters improved.     Objective/Observations:   Participation: Active verbal participation, Attentive and Engaged   Grooming: Casual   Cognition: Alert and Fully Oriented   Eye contact: Good   Mood: Depressed and Anxious   Affect: Anxious   Thought process: Logical   Speech: Rate within normal limits   Other:     Diagnoses:   1. Generalized anxiety disorder    2. Major depressive disorder, recurrent episode, moderate (CMS-HCC)         Current risk:   SUICIDE: Not applicable   Homicide: Not applicable   Self-harm: Not applicable   Relapse: Not applicable   Other:    Safety Plan reviewed? Not Indicated   If evidence of imminent risk is present, intervention/plan:     Therapeutic Intervention(s): Conflict clarification, Goal-setting, Stressors assessed and Supportive psychotherapy    Treatment Goal(s)/Objective(s) addressed: Tx plan:  - Utilize learned skills to manage mood and emotional suffering more effectively  - Utilize learned assertiveness skills to set boundaries & get needs met  - Identify goals/values and cultivate a meaningful life  - Process and reduce the effects of past trauma on life, functioning, & sense of self  - Learn to ameliorate effects of anxiety on life and  functioning  - Increase behaviors of self-compassion and self-care       Progress toward Treatment Goals: Moderate improvement    Plan:  - Continue Individual therapy and Medication management    Beatriz Winn, Ph.D.  9/29/2017

## 2017-10-02 ENCOUNTER — OFFICE VISIT (OUTPATIENT)
Dept: MEDICAL GROUP | Facility: CLINIC | Age: 52
End: 2017-10-02
Payer: COMMERCIAL

## 2017-10-02 VITALS
HEIGHT: 69 IN | HEART RATE: 64 BPM | DIASTOLIC BLOOD PRESSURE: 70 MMHG | OXYGEN SATURATION: 97 % | SYSTOLIC BLOOD PRESSURE: 126 MMHG | BODY MASS INDEX: 39.69 KG/M2 | TEMPERATURE: 97.5 F | WEIGHT: 268 LBS | RESPIRATION RATE: 14 BRPM

## 2017-10-02 DIAGNOSIS — Z62.810 HISTORY OF SEXUAL MOLESTATION IN CHILDHOOD: ICD-10-CM

## 2017-10-02 DIAGNOSIS — Z23 NEED FOR VACCINATION FOR PNEUMOCOCCUS: ICD-10-CM

## 2017-10-02 DIAGNOSIS — F43.10 PTSD (POST-TRAUMATIC STRESS DISORDER): ICD-10-CM

## 2017-10-02 DIAGNOSIS — Z23 NEED FOR IMMUNIZATION AGAINST INFLUENZA: ICD-10-CM

## 2017-10-02 DIAGNOSIS — F33.1 MAJOR DEPRESSIVE DISORDER, RECURRENT EPISODE, MODERATE (HCC): ICD-10-CM

## 2017-10-02 DIAGNOSIS — F41.1 GENERALIZED ANXIETY DISORDER: ICD-10-CM

## 2017-10-02 PROCEDURE — 99213 OFFICE O/P EST LOW 20 MIN: CPT | Mod: 25 | Performed by: FAMILY MEDICINE

## 2017-10-02 PROCEDURE — 90471 IMMUNIZATION ADMIN: CPT | Performed by: FAMILY MEDICINE

## 2017-10-02 PROCEDURE — 90686 IIV4 VACC NO PRSV 0.5 ML IM: CPT | Performed by: FAMILY MEDICINE

## 2017-10-02 PROCEDURE — 90472 IMMUNIZATION ADMIN EACH ADD: CPT | Performed by: FAMILY MEDICINE

## 2017-10-02 PROCEDURE — 90732 PPSV23 VACC 2 YRS+ SUBQ/IM: CPT | Performed by: FAMILY MEDICINE

## 2017-10-02 RX ORDER — LEVOTHYROXINE SODIUM 0.12 MG/1
150 TABLET ORAL
Status: ON HOLD | COMMUNITY
Start: 2017-08-18 | End: 2017-12-18 | Stop reason: CLARIF
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
## 2017-10-04 ENCOUNTER — OFFICE VISIT (OUTPATIENT)
Dept: BEHAVIORAL HEALTH | Facility: PHYSICIAN GROUP | Age: 52
End: 2017-10-04
Payer: COMMERCIAL

## 2017-10-04 DIAGNOSIS — F41.1 GENERALIZED ANXIETY DISORDER: ICD-10-CM

## 2017-10-04 DIAGNOSIS — F33.1 MAJOR DEPRESSIVE DISORDER, RECURRENT EPISODE, MODERATE (HCC): ICD-10-CM

## 2017-10-04 PROCEDURE — 90834 PSYTX W PT 45 MINUTES: CPT | Performed by: PSYCHOLOGIST
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
## 2017-10-06 ENCOUNTER — APPOINTMENT (OUTPATIENT)
Dept: NEUROLOGY | Facility: MEDICAL CENTER | Age: 52
End: 2017-10-06
Payer: COMMERCIAL

## 2017-10-18 ENCOUNTER — OFFICE VISIT (OUTPATIENT)
Dept: BEHAVIORAL HEALTH | Facility: PHYSICIAN GROUP | Age: 52
End: 2017-10-18
Payer: COMMERCIAL

## 2017-10-18 DIAGNOSIS — F41.1 GENERALIZED ANXIETY DISORDER: ICD-10-CM

## 2017-10-18 DIAGNOSIS — F33.1 MAJOR DEPRESSIVE DISORDER, RECURRENT EPISODE, MODERATE (HCC): ICD-10-CM

## 2017-10-18 PROCEDURE — 90834 PSYTX W PT 45 MINUTES: CPT | Performed by: PSYCHOLOGIST
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
## 2017-11-01 ENCOUNTER — OFFICE VISIT (OUTPATIENT)
Dept: BEHAVIORAL HEALTH | Facility: PHYSICIAN GROUP | Age: 52
End: 2017-11-01
Payer: COMMERCIAL

## 2017-11-01 DIAGNOSIS — F41.1 GENERALIZED ANXIETY DISORDER: ICD-10-CM

## 2017-11-01 DIAGNOSIS — F33.1 MAJOR DEPRESSIVE DISORDER, RECURRENT EPISODE, MODERATE (HCC): ICD-10-CM

## 2017-11-01 PROCEDURE — 90834 PSYTX W PT 45 MINUTES: CPT | Performed by: PSYCHOLOGIST
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
## 2017-11-06 ENCOUNTER — PATIENT MESSAGE (OUTPATIENT)
Dept: MEDICAL GROUP | Facility: CLINIC | Age: 52
End: 2017-11-06

## 2017-11-15 ENCOUNTER — OFFICE VISIT (OUTPATIENT)
Dept: BEHAVIORAL HEALTH | Facility: PHYSICIAN GROUP | Age: 52
End: 2017-11-15
Payer: COMMERCIAL

## 2017-11-15 DIAGNOSIS — F41.1 GENERALIZED ANXIETY DISORDER: ICD-10-CM

## 2017-11-15 DIAGNOSIS — F33.1 MAJOR DEPRESSIVE DISORDER, RECURRENT EPISODE, MODERATE (HCC): ICD-10-CM

## 2017-11-15 PROCEDURE — 90834 PSYTX W PT 45 MINUTES: CPT | Performed by: PSYCHOLOGIST

## 2017-11-16 ENCOUNTER — PATIENT MESSAGE (OUTPATIENT)
Dept: MEDICAL GROUP | Facility: CLINIC | Age: 52
End: 2017-11-16

## 2017-11-16 DIAGNOSIS — F32.A DEPRESSION, UNSPECIFIED DEPRESSION TYPE: ICD-10-CM

## 2017-11-16 NOTE — BH THERAPY
[facility]  Therapy Progress Note    Patient Name: Tanvi Krueger  Patient MRN: [id number]  Today's Date: 11/15/2017     Type of session:Individual psychotherapy  Length of session: 50 minutes  Persons in attendance:Patient    Subjective/New Info: Pt disc how she is less willing to take care of others' needs at the expense of her own. She focused on how she is cutting people who don't give back out of her life. She's been feeling more empowered about this. Pt is spending less time figuring out how to put the pieces of other peoples lives together and trying to figure out what her pieces are that need to fall into place. Disc Pt distress over her daughter's medical crisis after the surgery. Disc what Pt wants to do for the thanksgiving holiday. Pt is not letting husb hold her back from speaking her mind any more. As Pt begins to care for self better, she is feeling more validated by others. Still struggling with depression however.    Objective/Observations:   Participation: Active verbal participation, Attentive and Engaged   Grooming: Casual   Cognition: Alert and Fully Oriented   Eye contact: Good   Mood: Euthymic   Affect: Anxious   Thought process: Logical and Goal-directed   Speech: Rate within normal limits   Other:     Diagnoses:   1. Major depressive disorder, recurrent episode, moderate (CMS-HCC)    2. Generalized anxiety disorder         Current risk:   SUICIDE: Not applicable   Homicide: Not applicable   Self-harm: Not applicable   Relapse: Not applicable   Other:    Safety Plan reviewed? Not Indicated   If evidence of imminent risk is present, intervention/plan:     Therapeutic Intervention(s): Conflict clarification, Distress tolerance skills, Self-care skills, Stressors assessed and Supportive psychotherapy    Treatment Goal(s)/Objective(s) addressed: Tx plan:  - Utilize learned skills to manage mood and emotional suffering more effectively  - Utilize learned assertiveness skills to set  boundaries & get needs met  - Identify goals/values and cultivate a meaningful life  - Process and reduce the effects of past trauma on life, functioning, & sense of self  - Learn to ameliorate effects of anxiety on life and functioning  - Increase behaviors of self-compassion and self-care       Progress toward Treatment Goals: Moderate improvement    Plan:  - Continue Individual therapy and Medication management    Beatriz Winn, Ph.D.  11/15/2017

## 2017-11-17 RX ORDER — SERTRALINE HYDROCHLORIDE 100 MG/1
200 TABLET, FILM COATED ORAL DAILY
Qty: 180 TAB | Refills: 2 | Status: SHIPPED | OUTPATIENT
Start: 2017-11-17 | End: 2019-05-07 | Stop reason: SDUPTHER

## 2017-11-17 RX ORDER — LEVOTHYROXINE SODIUM 0.15 MG/1
150 TABLET ORAL
COMMUNITY
Start: 2017-10-23 | End: 2018-05-14

## 2017-11-20 ENCOUNTER — OFFICE VISIT (OUTPATIENT)
Dept: PULMONOLOGY | Facility: HOSPICE | Age: 52
End: 2017-11-20
Payer: COMMERCIAL

## 2017-11-20 VITALS
DIASTOLIC BLOOD PRESSURE: 78 MMHG | RESPIRATION RATE: 20 BRPM | SYSTOLIC BLOOD PRESSURE: 126 MMHG | TEMPERATURE: 97.7 F | BODY MASS INDEX: 40.14 KG/M2 | OXYGEN SATURATION: 90 % | HEART RATE: 114 BPM | WEIGHT: 271 LBS | HEIGHT: 69 IN

## 2017-11-20 DIAGNOSIS — G47.31 CENTRAL SLEEP APNEA: ICD-10-CM

## 2017-11-20 DIAGNOSIS — J45.40 REACTIVE AIRWAY DISEASE WITH WHEEZING, MODERATE PERSISTENT, UNCOMPLICATED: ICD-10-CM

## 2017-11-20 DIAGNOSIS — R91.8 PULMONARY NODULES: ICD-10-CM

## 2017-11-20 PROCEDURE — 99214 OFFICE O/P EST MOD 30 MIN: CPT | Performed by: INTERNAL MEDICINE

## 2017-11-21 NOTE — PROGRESS NOTES
"Chief Complaint   Patient presents with   • Results     Echo results.       HPI: This patient is a 52 y.o. Female who returns for follow-up of central sleep apnea, and dyspnea workup. Former polysomnography showed severe sleep apnea with AHI 81, and she was titrated on ASV with compliance card confirming 93% usage for 6.5 hours nightly, and normal AHI of 2. Her average EPAP is 9 cm of water, average pressure support is 4 cm of water. No significant mask leakage was appreciated. She has oxygen bleed in at 2.5 L/m. She is concerned whether her oxygen saturations are adequate.  She had workup for complaints of exertional dyspnea, with pulmonary function testing showing small airways obstruction with bronchodilator response. She was started on Anoro inhaler last visit with subjective benefit. She has declined inhaled corticosteroids based on genetic testing through \"[employer]\" which suggests she would have poor response to inhaled glucocorticoids. She had a chest CAT scan showing small,  few millimete,  pulmonary nodules in the right upper and lower lobe, with follow-up CAT scan scheduled in 2018. She is a lifelong nonsmoker. Her mother, was a smoker,  of lung cancer.  Echocardiography from 2017 was normal.    Past Medical History:   Diagnosis Date   • Anemia    • Anesthesia     nausea/vomiting   • Anxiety    • Arthritis    • Asthma     allergy related   • Bowel habit changes     constipation   • Cholesteatoma of middle ear and mastoid(385.33)  surgery    mastoidectomy, prosthesis   • Depression    • Dyslipidemia, goal LDL below 130    • Elevated glycohemoglobin    • Eosinophilic esophagitis    • Family history of early CAD    • Hiatus hernia syndrome     \"was told I had one\"   • History of chickenpox    • History of endometriosis    • History of partial thyroidectomy     partial thyroidectomy   • Hypothyroidism due to Hashimoto's thyroiditis    • Lumbar disc narrowing 2009   • Menopausal " symptoms    • Oxygen desaturation during sleep     O2 2 liters HS   • Pneumonia     hx   • Recurrent sinus infections    • Seizure (CMS-HCC)    • Sleep apnea     ASV with 2L oxygen at night (sean)    • Tonsillitis    • Vitamin d deficiency        Social History     Social History   • Marital status:      Spouse name: N/A   • Number of children: N/A   • Years of education: N/A     Occupational History   • Not on file.     Social History Main Topics   • Smoking status: Never Smoker   • Smokeless tobacco: Never Used   • Alcohol use No   • Drug use: No   • Sexual activity: Yes     Partners: Male     Other Topics Concern   • Not on file     Social History Narrative   • No narrative on file       Family History   Problem Relation Age of Onset   • Cancer Mother 61     lung, smoker   • Heart Disease Mother 44     early heart attack   • Sleep Apnea Mother      possibly   • Heart Disease Father 60     cabg x 3   • Hyperlipidemia Father    • Psychiatry Father      schizophrenia   • Kidney Disease Father      renal failure, dialysis   • Dementia Father    • Hyperlipidemia Brother    • Cancer Maternal Grandmother 62     lung, non-smoker   • Psychiatry Maternal Grandmother      depression, severe   • Psychiatry Other      depression, great uncle   • Anxiety disorder Sister        Current Outpatient Prescriptions on File Prior to Visit   Medication Sig Dispense Refill   • levothyroxine (SYNTHROID) 150 MCG Tab      • sertraline (ZOLOFT) 100 MG Tab Take 2 Tabs by mouth every day. 180 Tab 2   • HM OMEGA-3-6-9 FATTY ACIDS Cap Take 1 Cap by mouth every day. 30 Cap 11   • B Complex Vitamins (VITAMIN B COMPLEX PO) Take  by mouth every day.     • levothyroxine (SYNTHROID) 125 MCG Tab      • NEOMYCIN-POLYMYXIN-HC, OTIC, 1 % Solution Place 2 Drops in ear 3 times a day. 1 Bottle 0   • Misc. Devices Misc This is an order for nocturnal oxygen, 2L nasal prongs.  Dx:  Nocturnal hypoxia, lowest 59% on overnight study.  G47.34            "MARIA DOLORES 99 months   NPI [id number] 1 Each 0   • Cholecalciferol (VITAMIN D) 2000 UNIT CAPS Take 2,000 Units by mouth every day. (Patient taking differently: Take 2,000 Units by mouth 2 Times a Day.) 60 Cap 6     No current facility-administered medications on file prior to visit.        Allergies: Doxycycline; Pcn [penicillins]; and Keflex [cephalexin]    ROS:   Constitutional: Denies fevers, chills, night sweats,+ fatigue, denies weight loss  Eyes: Denies vision loss, pain, drainage, double vision  Ears, Nose, Throat: Denies earache, difficulty hearing, tinnitus, nasal congestion, hoarseness  Cardiovascular: Denies chest pain, tightness, palpitations, orthopnea or edema  Respiratory: +shortness of breath, denies cough, +wheezing, denies hemoptysis  Sleep: Denies daytime sleepiness, snoring, apneas, insomnia, morning headaches  GI: Denies heartburn, dysphagia, nausea, abdominal pain, diarrhea or constipation  : Denies frequent urination, hematuria, discharge or painful urination  Musculoskeletal: Denies back pain, painful joints, sore muscles  Neurological: Denies weakness or headaches  Skin: No rashes    Blood pressure 126/78, pulse (!) 114, temperature 36.5 °C (97.7 °F), resp. rate 20, height 1.74 m (5' 8.5\"), weight 122.9 kg (271 lb), last menstrual period 03/01/1997, SpO2 90 %.    Physical Exam:  Appearance: Well-nourished, well-developed, in no acute distress  HEENT: Normocephalic, atraumatic, white sclera, PERRLA, oropharynx clear  Neck: No adenopathy or masses  Respiratory: no intercostal retractions or accessory muscle use  Lungs auscultation: Clear to auscultation bilaterally  Cardiovascular: Regular rate rhythm. No murmurs, rubs or gallops.  No LE edema  Abdomen: soft, nondistended  Gait: Normal  Digits: No clubbing, cyanosis  Motor: No focal deficits  Orientation: Oriented to time, person and place    Diagnosis:  1. Reactive airway disease with wheezing, moderate persistent, uncomplicated  " umeclidinium-vilanterol (ANORO ELLIPTA) 62.5-25 MCG/INH AEROSOL POWDER, BREATH ACTIVATED inhaler   2. Central sleep apnea  OVERNIGHT OXIMETRY   3. Pulmonary nodules     4. BMI 40.0-44.9, adult (CMS-[facility])         Plan:  The patient's dyspnea is likely secondary to mild reactive airways disease. She is against using inhaled corticosteroids, however has had subjective response to inhaled bronchodilators (LABA/LAMA) and is interested in continuing with Anoro Ellipta inhaler.  Weight loss was encouraged.  She shows excellent compliance and response to ASV therapy. We will verify overnight oximetry on ASV and 2.5 L oxygen bleed in.  Her pulmonary nodules are felt postinflammatory. She is pending follow-up chest CAT scan in February 2018.  Return for after CT scan.

## 2017-12-01 ENCOUNTER — TELEPHONE (OUTPATIENT)
Dept: NEUROLOGY | Facility: MEDICAL CENTER | Age: 52
End: 2017-12-01

## 2017-12-04 ENCOUNTER — TELEPHONE (OUTPATIENT)
Dept: NEUROLOGY | Facility: MEDICAL CENTER | Age: 52
End: 2017-12-04

## 2017-12-14 NOTE — PROGRESS NOTES
Future direct admit for 12/18/2017 @ 0600 by Dr. Weldon for Inpatient video EEG monitoring.  Diagnosis of Epilepsy.  ADT signed & held on 12/14/2017 @ 2898, needs to be released upon pt arrival.  Written orders received, faxed to unit, and scanned to media tab.  Pt coming by private vehicle.

## 2017-12-18 ENCOUNTER — HOSPITAL ENCOUNTER (OUTPATIENT)
Facility: MEDICAL CENTER | Age: 52
End: 2017-12-18
Payer: COMMERCIAL

## 2017-12-18 ENCOUNTER — HOSPITAL ENCOUNTER (INPATIENT)
Facility: MEDICAL CENTER | Age: 52
LOS: 4 days | DRG: 093 | End: 2017-12-22
Attending: PSYCHIATRY & NEUROLOGY | Admitting: PSYCHIATRY & NEUROLOGY
Payer: COMMERCIAL

## 2017-12-18 PROCEDURE — 95951 EEG: CPT

## 2017-12-18 PROCEDURE — 4A10X4Z MONITORING OF CENTRAL NERVOUS ELECTRICAL ACTIVITY, EXTERNAL APPROACH: ICD-10-PCS | Performed by: PSYCHIATRY & NEUROLOGY

## 2017-12-18 PROCEDURE — 700102 HCHG RX REV CODE 250 W/ 637 OVERRIDE(OP): Performed by: PSYCHIATRY & NEUROLOGY

## 2017-12-18 PROCEDURE — 770020 HCHG ROOM/CARE - TELE (206)

## 2017-12-18 PROCEDURE — A9270 NON-COVERED ITEM OR SERVICE: HCPCS | Performed by: PSYCHIATRY & NEUROLOGY

## 2017-12-18 RX ORDER — SERTRALINE HYDROCHLORIDE 100 MG/1
200 TABLET, FILM COATED ORAL DAILY
Status: DISCONTINUED | OUTPATIENT
Start: 2017-12-18 | End: 2017-12-22 | Stop reason: HOSPADM

## 2017-12-18 RX ORDER — LEVOTHYROXINE SODIUM 0.15 MG/1
150 TABLET ORAL DAILY
Status: DISCONTINUED | OUTPATIENT
Start: 2017-12-18 | End: 2017-12-22 | Stop reason: HOSPADM

## 2017-12-18 RX ADMIN — LEVOTHYROXINE SODIUM 150 MCG: 150 TABLET ORAL at 10:16

## 2017-12-18 RX ADMIN — SERTRALINE 200 MG: 100 TABLET, FILM COATED ORAL at 20:32

## 2017-12-18 ASSESSMENT — COPD QUESTIONNAIRES
DURING THE PAST 4 WEEKS HOW MUCH DID YOU FEEL SHORT OF BREATH: NONE/LITTLE OF THE TIME
COPD SCREENING SCORE: 1
DO YOU EVER COUGH UP ANY MUCUS OR PHLEGM?: NO/ONLY WITH OCCASIONAL COLDS OR INFECTIONS
HAVE YOU SMOKED AT LEAST 100 CIGARETTES IN YOUR ENTIRE LIFE: NO/DON'T KNOW

## 2017-12-18 ASSESSMENT — LIFESTYLE VARIABLES
EVER_SMOKED: NEVER
ALCOHOL_USE: NO
EVER_SMOKED: NEVER

## 2017-12-18 ASSESSMENT — PATIENT HEALTH QUESTIONNAIRE - PHQ9
SUM OF ALL RESPONSES TO PHQ9 QUESTIONS 1 AND 2: 0
1. LITTLE INTEREST OR PLEASURE IN DOING THINGS: NOT AT ALL
2. FEELING DOWN, DEPRESSED, IRRITABLE, OR HOPELESS: NOT AT ALL
SUM OF ALL RESPONSES TO PHQ QUESTIONS 1-9: 0

## 2017-12-18 ASSESSMENT — PAIN SCALES - GENERAL
PAINLEVEL_OUTOF10: 0
PAINLEVEL_OUTOF10: 0

## 2017-12-18 NOTE — PROGRESS NOTES
Patient and family arrive at 0600. Oriented patient to unit and routine. Admission process started.

## 2017-12-18 NOTE — H&P
"NEUROLOGY NOTE      CHIEF COMPLAINT:    Seizures    PRESENT ILLNESS:     Speech problems and word finding difficulty for one year  Silent aspiration  Hx of thyroid problems   Since last visit, developed external ear canal discharges , shivering 10-12 times every day      She is seeing ENT doctor, ---      For few seconds, could not find the words she would like to say    the problems of speech and word finding difficult could be secondary to brain malfunction ( not neuromuscle weakness since the patient's trouble lies in finding the words instead of speaking out)        the patient could not tolerate Keppra  Even Keppra 125mg made her sleepy     Zonegran did not help the speech problems plus the patient noticed that she could not sleep  One more thing, in the past 3 months, she developed external ear discharges -- she is going to see ENT doctor this PM  Please stop Zonegran for now        Since the patient could not get benefits from trial of medication -- keppra and zonegran -- we will invite her to be admitted to hospital 5 days    PAST MEDICAL HISTORY:  Past Medical History:   Diagnosis Date   • Anemia    • Anesthesia     nausea/vomiting   • Anxiety    • Arthritis    • Asthma     allergy related   • Bowel habit changes     constipation   • Cholesteatoma of middle ear and mastoid(385.33) 1992 surgery    mastoidectomy, prosthesis   • Depression    • Dyslipidemia, goal LDL below 130    • Elevated glycohemoglobin    • Eosinophilic esophagitis    • Family history of early CAD    • Hiatus hernia syndrome     \"was told I had one\"   • History of chickenpox    • History of endometriosis    • History of partial thyroidectomy     partial thyroidectomy   • Hypothyroidism due to Hashimoto's thyroiditis    • Lumbar disc narrowing 7/14/2009   • Menopausal symptoms    • Oxygen desaturation during sleep     O2 2 liters HS   • Pneumonia     hx   • Recurrent sinus infections    • Seizure (CMS-HCC)    • Sleep apnea     ASV with 2L " oxygen at night (sean)    • Tonsillitis    • Vitamin d deficiency        PAST SURGICAL HISTORY:  Past Surgical History:   Procedure Laterality Date   • LARYNGOSCOPY N/A 3/31/2017    Procedure: LARYNGOSCOPY - DIRECT W/BIOPSY BASE OF TONGUE AND NASOPHARYNGOSCOPY W/BIOPSY;  Surgeon: Naresh Abdi M.D.;  Location: SURGERY SAME DAY [facility];  Service:    • THYROIDECTOMY TOTAL Left 10/14/2016    Procedure: LEFT PARTIAL THROIDECTOMY;  Surgeon: Naresh Abdi M.D.;  Location: SURGERY SAME DAY [facility];  Service:    • SEPTOPLASTY  9/9/2016    Procedure: SEPTOPLASTY;  Surgeon: Naresh Abdi M.D.;  Location: SURGERY SAME DAY [facility];  Service:    • TURBINOPLASTY Bilateral 9/9/2016    Procedure: TURBINOPLASTY;  Surgeon: Naresh Abdi M.D.;  Location: SURGERY SAME DAY [facility];  Service:    • OTHER  2016    sinus surgery   • EAR MIDDLE EXPLORATION  4/3/2015    Performed by Naresh Abdi M.D. at SURGERY SAME DAY [facility]   • OSSICULAR RECONSTRUCTION  4/3/2015    Performed by Naresh Abdi M.D. at SURGERY SAME DAY [facility]   • MYRINGOTOMY  4/20/2012    Performed by RYANNE ALICIA at SURGERY [facility]   • TYMPANOTOMY  6/24/2010    Performed by MAXIMUS WHITE at SURGERY SAME DAY [facility]   • EXAM UNDER ANESTHESIA  6/24/2010    Performed by MAXIMUS WHITE at SURGERY SAME DAY [facility]   • MYRINGOTOMY  4/30/2009    Performed by MAXIMUS WHITE at SURGERY SAME DAY [facility]   • ABDOMINAL HYSTERECTOMY TOTAL  1997    ovaries are gone   • ARTHROSCOPY, KNEE     • HYSTERECTOMY LAPAROSCOPY     • SINUSCOPE     • TONSILLECTOMY         FAMILY HISTORY:  Family History   Problem Relation Age of Onset   • Cancer Mother 61     lung, smoker   • Heart Disease Mother 44     early heart attack   • Sleep Apnea Mother      possibly   • Heart Disease Father 60     cabg x 3   • Hyperlipidemia Father    • Psychiatry Father      schizophrenia   • Kidney Disease Father      renal failure, dialysis    • Dementia Father    • Hyperlipidemia Brother    • Cancer Maternal Grandmother 62     lung, non-smoker   • Psychiatry Maternal Grandmother      depression, severe   • Psychiatry Other      depression, great uncle   • Anxiety disorder Sister        SOCIAL HISTORY:  Social History     Social History   • Marital status:      Spouse name: N/A   • Number of children: N/A   • Years of education: N/A     Occupational History   • Not on file.     Social History Main Topics   • Smoking status: Never Smoker   • Smokeless tobacco: Never Used   • Alcohol use No   • Drug use: No   • Sexual activity: Yes     Partners: Male     Other Topics Concern   • Not on file     Social History Narrative   • No narrative on file     ALLERGIES:  Allergies   Allergen Reactions   • Doxycycline      Wheezing; diarrhea   • Pcn [Penicillins] Unspecified     Unknown reaction as child   • Keflex [Cephalexin] Unspecified     Yeast Infection     TOBHX  History   Smoking Status   • Never Smoker   Smokeless Tobacco   • Never Used     ALCHX  History   Alcohol Use No     DRUGHX  History   Drug Use No           MEDICATIONS:  No current facility-administered medications for this encounter.        REVIEW OF SYSTEM:    Constitutional: Denies fevers, Denies weight changes   Eyes: Denies changes in vision, no eye pain   Ears/Nose/Throat/Mouth: Denies nasal congestion or sore throat   Cardiovascular: Denies chest pain or palpitations   Respiratory: central sleep apnea  Gastrointestinal/Hepatic: Denies abdominal pain, nausea, vomiting, diarrhea, constipation or GI bleeding   Genitourinary: Denies bladder dysfunction, dysuria or frequency   Musculoskeletal/Rheum: Denies joint pain and swelling   Skin/Breast: Denies rash, denies breast lumps or discharge   Neurological: speech problems and word finding difficulty, hearing aids  Psychiatric: denies mood disorder   Endocrine: thyroid dysfunction   Heme/Oncology/Lymph Nodes: Denies enlarged lymph nodes, denies  "brusing or known bleeding disorder   Allergic/Immunologic: Denies hx of allergies       PHYSICAL AND NEUROLOGICAL EXMAINATIONS:  VITAL SIGNS: BP (P) 125/86   Pulse (P) 75   Temp (P) 36.6 °C (97.8 °F)   Resp (P) 16   Ht 1.74 m (5' 8.5\")   Wt 119.3 kg (263 lb 0.1 oz)   LMP 03/01/1997   SpO2 (P) 93%   Breastfeeding? No   BMI 39.41 kg/m²   CURRENT WEIGHT:   BMI: Body mass index is 39.41 kg/m².  PREVIOUS WEIGHTS:  Wt Readings from Last 25 Encounters:   12/18/17 119.3 kg (263 lb 0.1 oz)   11/20/17 122.9 kg (271 lb)   10/02/17 121.6 kg (268 lb)   08/28/17 120.2 kg (265 lb)   08/28/17 120.2 kg (265 lb)   08/10/17 120.4 kg (265 lb 8 oz)   07/26/17 116.6 kg (257 lb)   07/18/17 116.6 kg (257 lb)   07/03/17 116.6 kg (257 lb)   06/30/17 118.8 kg (262 lb)   06/20/17 118.2 kg (260 lb 9.6 oz)   06/13/17 122.2 kg (269 lb 6.4 oz)   04/17/17 117 kg (258 lb)   03/31/17 118.7 kg (261 lb 11 oz)   03/17/17 117 kg (258 lb)   03/07/17 117 kg (258 lb)   01/10/17 115.7 kg (255 lb)   12/22/16 116.1 kg (256 lb)   12/12/16 117.9 kg (260 lb)   11/18/16 115.7 kg (255 lb)   10/19/16 115.7 kg (255 lb)   10/14/16 115.3 kg (254 lb 3.1 oz)   10/10/16 115.7 kg (255 lb)   10/04/16 115.7 kg (255 lb)   09/21/16 116.1 kg (256 lb)       General appearance of patient: WDWN(+) NAD(+)    EYES  o Fundus : Papilledem(-) Exudates(-) Hemorrhage(-)  Nervous System  Orientation to time, place and person(+)  Memory normal(-)  Language: aphasia(-)  Knowledge: past(+) Current(+)  Attention(+)  Cranial Nerves  • Nerve 2: intact  • Nerve 3,4,6: intact  • Nerve 5 : intact  • Nerve 7: intact  • Nerve 8: intact  • Nerve 9 & 10: intact  • Nerve 11: intact  • Nerve 12: intact  Muscle Power and muscle tone: symmetric, normal in upper and lower  Sensory System: Pin sensation intact(+)  Reflexes: symmetric throughout  Cerebellar Function FNP normal   Gait : Steady(+) TandemGait steady(+)  Heart and Vascular  Peripheral Vasucular system : Edema (-) Swelling(-)  RHB, " Breathing sound clear  abdomen bowel sound normoactive  Extremities freely moveable  Joints no contracture       NEUROIMAGING: I reviewed the MRI/CT of brain       LAB:          IMPRESSION:     1. Trouble of speech and finding words for one year-- spells like-- lasting for seconds  2. Silent aspiration 2016,  Central Sleep Apnea 2016 ( now on nocturnal O2 + ASV), Thyroid disease-Hashimoto- 2016, Cholesteatoma, replacement of ossicle bones since childhood  3. Abnormal scalp EEG in outpatient        PLAN/RECOMMENDATIONS:     Explain to the patient --- the problems of speech and word finding difficult could be secondary to brain malfunction ( not neuromuscle weakness since the patient's trouble lies in finding the words instead of speaking out)        the patient could not tolerate Keppra  Even Keppra 125mg made her sleepy     Zonegran did not help the speech problems plus the patient noticed that she could not sleep  One more thing, in the past 3 months, she developed external ear discharges -- she is going to see ENT doctor this PM  Please stop Zonegran for now        Since the patient could not get benefits from trial of medication -- keppra and zonegran -- we will start 5 days of video EEG       SIGNATURE:  Ben Weldon

## 2017-12-18 NOTE — PROGRESS NOTES
Attention seizure pads neuro department will need to supply from their own supply as our department does not carry this item.

## 2017-12-19 PROCEDURE — A9270 NON-COVERED ITEM OR SERVICE: HCPCS | Performed by: PSYCHIATRY & NEUROLOGY

## 2017-12-19 PROCEDURE — 700102 HCHG RX REV CODE 250 W/ 637 OVERRIDE(OP): Performed by: PSYCHIATRY & NEUROLOGY

## 2017-12-19 PROCEDURE — 770020 HCHG ROOM/CARE - TELE (206)

## 2017-12-19 PROCEDURE — 95951 HCHG EEG-VIDEO-24HR: CPT

## 2017-12-19 RX ADMIN — LEVOTHYROXINE SODIUM 150 MCG: 150 TABLET ORAL at 06:04

## 2017-12-19 RX ADMIN — SERTRALINE 200 MG: 100 TABLET, FILM COATED ORAL at 21:42

## 2017-12-19 ASSESSMENT — ENCOUNTER SYMPTOMS
SPEECH CHANGE: 1
BLOOD IN STOOL: 0
SHORTNESS OF BREATH: 1
COUGH: 0
FEVER: 0
EYE DISCHARGE: 0

## 2017-12-19 ASSESSMENT — PATIENT HEALTH QUESTIONNAIRE - PHQ9
2. FEELING DOWN, DEPRESSED, IRRITABLE, OR HOPELESS: NOT AT ALL
SUM OF ALL RESPONSES TO PHQ9 QUESTIONS 1 AND 2: 0
SUM OF ALL RESPONSES TO PHQ QUESTIONS 1-9: 0
1. LITTLE INTEREST OR PLEASURE IN DOING THINGS: NOT AT ALL

## 2017-12-19 ASSESSMENT — PAIN SCALES - GENERAL
PAINLEVEL_OUTOF10: 0

## 2017-12-19 ASSESSMENT — LIFESTYLE VARIABLES
DO YOU DRINK ALCOHOL: NO
SUBSTANCE_ABUSE: 0

## 2017-12-19 NOTE — PROGRESS NOTES
Assumed care from dayshift nurse. Per dayshift RN patient has intermittent episodes causing disorientation and delayed response. Per report the patient is quickly reoriented. Patient currently resting in bed  A/o x4. Pt denies needs at this time.

## 2017-12-19 NOTE — PROGRESS NOTES
Received report, assumed care, introduced myself to patient. Pt denies pain @ this time. Denies seizure activity this morning, states may have had seizure yesterday 12/18/2017, says doesn't know if it is a seizure, but has discomfort in the brain. Pt is on O2 2 L NC O2 95% denies SOB. SCDs on, bed alarm on, questions answered. Will continue to monitor.

## 2017-12-19 NOTE — PROGRESS NOTES
Monitor Summary: SB-SR 51-69, SD 0.16, QRS 0.08, QT 0.44 with one episode emiliano down to 45 per strip from monitor room.

## 2017-12-19 NOTE — DIETARY
Nutrition Services: Pt seen for poor PO intake pta and TF/TPN/supplement usage per nutrition admit screen.    Pt is a 51 yo F admitted for Epilepsy. On day 1 of admit and on a Regular diet.    Ht: 174 cm  Wt: 119.3 kg (263#)  BMI: 39.41 (obese, class II)    ADL documentation shows that pt has consistently eaten % x past 3 meals on Regular diet.   Spoke with pt regarding positive response to nutrition supplement usage, pt states that she takes multiple vitamins but has never used TF or supplements for extra kcal/protein.  No further nutritional needs at this time.    RD to monitor per dept policy.

## 2017-12-19 NOTE — CARE PLAN
Problem: Venous Thromboembolism (VTW)/Deep Vein Thrombosis (DVT) Prevention:  Goal: Patient will participate in Venous Thrombosis (VTE)/Deep Vein Thrombosis (DVT)Prevention Measures    Intervention: Ensure patient wears graduated elastic stockings (JOSSELYN hose) and/or SCDs, if ordered, when in bed or chair (Remove at least once per shift for skin check)  SCD's in place. Education provided.

## 2017-12-19 NOTE — CARE PLAN
Problem: Communication  Goal: The ability to communicate needs accurately and effectively will improve    Intervention: [place] patient and significant other/support system to call light to alert staff of needs  Introduced myself to patient, reoriented pt to room, and to use call light when needing assistance      Problem: Safety  Goal: Will remain free from falls  Pt is on seizure precautions, 3 side rails up.

## 2017-12-20 PROCEDURE — 700102 HCHG RX REV CODE 250 W/ 637 OVERRIDE(OP): Performed by: PSYCHIATRY & NEUROLOGY

## 2017-12-20 PROCEDURE — A9270 NON-COVERED ITEM OR SERVICE: HCPCS | Performed by: PSYCHIATRY & NEUROLOGY

## 2017-12-20 PROCEDURE — 95951 HCHG EEG-VIDEO-24HR: CPT

## 2017-12-20 PROCEDURE — 770020 HCHG ROOM/CARE - TELE (206)

## 2017-12-20 RX ADMIN — LEVOTHYROXINE SODIUM 150 MCG: 150 TABLET ORAL at 05:31

## 2017-12-20 RX ADMIN — SERTRALINE 200 MG: 100 TABLET, FILM COATED ORAL at 21:09

## 2017-12-20 ASSESSMENT — PAIN SCALES - GENERAL: PAINLEVEL_OUTOF10: 0

## 2017-12-20 NOTE — PROGRESS NOTES
IMPRESSION:     1. Trouble of speech and finding words for one year-- spells like-- lasting for seconds  2. Silent aspiration 2016,  Central Sleep Apnea 2016 ( now on nocturnal O2 + ASV), Thyroid disease-Hashimoto- 2016, Cholesteatoma, replacement of ossicle bones since childhood  3. Abnormal scalp EEG in outpatient  4. Hypoxia -- cause unknown-- now on O2 even daytime, CPAP use in the night also    MANAGEMENT    Did record a couple of spells of speech disturbance-- some were associated with headache  During her spell, tachycardia was noted too ( transient)      Vitals:    12/19/17 0730 12/19/17 1115 12/19/17 1600 12/19/17 2000   BP: 127/59 126/81 114/79 119/56   Pulse: (!) 56 66 78 71   Resp: 16 17 17 18   Temp: 36.4 °C (97.5 °F) 36.5 °C (97.7 °F) 37.2 °C (98.9 °F) 36.3 °C (97.3 °F)   SpO2: 95% 91% 95% 95%   Weight:       Height:         Physical Exam   Constitutional: She is oriented to person, place, and time and well-developed, well-nourished, and in no distress.   HENT:   Head: Normocephalic.   Eyes: Pupils are equal, round, and reactive to light.   Neck: Normal range of motion.   Pulmonary/Chest: Effort normal.   Hypoxia without O2 ( chronic)   Abdominal: Soft.   Musculoskeletal: Normal range of motion.   Neurological: She is alert and oriented to person, place, and time.   Skin: Skin is warm.     Review of Systems   Constitutional: Negative for fever.   Eyes: Negative for discharge.   Respiratory: Positive for shortness of breath. Negative for cough.    Gastrointestinal: Negative for blood in stool.   Genitourinary: Negative for urgency.   Neurological: Positive for speech change.   Psychiatric/Behavioral: Negative for substance abuse.

## 2017-12-20 NOTE — PROGRESS NOTES
Received report and assumed pt care.  A&O x4.  VSS.  Bed alarm and treaded socks on.  Call light and personal belongings within reach.  No s/s of distress or pain.

## 2017-12-21 PROCEDURE — 700102 HCHG RX REV CODE 250 W/ 637 OVERRIDE(OP): Performed by: PSYCHIATRY & NEUROLOGY

## 2017-12-21 PROCEDURE — 770020 HCHG ROOM/CARE - TELE (206)

## 2017-12-21 PROCEDURE — A9270 NON-COVERED ITEM OR SERVICE: HCPCS | Performed by: PSYCHIATRY & NEUROLOGY

## 2017-12-21 PROCEDURE — 95951 HCHG EEG-VIDEO-24HR: CPT

## 2017-12-21 RX ADMIN — LEVOTHYROXINE SODIUM 150 MCG: 150 TABLET ORAL at 06:00

## 2017-12-21 RX ADMIN — UMECLIDINIUM BROMIDE AND VILANTEROL TRIFENATATE 1 PUFF: 62.5; 25 POWDER RESPIRATORY (INHALATION) at 09:59

## 2017-12-21 RX ADMIN — SERTRALINE 200 MG: 100 TABLET, FILM COATED ORAL at 20:54

## 2017-12-21 ASSESSMENT — LIFESTYLE VARIABLES
DO YOU DRINK ALCOHOL: NO
SUBSTANCE_ABUSE: 0

## 2017-12-21 ASSESSMENT — PAIN SCALES - GENERAL
PAINLEVEL_OUTOF10: 0

## 2017-12-21 ASSESSMENT — ENCOUNTER SYMPTOMS
SHORTNESS OF BREATH: 1
BLOOD IN STOOL: 0
COUGH: 0
EYE DISCHARGE: 0
FEVER: 0
SPEECH CHANGE: 1

## 2017-12-21 NOTE — PROGRESS NOTES
Received report and assumed pt care.  A&O x4.  Bed alarm and treaded socks on.  Call light and personal belongings within reach.  No s/s of distress or pain.   at bedside.  CPAP in use.

## 2017-12-21 NOTE — PROGRESS NOTES
Monitor Summary: SR 65-94, [place] .16, QRS .08, QT .40 with PVC, occasional couplet per strip from monitor room

## 2017-12-21 NOTE — CARE PLAN
Problem: Safety  Goal: Will remain free from injury  Outcome: PROGRESSING AS EXPECTED      Problem: Discharge Barriers/Planning  Goal: Patient's continuum of care needs will be met  Outcome: PROGRESSING AS EXPECTED

## 2017-12-22 VITALS
OXYGEN SATURATION: 93 % | DIASTOLIC BLOOD PRESSURE: 73 MMHG | BODY MASS INDEX: 38.96 KG/M2 | TEMPERATURE: 96.9 F | RESPIRATION RATE: 16 BRPM | HEIGHT: 69 IN | WEIGHT: 263.01 LBS | SYSTOLIC BLOOD PRESSURE: 132 MMHG | HEART RATE: 70 BPM

## 2017-12-22 PROCEDURE — A9270 NON-COVERED ITEM OR SERVICE: HCPCS | Performed by: PSYCHIATRY & NEUROLOGY

## 2017-12-22 PROCEDURE — 700102 HCHG RX REV CODE 250 W/ 637 OVERRIDE(OP): Performed by: PSYCHIATRY & NEUROLOGY

## 2017-12-22 PROCEDURE — 95951 HCHG EEG-VIDEO-24HR: CPT | Mod: 52

## 2017-12-22 RX ADMIN — UMECLIDINIUM BROMIDE AND VILANTEROL TRIFENATATE 1 PUFF: 62.5; 25 POWDER RESPIRATORY (INHALATION) at 09:00

## 2017-12-22 RX ADMIN — LEVOTHYROXINE SODIUM 150 MCG: 150 TABLET ORAL at 05:00

## 2017-12-22 ASSESSMENT — PAIN SCALES - GENERAL
PAINLEVEL_OUTOF10: 0

## 2017-12-22 NOTE — DISCHARGE INSTRUCTIONS
Discharge Instructions    Discharged to home by car with relative. Discharged via wheelchair, hospital escort: Yes.  Special equipment needed: Not Applicable    Be sure to schedule a follow-up appointment with your primary care doctor or any specialists as instructed.     Discharge Plan:     Resume Home Medication, Follow up with Dr. Weldon in 2 months at the outpatient clinic.  Please contact PCP or pulmonologist regarding hypoxia in the daytime.  May need O2 use all day long.  Fine to go back to work Next Tuesday.      Influenza Vaccine Indication: Not indicated: Previously immunized this influenza season and > 8 years of age    I understand that a diet low in cholesterol, fat, and sodium is recommended for good health. Unless I have been given specific instructions below for another diet, I accept this instruction as my diet prescription.       Special Instructions: None    · Is patient discharged on Warfarin / Coumadin?   No     · Is patient Post Blood Transfusion?  No    Depression / Suicide Risk    As you are discharged from this [facility] facility, it is important to learn how to keep safe from harming yourself.    Recognize the warning signs:  · Abrupt changes in personality, positive or negative- including increase in energy   · Giving away possessions  · Change in eating patterns- significant weight changes-  positive or negative  · Change in sleeping patterns- unable to sleep or sleeping all the time   · Unwillingness or inability to communicate  · Depression  · Unusual sadness, discouragement and loneliness  · Talk of wanting to die  · Neglect of personal appearance   · Rebelliousness- reckless behavior  · Withdrawal from people/activities they love  · Confusion- inability to concentrate     If you or a loved one observes any of these behaviors or has concerns about self-harm, here's what you can do:  · Talk about it- your feelings and reasons for harming yourself  · Remove any means that you might use  to hurt yourself (examples: pills, rope, extension cords, firearm)  · Get professional help from the community (Mental Health, Substance Abuse, psychological counseling)  · Do not be alone:Call your Safe Contact- someone whom you trust who will be there for you.  · Call your local CRISIS HOTLINE 615-5876 or 560-365-8196  · Call your local Children's Mobile Crisis Response Team [place] (394) 495-8165 or www.NanoPack  · Call the toll free National Suicide Prevention Hotlines   · National Suicide Prevention Lifeline 517-989-UXWN (1617)  · National Hope Line Network 800-SUICIDE (023-0449)

## 2017-12-22 NOTE — CARE PLAN
Problem: Safety  Goal: Will remain free from injury  Outcome: PROGRESSING AS EXPECTED    Patient assessed for risk for injury/fall. appropriate signs in place. Educated staff,  Patient Alert and oriented with a steady gait. Patient educated on risks and interventions in place Patient verbalized understanding.  Bed at lowest level with rails up, call light and belongings within reach, patient calls for assistance, safety socks on, floor clear, bed alarmed, hourly rounds in place. Patient remains free from injury, Will continue to monitor.    Problem: Venous Thromboembolism (VTW)/Deep Vein Thrombosis (DVT) Prevention:  Goal: Patient will participate in Venous Thrombosis (VTE)/Deep Vein Thrombosis (DVT)Prevention Measures  Outcome: PROGRESSING AS EXPECTED    Patient assessed for any signs of thrombus event. Patien alert and oriented, breathing normally with normal vital signs, negative for unilateral leg pain/swelling/redness. Educated patient on the importance of VTE prophylaxis. SCD's on, patient ambulating well.     Problem: Knowledge Deficit  Goal: Knowledge of disease process/condition, treatment plan, diagnostic tests, and medications will improve  Outcome: PROGRESSING AS EXPECTED  Discussed POC, patient verbalized understanding

## 2017-12-22 NOTE — PROGRESS NOTES
Received report from am RN at bedside. Pt is calm and alert, no seizure activity noted, reports N/T on L thigh (baseline), pt reports that when she seizes, her head stats having N/T, none noted at this time.5/5 motor strength, pt shows no signs of distress. Negative for headache, no N/V, Pt is breathing normally. No SOB, no, chest pain. Present bowel sounds and pt passing gas. Bowel protocol in place. No pain noted, No needs at this time. POC discussed. Bed in low position call bell within reach, 3 side rails up (seizure precaution, ok per MD, Bed alarm on. Pt resting quietly. Will continue to assess.

## 2017-12-22 NOTE — PROGRESS NOTES
Noted  EEG alarm on for brief 5 secs, pt reported n/t on head upon assessment,. Pt successfully verbalized the three words that was asked for her to remember at the start of the shift, pt alert and oriented, no distress noted. Will continue to monitor.

## 2017-12-22 NOTE — PROGRESS NOTES
Pt received written discharge education regarding follow-up plan, need for f/u with PCP regarding oxygen use, and how to treat seizure if she does have one.  She verbalized understanding of discharge instructions.  IV removed, pt allowed to shower prior to discharge.  At time of discharge oxygen saturation was 91% on RA.  No s/s of distress.

## 2017-12-22 NOTE — PROGRESS NOTES
IMPRESSION:     1. Trouble of speech and finding words for one year-- spells like-- lasting for seconds  2. Silent aspiration 2016,  Central Sleep Apnea 2016 ( now on nocturnal O2 + ASV), Thyroid disease-Hashimoto- 2016, Cholesteatoma, replacement of ossicle bones since childhood  3. Abnormal scalp EEG in outpatient  4. 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   HENT:   Head: Normocephalic.   Eyes: Pupils are equal, round, and reactive to light.   Neck: Normal range of motion.   Pulmonary/Chest: Effort normal.   Hypoxia without O2 ( chronic)   Abdominal: Soft.   Musculoskeletal: Normal range of motion.   Neurological: She is alert and oriented to person, place, and time.   Skin: Skin is warm.     Review of Systems   Constitutional: Negative for fever.   Eyes: Negative for discharge.   Respiratory: Positive for shortness of breath. Negative for cough.    Gastrointestinal: Negative for blood in stool.   Genitourinary: Negative for urgency.   Neurological: Positive for speech change.   Psychiatric/Behavioral: Negative for substance abuse.

## 2017-12-22 NOTE — PROGRESS NOTES
IMPRESSION:     1. Trouble of speech and finding words for one year-- spells like-- lasting for seconds  2. Silent aspiration 2016,  Central Sleep Apnea 2016 ( now on nocturnal O2 + ASV), Thyroid disease-Hashimoto- 2016, Cholesteatoma, replacement of ossicle bones since childhood  3. Abnormal scalp EEG in outpatient  4. Hypoxia -- cause unknown-- now on O2 even daytime, CPAP use in the night also    MANAGEMENT    Did record a couple of spells of speech disturbance-- some were associated with headache  During her spell, tachycardia was noted too ( transient)  On average, daily spells  EEG was not normal    Physical Exam   Constitutional: She is oriented to person, place, and time and well-developed, well-nourished, and in no distress.   HENT:   Head: Normocephalic.   Eyes: Pupils are equal, round, and reactive to light.   Neck: Normal range of motion.   Pulmonary/Chest: Effort normal.   Hypoxia without O2 ( chronic)   Abdominal: Soft.   Musculoskeletal: Normal range of motion.   Neurological: She is alert and oriented to person, place, and time.   Skin: Skin is warm.     Review of Systems   Constitutional: Negative for fever.   Eyes: Negative for discharge.   Respiratory: Positive for shortness of breath. Negative for cough.    Gastrointestinal: Negative for blood in stool.   Genitourinary: Negative for urgency.   Neurological: Positive for speech change.   Psychiatric/Behavioral: Negative for substance abuse.

## 2017-12-22 NOTE — CARE PLAN
Problem: Communication  Goal: The ability to communicate needs accurately and effectively will improve  Outcome: PROGRESSING AS EXPECTED  Pt is oriented x 4, aware and agreeable to plan of care for VEEG.  SHe does push button on EEG machine quite a bit to alert Md of any possible seizure activity.  She never experiences full seizure activity, but does report frequent tingling of face and head, as well as numbness to legs at times.  RN demonstrates how to describe effects to camera on VEEg.  Pt verbalizes understanding.    Problem: Safety  Goal: Will remain free from injury  Outcome: PROGRESSING AS EXPECTED  Oxygen in place, as pt requires CPAP while in bed.  Suction in place, but not necessary at this time.  Pt aware to push button when she is experiencing any seizure like activity.  No major activity noted throughout the shift.

## 2017-12-22 NOTE — DISCHARGE SUMMARY
DISCHARGE DIAGNOSIS:     1. Trouble of speech and finding words for one year-- spells like-- lasting for seconds  2. Silent aspiration 2016,  Central Sleep Apnea 2016 ( now on nocturnal O2 + ASV), Thyroid disease-Hashimoto- 2016, Cholesteatoma, replacement of ossicle bones since childhood  3. Abnormal scalp EEG in outpatient  4. Hypoxia -- cause unknown-- now on O2 even daytime, CPAP use in the night also    PLANNING:    Resume Home Medication, Follow up with me in 2 months ( outpatient)  Please contact PCP or pulmonologist regarding hypoxia in the daytime  May need O2 use all day long  Fine to go back to work Next Tuesday      Hospital Course    video EEG was successfully provided  Patient did not have prolonged passing out spells recorded though  However  I explained to the patient, it will take us one week or so to fully review this video EEG      Will resume home mediciation  There were no complication during this video EEG  Patient remained cooperative during this video EEG    Follow up with me in [facility] neuroscience clinic with me in 2 months    Results for PICKENS, NARINDER GO (MRN [id number]) as of 1/6/2018 14:32   Ref. Range 3/17/2017 12:45 8/10/2017 11:45   Microsomal -Tpo- Abs Latest Ref Range: 0.0 - 9.0 IU/mL 0.8 0.4   Anti-Thyroglobulin Latest Ref Range: 0.0 - 4.0 IU/mL <0.9 <0.9       ________________________________________________________________________     This scalp intensive video EEG from 12/18/2017 to 12/22/2017 denotes                           1. Focal cortical dysfunction over the frontal lobe and potential frontal lobe irritability ( ie frontal lobe seizures are possible)                           2.Diffuse nonspecific cortical dysfunction - mild     There are no definite electrographic seizures in this record     The patient's multiple symptoms and signs could be related with frontal lobe dysfunction secondary to frontal lobe seizures ( instead of just psychogenic)     Advise aggressive  therapeutic trial of anti-seizure medication     ________________________________________________________________________

## 2017-12-25 ENCOUNTER — PATIENT MESSAGE (OUTPATIENT)
Dept: MEDICAL GROUP | Facility: CLINIC | Age: 52
End: 2017-12-25

## 2018-01-01 ENCOUNTER — OFFICE VISIT (OUTPATIENT)
Dept: URGENT CARE | Facility: CLINIC | Age: 53
End: 2018-01-01
Payer: COMMERCIAL

## 2018-01-01 VITALS
DIASTOLIC BLOOD PRESSURE: 90 MMHG | TEMPERATURE: 98.2 F | WEIGHT: 261 LBS | OXYGEN SATURATION: 94 % | BODY MASS INDEX: 39.56 KG/M2 | HEIGHT: 68 IN | RESPIRATION RATE: 16 BRPM | HEART RATE: 91 BPM | SYSTOLIC BLOOD PRESSURE: 118 MMHG

## 2018-01-01 DIAGNOSIS — H60.531 ACUTE CONTACT OTITIS EXTERNA OF RIGHT EAR: ICD-10-CM

## 2018-01-01 DIAGNOSIS — J20.9 ACUTE BRONCHITIS, UNSPECIFIED ORGANISM: ICD-10-CM

## 2018-01-01 DIAGNOSIS — J02.8 PHARYNGITIS DUE TO OTHER ORGANISM: ICD-10-CM

## 2018-01-01 LAB
INT CON NEG: NORMAL
INT CON POS: NORMAL
S PYO AG THROAT QL: POSITIVE

## 2018-01-01 PROCEDURE — 99214 OFFICE O/P EST MOD 30 MIN: CPT | Performed by: FAMILY MEDICINE

## 2018-01-01 PROCEDURE — 87880 STREP A ASSAY W/OPTIC: CPT | Performed by: FAMILY MEDICINE

## 2018-01-01 RX ORDER — AZITHROMYCIN 250 MG/1
TABLET, FILM COATED ORAL
Qty: 6 TAB | Refills: 0 | Status: SHIPPED | OUTPATIENT
Start: 2018-01-01 | End: 2018-02-02

## 2018-01-01 RX ORDER — PREDNISONE 10 MG/1
TABLET ORAL
Qty: 9 TAB | Refills: 0 | Status: SHIPPED | OUTPATIENT
Start: 2018-01-01 | End: 2018-02-02

## 2018-01-01 RX ORDER — CIPROFLOXACIN AND DEXAMETHASONE 3; 1 MG/ML; MG/ML
4 SUSPENSION/ DROPS AURICULAR (OTIC) 2 TIMES DAILY
Qty: 1 BOTTLE | Refills: 0 | Status: SHIPPED | OUTPATIENT
Start: 2018-01-01 | End: 2018-01-08

## 2018-01-01 RX ORDER — CIPROFLOXACIN AND DEXAMETHASONE 3; 1 MG/ML; MG/ML
4 SUSPENSION/ DROPS AURICULAR (OTIC) 2 TIMES DAILY
Qty: 1 BOTTLE | Refills: 0 | Status: SHIPPED | OUTPATIENT
Start: 2018-01-01 | End: 2018-01-01 | Stop reason: SDUPTHER

## 2018-01-01 NOTE — PROGRESS NOTES
HPI: Tanvi Krueger is a 52 y.o. female who presents with   Chief Complaint   Patient presents with   • Other     possible strep        Patient has had 3-4 days Of cough and chest congestion. Sore throat as well. No nausea vomiting or diarrhea she's a history of chronic nasopharyngitis. Has had some fevers chills body aches. No nausea vomiting or diarrhea.  Worsened by: activity, laying supine at night, first thing in the morning, when exposed to outside allergens  Improved by: OTC symptomatic medictions      PMH:  has a past medical history of Anemia; Anesthesia; Anxiety; Arthritis; Asthma; Bowel habit changes; Cholesteatoma of middle ear and mastoid(385.33) (1992 surgery); Depression; Dyslipidemia, goal LDL below 130; Elevated glycohemoglobin; Eosinophilic esophagitis; Family history of early CAD; Hiatus hernia syndrome; History of chickenpox; History of endometriosis; History of partial thyroidectomy; Hypothyroidism due to Hashimoto's thyroiditis; Lumbar disc narrowing (7/14/2009); Menopausal symptoms; Oxygen desaturation during sleep; Pneumonia; Recurrent sinus infections; Seizure (CMS-HCC); Sleep apnea; Tonsillitis; and Vitamin d deficiency.  MEDS:   Current Outpatient Prescriptions:   •  Probiotic Product (PROBIOTIC DAILY PO), Take 1 tablet by mouth every day., Disp: , Rfl:   •  ADK 9732-8711-151 UNIT-MCG Cap, Take 1 tablet by mouth., Disp: , Rfl:   •  umeclidinium-vilanterol (ANORO ELLIPTA) 62.5-25 MCG/INH AEROSOL POWDER, BREATH ACTIVATED inhaler, Inhale 1 Puff by mouth every day. (Patient taking differently: Inhale 1 Puff by mouth as needed.), Disp: 1 Inhaler, Rfl: 6  •  levothyroxine (SYNTHROID) 150 MCG Tab, Take 150 mcg by mouth., Disp: , Rfl:   •  sertraline (ZOLOFT) 100 MG Tab, Take 2 Tabs by mouth every day., Disp: 180 Tab, Rfl: 2  •  HM OMEGA-3-6-9 FATTY ACIDS Cap, Take 1 Cap by mouth every day., Disp: 30 Cap, Rfl: 11  •  B Complex Vitamins (VITAMIN B COMPLEX PO), Take  by mouth every day., Disp: ,  Rfl:   •  Misc. Devices Misc, This is an order for nocturnal oxygen, 2L nasal prongs.  Dx:  Nocturnal hypoxia, lowest 59% on overnight study.  G47.34           MARIA DOLORES 99 months   NPI [id number], Disp: 1 Each, Rfl: 0  •  NEOMYCIN-POLYMYXIN-HC, OTIC, 1 % Solution, Place 2 Drops in ear 3 times a day. (Patient not taking: Reported on 12/18/2017), Disp: 1 Bottle, Rfl: 0  •  Cholecalciferol (VITAMIN D) 2000 UNIT CAPS, Take 2,000 Units by mouth every day. (Patient taking differently: Take 2,000 Units by mouth 2 Times a Day.), Disp: 60 Cap, Rfl: 6  ALLERGIES:   Allergies   Allergen Reactions   • Doxycycline      Wheezing; diarrhea   • Pcn [Penicillins] Unspecified     Unknown reaction as child   • Keflex [Cephalexin] Unspecified     Yeast Infection     SURGHX:   Past Surgical History:   Procedure Laterality Date   • LARYNGOSCOPY N/A 3/31/2017    Procedure: LARYNGOSCOPY - DIRECT W/BIOPSY BASE OF TONGUE AND NASOPHARYNGOSCOPY W/BIOPSY;  Surgeon: Naresh Abdi M.D.;  Location: SURGERY SAME DAY [facility];  Service:    • THYROIDECTOMY TOTAL Left 10/14/2016    Procedure: LEFT PARTIAL THROIDECTOMY;  Surgeon: Naresh Abdi M.D.;  Location: SURGERY SAME DAY [facility];  Service:    • SEPTOPLASTY  9/9/2016    Procedure: SEPTOPLASTY;  Surgeon: Naresh Abdi M.D.;  Location: SURGERY SAME DAY [facility];  Service:    • TURBINOPLASTY Bilateral 9/9/2016    Procedure: TURBINOPLASTY;  Surgeon: Naresh Abdi M.D.;  Location: SURGERY SAME DAY [facility];  Service:    • OTHER  2016    sinus surgery   • EAR MIDDLE EXPLORATION  4/3/2015    Performed by Naresh Abdi M.D. at SURGERY SAME DAY [facility]   • OSSICULAR RECONSTRUCTION  4/3/2015    Performed by Naresh Abdi M.D. at SURGERY SAME DAY [facility]   • MYRINGOTOMY  4/20/2012    Performed by RYANNE ALICIA at [facility]   • TYMPANOTOMY  6/24/2010    Performed by MAXIMUS WHITE at SURGERY SAME DAY [facility]   • EXAM UNDER ANESTHESIA  6/24/2010     "Performed by MAXIMUS WHITE at SURGERY SAME DAY [facility]   • MYRINGOTOMY  4/30/2009    Performed by MAXIMUS WHITE at SURGERY SAME DAY [facility]   • ABDOMINAL HYSTERECTOMY TOTAL  1997    ovaries are gone   • ARTHROSCOPY, KNEE     • HYSTERECTOMY LAPAROSCOPY     • SINUSCOPE     • TONSILLECTOMY       SOCHX:  reports that she has never smoked. She has never used smokeless tobacco. She reports that she does not drink alcohol or use drugs.  FH: Family history was reviewed, no pertinent findings to report    PE:  Vitals /90   Pulse 91   Temp 36.8 °C (98.2 °F)   Resp 16   Ht 1.727 m (5' 8\")   Wt 118.4 kg (261 lb)   LMP 03/01/1997   SpO2 94%   BMI 39.68 kg/m²    Gen AOx4, NAD  HEENT: moist mucus membranes, no pain or pressure with percussion of frontal, maxillary or ethmoid sinuses.  Bilateral conjunciva clear without erythema or exudate,  Bilateral TM's with chronic changes present, right external canal with fresh clot, mild pharyngeal erythema s/p tonsillectomy Neck: supple, no cervical lymphadenopathy, no signs of menigismus  CV/PULM: RRR no murmurs, no rales ronchi or wheezes, no signs of resp distress  Abd soft nontender, bs present  Skin no rashes  Extremities -c/c/e  Neuro appropriate affect,       Diagnostics: rapid strep negative    A/P  1. Pharyngitis due to other organism  POCT Rapid Strep A   2. Acute contact otitis externa of right ear  ciprofloxacin/dexamethasone (CIPRODEX) 0.3-0.1 % Suspension    DISCONTINUED: ciprofloxacin/dexamethasone (CIPRODEX) 0.3-0.1 % Suspension   3. Acute bronchitis, unspecified organism  azithromycin (ZITHROMAX) 250 MG Tab    predniSONE (DELTASONE) 10 MG Tab     Follow-up with primary care provider within 4-5 days, emergency room precautions discussed.  Patient and/or family appears understanding of information.    "

## 2018-01-02 ENCOUNTER — PATIENT MESSAGE (OUTPATIENT)
Dept: MEDICAL GROUP | Facility: CLINIC | Age: 53
End: 2018-01-02

## 2018-01-02 ENCOUNTER — TELEPHONE (OUTPATIENT)
Dept: NEUROLOGY | Facility: MEDICAL CENTER | Age: 53
End: 2018-01-02

## 2018-01-03 ENCOUNTER — TELEPHONE (OUTPATIENT)
Dept: NEUROLOGY | Facility: MEDICAL CENTER | Age: 53
End: 2018-01-03

## 2018-01-03 NOTE — TELEPHONE ENCOUNTER
Tanvi HORVATH [facility]   Phone Number: [phone]             It says final test results have been posted however I’m not able to see them.  Can the MA scan them in please?   Thank you.      Please advise    Sent via [facility]

## 2018-01-05 ENCOUNTER — PATIENT MESSAGE (OUTPATIENT)
Dept: MEDICAL GROUP | Facility: CLINIC | Age: 53
End: 2018-01-05

## 2018-01-06 NOTE — EEG PROGRESS NOTE
5 DAYS OF INTENSIVE VIDEO ELECTROENCEPHALOGRAM REPORT      This Video EEG was done from 12/18/2017 to 12/22/2017      INDICATION:       TECHNIQUE:  Routine electroencephalogram (EEG) was performed in accordance with the international 10-20 system. The study was reviewed in bipolar and referential montages. The recording examined the patient during wakeful and drowsy state(s).     DESCRIPTION OF THE RECORD:    On 12/18/2017 Background rhythm during awake stage shows well-organized, well-developed, average voltage 10 to 11 hertz alpha activity in the posterior regions.  It blocks with eye opening and it is bilaterally synchronous and symmetrical.  There are frequent bursts of generalized delta and sharp bursts, and also mono and polymorphic delta over temporal ( more over left temporal )Photic stimulation did not produce any abnormalities. Hyperventilation did not produce any abnormalities.  Stage II sleep was achieved.    On 12/19/2017 Background rhythm during awake stage shows well-organized, well-developed, average voltage 10 to 11 hertz alpha activity in the posterior regions.  It blocks with eye opening and it is bilaterally synchronous and symmetrical.  There are frequent bursts of generalized delta and sharp bursts, and also mono and polymorphic delta over temporal ( more over left temporal ) Burst of sharp over both temporal areas as well. Stage II sleep was achieved.    On 12/20/2017 Background rhythm during awake stage shows well-organized, well-developed, average voltage 10 to 11 hertz alpha activity in the posterior regions.  It blocks with eye opening and it is bilaterally synchronous and symmetrical.  There are frequent bursts of generalized delta and sharp bursts, and also mono and polymorphic delta over temporal ( more over left temporal ) Burst of sharp over both temporal areas as well. Stage II sleep was achieved.    On 12/21/2017 Background rhythm during awake stage shows well-organized,  well-developed, average voltage 10 to 11 hertz alpha activity in the posterior regions.  It blocks with eye opening and it is bilaterally synchronous and symmetrical.  There are frequent bursts of generalized delta and sharp bursts, and also mono and polymorphic delta over temporal ( more over left temporal ) Burst of sharp over both temporal areas as well. Stage II sleep was achieved.    On 12/22/2017 Background rhythm during awake stage shows well-organized, well-developed, average voltage 10 to 11 hertz alpha activity in the posterior regions.  It blocks with eye opening and it is bilaterally synchronous and symmetrical.  There are frequent bursts of generalized delta and sharp bursts, and also mono and polymorphic delta over temporal ( more over left temporal ) Burst of sharp over both temporal areas as well.     ACTIVATION PROCEDURES:        ICTAL AND/OR INTERICTAL FINDINGS:         EKG: sampling of the EKG recording demonstrated sinus rhythm.        INTERPRETATION:      ________________________________________________________________________    This scalp intensive video EEG from 12/18/2017 to 12/22/2017 denotes     1. Focal cortical dysfunction over the frontal lobe and potential frontal lobe irritability ( ie frontal lobe seizures are possible)     2.Diffuse nonspecific cortical dysfunction - mild    There are no definite electrographic seizures in this record    The patient's multiple symptoms and signs could be related with frontal lobe dysfunction secondary to frontal lobe seizures ( instead of just psychogenic)    Advise aggressive therapeutic trial of anti-seizure medication    ________________________________________________________________________          ________________________________________________________________________      Billing documentation reflecting total daily recordings:   12/18/2017 (22 hrs and 36 mins)  12/19/2017 (24 hrs)  12/20/2017 (24 hrs)  12/21/2017 (24 hrs)  12/22/2017 (2  hrs and 42 mins)  ________________________________________________________________________

## 2018-01-08 ENCOUNTER — TELEPHONE (OUTPATIENT)
Dept: NEUROLOGY | Facility: MEDICAL CENTER | Age: 53
End: 2018-01-08

## 2018-01-08 NOTE — TELEPHONE ENCOUNTER
Tanvi Weldon M.D. 3 days ago         Dr. Weldon. I have been taking Zoloft for years.  I just read an article that Zoloft can cause seizures.  I have asked my PCP doctor about getting off it or changing the rx.   Your thoughts, please.  Thank you. Tanvi Krueger         Please advise    Sent via [employer]

## 2018-01-08 NOTE — TELEPHONE ENCOUNTER
RE: Non-Urgent Medical Question   Received: 1 week ago   Message Contents   Tanvi Weldon M.D.   Phone Number: [phone]             January - I have  appt with Dr. Ferro   February- I have appt with pulmonary     I await VEEG results also.  Thank you.    Previous Messages            Non-Urgent Medical Question     Tanvi Stanley 12 days ago         January - I have  appt with Dr. Ferro   February- I have appt with pulmonary     I await VEEG results also.  Thank you.          You   Tanvi Krueger 12 days ago         This daytime hypoxia is an important contributing factor for headache and cognition decline.   The best physician to address this is pulomonoglist

## 2018-01-09 ENCOUNTER — OFFICE VISIT (OUTPATIENT)
Dept: NEUROLOGY | Facility: MEDICAL CENTER | Age: 53
End: 2018-01-09
Payer: COMMERCIAL

## 2018-01-09 VITALS
WEIGHT: 264.8 LBS | OXYGEN SATURATION: 95 % | TEMPERATURE: 98.1 F | SYSTOLIC BLOOD PRESSURE: 116 MMHG | RESPIRATION RATE: 16 BRPM | BODY MASS INDEX: 40.13 KG/M2 | HEIGHT: 68 IN | DIASTOLIC BLOOD PRESSURE: 88 MMHG | HEART RATE: 76 BPM

## 2018-01-09 DIAGNOSIS — G05.3 AUTOIMMUNE CEREBRITIS (HCC): ICD-10-CM

## 2018-01-09 DIAGNOSIS — G47.31 CENTRAL SLEEP APNEA: ICD-10-CM

## 2018-01-09 DIAGNOSIS — G40.909 SEIZURE CEREBRAL (HCC): ICD-10-CM

## 2018-01-09 DIAGNOSIS — M35.9 AUTOIMMUNE CEREBRITIS (HCC): ICD-10-CM

## 2018-01-09 PROCEDURE — 99214 OFFICE O/P EST MOD 30 MIN: CPT | Performed by: PSYCHIATRY & NEUROLOGY

## 2018-01-09 ASSESSMENT — PATIENT HEALTH QUESTIONNAIRE - PHQ9: CLINICAL INTERPRETATION OF PHQ2 SCORE: 0

## 2018-01-09 NOTE — TELEPHONE ENCOUNTER
Please tell the patient, EEG not normal  We could discuss the future plan in the next visit    ________________________________________________________________________     This scalp intensive video EEG from 12/18/2017 to 12/22/2017 denotes                           1. Focal cortical dysfunction over the frontal lobe and potential frontal lobe irritability ( ie frontal lobe seizures are possible)                           2.Diffuse nonspecific cortical dysfunction - mild     There are no definite electrographic seizures in this record     The patient's multiple symptoms and signs could be related with frontal lobe dysfunction secondary to frontal lobe seizures ( instead of just psychogenic)     Advise aggressive therapeutic trial of anti-seizure medication     ________________________________________________________________________

## 2018-01-09 NOTE — PROGRESS NOTES
"NEUROLOGY NOTE    Referring Physician  Rebecca Ferro      CHIEF COMPLAINT:  Speech problems and word finding difficulty for one year  Life still could go on  Silent aspiration  Hx of thyroid problems   Since last visit, developed external ear canal discharges , shivering 10-12 times every day  Chief Complaint   Patient presents with   • Follow-Up     Test results       PRESENT ILLNESS:   Speech problems and word finding difficulty for one year  Silent aspiration  Hx of thyroid problems-- thyroid cancer   Since last visit, developed external ear canal discharges , shivering 10-12 times every day  She is seeing ENT doctor, ---     For few seconds, could not find the words she would like to say    PAST MEDICAL HISTORY:  Past Medical History:   Diagnosis Date   • Anemia    • Anesthesia     nausea/vomiting   • Anxiety    • Arthritis    • Asthma     allergy related   • Bowel habit changes     constipation   • Cholesteatoma of middle ear and mastoid(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.33) 1992 surgery    mastoidectomy, prosthesis   • Depression    • Dyslipidemia, goal LDL below 130    • Elevated glycohemoglobin    • Eosinophilic esophagitis    • Family history of early CAD    • Hiatus hernia syndrome     \"was told I had one\"   • History of chickenpox    • History of endometriosis    • History of partial thyroidectomy     partial thyroidectomy   • Hypothyroidism due to Hashimoto's thyroiditis    • Lumbar disc narrowing 7/14/2009   • Menopausal symptoms    • Oxygen desaturation during sleep     O2 2 liters HS   • Pneumonia     hx   • Recurrent sinus infections    • Seizure (CMS-HCC)    • Sleep apnea     ASV with 2L oxygen at night (sean)    • Tonsillitis    • Vitamin d deficiency        PAST SURGICAL HISTORY:  Past Surgical History:   Procedure Laterality Date   • LARYNGOSCOPY N/A 3/31/2017    Procedure: LARYNGOSCOPY - DIRECT W/BIOPSY BASE OF TONGUE AND NASOPHARYNGOSCOPY W/BIOPSY;  Surgeon: Naresh Abdi M.D.;  Location: SURGERY SAME DAY [facility]" ORS;  Service:    • THYROIDECTOMY TOTAL Left 10/14/2016    Procedure: LEFT PARTIAL THROIDECTOMY;  Surgeon: Naresh Abdi M.D.;  Location: SURGERY SAME DAY [facility] ORS;  Service:    • SEPTOPLASTY  9/9/2016    Procedure: SEPTOPLASTY;  Surgeon: Naresh Abdi M.D.;  Location: SURGERY SAME DAY [facility] ORS;  Service:    • TURBINOPLASTY Bilateral 9/9/2016    Procedure: TURBINOPLASTY;  Surgeon: Naresh Abdi M.D.;  Location: SURGERY SAME DAY [facility] ORS;  Service:    • OTHER  2016    sinus surgery   • EAR MIDDLE EXPLORATION  4/3/2015    Performed by Naresh Abdi M.D. at SURGERY SAME DAY [facility] ORS   • OSSICULAR RECONSTRUCTION  4/3/2015    Performed by Naresh Abdi M.D. at SURGERY SAME DAY [facility] ORS   • MYRINGOTOMY  4/20/2012    Performed by RYANNE ALICIA at SURGERY [facility] ORS   • TYMPANOTOMY  6/24/2010    Performed by MAXIMUS WHITE at SURGERY SAME DAY [facility] ORS   • EXAM UNDER ANESTHESIA  6/24/2010    Performed by MAXIMUS WHITE at SURGERY SAME DAY [facility] ORS   • MYRINGOTOMY  4/30/2009    Performed by MAXIMUS WHITE at SURGERY SAME DAY [facility] ORS   • ABDOMINAL HYSTERECTOMY TOTAL  1997    ovaries are gone   • ARTHROSCOPY, KNEE     • HYSTERECTOMY LAPAROSCOPY     • SINUSCOPE     • TONSILLECTOMY         FAMILY HISTORY:  Family History   Problem Relation Age of Onset   • Cancer Mother 61     lung, smoker   • Heart Disease Mother 44     early heart attack   • Sleep Apnea Mother      possibly   • Heart Disease Father 60     cabg x 3   • Hyperlipidemia Father    • Psychiatry Father      schizophrenia   • Kidney Disease Father      renal failure, dialysis   • Dementia Father    • Hyperlipidemia Brother    • Cancer Maternal Grandmother 62     lung, non-smoker   • Psychiatry Maternal Grandmother      depression, severe   • Psychiatry Other      depression, great uncle   • Anxiety disorder Sister        SOCIAL HISTORY:  Social History     Social History   • Marital status:      Spouse  name: N/A   • Number of children: N/A   • Years of education: N/A     Occupational History   • Not on file.     Social History Main Topics   • Smoking status: Never Smoker   • Smokeless tobacco: Never Used   • Alcohol use No   • Drug use: No   • Sexual activity: Yes     Partners: Male     Other Topics Concern   • Not on file     Social History Narrative   • No narrative on file     ALLERGIES:  Allergies   Allergen Reactions   • Doxycycline      Wheezing; diarrhea   • Pcn [Penicillins] Unspecified     Unknown reaction as child   • Keflex [Cephalexin] Unspecified     Yeast Infection     TOBHX  History   Smoking Status   • Never Smoker   Smokeless Tobacco   • Never Used     ALCHX  History   Alcohol Use No     DRUGHX  History   Drug Use No           MEDICATIONS:  Current Outpatient Prescriptions   Medication   • Probiotic Product (PROBIOTIC DAILY PO)   • ADK 9022-1783-340 UNIT-MCG Cap   • umeclidinium-vilanterol (ANORO ELLIPTA) 62.5-25 MCG/INH AEROSOL POWDER, BREATH ACTIVATED inhaler   • levothyroxine (SYNTHROID) 150 MCG Tab   • sertraline (ZOLOFT) 100 MG Tab   • HM OMEGA-3-6-9 FATTY ACIDS Cap   • B Complex Vitamins (VITAMIN B COMPLEX PO)   • Cholecalciferol (VITAMIN D) 2000 UNIT CAPS   • azithromycin (ZITHROMAX) 250 MG Tab   • predniSONE (DELTASONE) 10 MG Tab   • NEOMYCIN-POLYMYXIN-HC, OTIC, 1 % Solution   • Misc. Devices Misc     No current facility-administered medications for this visit.        REVIEW OF SYSTEM:    Constitutional: Denies fevers, Denies weight changes   Eyes: Denies changes in vision, no eye pain   Ears/Nose/Throat/Mouth: Denies nasal congestion or sore throat   Cardiovascular: Denies chest pain or palpitations   Respiratory: central sleep apnea  Gastrointestinal/Hepatic: Denies abdominal pain, nausea, vomiting, diarrhea, constipation or GI bleeding   Genitourinary: Denies bladder dysfunction, dysuria or frequency   Musculoskeletal/Rheum: Denies joint pain and swelling   Skin/Breast: Denies rash,  "denies breast lumps or discharge   Neurological: speech problems and word finding difficulty, hearing aids  Psychiatric: denies mood disorder   Endocrine: thyroid dysfunction   Heme/Oncology/Lymph Nodes: Denies enlarged lymph nodes, denies brusing or known bleeding disorder   Allergic/Immunologic: Denies hx of allergies         PHYSICAL AND NEUROLOGICAL EXMAINATIONS:  VITAL SIGNS: /88   Pulse 76   Temp 36.7 °C (98.1 °F)   Resp 16   Ht 1.727 m (5' 8\")   Wt 120.1 kg (264 lb 12.8 oz)   LMP 03/01/1997   SpO2 95%   BMI 40.26 kg/m²   CURRENT WEIGHT:   BMI: Body mass index is 40.26 kg/m².  PREVIOUS WEIGHTS:  Wt Readings from Last 25 Encounters:   01/09/18 120.1 kg (264 lb 12.8 oz)   01/01/18 118.4 kg (261 lb)   12/18/17 119.3 kg (263 lb 0.1 oz)   11/20/17 122.9 kg (271 lb)   10/02/17 121.6 kg (268 lb)   08/28/17 120.2 kg (265 lb)   08/28/17 120.2 kg (265 lb)   08/10/17 120.4 kg (265 lb 8 oz)   07/26/17 116.6 kg (257 lb)   07/18/17 116.6 kg (257 lb)   07/03/17 116.6 kg (257 lb)   06/30/17 118.8 kg (262 lb)   06/20/17 118.2 kg (260 lb 9.6 oz)   06/13/17 122.2 kg (269 lb 6.4 oz)   04/17/17 117 kg (258 lb)   03/31/17 118.7 kg (261 lb 11 oz)   03/17/17 117 kg (258 lb)   03/07/17 117 kg (258 lb)   01/10/17 115.7 kg (255 lb)   12/22/16 116.1 kg (256 lb)   12/12/16 117.9 kg (260 lb)   11/18/16 115.7 kg (255 lb)   10/19/16 115.7 kg (255 lb)   10/14/16 115.3 kg (254 lb 3.1 oz)   10/10/16 115.7 kg (255 lb)       General appearance of patient: WDWN(+) NAD(+)    EYES  o Fundus : Papilledem(-) Exudates(-) Hemorrhage(-)  Nervous System  Orientation to time, place and person(+)  Memory normal(-)  Language: aphasia(-)  Knowledge: past(+) Current(+)  Attention(+)  Cranial Nerves  • Nerve 2: intact  • Nerve 3,4,6: intact  • Nerve 5 : intact  • Nerve 7: intact  • Nerve 8: impaired over left-- status post middle ear tumor  • Nerve 9 & 10: intact  • Nerve 11: intact  • Nerve 12: intact  Muscle Power and muscle tone: symmetric, " normal in upper and lower  Sensory System: Pin sensation intact(+)  Reflexes: symmetric throughout  Cerebellar Function FNP normal   Gait : Steady(+) TandemGait steady(-)  Heart and Vascular  Peripheral Vasucular system : Edema (-) Swelling(-)  RHB, Breathing sound clear  abdomen bowel sound normoactive  Extremities freely moveable  Joints no contracture  Neck pain     NEUROIMAGING: I reviewed the MRI/CT of brain     Registration 3/3  Recall 1/3    Keppra 125mg made her sleepy  Zonegran did not go well with the patient    IMPRESSION:    1. Trouble of speech and finding words for one year-- spells like-- lasting for seconds  2. Silent aspiration 2016,  Central Sleep Apnea 2016 ( now on nocturnal O2 + ASV), Thyroid disease-Hashimoto- 2016, Cholesteatoma, replacement of ossicle bones since childhood  3. Abnormal EEG  4. Hypoxia even in the daytime-- cause to be determined      PLAN/RECOMMENDATIONS:    Explain to the patient --- the problems of speech and word finding difficult could be secondary to brain malfunction ( not neuromuscle weakness since the patient's trouble lies in finding the words instead of speaking out)    The patient's multiple symptoms and signs could be related with frontal lobe dysfunction secondary to frontal lobe seizures ( instead of just psychogenic)     Advise aggressive therapeutic trial of anti-seizure medication       We could try Lamictal and Trileptal if the patient is willing to try--- the patient already failed keppra and zonegran    We would offer spinal tap to exclude signs of inflammation in the CSF    Daytime Hypoxia cause to be determined--- > going to see Pulmonologist        Also advise the following  ________________________________________________________________________    Fish Oil -- Omega 3 1000mg 6# daily  Try Daily Probiotic too  Vit D-3 4000 unit daily  ________________________________________________________________________    Please tell the patient, EEG not normal  We  could discuss the future plan in the next visit    ________________________________________________________________________     This scalp intensive video EEG from 12/18/2017 to 12/22/2017 denotes                           1. Focal cortical dysfunction over the frontal lobe and potential frontal lobe irritability ( ie frontal lobe seizures are possible)                           2.Diffuse nonspecific cortical dysfunction - mild     There are no definite electrographic seizures in this record     The patient's multiple symptoms and signs could be related with frontal lobe dysfunction secondary to frontal lobe seizures ( instead of just psychogenic)     Advise aggressive therapeutic trial of anti-seizure medication     ________________________________________________________________________         MRI of brain-- Dec 2016-- not remarkable        SIGNATURE:  Ben Weldon      CC:  Rebecca Ferro M.D.    INTERPRETATION:        ________________________________________________________________________     This scalp intensive video EEG from 12/18/2017 to 12/22/2017 denotes                 1. Focal cortical dysfunction over the frontal lobe and potential frontal lobe irritability ( ie frontal lobe seizures are possible)                 2.Diffuse nonspecific cortical dysfunction - mild     There are no definite electrographic seizures in this record     The patient's multiple symptoms and signs could be related with frontal lobe dysfunction secondary to frontal lobe seizures ( instead of just psychogenic)     Advise aggressive therapeutic trial of anti-seizure medication     ________________________________________________________________________              ________________________________________________________________________        Billing documentation reflecting total daily recordings:   12/18/2017 (22 hrs and 36 mins)  12/19/2017 (24 hrs)  12/20/2017 (24 hrs)  12/21/2017 (24 hrs)  12/22/2017 (2 hrs and 42  mins)  ________________________________________________________________________

## 2018-01-09 NOTE — PATIENT INSTRUCTIONS
IMPRESSION:    1. Trouble of speech and finding words for one year-- spells like-- lasting for seconds  2. Silent aspiration 2016,  Central Sleep Apnea 2016 ( now on nocturnal O2 + ASV), Thyroid disease-Hashimoto- 2016, Cholesteatoma, replacement of ossicle bones since childhood  3. Abnormal EEG  4. Hypoxia even in the daytime-- cause to be determined      PLAN/RECOMMENDATIONS:    Explain to the patient --- the problems of speech and word finding difficult could be secondary to brain malfunction ( not neuromuscle weakness since the patient's trouble lies in finding the words instead of speaking out)    The patient's multiple symptoms and signs could be related with frontal lobe dysfunction secondary to frontal lobe seizures ( instead of just psychogenic)     Advise aggressive therapeutic trial of anti-seizure medication       We could try Lamictal and Trileptal if the patient is willing to try--- the patient already failed keppra and zonegran    We would offer spinal tap to exclude signs of inflammation in the CSF    Daytime Hypoxia cause to be determined--- > going to see Pulmonologist        Also advise the following  ________________________________________________________________________    Fish Oil -- Omega 3 1000mg 6# daily  Try Daily Probiotic too  Vit D-3 4000 unit daily  ________________________________________________________________________    Please tell the patient, EEG not normal  We could discuss the future plan in the next visit    ________________________________________________________________________     This scalp intensive video EEG from 12/18/2017 to 12/22/2017 denotes                           1. Focal cortical dysfunction over the frontal lobe and potential frontal lobe irritability ( ie frontal lobe seizures are possible)                           2.Diffuse nonspecific cortical dysfunction - mild     There are no definite electrographic seizures in this record     The patient's multiple  symptoms and signs could be related with frontal lobe dysfunction secondary to frontal lobe seizures ( instead of just psychogenic)     Advise aggressive therapeutic trial of anti-seizure medication     ________________________________________________________________________         MRI of brain-- Dec 2016-- not remarkable        SIGNATURE:  Ben Weldon      CC:  Rebecca Ferro M.D.    INTERPRETATION:        ________________________________________________________________________     This scalp intensive video EEG from 12/18/2017 to 12/22/2017 denotes                 1. Focal cortical dysfunction over the frontal lobe and potential frontal lobe irritability ( ie frontal lobe seizures are possible)                 2.Diffuse nonspecific cortical dysfunction - mild     There are no definite electrographic seizures in this record     The patient's multiple symptoms and signs could be related with frontal lobe dysfunction secondary to frontal lobe seizures ( instead of just psychogenic)     Advise aggressive therapeutic trial of anti-seizure medication     ________________________________________________________________________              ________________________________________________________________________        Billing documentation reflecting total daily recordings:   12/18/2017 (22 hrs and 36 mins)  12/19/2017 (24 hrs)  12/20/2017 (24 hrs)  12/21/2017 (24 hrs)  12/22/2017 (2 hrs and 42 mins)  ________________________________________________________________________

## 2018-01-11 ENCOUNTER — HOSPITAL ENCOUNTER (OUTPATIENT)
Dept: LAB | Facility: MEDICAL CENTER | Age: 53
End: 2018-01-11
Attending: PSYCHIATRY & NEUROLOGY
Payer: COMMERCIAL

## 2018-01-11 DIAGNOSIS — G05.3 AUTOIMMUNE CEREBRITIS (HCC): ICD-10-CM

## 2018-01-11 DIAGNOSIS — G47.31 CENTRAL SLEEP APNEA: ICD-10-CM

## 2018-01-11 DIAGNOSIS — G40.909 SEIZURE CEREBRAL (HCC): ICD-10-CM

## 2018-01-11 DIAGNOSIS — M35.9 AUTOIMMUNE CEREBRITIS (HCC): ICD-10-CM

## 2018-01-11 LAB
APTT PPP: 24.9 SEC (ref 24.7–36)
BASOPHILS # BLD AUTO: 0.9 % (ref 0–1.8)
BASOPHILS # BLD: 0.07 K/UL (ref 0–0.12)
EOSINOPHIL # BLD AUTO: 0.2 K/UL (ref 0–0.51)
EOSINOPHIL NFR BLD: 2.5 % (ref 0–6.9)
ERYTHROCYTE [DISTWIDTH] IN BLOOD BY AUTOMATED COUNT: 46.2 FL (ref 35.9–50)
HCT VFR BLD AUTO: 45.1 % (ref 37–47)
HGB BLD-MCNC: 13.9 G/DL (ref 12–16)
IMM GRANULOCYTES # BLD AUTO: 0.03 K/UL (ref 0–0.11)
IMM GRANULOCYTES NFR BLD AUTO: 0.4 % (ref 0–0.9)
INR PPP: 0.93 (ref 0.87–1.13)
LYMPHOCYTES # BLD AUTO: 2.69 K/UL (ref 1–4.8)
LYMPHOCYTES NFR BLD: 33.3 % (ref 22–41)
MCH RBC QN AUTO: 25.8 PG (ref 27–33)
MCHC RBC AUTO-ENTMCNC: 30.8 G/DL (ref 33.6–35)
MCV RBC AUTO: 83.8 FL (ref 81.4–97.8)
MONOCYTES # BLD AUTO: 0.44 K/UL (ref 0–0.85)
MONOCYTES NFR BLD AUTO: 5.5 % (ref 0–13.4)
NEUTROPHILS # BLD AUTO: 4.64 K/UL (ref 2–7.15)
NEUTROPHILS NFR BLD: 57.4 % (ref 44–72)
NRBC # BLD AUTO: 0 K/UL
NRBC BLD-RTO: 0 /100 WBC
PLATELET # BLD AUTO: 272 K/UL (ref 164–446)
PMV BLD AUTO: 10.6 FL (ref 9–12.9)
PROTHROMBIN TIME: 12.2 SEC (ref 12–14.6)
RBC # BLD AUTO: 5.38 M/UL (ref 4.2–5.4)
WBC # BLD AUTO: 8.1 K/UL (ref 4.8–10.8)

## 2018-01-11 PROCEDURE — 85730 THROMBOPLASTIN TIME PARTIAL: CPT

## 2018-01-11 PROCEDURE — 36415 COLL VENOUS BLD VENIPUNCTURE: CPT

## 2018-01-11 PROCEDURE — 85025 COMPLETE CBC W/AUTO DIFF WBC: CPT

## 2018-01-11 PROCEDURE — 85610 PROTHROMBIN TIME: CPT

## 2018-01-22 ENCOUNTER — OFFICE VISIT (OUTPATIENT)
Dept: MEDICAL GROUP | Facility: CLINIC | Age: 53
End: 2018-01-22
Payer: COMMERCIAL

## 2018-01-22 ENCOUNTER — HOSPITAL ENCOUNTER (OUTPATIENT)
Dept: RADIOLOGY | Facility: MEDICAL CENTER | Age: 53
End: 2018-01-22
Attending: PSYCHIATRY & NEUROLOGY
Payer: COMMERCIAL

## 2018-01-22 VITALS
HEIGHT: 68 IN | WEIGHT: 262 LBS | TEMPERATURE: 98.2 F | DIASTOLIC BLOOD PRESSURE: 82 MMHG | BODY MASS INDEX: 39.71 KG/M2 | HEART RATE: 66 BPM | RESPIRATION RATE: 14 BRPM | OXYGEN SATURATION: 96 % | SYSTOLIC BLOOD PRESSURE: 124 MMHG

## 2018-01-22 DIAGNOSIS — G47.31 CENTRAL SLEEP APNEA: ICD-10-CM

## 2018-01-22 DIAGNOSIS — G05.3 AUTOIMMUNE CEREBRITIS (HCC): ICD-10-CM

## 2018-01-22 DIAGNOSIS — G47.33 OBSTRUCTIVE SLEEP APNEA: ICD-10-CM

## 2018-01-22 DIAGNOSIS — E66.9 OBESITY (BMI 35.0-39.9 WITHOUT COMORBIDITY): ICD-10-CM

## 2018-01-22 DIAGNOSIS — G47.34 OXYGEN DESATURATION DURING SLEEP: ICD-10-CM

## 2018-01-22 DIAGNOSIS — G40.909 SEIZURE CEREBRAL (HCC): ICD-10-CM

## 2018-01-22 DIAGNOSIS — M79.672 FOOT PAIN, LEFT: ICD-10-CM

## 2018-01-22 DIAGNOSIS — M35.9 AUTOIMMUNE CEREBRITIS (HCC): ICD-10-CM

## 2018-01-22 LAB
BURR CELLS/RBC NFR CSF MANUAL: 0 %
CLARITY CSF: CLEAR
COLOR CSF: COLORLESS
COLOR SPUN CSF: COLORLESS
GLUCOSE CSF-MCNC: 61 MG/DL (ref 40–80)
LYMPHOCYTES NFR CSF: 82 %
MONONUC CELLS NFR CSF: 16 %
NEUTROPHILS NFR CSF: 2 %
PROT CSF-MCNC: 46 MG/DL (ref 15–45)
RBC # CSF: 4 CELLS/UL
SPECIMEN VOL CSF: 9 ML
TUBE # CSF: 3
TUBE # CSF: 3
WBC # CSF: 3 CELLS/UL (ref 0–10)

## 2018-01-22 PROCEDURE — 89051 BODY FLUID CELL COUNT: CPT

## 2018-01-22 PROCEDURE — 62270 DX LMBR SPI PNXR: CPT

## 2018-01-22 PROCEDURE — 87798 DETECT AGENT NOS DNA AMP: CPT

## 2018-01-22 PROCEDURE — 82042 OTHER SOURCE ALBUMIN QUAN EA: CPT

## 2018-01-22 PROCEDURE — 83916 OLIGOCLONAL BANDS: CPT

## 2018-01-22 PROCEDURE — 99214 OFFICE O/P EST MOD 30 MIN: CPT | Performed by: FAMILY MEDICINE

## 2018-01-22 PROCEDURE — 82040 ASSAY OF SERUM ALBUMIN: CPT

## 2018-01-22 PROCEDURE — 83873 ASSAY OF CSF PROTEIN: CPT

## 2018-01-22 PROCEDURE — 82784 ASSAY IGA/IGD/IGG/IGM EACH: CPT | Mod: 91

## 2018-01-22 PROCEDURE — 84157 ASSAY OF PROTEIN OTHER: CPT

## 2018-01-22 PROCEDURE — 86592 SYPHILIS TEST NON-TREP QUAL: CPT

## 2018-01-22 PROCEDURE — 82945 GLUCOSE OTHER FLUID: CPT
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
## 2018-01-23 ENCOUNTER — PATIENT MESSAGE (OUTPATIENT)
Dept: MEDICAL GROUP | Facility: CLINIC | Age: 53
End: 2018-01-23

## 2018-01-23 ENCOUNTER — TELEPHONE (OUTPATIENT)
Dept: NEUROLOGY | Facility: MEDICAL CENTER | Age: 53
End: 2018-01-23

## 2018-01-23 NOTE — TELEPHONE ENCOUNTER
Tanvi Weldon M.D. 5 minutes ago (12:37 PM)         Can you see any comments on the spinal tap from the doctor who did the procedure?    I have been having trouble walking.  I’m managing so far.  My  helped me up the steps while we were on vacation.  The spine/back, hips and knees have been quite painful.  I know the labs will take a while.   Also,  no headache as I followed the instructions to inna T.  Thank you.   Tanvi Krueger.         Please advise    Sent via [employer]

## 2018-01-24 ENCOUNTER — TELEPHONE (OUTPATIENT)
Dept: NEUROLOGY | Facility: MEDICAL CENTER | Age: 53
End: 2018-01-24

## 2018-01-24 LAB
MBP CSF-MCNC: 2.09 NG/ML (ref 0–5.5)
TEST NAME 95000: ABNORMAL

## 2018-01-24 NOTE — TELEPHONE ENCOUNTER
Basic CSF tests are not remarkable   Advanced CSF tests may take one week to have results back       Spoke to patient gave him above results per Dr Weldon. She will call next week for the rest of results.

## 2018-01-25 LAB
OLIGOCLONAL BANDS CSF ELPH-IMP: NORMAL
OLIGOCLONAL BANDS CSF IEF: 0 BANDS (ref 0–1)
OLIGOCLONAL BANDS.IT SER+CSF QL: NEGATIVE
VDRL CSF QL: NON REACTIVE

## 2018-01-27 LAB
TWHIPPLEI PCR Q0087: NOT DETECTED
TWHIPPLEI PCR SRC Q0086: NORMAL

## 2018-01-31 ENCOUNTER — TELEPHONE (OUTPATIENT)
Dept: NEUROLOGY | Facility: MEDICAL CENTER | Age: 53
End: 2018-01-31

## 2018-02-01 NOTE — TELEPHONE ENCOUNTER
Ben Weldon M.D.  Sunni Razo, [facility]                The spinal tap remains not remarkable so far     This test is very powerful but not perfect though         Spoke to patient gave above results. I asked her to call with any concerns.

## 2018-02-09 ENCOUNTER — PATIENT MESSAGE (OUTPATIENT)
Dept: MEDICAL GROUP | Facility: CLINIC | Age: 53
End: 2018-02-09

## 2018-02-10 ENCOUNTER — HOSPITAL ENCOUNTER (OUTPATIENT)
Dept: RADIOLOGY | Facility: MEDICAL CENTER | Age: 53
End: 2018-02-10
Attending: NURSE PRACTITIONER
Payer: COMMERCIAL

## 2018-02-10 DIAGNOSIS — R93.89 ABNORMAL CT SCAN: ICD-10-CM

## 2018-02-10 PROCEDURE — 71250 CT THORAX DX C-: CPT

## 2018-02-10 NOTE — TELEPHONE ENCOUNTER
From: Tanvi Krueger  To: Paulino Ferro M.D.  Sent: 2/9/2018 12:21 PM PST  Subject: Test Result Question    Have the oximetry results come in? I’m doing follow up pulmonary CT tomorrow. Dr. Abdi is doing head CT in office on Tuesday because my left ear hearing has decreased and we don’t know why.

## 2018-02-12 ENCOUNTER — PATIENT MESSAGE (OUTPATIENT)
Dept: MEDICAL GROUP | Facility: CLINIC | Age: 53
End: 2018-02-12

## 2018-02-12 NOTE — TELEPHONE ENCOUNTER
From: Tanvi Krueger  To: Paulino Ferro M.D.  Sent: 2/12/2018 10:32 AM PST  Subject: Test Result Question    Can you see to chest CT results?  ----- Message -----  From: Paulino Ferro M.D.  Sent: 2/10/2018 2:08 PM PST  To: Tanvi Krueger  Subject: RE: Test Result Question  Your oxygen overnight on 2.5 L was still moderately low for about 14 minutes. I do believe either the pressure needs to be adjusted or the oxygen needs to be increased to 3 L. I see that you have done the CT scan but the results are not yet available. I see that you see pulmonology this coming week. I think some adjustments might be quite helpful for your persistent fatigue.    Paulino Ferro M.D.        ----- Message -----   From: Tanvi Krueger   Sent: 2/9/2018 12:21 PM PST   To: Paulino Ferro M.D.  Subject: Test Result Question    Have the oximetry results come in? I’m doing follow up pulmonary CT tomorrow. Dr. Abdi is doing head CT in office on Tuesday because my left ear hearing has decreased and we don’t know why.

## 2018-02-13 ENCOUNTER — OFFICE VISIT (OUTPATIENT)
Dept: PULMONOLOGY | Facility: HOSPICE | Age: 53
End: 2018-02-13
Payer: COMMERCIAL

## 2018-02-13 VITALS
RESPIRATION RATE: 15 BRPM | HEART RATE: 64 BPM | SYSTOLIC BLOOD PRESSURE: 124 MMHG | HEIGHT: 68 IN | OXYGEN SATURATION: 94 % | DIASTOLIC BLOOD PRESSURE: 72 MMHG | WEIGHT: 266 LBS | BODY MASS INDEX: 40.32 KG/M2

## 2018-02-13 DIAGNOSIS — J45.40 MODERATE PERSISTENT REACTIVE AIRWAY DISEASE WITH WHEEZING WITHOUT COMPLICATION: ICD-10-CM

## 2018-02-13 DIAGNOSIS — G47.31 CENTRAL SLEEP APNEA: ICD-10-CM

## 2018-02-13 DIAGNOSIS — G47.34 OXYGEN DESATURATION DURING SLEEP: ICD-10-CM

## 2018-02-13 PROBLEM — R06.09 EXERTIONAL DYSPNEA: Status: RESOLVED | Noted: 2017-08-28 | Resolved: 2018-02-13

## 2018-02-13 PROBLEM — R93.89 ABNORMAL CT SCAN: Status: RESOLVED | Noted: 2017-06-30 | Resolved: 2018-02-13

## 2018-02-13 PROBLEM — R91.1 PULMONARY NODULE: Status: RESOLVED | Noted: 2017-08-28 | Resolved: 2018-02-13

## 2018-02-13 PROCEDURE — 99214 OFFICE O/P EST MOD 30 MIN: CPT | Performed by: NURSE PRACTITIONER

## 2018-02-14 NOTE — PROGRESS NOTES
"Chief Complaint   Patient presents with   • Results     CT          HPI: This patient is a 52 y.o. female, who presents for follow-up overnight oximetry and chest CT results.     Former polysomnography showed severe sleep apnea with AHI 81, and she was titrated on ASV EPAP 9, ps16/4 with compliance card confirming 93% usage for 7 hours nightly, and normal AHI of 1.4. She has oxygen bleed in at 2.5 L/m. She had concerns whether her oxygen saturations are adequate. Overnight oximetry shows this was 2-91%, she did spend 14 minutes of the night with a saturation less than 88%. She was encouraged to increase O2 bleed in to 3 L.    She has had workup for complaints of exertional dyspnea, with pulmonary function testing showing small airways obstruction with bronchodilator response. She was started on Anoro inhaler  with subjective benefit. She has declined inhaled corticosteroids based on genetic testing through \"[employer]\" which suggests she would have poor response to inhaled glucocorticoids. She had a chest CAT scan 2017 showing small,  few millimeter,  pulmonary nodules in the right upper and lower lobe, F/U CT 2/10/18 shows resolution of nodules. She is a lifelong nonsmoker. Her mother, was a smoker,  of lung cancer.  Echocardiography from 2017 was normal.    She tells me today she has been feeling well. Has denied dyspnea, cough or wheeze. This morning she woke up with some nasal congestion and allergy symptoms. Otherwise no changes to her health.    Past Medical History:   Diagnosis Date   • Anemia    • Anesthesia     nausea/vomiting   • Anxiety    • Arthritis    • Asthma     allergy related   • Bowel habit changes     constipation   • Cholesteatoma of middle ear and mastoid(385.33)  surgery    mastoidectomy, prosthesis   • Depression    • Dyslipidemia, goal LDL below 130    • Elevated glycohemoglobin    • Eosinophilic esophagitis    • Family history of early CAD    • Hiatus hernia syndrome  " "   \"was told I had one\"   • History of chickenpox    • History of endometriosis    • History of partial thyroidectomy     partial thyroidectomy   • Hypothyroidism due to Hashimoto's thyroiditis    • Lumbar disc narrowing 7/14/2009   • Menopausal symptoms    • Oxygen desaturation during sleep     O2 2 liters HS   • Pneumonia     hx   • Recurrent sinus infections    • Seizure (CMS-HCC)    • Sleep apnea     ASV with 2L oxygen at night (sean)    • Tonsillitis    • Vitamin d deficiency        Social History   Substance Use Topics   • Smoking status: Passive Smoke Exposure - Never Smoker   • Smokeless tobacco: Never Used      Comment: Patient grew up with 3 smokers in home    • Alcohol use No       Family History   Problem Relation Age of Onset   • Cancer Mother 61     lung, smoker   • Heart Disease Mother 44     early heart attack   • Sleep Apnea Mother      possibly   • Heart Disease Father 60     cabg x 3   • Hyperlipidemia Father    • Psychiatry Father      schizophrenia   • Kidney Disease Father      renal failure, dialysis   • Dementia Father    • Hyperlipidemia Brother    • Cancer Maternal Grandmother 62     lung, non-smoker   • Psychiatry Maternal Grandmother      depression, severe   • Psychiatry Other      depression, great uncle   • Anxiety disorder Sister        Current medications as of today   Current Outpatient Prescriptions   Medication Sig Dispense Refill   • Misc. Devices Misc This is an order for overnight pulse ox on her ASV with 2.5 liter bleed in.  Dx;  Oxygen desaturation during sleep G47.34, Central sleep apnea G47.31, obstructive sleep apnea G47.33      MARIA DOLORES 99 months   NPI [id number] 1 Each 0   • Probiotic Product (PROBIOTIC DAILY PO) Take 1 tablet by mouth every day.     • ADK 0738-9011-295 UNIT-MCG Cap Take 1 tablet by mouth.     • umeclidinium-vilanterol (ANORO ELLIPTA) 62.5-25 MCG/INH AEROSOL POWDER, BREATH ACTIVATED inhaler Inhale 1 Puff by mouth every day. (Patient taking differently: " "Inhale 1 Puff by mouth as needed.) 1 Inhaler 6   • levothyroxine (SYNTHROID) 150 MCG Tab Take 150 mcg by mouth.     • sertraline (ZOLOFT) 100 MG Tab Take 2 Tabs by mouth every day. (Patient taking differently: Take 100 mg by mouth every day.) 180 Tab 2   • HM OMEGA-3-6-9 FATTY ACIDS Cap Take 1 Cap by mouth every day. 30 Cap 11   • B Complex Vitamins (VITAMIN B COMPLEX PO) Take  by mouth every day.     • Misc. Devices Misc This is an order for nocturnal oxygen, 2L nasal prongs.  Dx:  Nocturnal hypoxia, lowest 59% on overnight study.  G47.34           MARIA DOLORES 99 months   NPI [id number] 1 Each 0   • Cholecalciferol (VITAMIN D) 2000 UNIT CAPS Take 2,000 Units by mouth every day. (Patient taking differently: Take 2,000 Units by mouth 2 Times a Day.) 60 Cap 6     No current facility-administered medications for this visit.        Allergies: Doxycycline; Pcn [penicillins]; and Keflex [cephalexin]    Blood pressure 124/72, pulse 64, resp. rate 15, height 1.727 m (5' 8\"), weight 120.7 kg (266 lb), last menstrual period 03/01/1997, SpO2 94 %.      ROS:   Constitutional: Denies fevers, chills, night sweats, weight loss or fatigue  Eyes: Denies pain, discharge/drainage  ENT: Positive nasal congestion   Allergic: Positive rhinitis  Respiratory: See HPI  Cardiovascular: Denies chest pain, tightness, palpitations, orthopnea or edema  Sleep: Denies snoring, resuscitative gasps, frequent nocturnal awakenings, insomnia  GI/: Denies heartburn, nausea, vomiting, urinary incontinence, hematuria  Neurological: Denies vertigo or headaches  Skin: Denies rashes, lesions  Psychiatric: Denies depression or anxiety      Physical exam:   Constitutional: Well-nourished, well-developed, in no acute distress  Eyes: PERRL  Mouth/Throat: Oropharynx is moist, clear, no lesions, Mallampati 3  Neck: supple, trachea midline  Respiratory: no intercostal retractions or accessory muscle use   Lungs auscultation: Clear to auscultation bilaterally, diminished " bilateral bases  Cardiovascular: Regular rate rhythm no murmurs, rubs or gallops  Musculoskeletal:  no clubbing or cyanosis  Skin: No rashes or lesions noted on exposed skin  Neuro: No focal deficit noted  Psychiatric: Oriented to time, person and place.     Diagnosis:  1. Moderate persistent reactive airway disease with wheezing without complication     2. Central sleep apnea     3. BMI 40.0-44.9, adult (CMS-[facility])     4. Oxygen desaturation during sleep         Plan:    1. Continue ASV nightly with O2 bleed in at 3 L  2. Clean mask and tubing weekly  3. Chest CT shows resolution of pulmonary nodules. No further radiographic follow-up indicated at this point in time.  4. Continue current bronchodilator  5. Patient is up-to-date on influenza and pneumococcal 23  6. Follow with PCP routinely for health maintenance  7. Follow-up here annually for compliance download

## 2018-02-15 ENCOUNTER — PATIENT MESSAGE (OUTPATIENT)
Dept: PULMONOLOGY | Facility: HOSPICE | Age: 53
End: 2018-02-15

## 2018-02-15 ENCOUNTER — TELEPHONE (OUTPATIENT)
Dept: NEUROLOGY | Facility: MEDICAL CENTER | Age: 53
End: 2018-02-15

## 2018-02-15 DIAGNOSIS — G47.33 OSA (OBSTRUCTIVE SLEEP APNEA): ICD-10-CM

## 2018-02-15 NOTE — TELEPHONE ENCOUNTER
From: Tanvi Krueger  To: GERBER Renteria  Sent: 2/15/2018 6:54 AM PST  Subject: Non-Urgent Medical Question    When I tried to it the card in the machine after my office visit it says it’s corrupt. See photo attached. What should I do?    Thank you

## 2018-02-15 NOTE — TELEPHONE ENCOUNTER
Dr. Weldon, Karen relayed my spinal tap results were all good.  My health history shows abnormal VEEG (absence seizures), central sleep apnea and off balance  / back/spine pain walking  and stiffness.  I will go back to ENT and make sure Cholesteatoma has not reoccurred in my ears.    In the mean  am I just to let things be ie continue minimizing gluten  etc?     Kind of odd not to have further guidance.  Thank you Tanvi Krueger         Called patient left message to call back.

## 2018-02-16 ENCOUNTER — TELEPHONE (OUTPATIENT)
Dept: NEUROLOGY | Facility: MEDICAL CENTER | Age: 53
End: 2018-02-16

## 2018-02-16 DIAGNOSIS — G47.33 OSA (OBSTRUCTIVE SLEEP APNEA): ICD-10-CM

## 2018-02-16 NOTE — TELEPHONE ENCOUNTER
Dr. Weldon, Karen relayed my spinal tap results were all good.  My health history shows abnormal VEEG (absence seizures), central sleep apnea and off balance  / back/spine pain walking  and stiffness.  I will go back to ENT and make sure Cholesteatoma has not reoccurred in my ears.    In the mean  am I just to let things be ie continue minimizing gluten  etc?     Kind of odd not to have further guidance.  Thank you Tanvi Krueger    Spoke to patient she is still having episodes and stated you talked  to  Her about possibly taking medicine.     Please advise    Thank you

## 2018-02-20 ENCOUNTER — OFFICE VISIT (OUTPATIENT)
Dept: BEHAVIORAL HEALTH | Facility: PHYSICIAN GROUP | Age: 53
End: 2018-02-20
Payer: COMMERCIAL

## 2018-02-20 DIAGNOSIS — F41.1 GENERALIZED ANXIETY DISORDER: ICD-10-CM

## 2018-02-20 DIAGNOSIS — F33.1 MAJOR DEPRESSIVE DISORDER, RECURRENT EPISODE, MODERATE (HCC): ICD-10-CM

## 2018-02-20 PROCEDURE — 90834 PSYTX W PT 45 MINUTES: CPT | Performed by: PSYCHOLOGIST

## 2018-02-21 NOTE — BH THERAPY
[facility]  Therapy Progress Note    Patient Name: Tanvi Krueger  Patient MRN: [id number]  Today's Date: 2/20/2018     Type of session:Individual psychotherapy  Length of session: 50 minutes  Persons in attendance:Patient    Subjective/New Info: Pt disc how she is less willing to take care of others' needs at the expense of her own.  She's been feeling more empowered about this. Pt has passed her parole period at work and feels more confident in her abilities. She discussed a situation where she asserted herself with a co-worker during a meeting. Pt processed her ongoing annoyance with her mother in law. Disc ways she can step back from automatically picking up responsibilities and let her husb take on more of a role of care taker with his mom.   Objective/Observations:   Participation: Active verbal participation, Attentive and Engaged   Grooming: Casual   Cognition: Alert and Fully Oriented   Eye contact: Good   Mood: Euthymic   Affect: Anxious   Thought process: Logical and Goal-directed   Speech: Rate within normal limits   Other:     Diagnoses:   1. Major depressive disorder, recurrent episode, moderate (CMS-HCC)    2. Generalized anxiety disorder         Current risk:   SUICIDE: Not applicable   Homicide: Not applicable   Self-harm: Not applicable   Relapse: Not applicable   Other:    Safety Plan reviewed? Not Indicated   If evidence of imminent risk is present, intervention/plan:     Therapeutic Intervention(s): Behavior:  Behavioral activation, Communication skills, Parenting/familial roles addressed, Self-care skills, Stressors assessed and Supportive psychotherapy    Treatment Goal(s)/Objective(s) addressed: Tx plan:  - Utilize learned skills to manage mood and emotional suffering more effectively  - Utilize learned assertiveness skills to set boundaries & get needs met  - Identify goals/values and cultivate a meaningful life  - Process and reduce the effects of past trauma on life,  functioning, & sense of self  - Learn to ameliorate effects of anxiety on life and functioning  - Increase behaviors of self-compassion and self-care       Progress toward Treatment Goals: Moderate improvement    Plan:  - Continue Individual therapy and Medication management    Beatriz Winn, Ph.D.  2/20/2018

## 2018-03-06 RX ORDER — LAMOTRIGINE 25 MG/1
25 TABLET ORAL DAILY
Qty: 30 TAB | Refills: 3 | Status: SHIPPED | OUTPATIENT
Start: 2018-03-06 | End: 2019-02-14

## 2018-03-06 NOTE — TELEPHONE ENCOUNTER
Message   Received: Today   Message Contents   Ben Weldon M.D. sent to Sunni Razo, Med Ass't   Caller: Unspecified (2 weeks ago)                   We could try Lamictal and Trileptal if the patient is willing to try--- the patient already failed keppra and zonegran     Lamictal 25mg before sleep --- if not helpful in one week, let us know   We could slowly up the dose   Order in EPIC      Left message for pt to call back.

## 2018-03-20 ENCOUNTER — OFFICE VISIT (OUTPATIENT)
Dept: BEHAVIORAL HEALTH | Facility: PHYSICIAN GROUP | Age: 53
End: 2018-03-20
Payer: COMMERCIAL

## 2018-03-20 DIAGNOSIS — F41.1 GENERALIZED ANXIETY DISORDER: ICD-10-CM

## 2018-03-20 DIAGNOSIS — F33.1 MAJOR DEPRESSIVE DISORDER, RECURRENT EPISODE, MODERATE (HCC): ICD-10-CM

## 2018-03-20 PROCEDURE — 90834 PSYTX W PT 45 MINUTES: CPT | Performed by: PSYCHOLOGIST

## 2018-03-20 NOTE — BH THERAPY
[facility]  Therapy Progress Note    Patient Name: Tanvi Krueger  Patient MRN: [id number]  Today's Date: 3/20/2018     Type of session:Individual psychotherapy  Length of session: 50 minutes  Persons in attendance:Patient    Subjective/New Info: Pt disc her heightened anxiety lately. She told about some encounters she had with family and a co-worker. All had the common thread of Pt feeling like she was being dismissed and what she had to say or request wasn't treated as unimportant by others. Disc ways in which she could assert herself better and say things in a manner that communicated that she felt her opinions were important. Suggested Pt look at the legitimate rights flyer again.        Objective/Observations:   Participation: Active verbal participation, Attentive and Engaged   Grooming: Casual   Cognition: Alert and Fully Oriented   Eye contact: Good   Mood: Depressed, Anxious and Irritable   Affect: Sad, Anxious and Tearful   Thought process: Logical and Goal-directed   Speech: Rate within normal limits   Other:     Diagnoses:   1. Generalized anxiety disorder    2. Major depressive disorder, recurrent episode, moderate (CMS-HCC)         Current risk:   SUICIDE: Not applicable   Homicide: Not applicable   Self-harm: Not applicable   Relapse: Not applicable   Other:    Safety Plan reviewed? Not Indicated   If evidence of imminent risk is present, intervention/plan:     Therapeutic Intervention(s): Behavior:  Behavioral activation, Communication skills, Parenting/familial roles addressed, Self-care skills, Stressors assessed and Supportive psychotherapy    Treatment Goal(s)/Objective(s) addressed: Tx plan:  - Utilize learned skills to manage mood and emotional suffering more effectively  - Utilize learned assertiveness skills to set boundaries & get needs met  - Identify goals/values and cultivate a meaningful life  - Process and reduce the effects of past trauma on life, functioning, & sense  of self  - Learn to ameliorate effects of anxiety on life and functioning  - Increase behaviors of self-compassion and self-care       Progress toward Treatment Goals: Mild decline    Plan:  - Continue Individual therapy and Medication management    Beatriz Winn, Ph.D.  3/20/2018

## 2018-04-06 ENCOUNTER — APPOINTMENT (OUTPATIENT)
Dept: BEHAVIORAL HEALTH | Facility: PHYSICIAN GROUP | Age: 53
End: 2018-04-06
Payer: COMMERCIAL

## 2018-04-27 ENCOUNTER — OFFICE VISIT (OUTPATIENT)
Dept: BEHAVIORAL HEALTH | Facility: PHYSICIAN GROUP | Age: 53
End: 2018-04-27
Payer: COMMERCIAL

## 2018-04-27 DIAGNOSIS — F41.1 GENERALIZED ANXIETY DISORDER: ICD-10-CM

## 2018-04-27 DIAGNOSIS — F33.1 MAJOR DEPRESSIVE DISORDER, RECURRENT EPISODE, MODERATE (HCC): ICD-10-CM

## 2018-04-27 PROCEDURE — 90834 PSYTX W PT 45 MINUTES: CPT | Performed by: PSYCHOLOGIST

## 2018-04-27 NOTE — BH THERAPY
[facility]  Therapy Progress Note    Patient Name: Tanvi Krueger  Patient MRN: [id number]  Today's Date: 4/27/2018     Type of session:Individual psychotherapy  Length of session: 50 minutes  Persons in attendance:Patient    Subjective/New Info: Pt disc her efforts at setting boundaries with family and co-workers and trying to ask for what she wants. Role played a few of these encounters so as to show Pt how she could have been more effective.        Objective/Observations:   Participation: Active verbal participation, Attentive and Engaged   Grooming: Casual   Cognition: Alert and Fully Oriented   Eye contact: Good   Mood: Anxious   Affect: Anxious   Thought process: Logical and Goal-directed   Speech: Rate within normal limits   Other:     Diagnoses:   1. Major depressive disorder, recurrent episode, moderate (CMS-HCC)    2. Generalized anxiety disorder         Current risk:   SUICIDE: Not applicable   Homicide: Not applicable   Self-harm: Not applicable   Relapse: Not applicable   Other:    Safety Plan reviewed? Not Indicated   If evidence of imminent risk is present, intervention/plan:     Therapeutic Intervention(s): Communication skills, Limit-setting, Positive behavior reinforced, Self-care skills, Stressors assessed and Supportive psychotherapy    Treatment Goal(s)/Objective(s) addressed: Tx plan:  - Utilize learned skills to manage mood and emotional suffering more effectively  - Utilize learned assertiveness skills to set boundaries & get needs met  - Identify goals/values and cultivate a meaningful life  - Process and reduce the effects of past trauma on life, functioning, & sense of self  - Learn to ameliorate effects of anxiety on life and functioning  - Increase behaviors of self-compassion and self-care       Progress toward Treatment Goals: Moderate improvement    Plan:  - Continue Individual therapy and Medication management    Beatriz Winn,  Ph.D.  4/27/2018

## 2018-05-14 DIAGNOSIS — E06.3 HASHIMOTO'S THYROIDITIS: ICD-10-CM

## 2018-05-14 RX ORDER — LEVOTHYROXINE SODIUM 0.15 MG/1
TABLET ORAL
Qty: 30 TAB | Refills: 0 | Status: SHIPPED | OUTPATIENT
Start: 2018-05-14 | End: 2018-06-22 | Stop reason: SDUPTHER

## 2018-05-22 ENCOUNTER — OFFICE VISIT (OUTPATIENT)
Dept: BEHAVIORAL HEALTH | Facility: PHYSICIAN GROUP | Age: 53
End: 2018-05-22
Payer: COMMERCIAL

## 2018-05-22 DIAGNOSIS — F33.1 MAJOR DEPRESSIVE DISORDER, RECURRENT EPISODE, MODERATE (HCC): ICD-10-CM

## 2018-05-22 DIAGNOSIS — F41.1 GENERALIZED ANXIETY DISORDER: ICD-10-CM

## 2018-05-22 PROCEDURE — 90834 PSYTX W PT 45 MINUTES: CPT | Performed by: PSYCHOLOGIST

## 2018-05-23 NOTE — BH THERAPY
[facility]  Therapy Progress Note    Patient Name: Tanvi Krueger  Patient MRN: [id number]  Today's Date: 5/22/2018     Type of session:Individual psychotherapy  Length of session: 50 minutes  Persons in attendance:Patient    Subjective/New Info: Pt disc her efforts at setting boundaries with family and co-workers and trying to ask for what she wants. Pt described some interactions with her mom in law and adult kids where she was able to successfully able to speak her thoughts and feelings in the moment and challenge passive aggressive jabs at her. Pt expressed her frustration at her husb for not supporting her in the moment more.        Objective/Observations:   Participation: Active verbal participation, Attentive and Engaged   Grooming: Casual   Cognition: Alert and Fully Oriented   Eye contact: Good   Mood: Anxious   Affect: Anxious   Thought process: Logical and Goal-directed   Speech: Rate within normal limits   Other:     Diagnoses:   1. Major depressive disorder, recurrent episode, moderate (HCC)    2. Generalized anxiety disorder         Current risk:   SUICIDE: Not applicable   Homicide: Not applicable   Self-harm: Not applicable   Relapse: Not applicable   Other:    Safety Plan reviewed? Not Indicated   If evidence of imminent risk is present, intervention/plan:     Therapeutic Intervention(s): Communication skills, Limit-setting, Positive behavior reinforced, Self-care skills, Stressors assessed and Supportive psychotherapy    Treatment Goal(s)/Objective(s) addressed: Tx plan:  - Utilize learned skills to manage mood and emotional suffering more effectively  - Utilize learned assertiveness skills to set boundaries & get needs met  - Identify goals/values and cultivate a meaningful life  - Process and reduce the effects of past trauma on life, functioning, & sense of self  - Learn to ameliorate effects of anxiety on life and functioning  - Increase behaviors of self-compassion and  self-care       Progress toward Treatment Goals: Moderate improvement    Plan:  - Continue Individual therapy and Medication management    Beatriz Winn, Ph.D.  5/22/2018

## 2018-05-31 ENCOUNTER — OFFICE VISIT (OUTPATIENT)
Dept: BEHAVIORAL HEALTH | Facility: PHYSICIAN GROUP | Age: 53
End: 2018-05-31
Payer: COMMERCIAL

## 2018-05-31 DIAGNOSIS — F41.1 GENERALIZED ANXIETY DISORDER: ICD-10-CM

## 2018-05-31 DIAGNOSIS — F33.41 RECURRENT MAJOR DEPRESSIVE DISORDER, IN PARTIAL REMISSION (HCC): ICD-10-CM

## 2018-05-31 PROCEDURE — 90834 PSYTX W PT 45 MINUTES: CPT | Performed by: PSYCHOLOGIST

## 2018-05-31 NOTE — BH THERAPY
[facility]  Therapy Progress Note    Patient Name: Tanvi Krueger  Patient MRN: [id number]  Today's Date: 5/31/2018     Type of session:Individual psychotherapy  Length of session: 50 minutes  Persons in attendance:Patient    Subjective/New Info: Pt disc her efforts at setting boundaries with family and co-workers and trying to ask for what she wants. Pt is becoming more aware of her mom in law's manipulative behaviors and has decided to not offer assistance on her own as she feels taken advantaged of. Pt is not taking things as personally and she is beginning to let other people be who they are and not create a lot of emot suffering for herself by trying to change them.        Objective/Observations:   Participation: Active verbal participation, Attentive and Engaged   Grooming: Casual   Cognition: Alert and Fully Oriented   Eye contact: Good   Mood: Anxious   Affect: Anxious   Thought process: Logical and Goal-directed   Speech: Rate within normal limits   Other:     Diagnoses:   1. Generalized anxiety disorder         Current risk:   SUICIDE: Not applicable   Homicide: Not applicable   Self-harm: Not applicable   Relapse: Not applicable   Other:    Safety Plan reviewed? Not Indicated   If evidence of imminent risk is present, intervention/plan:     Therapeutic Intervention(s): Behavior:  Behavioral activation, Communication skills, Limit-setting, Parenting/familial roles addressed, Positive behavior reinforced, Self-care skills, Stressors assessed and Supportive psychotherapy    Treatment Goal(s)/Objective(s) addressed: Tx plan:  - Utilize learned skills to manage mood and emotional suffering more effectively  - Utilize learned assertiveness skills to set boundaries & get needs met  - Identify goals/values and cultivate a meaningful life  - Process and reduce the effects of past trauma on life, functioning, & sense of self  - Learn to ameliorate effects of anxiety on life and  functioning  - Increase behaviors of self-compassion and self-care       Progress toward Treatment Goals: Moderate improvement    Plan:  - Continue Individual therapy and Medication management    Beatriz Winn, Ph.D.  5/31/2018

## 2018-06-12 ENCOUNTER — OFFICE VISIT (OUTPATIENT)
Dept: BEHAVIORAL HEALTH | Facility: PHYSICIAN GROUP | Age: 53
End: 2018-06-12
Payer: COMMERCIAL

## 2018-06-12 DIAGNOSIS — F41.1 GENERALIZED ANXIETY DISORDER: ICD-10-CM

## 2018-06-12 DIAGNOSIS — F33.41 RECURRENT MAJOR DEPRESSIVE DISORDER, IN PARTIAL REMISSION (HCC): ICD-10-CM

## 2018-06-12 PROCEDURE — 90834 PSYTX W PT 45 MINUTES: CPT | Performed by: PSYCHOLOGIST

## 2018-06-12 NOTE — BH THERAPY
[facility]  Therapy Progress Note    Patient Name: Tanvi Krueger  Patient MRN: [id number]  Today's Date: 6/12/2018     Type of session:Individual psychotherapy  Length of session: 50 minutes  Persons in attendance:Patient    Subjective/New Info: Pt reports feeling a little low with her mood lately, but can't explain why. She has continued to set boundaries with her mom in law and others. Disc how she once saw her exit report from a supervisor that wasn't flattering and she used the experience to improve herself as an employee. Pt dsic some work stressors.         Objective/Observations:   Participation: Active verbal participation, Attentive and Engaged   Grooming: Casual   Cognition: Alert and Fully Oriented   Eye contact: Good   Mood: Euthymic   Affect: Constricted   Thought process: Logical and Goal-directed   Speech: Rate within normal limits   Other:     Diagnoses:   1. Recurrent major depressive disorder, in partial remission (HCC)    2. Generalized anxiety disorder         Current risk:   SUICIDE: Not applicable   Homicide: Not applicable   Self-harm: Not applicable   Relapse: Not applicable   Other:    Safety Plan reviewed? Not Indicated   If evidence of imminent risk is present, intervention/plan:     Therapeutic Intervention(s): Limit-setting, Parenting/familial roles addressed, Positive behavior reinforced, Self-care skills, Stressors assessed and Supportive psychotherapy    Treatment Goal(s)/Objective(s) addressed: Tx plan:  - Utilize learned skills to manage mood and emotional suffering more effectively  - Utilize learned assertiveness skills to set boundaries & get needs met  - Identify goals/values and cultivate a meaningful life  - Process and reduce the effects of past trauma on life, functioning, & sense of self  - Learn to ameliorate effects of anxiety on life and functioning  - Increase behaviors of self-compassion and self-care       Progress toward Treatment Goals: Moderate  improvement    Plan:  - Continue Individual therapy and Medication management    Beatriz Winn, Ph.D.  6/12/2018

## 2018-06-22 DIAGNOSIS — E06.3 HASHIMOTO'S THYROIDITIS: ICD-10-CM

## 2018-06-23 RX ORDER — LEVOTHYROXINE SODIUM 0.15 MG/1
TABLET ORAL
Qty: 30 TAB | Refills: 3 | Status: SHIPPED | OUTPATIENT
Start: 2018-06-23 | End: 2018-07-27 | Stop reason: SDUPTHER

## 2018-07-10 ENCOUNTER — TELEPHONE (OUTPATIENT)
Dept: MEDICAL GROUP | Facility: CLINIC | Age: 53
End: 2018-07-10

## 2018-07-12 ENCOUNTER — PATIENT MESSAGE (OUTPATIENT)
Dept: PULMONOLOGY | Facility: HOSPICE | Age: 53
End: 2018-07-12

## 2018-07-13 NOTE — TELEPHONE ENCOUNTER
From: Tanvi Krueger  To: GERBER Renteria  Sent: 7/12/2018 7:08 PM PDT  Subject: Prescription Question    My insurance changed see attached. Can we do the oxygen and the sleep apnea machine from [facility]?

## 2018-07-24 ENCOUNTER — APPOINTMENT (OUTPATIENT)
Dept: RADIOLOGY | Facility: MEDICAL CENTER | Age: 53
End: 2018-07-24
Attending: FAMILY MEDICINE
Payer: COMMERCIAL

## 2018-07-27 ENCOUNTER — OFFICE VISIT (OUTPATIENT)
Dept: MEDICAL GROUP | Facility: CLINIC | Age: 53
End: 2018-07-27
Payer: COMMERCIAL

## 2018-07-27 ENCOUNTER — HOSPITAL ENCOUNTER (OUTPATIENT)
Dept: LAB | Facility: MEDICAL CENTER | Age: 53
End: 2018-07-27
Attending: FAMILY MEDICINE
Payer: COMMERCIAL

## 2018-07-27 ENCOUNTER — PATIENT MESSAGE (OUTPATIENT)
Dept: MEDICAL GROUP | Facility: CLINIC | Age: 53
End: 2018-07-27

## 2018-07-27 VITALS
WEIGHT: 272 LBS | BODY MASS INDEX: 41.22 KG/M2 | TEMPERATURE: 96.8 F | SYSTOLIC BLOOD PRESSURE: 140 MMHG | HEART RATE: 65 BPM | HEIGHT: 68 IN | DIASTOLIC BLOOD PRESSURE: 86 MMHG | OXYGEN SATURATION: 93 % | RESPIRATION RATE: 14 BRPM

## 2018-07-27 DIAGNOSIS — J02.8 ACUTE BACTERIAL PHARYNGITIS: ICD-10-CM

## 2018-07-27 DIAGNOSIS — E78.5 DYSLIPIDEMIA, GOAL LDL BELOW 130: ICD-10-CM

## 2018-07-27 DIAGNOSIS — K13.0 LIP LESION: ICD-10-CM

## 2018-07-27 DIAGNOSIS — E06.3 HASHIMOTO'S THYROIDITIS: ICD-10-CM

## 2018-07-27 DIAGNOSIS — R73.01 ELEVATED FASTING GLUCOSE: ICD-10-CM

## 2018-07-27 DIAGNOSIS — K20.0 EOSINOPHILIC ESOPHAGITIS: ICD-10-CM

## 2018-07-27 DIAGNOSIS — E55.9 VITAMIN D DEFICIENCY DISEASE: ICD-10-CM

## 2018-07-27 DIAGNOSIS — B96.89 ACUTE BACTERIAL PHARYNGITIS: ICD-10-CM

## 2018-07-27 DIAGNOSIS — E04.1 THYROID NODULE: ICD-10-CM

## 2018-07-27 LAB
25(OH)D3 SERPL-MCNC: 18 NG/ML (ref 30–100)
ALBUMIN SERPL BCP-MCNC: 4.3 G/DL (ref 3.2–4.9)
ALBUMIN/GLOB SERPL: 1.5 G/DL
ALP SERPL-CCNC: 73 U/L (ref 30–99)
ALT SERPL-CCNC: 18 U/L (ref 2–50)
ANION GAP SERPL CALC-SCNC: 6 MMOL/L (ref 0–11.9)
AST SERPL-CCNC: 17 U/L (ref 12–45)
BILIRUB SERPL-MCNC: 0.4 MG/DL (ref 0.1–1.5)
BUN SERPL-MCNC: 14 MG/DL (ref 8–22)
CALCIUM SERPL-MCNC: 9.7 MG/DL (ref 8.5–10.5)
CHLORIDE SERPL-SCNC: 108 MMOL/L (ref 96–112)
CHOLEST SERPL-MCNC: 269 MG/DL (ref 100–199)
CO2 SERPL-SCNC: 27 MMOL/L (ref 20–33)
CREAT SERPL-MCNC: 0.85 MG/DL (ref 0.5–1.4)
ERYTHROCYTE [DISTWIDTH] IN BLOOD BY AUTOMATED COUNT: 45.2 FL (ref 35.9–50)
GLOBULIN SER CALC-MCNC: 2.9 G/DL (ref 1.9–3.5)
GLUCOSE SERPL-MCNC: 104 MG/DL (ref 65–99)
HCT VFR BLD AUTO: 43.1 % (ref 37–47)
HDLC SERPL-MCNC: 46 MG/DL
HGB BLD-MCNC: 13.8 G/DL (ref 12–16)
INT CON NEG: NEGATIVE
INT CON POS: POSITIVE
LDLC SERPL CALC-MCNC: 184 MG/DL
MCH RBC QN AUTO: 26.6 PG (ref 27–33)
MCHC RBC AUTO-ENTMCNC: 32 G/DL (ref 33.6–35)
MCV RBC AUTO: 83 FL (ref 81.4–97.8)
PLATELET # BLD AUTO: 236 K/UL (ref 164–446)
PMV BLD AUTO: 10.2 FL (ref 9–12.9)
POTASSIUM SERPL-SCNC: 4 MMOL/L (ref 3.6–5.5)
PROT SERPL-MCNC: 7.2 G/DL (ref 6–8.2)
RBC # BLD AUTO: 5.19 M/UL (ref 4.2–5.4)
S PYO AG THROAT QL: NEGATIVE
SODIUM SERPL-SCNC: 141 MMOL/L (ref 135–145)
TRIGL SERPL-MCNC: 197 MG/DL (ref 0–149)
TSH SERPL DL<=0.005 MIU/L-ACNC: 0.26 UIU/ML (ref 0.38–5.33)
WBC # BLD AUTO: 5.9 K/UL (ref 4.8–10.8)

## 2018-07-27 PROCEDURE — 80053 COMPREHEN METABOLIC PANEL: CPT

## 2018-07-27 PROCEDURE — 87880 STREP A ASSAY W/OPTIC: CPT | Performed by: FAMILY MEDICINE

## 2018-07-27 PROCEDURE — 85027 COMPLETE CBC AUTOMATED: CPT

## 2018-07-27 PROCEDURE — 84443 ASSAY THYROID STIM HORMONE: CPT

## 2018-07-27 PROCEDURE — 80061 LIPID PANEL: CPT

## 2018-07-27 PROCEDURE — 36415 COLL VENOUS BLD VENIPUNCTURE: CPT

## 2018-07-27 PROCEDURE — 83036 HEMOGLOBIN GLYCOSYLATED A1C: CPT

## 2018-07-27 PROCEDURE — 82306 VITAMIN D 25 HYDROXY: CPT

## 2018-07-27 PROCEDURE — 99214 OFFICE O/P EST MOD 30 MIN: CPT | Performed by: FAMILY MEDICINE

## 2018-07-27 RX ORDER — AZITHROMYCIN 250 MG/1
TABLET, FILM COATED ORAL
Qty: 6 TAB | Refills: 0 | Status: SHIPPED | OUTPATIENT
Start: 2018-07-27 | End: 2018-12-18

## 2018-07-27 RX ORDER — LEVOTHYROXINE SODIUM 0.15 MG/1
150 TABLET ORAL
Qty: 90 TAB | Refills: 3 | Status: SHIPPED | OUTPATIENT
Start: 2018-07-27 | End: 2019-02-14

## 2018-07-27 RX ORDER — AZITHROMYCIN 250 MG/1
TABLET, FILM COATED ORAL
Qty: 6 TAB | Refills: 0 | Status: SHIPPED | OUTPATIENT
Start: 2018-07-27 | End: 2018-07-27 | Stop reason: SDUPTHER

## 2018-07-27 RX ORDER — ROSUVASTATIN CALCIUM 5 MG/1
5 TABLET, COATED ORAL
Qty: 36 TAB | Refills: 2 | Status: SHIPPED | OUTPATIENT
Start: 2018-07-27 | End: 2019-02-14
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
## 2018-07-30 ENCOUNTER — HOSPITAL ENCOUNTER (OUTPATIENT)
Dept: RADIOLOGY | Facility: MEDICAL CENTER | Age: 53
End: 2018-07-30
Attending: FAMILY MEDICINE
Payer: COMMERCIAL

## 2018-07-30 DIAGNOSIS — Z12.31 VISIT FOR SCREENING MAMMOGRAM: ICD-10-CM

## 2018-07-30 PROCEDURE — 77067 SCR MAMMO BI INCL CAD: CPT

## 2018-07-31 ENCOUNTER — OFFICE VISIT (OUTPATIENT)
Dept: BEHAVIORAL HEALTH | Facility: PHYSICIAN GROUP | Age: 53
End: 2018-07-31
Payer: COMMERCIAL

## 2018-07-31 DIAGNOSIS — F33.41 RECURRENT MAJOR DEPRESSIVE DISORDER, IN PARTIAL REMISSION (HCC): ICD-10-CM

## 2018-07-31 DIAGNOSIS — F41.1 GENERALIZED ANXIETY DISORDER: ICD-10-CM

## 2018-07-31 PROCEDURE — 90834 PSYTX W PT 45 MINUTES: CPT | Performed by: PSYCHOLOGIST

## 2018-07-31 NOTE — BH THERAPY
[facility]  Therapy Progress Note    Patient Name: Tanvi Krueger  Patient MRN: [id number]  Today's Date: 7/31/2018     Type of session:Individual psychotherapy  Length of session: 50 minutes  Persons in attendance:Patient    Subjective/New Info: Pt reports feeling pretty good about her life these days. She has been speaking up more re her feelings and needs with people. She has been acknowledging her feelings without judgement or dismissal. Pt is getting better at letting things go and not ruminating on things.  Session got interrupted by medical emergency in clinic with another person, Pt handled the interruption well.        Objective/Observations:   Participation: Active verbal participation, Attentive and Engaged   Grooming: Casual   Cognition: Alert and Fully Oriented   Eye contact: Good   Mood: Euthymic   Affect: Congruent with content   Thought process: Logical and Goal-directed   Speech: Rate within normal limits   Other:     Diagnoses:   1. Generalized anxiety disorder    2. Recurrent major depressive disorder, in partial remission (HCC)         Current risk:   SUICIDE: Not applicable   Homicide: Not applicable   Self-harm: Not applicable   Relapse: Not applicable   Other:    Safety Plan reviewed? Not Indicated   If evidence of imminent risk is present, intervention/plan:     Therapeutic Intervention(s): Interpersonal effectiveness skills, Limit-setting, Parenting/familial roles addressed, Positive behavior reinforced, Self-care skills, Stressors assessed and Supportive psychotherapy    Treatment Goal(s)/Objective(s) addressed: Tx plan:  - Utilize learned skills to manage mood and emotional suffering more effectively  - Utilize learned assertiveness skills to set boundaries & get needs met  - Identify goals/values and cultivate a meaningful life  - Process and reduce the effects of past trauma on life, functioning, & sense of self  - Learn to ameliorate effects of anxiety on life and  functioning  - Increase behaviors of self-compassion and self-care       Progress toward Treatment Goals: Moderate improvement    Plan:  - Continue Individual therapy and Medication management    Beatriz Winn, Ph.D.  7/31/2018

## 2018-08-01 LAB
EST. AVERAGE GLUCOSE BLD GHB EST-MCNC: 114 MG/DL
HBA1C MFR BLD: 5.6 % (ref 0–5.6)

## 2018-08-28 ENCOUNTER — OFFICE VISIT (OUTPATIENT)
Dept: BEHAVIORAL HEALTH | Facility: PHYSICIAN GROUP | Age: 53
End: 2018-08-28
Payer: COMMERCIAL

## 2018-08-28 DIAGNOSIS — F33.41 RECURRENT MAJOR DEPRESSIVE DISORDER, IN PARTIAL REMISSION (HCC): ICD-10-CM

## 2018-08-28 DIAGNOSIS — F41.1 GENERALIZED ANXIETY DISORDER: ICD-10-CM

## 2018-08-28 PROCEDURE — 90834 PSYTX W PT 45 MINUTES: CPT | Performed by: PSYCHOLOGIST
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
## 2018-10-04 ENCOUNTER — HOSPITAL ENCOUNTER (OUTPATIENT)
Dept: LAB | Facility: MEDICAL CENTER | Age: 53
End: 2018-10-04
Attending: PHYSICIAN ASSISTANT
Payer: COMMERCIAL

## 2018-10-04 PROCEDURE — 84443 ASSAY THYROID STIM HORMONE: CPT

## 2018-10-04 PROCEDURE — 36415 COLL VENOUS BLD VENIPUNCTURE: CPT

## 2018-10-04 PROCEDURE — 84439 ASSAY OF FREE THYROXINE: CPT

## 2018-10-04 PROCEDURE — 82306 VITAMIN D 25 HYDROXY: CPT

## 2018-10-05 LAB
25(OH)D3 SERPL-MCNC: 42 NG/ML (ref 30–100)
T4 FREE SERPL-MCNC: 1.14 NG/DL (ref 0.53–1.43)
TSH SERPL DL<=0.005 MIU/L-ACNC: 0.69 UIU/ML (ref 0.38–5.33)

## 2018-11-01 ENCOUNTER — PATIENT MESSAGE (OUTPATIENT)
Dept: PULMONOLOGY | Facility: HOSPICE | Age: 53
End: 2018-11-01

## 2018-11-01 NOTE — PATIENT COMMUNICATION
Unable to download compliance wireless. Called [facility] no recent download since 2017. Informed the pt via [facility] encouraged to bring chip to sleep center or [facility] for compliance

## 2018-11-01 NOTE — PATIENT COMMUNICATION
Last seen 2/13/18 RAY Tan   Plan:     1. Continue ASV nightly with O2 bleed in at 3 L  2. Clean mask and tubing weekly  3. Chest CT shows resolution of pulmonary nodules. No further radiographic follow-up indicated at this point in time.  4. Continue current bronchodilator  5. Patient is up-to-date on influenza and pneumococcal 23  6. Follow with PCP routinely for health maintenance  7. Follow-up here annually for compliance download    Next OV 2/14/19 MAGDY Caldwell, RAY

## 2018-11-01 NOTE — TELEPHONE ENCOUNTER
----- Message from Tanvi Krueger sent at 11/1/2018  5:07 AM PDT -----  Regarding: Non-Urgent Medical Question  Contact: [phone]  I’d like to request a reassessment of the pressure needed on the asv machine.   When using the machine I feel like I’m struggling and have numbness in my extremities when I wake.  Thank you

## 2018-11-02 ENCOUNTER — PATIENT MESSAGE (OUTPATIENT)
Dept: SLEEP MEDICINE | Facility: MEDICAL CENTER | Age: 53
End: 2018-11-02

## 2018-11-02 ENCOUNTER — TELEPHONE (OUTPATIENT)
Dept: PULMONOLOGY | Facility: HOSPICE | Age: 53
End: 2018-11-02

## 2018-11-02 DIAGNOSIS — G47.31 COMPLEX SLEEP APNEA SYNDROME: ICD-10-CM

## 2018-11-02 NOTE — TELEPHONE ENCOUNTER
I do not think swelling in arms and legs is related. please have her follow-up with her primary regarding this.

## 2018-11-02 NOTE — TELEPHONE ENCOUNTER
"Pt walked into clinic to get chip downloaded. She would like to know how her DL looks? She states she feels there has been some \"issues\". I states this would most likely need to be dicussed in an OV but that I would send to a provider. She would not elaborate on the \"issues just concerned about the results of her chip  "

## 2018-11-02 NOTE — TELEPHONE ENCOUNTER
The AHI on compliance is normal. Her average EPAP pressure is 8.5. I can drop this back to 8 but dont want to go much lower than that. Please make the adjustment and have pt come into office for repeat download in 1 month, next available provider

## 2018-11-02 NOTE — TELEPHONE ENCOUNTER
----- Message from Tanvi Krueger sent at 11/1/2018  7:51 PM PDT -----  Regarding: RE: Non-Urgent Medical Question  Contact: [phone]  I took the chip to my Feb 13, 2018 appt with Lucia Caldwell.  My thyroid medicine has been changed two months ago and I feel like the pressure is too high on the machine.   ----- Message -----  From: Opal Bradley, Med Ass't  Sent: 11/1/2018  8:58 AM PDT  To: Tanvi Krueger  Subject: RE: Non-Urgent Medical Question  Good Morning Tanvi I tried to retrieve you sleep compliance through [facility] they didn't have one since 2017. We do need to review your current compliance. Can you please bring your chip into the Sleep center or into [facility]. Please let me know.     ----- Message -----     From: Tanvi Krueger     Sent: 11/1/2018  5:07 AM PDT       To: Lucia Caldwell, A.[email or web address]  Subject: Non-Urgent Medical Question    I’d like to request a reassessment of the pressure needed on the asv machine.   When using the machine I feel like I’m struggling and have numbness in my extremities when I wake.  Thank you

## 2018-11-02 NOTE — TELEPHONE ENCOUNTER
I spoke to the patient informed order placed to lower pressure EPAP from 8.5 to 8 faxed to [facility] encouraged she contact [facility] to make sure order was received and and pressure is changed. Pt scheduled for one month follow up at pulmonary per RAY Tan. Pt aware of date time and location. Encouraged if not feeling better with pressure change to schedule sooner appt.     Pt states she has been experiencing swelling in her arms and legs and body once she wake from using the ASV machine. Would like to know if lowering the pressure will help?

## 2018-12-15 ENCOUNTER — OFFICE VISIT (OUTPATIENT)
Dept: URGENT CARE | Facility: CLINIC | Age: 53
End: 2018-12-15
Payer: COMMERCIAL

## 2018-12-15 VITALS
SYSTOLIC BLOOD PRESSURE: 132 MMHG | DIASTOLIC BLOOD PRESSURE: 84 MMHG | HEART RATE: 76 BPM | TEMPERATURE: 98.6 F | WEIGHT: 268 LBS | HEIGHT: 68 IN | BODY MASS INDEX: 40.62 KG/M2 | OXYGEN SATURATION: 95 %
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
## 2018-12-15 PROCEDURE — 99214 OFFICE O/P EST MOD 30 MIN: CPT | Performed by: PHYSICIAN ASSISTANT

## 2018-12-15 RX ORDER — LEVOTHYROXINE SODIUM 0.12 MG/1
TABLET ORAL
COMMUNITY
Start: 2018-08-09 | End: 2021-09-04

## 2018-12-15 RX ORDER — METHYLPREDNISOLONE 4 MG/1
TABLET ORAL
Qty: 1 KIT | Refills: 0 | Status: SHIPPED | OUTPATIENT
Start: 2018-12-15 | End: 2019-02-14

## 2018-12-15 RX ORDER — TRIAMCINOLONE ACETONIDE 1 MG/G
CREAM TOPICAL
Qty: 1 TUBE | Refills: 0 | Status: SHIPPED | OUTPATIENT
Start: 2018-12-15 | End: 2021-06-14

## 2018-12-15 ASSESSMENT — ENCOUNTER SYMPTOMS
DIZZINESS: 0
SEIZURES: 0
BLURRED VISION: 0
TINGLING: 0
COUGH: 0
SPEECH CHANGE: 0
SENSORY CHANGE: 0
SHORTNESS OF BREATH: 0
CHILLS: 0
FEVER: 0
FOCAL WEAKNESS: 0
PALPITATIONS: 0
TREMORS: 0
DOUBLE VISION: 0
HEADACHES: 0
LOSS OF CONSCIOUSNESS: 0

## 2018-12-15 NOTE — PROGRESS NOTES
Subjective:      Tanvi Krueger is a 53 y.o. female who presents with Insect Bite (Pt states family who live in house have same symtoms/red spots on extremeties/pt using [facility] )            Rash   This is a new problem. The current episode started in the past 7 days. The rash is diffuse. The rash is characterized by redness and itchiness. She was exposed to an insect bite/sting. Pertinent negatives include no cough, fever or shortness of breath. Past treatments include antihistamine. The treatment provided no relief.       Review of Systems   Constitutional: Negative for chills and fever.   Eyes: Negative for blurred vision and double vision.   Respiratory: Negative for cough and shortness of breath.    Cardiovascular: Negative for chest pain and palpitations.   Skin: Positive for itching and rash.   Neurological: Negative for dizziness, tingling, tremors, sensory change, speech change, focal weakness, seizures, loss of consciousness and headaches.   All other systems reviewed and are negative.    PMH:  has a past medical history of Anemia; Anesthesia; Anxiety; Arthritis; Asthma; Bowel habit changes; Cholesteatoma of middle ear and mastoid(385.33) (1992 surgery); Depression; Dyslipidemia, goal LDL below 130; Elevated glycohemoglobin; Eosinophilic esophagitis; Family history of early CAD; Hiatus hernia syndrome; History of chickenpox; History of endometriosis; History of partial thyroidectomy; Hypothyroidism due to Hashimoto's thyroiditis; Lumbar disc narrowing (7/14/2009); Menopausal symptoms; Oxygen desaturation during sleep; Pneumonia; Recurrent sinus infections; Seizure (HCC); Sleep apnea; Tonsillitis; and Vitamin d deficiency.  MEDS:   Current Outpatient Prescriptions:   •  levothyroxine (LEVOXYL) 125 MCG Tab, 1 tab(s), Disp: , Rfl:   •  triamcinolone acetonide (KENALOG) 0.1 % Cream, Apply to affected area twice daily for 10 days, Disp: 1 Tube, Rfl: 0  •  MethylPREDNISolone (MEDROL DOSEPAK) 4 MG Tablet  Therapy Pack, Take as directed on package., Disp: 1 Kit, Rfl: 0  •  Misc. Devices Misc, This is an order for overnight pulse ox on her ASV with 2.5 liter bleed in.  Dx;  Oxygen desaturation during sleep G47.34, Central sleep apnea G47.31, obstructive sleep apnea G47.33      MARIA DOLORES 99 months   NPI [id number], Disp: 1 Each, Rfl: 0  •  Probiotic Product (PROBIOTIC DAILY PO), Take 1 tablet by mouth every day., Disp: , Rfl:   •  ADK [id number] UNIT-MCG Cap, Take 1 tablet by mouth., Disp: , Rfl:   •  sertraline (ZOLOFT) 100 MG Tab, Take 2 Tabs by mouth every day. (Patient taking differently: Take 100 mg by mouth every day.), Disp: 180 Tab, Rfl: 2  •  HM OMEGA-3-6-9 FATTY ACIDS Cap, Take 1 Cap by mouth every day., Disp: 30 Cap, Rfl: 11  •  B Complex Vitamins (VITAMIN B COMPLEX PO), Take  by mouth every day., Disp: , Rfl:   •  Misc. Devices Misc, This is an order for nocturnal oxygen, 2L nasal prongs.  Dx:  Nocturnal hypoxia, lowest 59% on overnight study.  G47.34           MARIA DOLORES 99 months   NPI [id number], Disp: 1 Each, Rfl: 0  •  Cholecalciferol (VITAMIN D) 2000 UNIT CAPS, Take 2,000 Units by mouth every day. (Patient taking differently: Take 2,000 Units by mouth 2 Times a Day.), Disp: 60 Cap, Rfl: 6  •  levothyroxine (SYNTHROID) 150 MCG Tab, Take 1 Tab by mouth Every morning on an empty stomach. (Patient not taking: Reported on 12/15/2018), Disp: 90 Tab, Rfl: 3  •  mupirocin (BACTROBAN) 2 % Ointment, Apply to affected area 3 times a day (Patient not taking: Reported on 12/15/2018), Disp: 1 Tube, Rfl: 0  •  azithromycin (ZITHROMAX) 250 MG Tab, As directed on pack (Patient not taking: Reported on 12/15/2018), Disp: 6 Tab, Rfl: 0  •  rosuvastatin (CRESTOR) 5 MG Tab, Take 1 Tab by mouth every Monday, Wednesday, and Friday. (Patient not taking: Reported on 12/15/2018), Disp: 36 Tab, Rfl: 2  •  lamoTRIgine (LAMICTAL) 25 MG Tab, Take 1 Tab by mouth every day. (Patient not taking: Reported on 12/15/2018), Disp: 30 Tab, Rfl: 3  •   umeclidinium-vilanterol (ANORO ELLIPTA) 62.5-25 MCG/INH AEROSOL POWDER, BREATH ACTIVATED inhaler, Inhale 1 Puff by mouth every day. (Patient taking differently: Inhale 1 Puff by mouth as needed.), Disp: 1 Inhaler, Rfl: 6  ALLERGIES:   Allergies   Allergen Reactions   • Doxycycline      Wheezing; diarrhea   • Pcn [Penicillins] Unspecified     Unknown reaction as child   • Keflex [Cephalexin] Unspecified     Yeast Infection     SURGHX:   Past Surgical History:   Procedure Laterality Date   • LARYNGOSCOPY N/A 3/31/2017    Procedure: LARYNGOSCOPY - DIRECT W/BIOPSY BASE OF TONGUE AND NASOPHARYNGOSCOPY W/BIOPSY;  Surgeon: Naresh Abdi M.D.;  Location: SURGERY SAME DAY [facility];  Service:    • THYROIDECTOMY TOTAL Left 10/14/2016    Procedure: LEFT PARTIAL THROIDECTOMY;  Surgeon: Naresh Abdi M.D.;  Location: SURGERY SAME DAY [facility];  Service:    • SEPTOPLASTY  9/9/2016    Procedure: SEPTOPLASTY;  Surgeon: Narseh Abdi M.D.;  Location: SURGERY SAME DAY [facility];  Service:    • TURBINOPLASTY Bilateral 9/9/2016    Procedure: TURBINOPLASTY;  Surgeon: Naresh Abdi M.D.;  Location: SURGERY SAME DAY [facility];  Service:    • OTHER  2016    sinus surgery   • EAR MIDDLE EXPLORATION  4/3/2015    Performed by Naresh Abdi M.D. at SURGERY SAME DAY [facility]   • OSSICULAR RECONSTRUCTION  4/3/2015    Performed by Naresh Abdi M.D. at SURGERY SAME DAY [facility]   • MYRINGOTOMY  4/20/2012    Performed by RYANNE ALICIA at [facility]   • TYMPANOTOMY  6/24/2010    Performed by MAXIMUS WHITE at SURGERY SAME DAY [facility]   • EXAM UNDER ANESTHESIA  6/24/2010    Performed by MAXIMUS WHITE at SURGERY SAME DAY [facility]   • MYRINGOTOMY  4/30/2009    Performed by MAXIMUS WHITE at SURGERY SAME DAY [facility]   • ABDOMINAL HYSTERECTOMY TOTAL  1997    ovaries are gone   • ARTHROSCOPY, KNEE     • HYSTERECTOMY LAPAROSCOPY     • SINUSCOPE     • TONSILLECTOMY       SOCHX:  reports  "that she is a non-smoker but has been exposed to tobacco smoke. She has never used smokeless tobacco. She reports that she does not drink alcohol or use drugs.  FH: Family history was reviewed, no pertinent findings to report  Medications, Allergies, and current problem list reviewed today in Epic       Objective:     /84   Pulse 76   Temp 37 °C (98.6 °F)   Ht 1.727 m (5' 8\")   Wt 121.6 kg (268 lb)   LMP 03/01/1997   SpO2 95%   BMI 40.75 kg/m²      Physical Exam   Constitutional: She appears well-developed and well-nourished.   Cardiovascular: Normal rate, regular rhythm and normal heart sounds.    Pulmonary/Chest: Effort normal and breath sounds normal.   Skin: Skin is warm and dry. Rash noted. There is erythema.   Multiple red papules 2-3 mm throughout legs and arms.     Psychiatric: She has a normal mood and affect. Her behavior is normal. Judgment and thought content normal.   Vitals reviewed.              Assessment/Plan:   Pt and family exposed to sand fleas while on vacation.  Has tried antihistamine with no response.    1. Bug bite, initial encounter    - triamcinolone acetonide (KENALOG) 0.1 % Cream; Apply to affected area twice daily for 10 days  Dispense: 1 Tube; Refill: 0  - MethylPREDNISolone (MEDROL DOSEPAK) 4 MG Tablet Therapy Pack; Take as directed on package.  Dispense: 1 Kit; Refill: 0    Differential diagnosis, natural history, supportive care discussed. Follow-up with primary care provider within 7-10 days, emergency room precautions discussed.  Patient and/or family appears understanding of information.  Handout and review of patients diagnosis and treatment was discussed extensively.     "

## 2018-12-18 ENCOUNTER — OFFICE VISIT (OUTPATIENT)
Dept: PULMONOLOGY | Facility: HOSPICE | Age: 53
End: 2018-12-18
Payer: COMMERCIAL

## 2018-12-18 VITALS
OXYGEN SATURATION: 93 % | TEMPERATURE: 97.9 F | SYSTOLIC BLOOD PRESSURE: 112 MMHG | HEIGHT: 68 IN | WEIGHT: 267 LBS | HEART RATE: 69 BPM | DIASTOLIC BLOOD PRESSURE: 84 MMHG | BODY MASS INDEX: 40.47 KG/M2 | RESPIRATION RATE: 16 BRPM

## 2018-12-18 DIAGNOSIS — J45.20 MILD INTERMITTENT REACTIVE AIRWAY DISEASE WITH WHEEZING WITHOUT COMPLICATION: ICD-10-CM

## 2018-12-18 DIAGNOSIS — G47.34 OXYGEN DESATURATION DURING SLEEP: ICD-10-CM

## 2018-12-18 DIAGNOSIS — G47.31 CENTRAL SLEEP APNEA: ICD-10-CM

## 2018-12-18 PROCEDURE — 99214 OFFICE O/P EST MOD 30 MIN: CPT | Performed by: NURSE PRACTITIONER

## 2018-12-18 ASSESSMENT — ENCOUNTER SYMPTOMS
PSYCHIATRIC NEGATIVE: 1
RESPIRATORY NEGATIVE: 1
NEUROLOGICAL NEGATIVE: 1
MUSCULOSKELETAL NEGATIVE: 1
GASTROINTESTINAL NEGATIVE: 1
EYE DISCHARGE: 0
CONSTITUTIONAL NEGATIVE: 1
CARDIOVASCULAR NEGATIVE: 1
EYE PAIN: 0
BRUISES/BLEEDS EASILY: 0
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
## 2019-01-09 ENCOUNTER — SLEEP STUDY (OUTPATIENT)
Dept: SLEEP MEDICINE | Facility: MEDICAL CENTER | Age: 54
End: 2019-01-09
Attending: NURSE PRACTITIONER
Payer: COMMERCIAL

## 2019-01-09 DIAGNOSIS — G47.31 CENTRAL SLEEP APNEA: ICD-10-CM

## 2019-01-09 PROCEDURE — 95811 POLYSOM 6/>YRS CPAP 4/> PARM: CPT | Performed by: FAMILY MEDICINE

## 2019-01-10 NOTE — PROCEDURES
Technical summary: The patient underwent a split-night polysomnogram. This was a 16 channel montage study to include a 6 channel EEG, a 2 channel EOG, and chin EMG, left and right leg EMG, a snore channel, a nasal pressure transducer, and a nasal oral airflow semester and a CFLOW pressure transducer.   Respiratory effort was assessed with the use of a thoracic and abdominal monitor and overnight oximetry was obtained. Audio and video recordings were reviewed. This was a fully attended study and sleep stage scoring was performed. The test was technically adequate.    Scoring Criteria: A modification of the the AASM Manual for the Scoring of Sleep and Associated Events, 2012, was used.   Obstructive apnea was scored by cessation of airflow for at least 10 seconds with continuing respiratory effort.  Central apnea was scored by cessation of airflow for at least 10 seconds with no effort.  Hypopnea was scored by a 30% or more reduction in airflow for at least 10 seconds accompanied by an arterial oxygen desaturation of 3% or more.  (For Medicare patients, hypopneas were scored by a 30% or more reduction in airflow for at least 10 seconds accompanied by an arterial oxygen saturation of 4% or more, as required by their insurance, CMS.      General sleep summary:      Diagnostic:  During the overnight study, the sleep latency was 29 min which is prolonged. The total sleep time was 154 min and sleep efficiency was 46 % which is decreased.  Sleep stage proportions showed no REM sleep and increased WASo of 150 min.  In regards to sleep quality there was a sever degree of sleep fragmentation as shown by the arousal index of 56 an hour which is increased. The arousals were due to spontaneous arousal and respiratory events.     Treatment: During the treatment portion of the study, REM and slow wave sleep was observed*.   The total sleep time was 226 min and sleep efficiency was 89 %.  Sleep stage proportions showed normal sleep.   In regards to sleep quality there was a moderate degree of sleep fragmentation as shown by the arousal index of 16/hr. The arousals were due to spontaneous arousal.       Respiratory summary:   Diagnostic: During the diagnostic portion of the the study, the patient demonstrated sever obstructive sleep apnea as shown by the apnea hypopnea index of 56.1/hr. There are a total of 34 apneas and 110 hypopneas. In the supine position the respiratory disturbance index was 5 an hour and in the non-supine position the respiratory disturbance index was 20 per hour. The O2 zuhair was 82% and the average SpO2 was 87 %. He spent  98 min of sleep time below 89% O2 saturation. There was snoring. The patient demonstrated a NSr with an average heartbeat of 79 bpm.     CPAP Titration:  Due to the significant number of obstructive respiratory events observed during the diagnostic portion of the study a CPAP titration trial was performed during the second half of the night. The CPAP pressure was initiated at 6 cm of water and the pressure was increased in an attempt to eliminate all sleep disordered breathing and snoring. CPAP was increased to CPAP 10  Cm before switching to BiPAP. The BiPAP was titrated between 12/8 cm to 15/11 cm. At this final pressure the patient was no observed in supine or REM sleep stage. The apnea hypopnea index improved to 2.8/hr with improved O2 zuhair of  88%.He spent 57 min of sleep time below 89% O2 saturation Snoring was resolved.  The patient continued to demonstrate NSR  and the average HR was 71. The patient utilized medium eson 2 mask with heated humidification. The CPAP was well-tolerated and there was minimal air leaks. No supplemental oxygen was required.    Periodic limb movement summary:   Diagnostic:The patient demonstrated an mild degree of periodic limb movements as shown by the PLM index of 6.6 per hour and normal PLM arousal index of 5/hr.    Treatment:The patient demonstrated an normal degree  of periodic limb movements as shown by the PLM index of 2.4 per hour and normal PLM arousal index of 2.1/hr.  Impression:  1.  Sever obstructive sleep apnea with AHI of 56.1/hr and O2 zuhair 82 %. Due to severity of the disease she met the split study protocol. The titration started with CPAP 6 cm and the best tolerated was BiPAP 15/11 cm. The AHI improved to 2.89/hr with improved O2 zuhair of 88% and average O2 saturation of 91 %.     2.  Sleep related hypoxia       Recommendations:  I recommend BiPAP of 15/11 cm with medium eson 2 mask. Consider supplemental O2 bleed in and f/u with OPO on the recommended pressure due to residual sleep hypoxia. I also recommend 30 day compliance download to assess the efficacy to the recommended pressure, measure leak, apnea hypopnea index and compliance for further outpatient monitoring and management of CPAP therapy. In some cases alternative treatment options may prove effective in resolving sleep apnea and these options include upper airway surgery, the use of a dental orthotic or weight loss and positional therapy. Clinical correlation is required. In general patients with sleep apnea are advised to avoid alcohol and sedatives and to not operate a motor vehicle while drowsy and are at a greater risk for cardiovascular disease.

## 2019-01-26 ENCOUNTER — HOSPITAL ENCOUNTER (OUTPATIENT)
Dept: LAB | Facility: MEDICAL CENTER | Age: 54
End: 2019-01-26
Attending: INTERNAL MEDICINE
Payer: COMMERCIAL

## 2019-01-26 LAB
T4 FREE SERPL-MCNC: 1.09 NG/DL (ref 0.53–1.43)
TSH SERPL DL<=0.005 MIU/L-ACNC: 0.84 UIU/ML (ref 0.38–5.33)

## 2019-01-26 PROCEDURE — 36415 COLL VENOUS BLD VENIPUNCTURE: CPT

## 2019-01-26 PROCEDURE — 84439 ASSAY OF FREE THYROXINE: CPT

## 2019-01-26 PROCEDURE — 84443 ASSAY THYROID STIM HORMONE: CPT

## 2019-02-14 ENCOUNTER — SLEEP CENTER VISIT (OUTPATIENT)
Dept: SLEEP MEDICINE | Facility: MEDICAL CENTER | Age: 54
End: 2019-02-14
Payer: COMMERCIAL

## 2019-02-14 VITALS
DIASTOLIC BLOOD PRESSURE: 72 MMHG | BODY MASS INDEX: 41.37 KG/M2 | HEIGHT: 68 IN | WEIGHT: 273 LBS | RESPIRATION RATE: 16 BRPM | OXYGEN SATURATION: 92 % | SYSTOLIC BLOOD PRESSURE: 114 MMHG | HEART RATE: 67 BPM

## 2019-02-14 DIAGNOSIS — G47.33 OBSTRUCTIVE SLEEP APNEA: ICD-10-CM

## 2019-02-14 DIAGNOSIS — Z78.9 NONSMOKER: ICD-10-CM

## 2019-02-14 PROCEDURE — 99212 OFFICE O/P EST SF 10 MIN: CPT | Performed by: NURSE PRACTITIONER

## 2019-02-14 RX ORDER — CEFDINIR 300 MG/1
CAPSULE ORAL
Refills: 0 | COMMUNITY
Start: 2019-01-18 | End: 2019-01-28

## 2019-02-14 ASSESSMENT — ENCOUNTER SYMPTOMS
BRUISES/BLEEDS EASILY: 0
RESPIRATORY NEGATIVE: 1
EYE PAIN: 0
CARDIOVASCULAR NEGATIVE: 1
EYE DISCHARGE: 0
PSYCHIATRIC NEGATIVE: 1
CONSTITUTIONAL NEGATIVE: 1

## 2019-02-14 NOTE — PROGRESS NOTES
"Chief Complaint   Patient presents with   • Follow-Up     Sleep apnea, SS results         HPI: This patient is a 53 y.o. female, who presents for sleep study results.     She has a history of complex sleep apnea and has been on ASV therapy for many years.  She has had significant life changes.  Felt she was sleeping more soundly and denied sleep symptoms.  She was having a difficult time tolerating the pressure on her ASV machine.  PSG was done to determine if sleep apnea is still present and recalibrate therapy.    PSG indicates severe obstructive sleep apnea with an AHI of 56.1, minimum oxygen saturation of 82 %.  Prior study showed severe apnea with an AHI of 81.  There was improvement however she still requires treatment.  She did well with BiPAP with an improved AHI of 2.89 and a zuhair of 88% with an average O2 saturation 91%.  It is recommended she continue 2 L of O2 bleed and as previously prescribed.  Testing reviewed with the patient.  She denies other changes to her health since she was seen.    Past Medical History:   Diagnosis Date   • Anemia    • Anesthesia     nausea/vomiting   • Anxiety    • Arthritis    • Asthma     allergy related   • Bowel habit changes     constipation   • Cholesteatoma of middle ear and mastoid(385.33) 1992 surgery    mastoidectomy, prosthesis   • Depression    • Dyslipidemia, goal LDL below 130    • Elevated glycohemoglobin    • Eosinophilic esophagitis    • Family history of early CAD    • Hiatus hernia syndrome     \"was told I had one\"   • History of chickenpox    • History of endometriosis    • History of partial thyroidectomy     partial thyroidectomy   • Hypothyroidism due to Hashimoto's thyroiditis    • Lumbar disc narrowing 7/14/2009   • Menopausal symptoms    • Oxygen desaturation during sleep     O2 2 liters HS   • Pneumonia     hx   • Recurrent sinus infections    • Seizure (HCC)    • Sleep apnea     ASV with 2L oxygen at night (sean)    • Tonsillitis    • Vitamin d " deficiency        Social History   Substance Use Topics   • Smoking status: Passive Smoke Exposure - Never Smoker   • Smokeless tobacco: Never Used      Comment: Patient grew up with 3 smokers in home    • Alcohol use No       Family History   Problem Relation Age of Onset   • Cancer Mother 61        lung, smoker   • Heart Disease Mother 44        early heart attack   • Sleep Apnea Mother         possibly   • Heart Disease Father 60        cabg x 3   • Hyperlipidemia Father    • Psychiatry Father         schizophrenia   • Kidney Disease Father         renal failure, dialysis   • Dementia Father    • Hyperlipidemia Brother    • Cancer Maternal Grandmother 62        lung, non-smoker   • Psychiatry Maternal Grandmother         depression, severe   • Psychiatry Other         depression, great uncle   • Anxiety disorder Sister        Immunization History   Administered Date(s) Administered   • Hepatitis B Vaccine Recombivax (Adol/Adult) 07/27/2012, 09/04/2012, 01/07/2013   • Influenza (IM) Preservative Free 10/12/2010, 10/12/2011, 10/09/2012, 10/03/2013   • Influenza Seasonal Injectable 09/23/2015   • Influenza TIV (IM) 10/01/2012, 09/23/2015   • Influenza Vaccine Quad Inj (Pf) 09/23/2014, 12/02/2015, 10/02/2017   • Influenza Vaccine Quad Inj (Preserved) 12/12/2016   • Pneumococcal polysaccharide vaccine (PPSV-23) 10/02/2017   • Tdap Vaccine 11/01/2012       Current medications as of today   Current Outpatient Prescriptions   Medication Sig Dispense Refill   • levothyroxine (LEVOXYL) 125 MCG Tab 1 tab(s)     • ADK [id number] UNIT-MCG Cap Take 1 tablet by mouth.     • sertraline (ZOLOFT) 100 MG Tab Take 2 Tabs by mouth every day. (Patient taking differently: Take 100 mg by mouth every day.) 180 Tab 2   • HM OMEGA-3-6-9 FATTY ACIDS Cap Take 1 Cap by mouth every day. 30 Cap 11   • B Complex Vitamins (VITAMIN B COMPLEX PO) Take  by mouth every day.     • Cholecalciferol (VITAMIN D) 2000 UNIT CAPS Take 2,000 Units by mouth  "every day. (Patient taking differently: Take 2,000 Units by mouth 2 Times a Day.) 60 Cap 6   • triamcinolone acetonide (KENALOG) 0.1 % Cream Apply to affected area twice daily for 10 days 1 Tube 0   • Misc. Devices Misc This is an order for overnight pulse ox on her ASV with 2.5 liter bleed in.  Dx;  Oxygen desaturation during sleep G47.34, Central sleep apnea G47.31, obstructive sleep apnea G47.33      MARIA DOLORES 99 months   NPI [id number] 1 Each 0   • Probiotic Product (PROBIOTIC DAILY PO) Take 1 tablet by mouth every day.     • umeclidinium-vilanterol (ANORO ELLIPTA) 62.5-25 MCG/INH AEROSOL POWDER, BREATH ACTIVATED inhaler Inhale 1 Puff by mouth every day. (Patient taking differently: Inhale 1 Puff by mouth as needed.) 1 Inhaler 6   • Misc. Devices Misc This is an order for nocturnal oxygen, 2L nasal prongs.  Dx:  Nocturnal hypoxia, lowest 59% on overnight study.  G47.34           MARIA DOLORES 99 months   NPI [id number] 1 Each 0     No current facility-administered medications for this visit.        Allergies: Doxycycline; Pcn [penicillins]; and Keflex [cephalexin]    Blood pressure 114/72, pulse 67, resp. rate 16, height 1.727 m (5' 8\"), weight 123.8 kg (273 lb), last menstrual period 03/01/1997, SpO2 92 %, not currently breastfeeding.      Review of Systems   Constitutional: Negative.    Eyes: Negative for pain and discharge.   Respiratory: Negative.    Cardiovascular: Negative.    Endo/Heme/Allergies: Negative for environmental allergies. Does not bruise/bleed easily.   Psychiatric/Behavioral: Negative.        Physical Exam   Constitutional: She is oriented to person, place, and time and well-developed, well-nourished, and in no distress.   HENT:   Head: Normocephalic and atraumatic.   Eyes: Pupils are equal, round, and reactive to light.   Neck: Normal range of motion. Neck supple. No tracheal deviation present.   Pulmonary/Chest: Effort normal.   Neurological: She is alert and oriented to person, place, and time.   Skin: " Skin is warm and dry.   Psychiatric: Mood, memory, affect and judgment normal.   Vitals reviewed.      Diagnoses/Plan:    1. BMI 40.0-44.9, adult (HCC)      2. Obstructive sleep apnea  We will adjust her ASV to BiPAP settings 15/11 cm H2O with 2 L of O2 bleed in.  She will bring her machine back to our office so we can make these changes.  I would like her to follow-up in 3 months for compliance check, sooner if needed    3. Nonsmoker        This dictation was created using voice recognition software. The accuracy of the dictation is limited to the abilities of the software. I expect there may be some errors of grammar and possibly content.

## 2019-04-27 ENCOUNTER — HOSPITAL ENCOUNTER (OUTPATIENT)
Dept: LAB | Facility: MEDICAL CENTER | Age: 54
End: 2019-04-27
Attending: INTERNAL MEDICINE
Payer: COMMERCIAL

## 2019-04-27 LAB
T4 FREE SERPL-MCNC: 0.86 NG/DL (ref 0.53–1.43)
TSH SERPL DL<=0.005 MIU/L-ACNC: 1.17 UIU/ML (ref 0.38–5.33)

## 2019-04-27 PROCEDURE — 36415 COLL VENOUS BLD VENIPUNCTURE: CPT

## 2019-04-27 PROCEDURE — 84439 ASSAY OF FREE THYROXINE: CPT

## 2019-04-27 PROCEDURE — 84443 ASSAY THYROID STIM HORMONE: CPT

## 2019-05-07 DIAGNOSIS — F32.A DEPRESSION, UNSPECIFIED DEPRESSION TYPE: ICD-10-CM

## 2019-05-07 RX ORDER — SERTRALINE HYDROCHLORIDE 100 MG/1
200 TABLET, FILM COATED ORAL DAILY
Qty: 180 TAB | Refills: 2 | Status: SHIPPED | OUTPATIENT
Start: 2019-05-07 | End: 2020-02-03

## 2019-05-16 ENCOUNTER — SLEEP CENTER VISIT (OUTPATIENT)
Dept: SLEEP MEDICINE | Facility: MEDICAL CENTER | Age: 54
End: 2019-05-16
Payer: COMMERCIAL

## 2019-05-16 VITALS
DIASTOLIC BLOOD PRESSURE: 80 MMHG | BODY MASS INDEX: 42.59 KG/M2 | RESPIRATION RATE: 16 BRPM | HEART RATE: 70 BPM | HEIGHT: 68 IN | OXYGEN SATURATION: 93 % | WEIGHT: 281 LBS | SYSTOLIC BLOOD PRESSURE: 120 MMHG

## 2019-05-16 DIAGNOSIS — G47.33 OBSTRUCTIVE SLEEP APNEA: ICD-10-CM

## 2019-05-16 DIAGNOSIS — F41.9 ANXIETY: ICD-10-CM

## 2019-05-16 DIAGNOSIS — J45.20 MILD INTERMITTENT REACTIVE AIRWAY DISEASE WITH WHEEZING WITHOUT COMPLICATION: ICD-10-CM

## 2019-05-16 DIAGNOSIS — G47.34 NOCTURNAL HYPOXIA: ICD-10-CM

## 2019-05-16 PROCEDURE — 99214 OFFICE O/P EST MOD 30 MIN: CPT | Performed by: NURSE PRACTITIONER

## 2019-05-16 ASSESSMENT — ENCOUNTER SYMPTOMS
EYE DISCHARGE: 0
RESPIRATORY NEGATIVE: 1
HALLUCINATIONS: 0
CONSTITUTIONAL NEGATIVE: 1
EYE PAIN: 0
CARDIOVASCULAR NEGATIVE: 1
GASTROINTESTINAL NEGATIVE: 1
NEUROLOGICAL NEGATIVE: 1
MEMORY LOSS: 0
NERVOUS/ANXIOUS: 1
MUSCULOSKELETAL NEGATIVE: 1
BRUISES/BLEEDS EASILY: 0
DEPRESSION: 0
INSOMNIA: 1

## 2019-05-16 ASSESSMENT — LIFESTYLE VARIABLES: SUBSTANCE_ABUSE: 0

## 2019-05-16 NOTE — PROGRESS NOTES
"Chief Complaint   Patient presents with   • Apnea     Last Seen 02/14/19         HPI: This patient is a 53 y.o. female, who presents for compliance check.     She was last seen by myself in February.  To reiterate,  She has a history of complex sleep apnea and has been on ASV therapy for many years.  She has had significant life changes.  Felt she was sleeping more soundly and denied sleep symptoms without her machine.   PSG was done to determine if sleep apnea is still present and recalibrate therapy.     PSG indicates severe obstructive sleep apnea with an AHI of 56.1, minimum oxygen saturation of 82 %.  Prior study showed severe apnea with an AHI of 81.  There was improvement however she still requires treatment.  She did well with BiPAP with an improved AHI of 2.89 and a zuhair of 88% with an average O2 saturation 91%.  It is recommended she continue 2 L of O2 bleed and as previously prescribed.  Pressure change was ordered at her last visit, she never brought her machine in for changes.  She resumed ASV therapy with 2L o2. She is using EPAP 9 with a PS of 4/16.  It is working well to correct her sleep apnea.  Compliance shows 100% use, average use 6-1/2 hours per night with a normal AHI of 1.5.  She has no history of heart failure, echocardiogram shows EF of 60%.  She feels she sleeps soundly at night.  Denies EDS or a.m. Headache. She has had mild anxiety and insomnia in the past couple of days due to work-related stress but this is not typical.    Former PFT's showing small airways obstruction with bronchodilator response. She was started on Anoro inhaler with subjective benefit. She has declined inhaled corticosteroids based on genetic testing through \"[employer]\" which suggests she would have poor response to inhaled glucocorticoids. She had a chest CAT scan March 2017 showing small,  few millimeter,  pulmonary nodules in the right upper and lower lobe, F/U CT 2/10/18 shows resolution of nodules. She is a " "lifelong nonsmoker.  She denies pulmonary complaints today.  She rarely requires use of albuterol.    Past Medical History:   Diagnosis Date   • Anemia    • Anesthesia     nausea/vomiting   • Anxiety    • Arthritis    • Asthma     allergy related   • Bowel habit changes     constipation   • Cholesteatoma of middle ear and mastoid(385.33) 1992 surgery    mastoidectomy, prosthesis   • Depression    • Dyslipidemia, goal LDL below 130    • Elevated glycohemoglobin    • Eosinophilic esophagitis    • Family history of early CAD    • Hiatus hernia syndrome     \"was told I had one\"   • History of chickenpox    • History of endometriosis    • History of partial thyroidectomy     partial thyroidectomy   • Hypothyroidism due to Hashimoto's thyroiditis    • Lumbar disc narrowing 7/14/2009   • Menopausal symptoms    • Oxygen desaturation during sleep     O2 2 liters HS   • Pneumonia     hx   • Recurrent sinus infections    • Seizure ([facility])    • Sleep apnea     ASV with 2L oxygen at night (sean)    • Tonsillitis    • Vitamin d deficiency        Social History   Substance Use Topics   • Smoking status: Passive Smoke Exposure - Never Smoker   • Smokeless tobacco: Never Used      Comment: Patient grew up with 3 smokers in home    • Alcohol use No       Family History   Problem Relation Age of Onset   • Cancer Mother 61        lung, smoker   • Heart Disease Mother 44        early heart attack   • Sleep Apnea Mother         possibly   • Heart Disease Father 60        cabg x 3   • Hyperlipidemia Father    • Psychiatry Father         schizophrenia   • Kidney Disease Father         renal failure, dialysis   • Dementia Father    • Hyperlipidemia Brother    • Cancer Maternal Grandmother 62        lung, non-smoker   • Psychiatry Maternal Grandmother         depression, severe   • Psychiatry Other         depression, great uncle   • Anxiety disorder Sister        Immunization History   Administered Date(s) Administered   • Hepatitis B " Vaccine Recombivax (Adol/Adult) 07/27/2012, 09/04/2012, 01/07/2013   • Influenza (IM) Preservative Free 10/12/2010, 10/12/2011, 10/09/2012, 10/03/2013   • Influenza Seasonal Injectable 09/23/2015   • Influenza TIV (IM) 10/01/2012, 09/23/2015   • Influenza Vaccine Quad Inj (Pf) 09/23/2014, 12/02/2015, 10/02/2017   • Influenza Vaccine Quad Inj (Preserved) 12/12/2016   • Pneumococcal polysaccharide vaccine (PPSV-23) 10/02/2017   • Tdap Vaccine 11/01/2012       Current medications as of today   Current Outpatient Prescriptions   Medication Sig Dispense Refill   • sertraline (ZOLOFT) 100 MG Tab Take 2 Tabs by mouth every day. 180 Tab 2   • levothyroxine (LEVOXYL) 125 MCG Tab 1 tab(s)     • triamcinolone acetonide (KENALOG) 0.1 % Cream Apply to affected area twice daily for 10 days 1 Tube 0   • Misc. Devices Misc This is an order for overnight pulse ox on her ASV with 2.5 liter bleed in.  Dx;  Oxygen desaturation during sleep G47.34, Central sleep apnea G47.31, obstructive sleep apnea G47.33      MARIA DOLORES 99 months   NPI [id number] 1 Each 0   • Probiotic Product (PROBIOTIC DAILY PO) Take 1 tablet by mouth every day.     • ADK 2222-8040-023 UNIT-MCG Cap Take 1 tablet by mouth.     • umeclidinium-vilanterol (ANORO ELLIPTA) 62.5-25 MCG/INH AEROSOL POWDER, BREATH ACTIVATED inhaler Inhale 1 Puff by mouth every day. (Patient taking differently: Inhale 1 Puff by mouth as needed.) 1 Inhaler 6   • HM OMEGA-3-6-9 FATTY ACIDS Cap Take 1 Cap by mouth every day. 30 Cap 11   • B Complex Vitamins (VITAMIN B COMPLEX PO) Take  by mouth every day.     • Misc. Devices Misc This is an order for nocturnal oxygen, 2L nasal prongs.  Dx:  Nocturnal hypoxia, lowest 59% on overnight study.  G47.34           MARIA DOLORES 99 months   NPI [id number] 1 Each 0   • Cholecalciferol (VITAMIN D) 2000 UNIT CAPS Take 2,000 Units by mouth every day. (Patient taking differently: Take 2,000 Units by mouth 2 Times a Day.) 60 Cap 6     No current facility-administered  "medications for this visit.        Allergies: Doxycycline; Pcn [penicillins]; and Keflex [cephalexin]    /80 (BP Location: Left arm, Patient Position: Sitting, BP Cuff Size: Large adult)   Pulse 70   Resp 16   Ht 1.727 m (5' 8\")   Wt (!) 127.5 kg (281 lb)   SpO2 93%       Review of Systems   Constitutional: Negative.    HENT: Negative.    Eyes: Negative for pain and discharge.   Respiratory: Negative.    Cardiovascular: Negative.    Gastrointestinal: Negative.    Musculoskeletal: Negative.    Skin: Negative.    Neurological: Negative.    Endo/Heme/Allergies: Negative for environmental allergies. Does not bruise/bleed easily.   Psychiatric/Behavioral: Negative for depression, hallucinations, memory loss, substance abuse and suicidal ideas. The patient is nervous/anxious and has insomnia.        Physical Exam   Constitutional: She is oriented to person, place, and time and well-developed, well-nourished, and in no distress.   HENT:   Head: Normocephalic and atraumatic.   Eyes: Pupils are equal, round, and reactive to light.   Neck: Normal range of motion. Neck supple. No tracheal deviation present.   Pulmonary/Chest: Effort normal.   Neurological: She is alert and oriented to person, place, and time.   Skin: Skin is warm and dry.   Psychiatric: Mood, memory, affect and judgment normal.   Vitals reviewed.      Diagnoses/Plan:    1. Obstructive sleep apnea  She will continue ASV therapy nightly.  She can still bring her machine and to adjust to BiPAP settings although ASV is working well.  She will clean mask and tubing weekly, replace supplies as insurance will allow.    2. Mild intermittent reactive airway disease with wheezing without complication  Stable, she will continue current bronchodilators    3. Anxiety  She had increased anxiety due to work-related stress the past 3 days.  If anxiety does not improve or gets any worse have encouraged her to follow-up with her PCP.    4. Nocturnal hypoxia  Continue " nocturnal oxygen therapy with ASV      She will follow-up here annually, sooner if needed      This dictation was created using voice recognition software. The accuracy of the dictation is limited to the abilities of the software. I expect there may be some errors of grammar and possibly content.

## 2019-05-24 ENCOUNTER — OFFICE VISIT (OUTPATIENT)
Dept: MEDICAL GROUP | Facility: MEDICAL CENTER | Age: 54
End: 2019-05-24
Payer: COMMERCIAL

## 2019-05-24 VITALS
BODY MASS INDEX: 41.03 KG/M2 | SYSTOLIC BLOOD PRESSURE: 116 MMHG | HEIGHT: 69 IN | DIASTOLIC BLOOD PRESSURE: 78 MMHG | OXYGEN SATURATION: 94 % | HEART RATE: 60 BPM | TEMPERATURE: 98.1 F | WEIGHT: 277 LBS | RESPIRATION RATE: 14 BRPM

## 2019-05-24 DIAGNOSIS — E78.5 DYSLIPIDEMIA, GOAL LDL BELOW 130: ICD-10-CM

## 2019-05-24 DIAGNOSIS — K20.0 EOSINOPHILIC ESOPHAGITIS: ICD-10-CM

## 2019-05-24 DIAGNOSIS — F33.41 RECURRENT MAJOR DEPRESSIVE DISORDER, IN PARTIAL REMISSION (HCC): ICD-10-CM

## 2019-05-24 DIAGNOSIS — E55.9 VITAMIN D DEFICIENCY DISEASE: ICD-10-CM

## 2019-05-24 PROCEDURE — 99214 OFFICE O/P EST MOD 30 MIN: CPT | Performed by: FAMILY MEDICINE
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
## 2019-05-25 ENCOUNTER — HOSPITAL ENCOUNTER (OUTPATIENT)
Dept: LAB | Facility: MEDICAL CENTER | Age: 54
End: 2019-05-25
Attending: FAMILY MEDICINE
Payer: COMMERCIAL

## 2019-05-25 DIAGNOSIS — E55.9 VITAMIN D DEFICIENCY DISEASE: ICD-10-CM

## 2019-05-25 DIAGNOSIS — F33.41 RECURRENT MAJOR DEPRESSIVE DISORDER, IN PARTIAL REMISSION (HCC): ICD-10-CM

## 2019-05-25 DIAGNOSIS — E78.5 DYSLIPIDEMIA, GOAL LDL BELOW 130: ICD-10-CM

## 2019-05-25 DIAGNOSIS — K20.0 EOSINOPHILIC ESOPHAGITIS: ICD-10-CM

## 2019-05-25 LAB
25(OH)D3 SERPL-MCNC: 52 NG/ML (ref 30–100)
ALBUMIN SERPL BCP-MCNC: 4.2 G/DL (ref 3.2–4.9)
ALBUMIN/GLOB SERPL: 1.6 G/DL
ALP SERPL-CCNC: 68 U/L (ref 30–99)
ALT SERPL-CCNC: 16 U/L (ref 2–50)
ANION GAP SERPL CALC-SCNC: 6 MMOL/L (ref 0–11.9)
AST SERPL-CCNC: 14 U/L (ref 12–45)
BASOPHILS # BLD AUTO: 0.8 % (ref 0–1.8)
BASOPHILS # BLD: 0.06 K/UL (ref 0–0.12)
BILIRUB SERPL-MCNC: 0.3 MG/DL (ref 0.1–1.5)
BUN SERPL-MCNC: 13 MG/DL (ref 8–22)
CALCIUM SERPL-MCNC: 8.8 MG/DL (ref 8.5–10.5)
CHLORIDE SERPL-SCNC: 109 MMOL/L (ref 96–112)
CHOLEST SERPL-MCNC: 226 MG/DL (ref 100–199)
CO2 SERPL-SCNC: 28 MMOL/L (ref 20–33)
CREAT SERPL-MCNC: 0.93 MG/DL (ref 0.5–1.4)
EOSINOPHIL # BLD AUTO: 0.14 K/UL (ref 0–0.51)
EOSINOPHIL NFR BLD: 1.9 % (ref 0–6.9)
ERYTHROCYTE [DISTWIDTH] IN BLOOD BY AUTOMATED COUNT: 45.5 FL (ref 35.9–50)
FASTING STATUS PATIENT QL REPORTED: NORMAL
GLOBULIN SER CALC-MCNC: 2.6 G/DL (ref 1.9–3.5)
GLUCOSE SERPL-MCNC: 97 MG/DL (ref 65–99)
HCT VFR BLD AUTO: 44.3 % (ref 37–47)
HDLC SERPL-MCNC: 42 MG/DL
HGB BLD-MCNC: 13.5 G/DL (ref 12–16)
IMM GRANULOCYTES # BLD AUTO: 0.03 K/UL (ref 0–0.11)
IMM GRANULOCYTES NFR BLD AUTO: 0.4 % (ref 0–0.9)
LDLC SERPL CALC-MCNC: 158 MG/DL
LYMPHOCYTES # BLD AUTO: 2.53 K/UL (ref 1–4.8)
LYMPHOCYTES NFR BLD: 34.1 % (ref 22–41)
MCH RBC QN AUTO: 26.4 PG (ref 27–33)
MCHC RBC AUTO-ENTMCNC: 30.5 G/DL (ref 33.6–35)
MCV RBC AUTO: 86.5 FL (ref 81.4–97.8)
MONOCYTES # BLD AUTO: 0.48 K/UL (ref 0–0.85)
MONOCYTES NFR BLD AUTO: 6.5 % (ref 0–13.4)
NEUTROPHILS # BLD AUTO: 4.18 K/UL (ref 2–7.15)
NEUTROPHILS NFR BLD: 56.3 % (ref 44–72)
NRBC # BLD AUTO: 0 K/UL
NRBC BLD-RTO: 0 /100 WBC
PLATELET # BLD AUTO: 246 K/UL (ref 164–446)
PMV BLD AUTO: 10.5 FL (ref 9–12.9)
POTASSIUM SERPL-SCNC: 4 MMOL/L (ref 3.6–5.5)
PROT SERPL-MCNC: 6.8 G/DL (ref 6–8.2)
RBC # BLD AUTO: 5.12 M/UL (ref 4.2–5.4)
SODIUM SERPL-SCNC: 143 MMOL/L (ref 135–145)
TRIGL SERPL-MCNC: 131 MG/DL (ref 0–149)
WBC # BLD AUTO: 7.4 K/UL (ref 4.8–10.8)

## 2019-05-25 PROCEDURE — 80053 COMPREHEN METABOLIC PANEL: CPT

## 2019-05-25 PROCEDURE — 82306 VITAMIN D 25 HYDROXY: CPT

## 2019-05-25 PROCEDURE — 80061 LIPID PANEL: CPT

## 2019-05-25 PROCEDURE — 36415 COLL VENOUS BLD VENIPUNCTURE: CPT

## 2019-05-25 PROCEDURE — 85025 COMPLETE CBC W/AUTO DIFF WBC: CPT

## 2019-06-08 ENCOUNTER — OFFICE VISIT (OUTPATIENT)
Dept: URGENT CARE | Facility: MEDICAL CENTER | Age: 54
End: 2019-06-08
Payer: COMMERCIAL

## 2019-06-08 VITALS
HEIGHT: 69 IN | WEIGHT: 265 LBS | RESPIRATION RATE: 16 BRPM | TEMPERATURE: 97.5 F | HEART RATE: 100 BPM | OXYGEN SATURATION: 100 % | BODY MASS INDEX: 39.25 KG/M2 | SYSTOLIC BLOOD PRESSURE: 128 MMHG | DIASTOLIC BLOOD PRESSURE: 86 MMHG

## 2019-06-08 DIAGNOSIS — A08.4 VIRAL GASTROENTERITIS: ICD-10-CM

## 2019-06-08 DIAGNOSIS — R19.7 DIARRHEA, UNSPECIFIED TYPE: ICD-10-CM

## 2019-06-08 DIAGNOSIS — R11.0 NAUSEA: ICD-10-CM

## 2019-06-08 PROCEDURE — 99214 OFFICE O/P EST MOD 30 MIN: CPT | Performed by: NURSE PRACTITIONER

## 2019-06-08 RX ORDER — ONDANSETRON 4 MG/1
4 TABLET, ORALLY DISINTEGRATING ORAL EVERY 6 HOURS PRN
Qty: 10 TAB | Refills: 0 | Status: SHIPPED | OUTPATIENT
Start: 2019-06-08 | End: 2019-12-05

## 2019-06-08 ASSESSMENT — ENCOUNTER SYMPTOMS
DIARRHEA: 1
ABDOMINAL PAIN: 0
CHILLS: 0
FEVER: 0
SWEATS: 0
BLOATING: 0
NAUSEA: 1

## 2019-06-08 NOTE — PROGRESS NOTES
"Subjective:      Tanvi Krueger is a 53 y.o. female who presents with Diarrhea            Diarrhea    This is a new problem. Episode onset: pt reports new onset of nausea and diarrhea that started 3 days ago. Admits she vomited once but continues to feel nauseated. Reports the diarrhea is watery. Reports slight headache. No recent fevers, body aches or chills. The problem occurs 2 to 4 times per day. The problem has been gradually improving. The stool consistency is described as watery. Pertinent negatives include no abdominal pain, bloating, chills, fever or sweats. She has tried nothing (states she took 2 ibuprofen a few days ago for the headache but nothing else) for the symptoms. There is no history of irritable bowel syndrome or a recent abdominal surgery.       Review of Systems   Constitutional: Negative for chills and fever.   Gastrointestinal: Positive for diarrhea and nausea. Negative for abdominal pain and bloating.   All other systems reviewed and are negative.    Past Medical History:   Diagnosis Date   • Anemia    • Anesthesia     nausea/vomiting   • Anxiety    • Arthritis    • Asthma     allergy related   • Bowel habit changes     constipation   • Cholesteatoma of middle ear and mastoid(385.33) 1992 surgery    mastoidectomy, prosthesis   • Depression    • Dyslipidemia, goal LDL below 130    • Elevated glycohemoglobin    • Eosinophilic esophagitis    • Family history of early CAD    • Hiatus hernia syndrome     \"was told I had one\"   • History of chickenpox    • History of endometriosis    • History of partial thyroidectomy     partial thyroidectomy   • Hypothyroidism due to Hashimoto's thyroiditis    • Lumbar disc narrowing 7/14/2009   • Menopausal symptoms    • Oxygen desaturation during sleep     O2 2 liters HS   • Pneumonia     hx   • Recurrent sinus infections    • Seizure (HCC)    • Sleep apnea     ASV with 2L oxygen at night (sean)    • Tonsillitis    • Vitamin d deficiency       Past " Surgical History:   Procedure Laterality Date   • LARYNGOSCOPY N/A 3/31/2017    Procedure: LARYNGOSCOPY - DIRECT W/BIOPSY BASE OF TONGUE AND NASOPHARYNGOSCOPY W/BIOPSY;  Surgeon: Naresh Abdi M.D.;  Location: SURGERY SAME DAY [facility];  Service:    • THYROIDECTOMY TOTAL Left 10/14/2016    Procedure: LEFT PARTIAL THROIDECTOMY;  Surgeon: Naresh Abdi M.D.;  Location: SURGERY SAME DAY [facility];  Service:    • SEPTOPLASTY  9/9/2016    Procedure: SEPTOPLASTY;  Surgeon: Naresh Abdi M.D.;  Location: SURGERY SAME DAY [facility];  Service:    • TURBINOPLASTY Bilateral 9/9/2016    Procedure: TURBINOPLASTY;  Surgeon: Naresh Abdi M.D.;  Location: SURGERY SAME DAY [facility];  Service:    • OTHER  2016    sinus surgery   • EAR MIDDLE EXPLORATION  4/3/2015    Performed by Naresh Abdi M.D. at SURGERY SAME DAY [facility]   • OSSICULAR RECONSTRUCTION  4/3/2015    Performed by Naresh Abdi M.D. at SURGERY SAME DAY [facility]   • MYRINGOTOMY  4/20/2012    Performed by RYANNE ALICIA at SURGERY [facility]   • TYMPANOTOMY  6/24/2010    Performed by MAXIMUS WHITE at SURGERY SAME DAY [facility]   • EXAM UNDER ANESTHESIA  6/24/2010    Performed by MAXIMUS WHITE at SURGERY SAME DAY [facility]   • MYRINGOTOMY  4/30/2009    Performed by MAXIMUS WHITE at SURGERY SAME DAY [facility]   • ABDOMINAL HYSTERECTOMY TOTAL  1997    ovaries are gone   • ARTHROSCOPY, KNEE     • HYSTERECTOMY LAPAROSCOPY     • SINUSCOPE     • TONSILLECTOMY        Social History     Social History   • Marital status:      Spouse name: N/A   • Number of children: N/A   • Years of education: N/A     Occupational History   • Not on file.     Social History Main Topics   • Smoking status: Passive Smoke Exposure - Never Smoker   • Smokeless tobacco: Never Used      Comment: Patient grew up with 3 smokers in home    • Alcohol use No   • Drug use: No   • Sexual activity: Yes     Partners: Male     Other Topics  "Concern   • Not on file     Social History Narrative   • No narrative on file          Objective:     /86 (BP Location: Right arm, Patient Position: Sitting, BP Cuff Size: Adult long)   Pulse 100   Temp 36.4 °C (97.5 °F) (Temporal)   Resp 16   Ht 1.74 m (5' 8.5\")   Wt 120.2 kg (265 lb)   LMP 03/01/1997   SpO2 100%   BMI 39.70 kg/m²      Physical Exam   Constitutional: She is oriented to person, place, and time. Vital signs are normal. She appears well-developed and well-nourished.   HENT:   Head: Normocephalic and atraumatic.   Right Ear: External ear normal.   Left Ear: External ear normal.   Nose: Nose normal.   Eyes: Pupils are equal, round, and reactive to light. EOM are normal.   Neck: Normal range of motion.   Cardiovascular: Normal rate, regular rhythm and normal heart sounds.    Pulmonary/Chest: Effort normal and breath sounds normal.   Abdominal: Soft. Normal appearance. Bowel sounds are increased. There is tenderness (mild) in the right upper quadrant and right lower quadrant. There is no rigidity, no rebound, no guarding and no CVA tenderness.   Musculoskeletal: Normal range of motion.   Neurological: She is alert and oriented to person, place, and time.   Skin: Skin is warm and dry. Capillary refill takes less than 2 seconds.   Psychiatric: She has a normal mood and affect. Her speech is normal and behavior is normal. Thought content normal.   Vitals reviewed.              Assessment/Plan:     1. Nausea  - ondansetron (ZOFRAN ODT) 4 MG TABLET DISPERSIBLE; Take 1 Tab by mouth every 6 hours as needed for Nausea.  Dispense: 10 Tab; Refill: 0    2. Diarrhea, unspecified type    3. Viral gastroenteritis    DDX discussed with patient include but are not limited to viral gastritis, ileus, SBO, colitis, appendicitis  Advised pt to increase fluid intake with pedialyte or low sugar gatorade  Chelsea diet when hungry  Tylenol and ibuprofen as needed for headaches  Lowest effective dose of OTC imodium to " help slow diarrhea  Should expect symptom resolution over the next 4-5 days  Strict ER precautions if she cannot tolerate PO fluids, diarrhea persists beyond another 5 days or new concerning symptoms present  Supportive care, differential diagnoses, and indications for immediate follow-up discussed with patient.    Pathogenesis of diagnosis discussed including typical length and natural progression.      Instructed to return to UC or nearest emergency department if symptoms fail to improve, for any change in condition, further concerns, or new concerning symptoms.  Patient states understanding of the plan of care and discharge instructions.

## 2019-08-07 ENCOUNTER — PATIENT MESSAGE (OUTPATIENT)
Dept: SLEEP MEDICINE | Facility: MEDICAL CENTER | Age: 54
End: 2019-08-07

## 2019-08-08 ENCOUNTER — TELEPHONE (OUTPATIENT)
Dept: SLEEP MEDICINE | Facility: MEDICAL CENTER | Age: 54
End: 2019-08-08

## 2019-08-08 DIAGNOSIS — G47.33 OSA (OBSTRUCTIVE SLEEP APNEA): ICD-10-CM

## 2019-08-08 NOTE — TELEPHONE ENCOUNTER
Tanvi Krueger, JANETT Sawyer. Yesterday (5:14 AM)         Good morning   I need a replacement chip/disk for my cpap machine.       Could you please tell me how to get one?   I’m using the machine every night.       Thank you.        Patient has a Sanjuana, Order is Pending    Fax to [facility]

## 2019-08-09 NOTE — TELEPHONE ENCOUNTER
Order with Supporting Documents have been faxed to DME:  [facility] /  [phone] / fax [phone]

## 2019-08-30 ENCOUNTER — PATIENT MESSAGE (OUTPATIENT)
Dept: SLEEP MEDICINE | Facility: MEDICAL CENTER | Age: 54
End: 2019-08-30

## 2019-08-30 DIAGNOSIS — J45.20 MILD INTERMITTENT REACTIVE AIRWAY DISEASE WITH WHEEZING WITHOUT COMPLICATION: ICD-10-CM

## 2019-08-30 DIAGNOSIS — G47.31 CENTRAL SLEEP APNEA: ICD-10-CM

## 2019-08-30 NOTE — TELEPHONE ENCOUNTER
From: Tanvi Krueger  To: GERBER Finney  Sent: 8/30/2019 12:31 PM PDT  Subject: Prescription Question    Oxygen authorization expires October 11th according to the parent company of [employer]. The company is “[employer]”. Spoke to Max [phone]. Can someone please check into this regarding the rental of the oxygen concentrator. I use it only at night. Thank you. Tanvi Krueger

## 2019-09-16 ENCOUNTER — PATIENT MESSAGE (OUTPATIENT)
Dept: SLEEP MEDICINE | Facility: MEDICAL CENTER | Age: 54
End: 2019-09-16

## 2019-09-23 ENCOUNTER — PATIENT MESSAGE (OUTPATIENT)
Dept: PULMONOLOGY | Facility: HOSPICE | Age: 54
End: 2019-09-23

## 2019-09-25 ENCOUNTER — HOME STUDY (OUTPATIENT)
Dept: PULMONOLOGY | Facility: HOSPICE | Age: 54
End: 2019-09-25
Attending: NURSE PRACTITIONER
Payer: COMMERCIAL

## 2019-09-25 DIAGNOSIS — J45.20 MILD INTERMITTENT REACTIVE AIRWAY DISEASE WITH WHEEZING WITHOUT COMPLICATION: ICD-10-CM

## 2019-09-25 DIAGNOSIS — G47.31 CENTRAL SLEEP APNEA: ICD-10-CM

## 2019-09-25 PROCEDURE — 94762 N-INVAS EAR/PLS OXIMTRY CONT: CPT | Performed by: INTERNAL MEDICINE

## 2019-09-26 NOTE — PROCEDURES
Overnight oximetry was completed on September 25, 2019.  Study was done on adapt servo ventilation, without oxygen.  Duration was 6 hours and 16 minutes.  Patient had sustained low saturations below 90% 95% of the time monitored.  The pattern appears to be one  of sustained steady desaturation, may be remedied by supplemental oxygen but clinical correlation is needed.  Average low saturation was 84%.

## 2019-09-27 ENCOUNTER — TELEPHONE (OUTPATIENT)
Dept: PULMONOLOGY | Facility: HOSPICE | Age: 54
End: 2019-09-27

## 2019-09-27 DIAGNOSIS — J45.40 MODERATE PERSISTENT REACTIVE AIRWAY DISEASE WITH WHEEZING WITHOUT COMPLICATION: ICD-10-CM

## 2019-09-27 DIAGNOSIS — G47.31 CENTRAL SLEEP APNEA: ICD-10-CM

## 2019-09-27 NOTE — TELEPHONE ENCOUNTER
Please sign update o2 order to switch pt to [facility] for o2    Please also sign the DC order so Poncho can  her concentrator once she is set up by [facility]

## 2019-10-30 ENCOUNTER — OFFICE VISIT (OUTPATIENT)
Dept: URGENT CARE | Facility: CLINIC | Age: 54
End: 2019-10-30
Payer: COMMERCIAL

## 2019-10-30 VITALS
WEIGHT: 275 LBS | HEART RATE: 69 BPM | RESPIRATION RATE: 16 BRPM | OXYGEN SATURATION: 93 % | DIASTOLIC BLOOD PRESSURE: 80 MMHG | SYSTOLIC BLOOD PRESSURE: 126 MMHG | BODY MASS INDEX: 41.2 KG/M2 | TEMPERATURE: 99.4 F

## 2019-10-30 DIAGNOSIS — J06.9 URI WITH COUGH AND CONGESTION: ICD-10-CM

## 2019-10-30 DIAGNOSIS — J45.40 REACTIVE AIRWAY DISEASE WITH WHEEZING, MODERATE PERSISTENT, UNCOMPLICATED: ICD-10-CM

## 2019-10-30 PROCEDURE — 99214 OFFICE O/P EST MOD 30 MIN: CPT | Performed by: PHYSICIAN ASSISTANT

## 2019-10-30 RX ORDER — AZITHROMYCIN 250 MG/1
TABLET, FILM COATED ORAL
Qty: 6 TAB | Refills: 0 | Status: SHIPPED | OUTPATIENT
Start: 2019-10-30 | End: 2019-12-05

## 2019-10-30 RX ORDER — IPRATROPIUM BROMIDE AND ALBUTEROL SULFATE 2.5; .5 MG/3ML; MG/3ML
3 SOLUTION RESPIRATORY (INHALATION) ONCE
Status: COMPLETED | OUTPATIENT
Start: 2019-10-30 | End: 2019-10-30

## 2019-10-30 RX ADMIN — IPRATROPIUM BROMIDE AND ALBUTEROL SULFATE 3 ML: 2.5; .5 SOLUTION RESPIRATORY (INHALATION) at 09:24

## 2019-10-30 ASSESSMENT — ENCOUNTER SYMPTOMS
CHILLS: 0
MYALGIAS: 0
NAUSEA: 0
ABDOMINAL PAIN: 0
DIZZINESS: 0
CONSTIPATION: 0
COUGH: 1
WHEEZING: 0
HEADACHES: 1
SORE THROAT: 1
SINUS PAIN: 1
DIARRHEA: 0
EYE PAIN: 0
VOMITING: 0
SPUTUM PRODUCTION: 1
PALPITATIONS: 0
SHORTNESS OF BREATH: 1
FEVER: 0
BLURRED VISION: 0

## 2019-10-30 NOTE — PROGRESS NOTES
Subjective:      Tanvi Krueger is a 54 y.o. female who presents with Ear Pain (sore throat, upper back pain, congestion, x5 days )    URI    This is a new problem. The current episode started in the past 7 days. The problem has been gradually worsening. There has been no fever. Associated symptoms include congestion, coughing, ear pain, headaches, a plugged ear sensation, sinus pain, sneezing and a sore throat. Pertinent negatives include no abdominal pain, chest pain, diarrhea, nausea, rash, vomiting or wheezing. She has tried decongestant for the symptoms. The treatment provided no relief.       Review of Systems   Constitutional: Positive for malaise/fatigue. Negative for chills and fever.   HENT: Positive for congestion, ear pain, sinus pain, sneezing and sore throat.    Eyes: Negative for blurred vision and pain.   Respiratory: Positive for cough, sputum production and shortness of breath. Negative for wheezing.    Cardiovascular: Negative for chest pain and palpitations.   Gastrointestinal: Negative for abdominal pain, constipation, diarrhea, nausea and vomiting.   Musculoskeletal: Negative for myalgias.   Skin: Negative for rash.   Neurological: Positive for headaches. Negative for dizziness.       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  MEDS:   Current Outpatient Medications:   •  sertraline (ZOLOFT) 100 MG  Tab, Take 2 Tabs by mouth every day., Disp: 180 Tab, Rfl: 2  •  levothyroxine (LEVOXYL) 125 MCG Tab, 1 tab(s), Disp: , Rfl:   •  ADK 5030-0465-641 UNIT-MCG Cap, Take 1 tablet by mouth., Disp: , Rfl:   •  B Complex Vitamins (VITAMIN B COMPLEX PO), Take  by mouth every day., Disp: , Rfl:   •  Cholecalciferol (VITAMIN D) 2000 UNIT CAPS, Take 2,000 Units by mouth every day. (Patient taking differently: Take 2,000 Units by mouth 2 Times a Day.), Disp: 60 Cap, Rfl: 6  •  ondansetron (ZOFRAN ODT) 4 MG TABLET DISPERSIBLE, Take 1 Tab by mouth every 6 hours as needed for Nausea. (Patient not taking: Reported on 10/30/2019), Disp: 10 Tab, Rfl: 0  •  triamcinolone acetonide (KENALOG) 0.1 % Cream, Apply to affected area twice daily for 10 days (Patient not taking: Reported on 10/30/2019), Disp: 1 Tube, Rfl: 0  •  Misc. Devices Misc, This is an order for overnight pulse ox on her ASV with 2.5 liter bleed in.  Dx;  Oxygen desaturation during sleep G47.34, Central sleep apnea G47.31, obstructive sleep apnea G47.33      MARIA DOLORES 99 months   NPI [id number], Disp: 1 Each, Rfl: 0  •  Probiotic Product (PROBIOTIC DAILY PO), Take 1 tablet by mouth every day., Disp: , Rfl:   •  umeclidinium-vilanterol (ANORO ELLIPTA) 62.5-25 MCG/INH AEROSOL POWDER, BREATH ACTIVATED inhaler, Inhale 1 Puff by mouth every day. (Patient not taking: Reported on 10/30/2019), Disp: 1 Inhaler, Rfl: 6  •  HM OMEGA-3-6-9 FATTY ACIDS Cap, Take 1 Cap by mouth every day., Disp: 30 Cap, Rfl: 11  •  Misc. Devices Misc, This is an order for nocturnal oxygen, 2L nasal prongs.  Dx:  Nocturnal hypoxia, lowest 59% on overnight study.  G47.34           MARIA DOLORES 99 months   NPI [id number], Disp: 1 Each, Rfl: 0    Current Facility-Administered Medications:   •  ipratropium-albuterol (DUONEB) nebulizer solution, 3 mL, Nebulization, Once, ISAAC ArenasABETSEY  ALLERGIES:   Allergies   Allergen Reactions   • Doxycycline      Wheezing; diarrhea   • Pcn [Penicillins] Unspecified      Unknown reaction as child   • Keflex [Cephalexin] Unspecified     Yeast Infection     SURGHX:   Past Surgical History:   Procedure Laterality Date   • LARYNGOSCOPY N/A 3/31/2017    Procedure: LARYNGOSCOPY - DIRECT W/BIOPSY BASE OF TONGUE AND NASOPHARYNGOSCOPY W/BIOPSY;  Surgeon: Naresh Abdi M.D.;  Location: SURGERY SAME DAY [facility];  Service:    • THYROIDECTOMY TOTAL Left 10/14/2016    Procedure: LEFT PARTIAL THROIDECTOMY;  Surgeon: Naresh Abdi M.D.;  Location: SURGERY SAME DAY [facility];  Service:    • SEPTOPLASTY  9/9/2016    Procedure: SEPTOPLASTY;  Surgeon: Naresh Abdi M.D.;  Location: SURGERY SAME DAY [facility];  Service:    • TURBINOPLASTY Bilateral 9/9/2016    Procedure: TURBINOPLASTY;  Surgeon: Naresh Abdi M.D.;  Location: SURGERY SAME DAY [facility];  Service:    • OTHER  2016    sinus surgery   • EAR MIDDLE EXPLORATION  4/3/2015    Performed by Naresh Abdi M.D. at SURGERY SAME DAY [facility]   • OSSICULAR RECONSTRUCTION  4/3/2015    Performed by Naresh Abdi M.D. at SURGERY SAME DAY [facility]   • MYRINGOTOMY  4/20/2012    Performed by RYANNE ALICIA at SURGERY [facility]   • TYMPANOTOMY  6/24/2010    Performed by MAXIMUS WHITE at SURGERY SAME DAY [facility]   • EXAM UNDER ANESTHESIA  6/24/2010    Performed by MAXIMUS WHITE at SURGERY SAME DAY [facility]   • MYRINGOTOMY  4/30/2009    Performed by MAXIMUS WHITE at SURGERY SAME DAY [facility]   • ABDOMINAL HYSTERECTOMY TOTAL  1997    ovaries are gone   • ARTHROSCOPY, KNEE     • HYSTERECTOMY LAPAROSCOPY     • SINUSCOPE     • TONSILLECTOMY       SOCHX:  reports that she is a non-smoker but has been exposed to tobacco smoke. She has never used smokeless tobacco. She reports that she does not drink alcohol or use drugs.  FH: Family history was reviewed, no pertinent findings to report     Objective:     /80   Pulse 69   Temp 37.4 °C (99.4 °F) (Temporal)   Resp 16   Wt 124.7 kg (275 lb)   LMP  03/01/1997   SpO2 93%   BMI 41.20 kg/m²      Physical Exam   Constitutional: She is oriented to person, place, and time. She appears well-developed and well-nourished.   HENT:   Head: Normocephalic and atraumatic.   Right Ear: Tympanic membrane, external ear and ear canal normal.   Left Ear: Tympanic membrane, external ear and ear canal normal.   Nose: Mucosal edema and rhinorrhea present. Right sinus exhibits frontal sinus tenderness. Right sinus exhibits no maxillary sinus tenderness. Left sinus exhibits frontal sinus tenderness. Left sinus exhibits no maxillary sinus tenderness.   Mouth/Throat: Uvula is midline, oropharynx is clear and moist and mucous membranes are normal.   Eyes: Pupils are equal, round, and reactive to light. Conjunctivae are normal.   Neck: Normal range of motion.   Cardiovascular: Normal rate, regular rhythm and normal heart sounds.   No murmur heard.  Pulmonary/Chest: Effort normal and breath sounds normal. She has no wheezes.   Lymphadenopathy:     She has no cervical adenopathy.   Neurological: She is alert and oriented to person, place, and time.   Skin: Skin is warm and dry. Capillary refill takes less than 2 seconds.   Psychiatric: She has a normal mood and affect. Her behavior is normal. Judgment normal.   Vitals reviewed.      *Repeat pulse ox status post nebulizer treatment was 96% on room air.  Patient reported subjective improvement in her shortness of breath.     Assessment/Plan:     1. URI with cough and congestion  - ipratropium-albuterol (DUONEB) nebulizer solution  - azithromycin (ZITHROMAX) 250 MG Tab; Take two tabs po day one followed by one tab po day two through five with food  Dispense: 6 Tab; Refill: 0    2. Reactive airway disease with wheezing, moderate persistent, uncomplicated  - umeclidinium-vilanterol (ANORO ELLIPTA) 62.5-25 MCG/INH AEROSOL POWDER, BREATH ACTIVATED inhaler; Inhale 1 Puff by mouth every day.  Dispense: 1 Inhaler; Refill: 6            Differential  Diagnosis, natural history, and supportive care discussed. Return to the Urgent Care or follow up with your PCP if symptoms fail to resolve, or for any new or worsening symptoms. Emergency room precautions discussed. Patient and/or family appears understanding of information.

## 2019-10-30 NOTE — LETTER
October 30, 2019         Patient: Tanvi Krueger   YOB: 1965   Date of Visit: 10/30/2019           To Whom it May Concern:    Tanvi Krueger was seen in my clinic on 10/30/2019.     If you have any questions or concerns, please don't hesitate to call.        Sincerely,           Ngozi Sousa P.A.-C.  Electronically Signed

## 2019-11-05 ENCOUNTER — HOSPITAL ENCOUNTER (OUTPATIENT)
Dept: LAB | Facility: MEDICAL CENTER | Age: 54
End: 2019-11-05
Attending: PHYSICIAN ASSISTANT
Payer: COMMERCIAL

## 2019-11-05 LAB
25(OH)D3 SERPL-MCNC: 35 NG/ML (ref 30–100)
T4 FREE SERPL-MCNC: 0.98 NG/DL (ref 0.53–1.43)
TSH SERPL DL<=0.005 MIU/L-ACNC: 1.14 UIU/ML (ref 0.38–5.33)

## 2019-11-05 PROCEDURE — 36415 COLL VENOUS BLD VENIPUNCTURE: CPT

## 2019-11-05 PROCEDURE — 82306 VITAMIN D 25 HYDROXY: CPT

## 2019-11-05 PROCEDURE — 84439 ASSAY OF FREE THYROXINE: CPT

## 2019-11-05 PROCEDURE — 84443 ASSAY THYROID STIM HORMONE: CPT

## 2019-11-26 ENCOUNTER — HOSPITAL ENCOUNTER (OUTPATIENT)
Dept: RADIOLOGY | Facility: MEDICAL CENTER | Age: 54
End: 2019-11-26
Attending: FAMILY MEDICINE
Payer: COMMERCIAL

## 2019-11-26 DIAGNOSIS — Z12.31 VISIT FOR SCREENING MAMMOGRAM: ICD-10-CM

## 2019-11-26 PROCEDURE — 77063 BREAST TOMOSYNTHESIS BI: CPT

## 2019-12-05 ENCOUNTER — OFFICE VISIT (OUTPATIENT)
Dept: MEDICAL GROUP | Facility: MEDICAL CENTER | Age: 54
End: 2019-12-05
Payer: COMMERCIAL

## 2019-12-05 VITALS
BODY MASS INDEX: 41.62 KG/M2 | TEMPERATURE: 98.9 F | OXYGEN SATURATION: 95 % | WEIGHT: 281 LBS | RESPIRATION RATE: 14 BRPM | HEIGHT: 69 IN | DIASTOLIC BLOOD PRESSURE: 82 MMHG | HEART RATE: 65 BPM | SYSTOLIC BLOOD PRESSURE: 124 MMHG

## 2019-12-05 DIAGNOSIS — F41.1 GENERALIZED ANXIETY DISORDER: ICD-10-CM

## 2019-12-05 DIAGNOSIS — Z23 NEED FOR IMMUNIZATION AGAINST INFLUENZA: ICD-10-CM

## 2019-12-05 DIAGNOSIS — F33.41 RECURRENT MAJOR DEPRESSIVE DISORDER, IN PARTIAL REMISSION (HCC): ICD-10-CM

## 2019-12-05 DIAGNOSIS — H90.0 CONDUCTIVE HEARING LOSS, BILATERAL: ICD-10-CM

## 2019-12-05 DIAGNOSIS — E78.5 DYSLIPIDEMIA, GOAL LDL BELOW 130: ICD-10-CM

## 2019-12-05 DIAGNOSIS — E55.9 VITAMIN D DEFICIENCY DISEASE: ICD-10-CM

## 2019-12-05 DIAGNOSIS — G47.33 OBSTRUCTIVE SLEEP APNEA: ICD-10-CM

## 2019-12-05 PROBLEM — H91.90 HEARING LOSS: Status: ACTIVE | Noted: 2019-12-05

## 2019-12-05 PROCEDURE — 90686 IIV4 VACC NO PRSV 0.5 ML IM: CPT | Performed by: FAMILY MEDICINE

## 2019-12-05 PROCEDURE — 99214 OFFICE O/P EST MOD 30 MIN: CPT | Mod: 25 | Performed by: FAMILY MEDICINE

## 2019-12-05 PROCEDURE — 90471 IMMUNIZATION ADMIN: CPT | Performed by: FAMILY MEDICINE
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
## 2020-01-08 ENCOUNTER — HOSPITAL ENCOUNTER (OUTPATIENT)
Dept: LAB | Facility: MEDICAL CENTER | Age: 55
End: 2020-01-08
Attending: INTERNAL MEDICINE
Payer: COMMERCIAL

## 2020-01-08 LAB
T4 FREE SERPL-MCNC: 1.02 NG/DL (ref 0.53–1.43)
TSH SERPL DL<=0.005 MIU/L-ACNC: 4.03 UIU/ML (ref 0.38–5.33)

## 2020-01-08 PROCEDURE — 84439 ASSAY OF FREE THYROXINE: CPT

## 2020-01-08 PROCEDURE — 84443 ASSAY THYROID STIM HORMONE: CPT

## 2020-01-08 PROCEDURE — 36415 COLL VENOUS BLD VENIPUNCTURE: CPT

## 2020-02-02 DIAGNOSIS — F32.A DEPRESSION, UNSPECIFIED DEPRESSION TYPE: ICD-10-CM

## 2020-02-03 RX ORDER — SERTRALINE HYDROCHLORIDE 100 MG/1
100 TABLET, FILM COATED ORAL DAILY
Qty: 180 TAB | Refills: 4 | Status: SHIPPED | OUTPATIENT
Start: 2020-02-03 | End: 2020-02-14

## 2020-02-14 DIAGNOSIS — F32.A DEPRESSION, UNSPECIFIED DEPRESSION TYPE: ICD-10-CM

## 2020-02-14 RX ORDER — SERTRALINE HYDROCHLORIDE 100 MG/1
200 TABLET, FILM COATED ORAL DAILY
Qty: 180 TAB | Refills: 4 | Status: SHIPPED | OUTPATIENT
Start: 2020-02-14 | End: 2021-05-10

## 2020-04-09 ENCOUNTER — HOSPITAL ENCOUNTER (OUTPATIENT)
Dept: LAB | Facility: MEDICAL CENTER | Age: 55
End: 2020-04-09
Attending: PHYSICIAN ASSISTANT
Payer: COMMERCIAL

## 2020-04-09 LAB
T4 FREE SERPL-MCNC: 1.17 NG/DL (ref 0.53–1.43)
TSH SERPL DL<=0.005 MIU/L-ACNC: 0.85 UIU/ML (ref 0.38–5.33)

## 2020-04-09 PROCEDURE — 36415 COLL VENOUS BLD VENIPUNCTURE: CPT

## 2020-04-09 PROCEDURE — 84443 ASSAY THYROID STIM HORMONE: CPT

## 2020-04-09 PROCEDURE — 84439 ASSAY OF FREE THYROXINE: CPT

## 2020-06-13 DIAGNOSIS — L98.9 FACIAL SKIN LESION: ICD-10-CM

## 2020-06-13 NOTE — PROGRESS NOTES
Dermatology referral is placed for persistent and hard facial skin lesion.  The photograph appears to be the right upper cheek.

## 2020-08-14 ENCOUNTER — HOSPITAL ENCOUNTER (OUTPATIENT)
Dept: LAB | Facility: MEDICAL CENTER | Age: 55
End: 2020-08-14
Attending: PHYSICIAN ASSISTANT
Payer: COMMERCIAL

## 2020-08-14 LAB
T4 FREE SERPL-MCNC: 0.96 NG/DL (ref 0.93–1.7)
TSH SERPL DL<=0.005 MIU/L-ACNC: 3.94 UIU/ML (ref 0.38–5.33)

## 2020-08-14 PROCEDURE — 84443 ASSAY THYROID STIM HORMONE: CPT

## 2020-08-14 PROCEDURE — 36415 COLL VENOUS BLD VENIPUNCTURE: CPT

## 2020-08-14 PROCEDURE — 84439 ASSAY OF FREE THYROXINE: CPT

## 2020-09-02 ENCOUNTER — TELEMEDICINE (OUTPATIENT)
Dept: SLEEP MEDICINE | Facility: MEDICAL CENTER | Age: 55
End: 2020-09-02
Payer: COMMERCIAL

## 2020-09-02 VITALS — HEART RATE: 53 BPM | HEIGHT: 69 IN | WEIGHT: 275 LBS | BODY MASS INDEX: 40.73 KG/M2 | OXYGEN SATURATION: 99 %

## 2020-09-02 DIAGNOSIS — G47.31 CENTRAL SLEEP APNEA: ICD-10-CM

## 2020-09-02 DIAGNOSIS — G47.33 OBSTRUCTIVE SLEEP APNEA: ICD-10-CM

## 2020-09-02 DIAGNOSIS — G47.34 NOCTURNAL HYPOXIA: ICD-10-CM

## 2020-09-02 PROCEDURE — 99213 OFFICE O/P EST LOW 20 MIN: CPT | Mod: 95,CR | Performed by: NURSE PRACTITIONER

## 2020-09-02 ASSESSMENT — FIBROSIS 4 INDEX: FIB4 SCORE: 0.81
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
## 2020-09-14 ENCOUNTER — APPOINTMENT (OUTPATIENT)
Dept: DERMATOLOGY | Facility: IMAGING CENTER | Age: 55
End: 2020-09-14
Payer: COMMERCIAL

## 2020-10-29 ENCOUNTER — HOSPITAL ENCOUNTER (OUTPATIENT)
Dept: LAB | Facility: MEDICAL CENTER | Age: 55
End: 2020-10-29
Attending: INTERNAL MEDICINE
Payer: COMMERCIAL

## 2020-10-29 LAB
25(OH)D3 SERPL-MCNC: 49 NG/ML (ref 30–100)
T4 FREE SERPL-MCNC: 1.49 NG/DL (ref 0.93–1.7)
TSH SERPL DL<=0.005 MIU/L-ACNC: 0.97 UIU/ML (ref 0.38–5.33)

## 2020-10-29 PROCEDURE — 36415 COLL VENOUS BLD VENIPUNCTURE: CPT

## 2020-10-29 PROCEDURE — 84443 ASSAY THYROID STIM HORMONE: CPT

## 2020-10-29 PROCEDURE — 84439 ASSAY OF FREE THYROXINE: CPT

## 2020-10-29 PROCEDURE — 82306 VITAMIN D 25 HYDROXY: CPT

## 2020-11-02 ENCOUNTER — OFFICE VISIT (OUTPATIENT)
Dept: DERMATOLOGY | Facility: IMAGING CENTER | Age: 55
End: 2020-11-02
Payer: COMMERCIAL

## 2020-11-02 VITALS — WEIGHT: 265 LBS | HEIGHT: 69 IN | BODY MASS INDEX: 39.25 KG/M2 | TEMPERATURE: 97.7 F

## 2020-11-02 DIAGNOSIS — L85.3 XEROSIS CUTIS: ICD-10-CM

## 2020-11-02 DIAGNOSIS — D48.5 NEOPLASM OF UNCERTAIN BEHAVIOR OF SKIN: ICD-10-CM

## 2020-11-02 DIAGNOSIS — L81.4 LENTIGINES: ICD-10-CM

## 2020-11-02 DIAGNOSIS — L82.1 SEBORRHEIC KERATOSES: ICD-10-CM

## 2020-11-02 DIAGNOSIS — R23.8 VENOUS LAKE OF LIP: ICD-10-CM

## 2020-11-02 PROCEDURE — 99213 OFFICE O/P EST LOW 20 MIN: CPT | Mod: 25 | Performed by: NURSE PRACTITIONER

## 2020-11-02 PROCEDURE — 17000 DESTRUCT PREMALG LESION: CPT | Performed by: NURSE PRACTITIONER

## 2020-11-02 ASSESSMENT — ENCOUNTER SYMPTOMS
CHILLS: 0
FEVER: 0

## 2020-11-02 ASSESSMENT — FIBROSIS 4 INDEX: FIB4 SCORE: 0.81

## 2020-11-02 NOTE — PROGRESS NOTES
"Dermatology New Patient Visit    Chief Complaint   Patient presents with   • Establish Care   • Skin Lesion       Subjective:     HPI:   Tanvi Krueger is a 55 y.o. female presenting for    HPI/location: right eye  Time present: 5 years  Painful lesion: No  Itching lesion: Yes  Enlarging lesion: Yes  Anything make it better or worse? No    HPI/location: multiple lesions (dry patches) on right calf  Time present: 2 years  Painful lesion: No  Itching lesion: Yes  Enlarging lesion: No  Anything make it better or worse? No    HPI/location: multiple dry patches on left arm  Time present: 2-5 years  Painful lesion: No  Itching lesion: Yes  Enlarging lesion: Yes  Anything make it better or worse? No    History of skin cancer: No  History of precancers/actinic keratoses: No  History of biopsies:No  History of blistering/severe sunburns:Yes, Details: yes, childhood  Family history of skin cancer:Yes, Details: maternal grandfather, melanoma  Family history of atypical moles:No              Past Medical History:   Diagnosis Date   • Anemia    • Anesthesia     nausea/vomiting   • Anxiety    • Arthritis    • Asthma     allergy related   • Bowel habit changes     constipation   • Cholesteatoma of middle ear and mastoid(385.33) 1992 surgery    mastoidectomy, prosthesis   • Depression    • Dyslipidemia, goal LDL below 130    • Elevated glycohemoglobin    • Eosinophilic esophagitis    • Family history of early CAD    • Hiatus hernia syndrome     \"was told I had one\"   • History of chickenpox    • History of endometriosis    • History of partial thyroidectomy     partial thyroidectomy   • Hypothyroidism due to Hashimoto's thyroiditis    • Lumbar disc narrowing 7/14/2009   • Menopausal symptoms    • Oxygen desaturation during sleep     O2 2 liters HS   • Pneumonia     hx   • Recurrent sinus infections    • Seizure (HCC)    • Sleep apnea     ASV with 2L oxygen at night (sean)    • Tonsillitis    • Vitamin d deficiency  "       Current Outpatient Medications on File Prior to Visit   Medication Sig Dispense Refill   • sertraline (ZOLOFT) 100 MG Tab Take 2 Tabs by mouth every day. 180 Tab 4   • levothyroxine (LEVOXYL) 125 MCG Tab 1 tab(s)     • Misc. Devices Misc This is an order for overnight pulse ox on her ASV with 2.5 liter bleed in.  Dx;  Oxygen desaturation during sleep G47.34, Central sleep apnea G47.31, obstructive sleep apnea G47.33      MARIA DOLORES 99 months   NPI [id number] 1 Each 0   • B Complex Vitamins (VITAMIN B COMPLEX PO) Take  by mouth every day.     • Misc. Devices Misc This is an order for nocturnal oxygen, 2L nasal prongs.  Dx:  Nocturnal hypoxia, lowest 59% on overnight study.  G47.34           MARIA DOLORES 99 months   NPI [id number] 1 Each 0   • Cholecalciferol (VITAMIN D) 2000 UNIT CAPS Take 2,000 Units by mouth every day. (Patient taking differently: Take 2,000 Units by mouth 2 Times a Day.) 60 Cap 6   • triamcinolone acetonide (KENALOG) 0.1 % Cream Apply to affected area twice daily for 10 days (Patient not taking: Reported on 10/30/2019) 1 Tube 0   • Probiotic Product (PROBIOTIC DAILY PO) Take 1 tablet by mouth every day.     • ADK 5790-9531-450 UNIT-MCG Cap Take 1 tablet by mouth.     • HM OMEGA-3-6-9 FATTY ACIDS Cap Take 1 Cap by mouth every day. 30 Cap 11     No current facility-administered medications on file prior to visit.        Allergies   Allergen Reactions   • Doxycycline      Wheezing; diarrhea   • Pcn [Penicillins] Unspecified     Unknown reaction as child   • Keflex [Cephalexin] Unspecified     Yeast Infection       Family History   Problem Relation Age of Onset   • Cancer Mother 61        lung, smoker   • Heart Disease Mother 44        early heart attack   • Sleep Apnea Mother         possibly   • Heart Disease Father 60        cabg x 3   • Hyperlipidemia Father    • Psychiatric Illness Father         schizophrenia   • Kidney Disease Father         renal failure, dialysis   • Dementia Father    • Hyperlipidemia  Brother    • Cancer Maternal Grandmother 62        lung, non-smoker   • Psychiatric Illness Maternal Grandmother         depression, severe   • Psychiatric Illness Other         depression, great uncle   • Anxiety disorder Sister        Social History     Socioeconomic History   • Marital status:      Spouse name: Not on file   • Number of children: Not on file   • Years of education: Not on file   • Highest education level: Not on file   Occupational History   • Not on file   Social Needs   • Financial resource strain: Not on file   • Food insecurity     Worry: Not on file     Inability: Not on file   • Transportation needs     Medical: Not on file     Non-medical: Not on file   Tobacco Use   • Smoking status: Passive Smoke Exposure - Never Smoker   • Smokeless tobacco: Never Used   • Tobacco comment: Patient grew up with 3 smokers in home    Substance and Sexual Activity   • Alcohol use: No     Alcohol/week: 0.0 oz   • Drug use: No   • Sexual activity: Yes     Partners: Male   Lifestyle   • Physical activity     Days per week: Not on file     Minutes per session: Not on file   • Stress: Not on file   Relationships   • Social connections     Talks on phone: Not on file     Gets together: Not on file     Attends Jehovah's witness service: Not on file     Active member of club or organization: Not on file     Attends meetings of clubs or organizations: Not on file     Relationship status: Not on file   • Intimate partner violence     Fear of current or ex partner: Not on file     Emotionally abused: Not on file     Physically abused: Not on file     Forced sexual activity: Not on file   Other Topics Concern   • Not on file   Social History Narrative   • Not on file       Review of Systems   Constitutional: Negative for chills and fever.   Skin: Negative for itching and rash.        Objective:     A focused cutaneous exam was completed including: ears, face, eyelids, conjunctiva, lips, neck, upper chest and back  and  "b/l lower ext with the following pertinent findings listed below. Remaining above-listed examined areas within normal limits / negative for rashes or lesions.    Temp 36.5 °C (97.7 °F)   Ht 1.74 m (5' 8.5\")   Wt 120.2 kg (265 lb)     Physical Exam   Constitutional: She is oriented to person, place, and time and well-developed, well-nourished, and in no distress. No distress.   HENT:   Head: Normocephalic.       Few  scattered tan and light brown macules over face    Few  tan stuck-on waxy papules scattered on the face     Pulmonary/Chest: Effort normal.   Neurological: She is alert and oriented to person, place, and time.   Skin: Skin is warm and dry. No rash noted. No erythema.        Xerosis throughout    Several tan stuck-on waxy papules scattered on the trunk and extremities    Several scattered tan and light brown macules over sun exposed skin       Psychiatric: Mood normal.       DATA: none applicable to review    Assessment and Plan:     1. Neoplasm of uncertain behavior of skin at upper chest  Appears to be either a traumatized SK or perhaps a HAK  Cryo today and recheck in 6 weeks. If still present will bx  CRYOTHERAPY:  Risks (including, but not limited to: hypo or hyperpigmentation, redness, blister, blood blister, recurrence, need for further treatment, infection, scar) and benefits of cryotherapy discussed. Patient verbally agreed to proceed with treatment. 3 cryotherapy freeze thaw cycles of 10 seconds were applied to 1 lesion on chest with cryac. Patient tolerated procedure well. Aftercare instructions given.      2. Seborrheic keratoses  - Benign-appearing nature of lesions discussed. Advised to return to clinic for any new or concerning changes.      3. Lentigines  - Discussed benign nature of lesions, related to sun exposure  - Discussed sun protection, hand out provided  -- ABCDE's of melanoma discussed        4. Xerosis cutis  - extensively discussed importance of regular moisturization to the " affected area during flares, and also for maintenance therapy liberally, several times a day, to protect the skin barrier. OTC moisturizers recommended  -handout provided    5. Venous lake of lip  - Benign-appearing nature of lesion discussed. Advised to return to clinic for any new or concerning changes.        Followup: Return 6 week spot check or sooner for any concerns.    JANETT Anderson.

## 2020-11-05 ENCOUNTER — HOSPITAL ENCOUNTER (OUTPATIENT)
Facility: MEDICAL CENTER | Age: 55
End: 2020-11-05
Attending: NURSE PRACTITIONER
Payer: COMMERCIAL

## 2020-11-05 ENCOUNTER — OFFICE VISIT (OUTPATIENT)
Dept: URGENT CARE | Facility: CLINIC | Age: 55
End: 2020-11-05
Payer: COMMERCIAL

## 2020-11-05 VITALS
HEART RATE: 104 BPM | OXYGEN SATURATION: 97 % | DIASTOLIC BLOOD PRESSURE: 88 MMHG | HEIGHT: 69 IN | TEMPERATURE: 98.8 F | RESPIRATION RATE: 18 BRPM | SYSTOLIC BLOOD PRESSURE: 130 MMHG | BODY MASS INDEX: 39.25 KG/M2 | WEIGHT: 265 LBS

## 2020-11-05 DIAGNOSIS — R06.2 WHEEZING: ICD-10-CM

## 2020-11-05 DIAGNOSIS — R53.83 FATIGUE, UNSPECIFIED TYPE: ICD-10-CM

## 2020-11-05 DIAGNOSIS — R05.9 COUGH: ICD-10-CM

## 2020-11-05 DIAGNOSIS — Z91.09 ENVIRONMENTAL ALLERGIES: ICD-10-CM

## 2020-11-05 DIAGNOSIS — J00 ACUTE RHINITIS: ICD-10-CM

## 2020-11-05 PROCEDURE — U0003 INFECTIOUS AGENT DETECTION BY NUCLEIC ACID (DNA OR RNA); SEVERE ACUTE RESPIRATORY SYNDROME CORONAVIRUS 2 (SARS-COV-2) (CORONAVIRUS DISEASE [COVID-19]), AMPLIFIED PROBE TECHNIQUE, MAKING USE OF HIGH THROUGHPUT TECHNOLOGIES AS DESCRIBED BY CMS-2020-01-R: HCPCS

## 2020-11-05 PROCEDURE — 99214 OFFICE O/P EST MOD 30 MIN: CPT | Mod: CS | Performed by: NURSE PRACTITIONER

## 2020-11-05 RX ORDER — ALBUTEROL SULFATE 90 UG/1
2 AEROSOL, METERED RESPIRATORY (INHALATION) EVERY 6 HOURS PRN
Qty: 8.5 G | Refills: 0 | Status: SHIPPED | OUTPATIENT
Start: 2020-11-05 | End: 2021-04-20 | Stop reason: SDUPTHER

## 2020-11-05 RX ORDER — FLUTICASONE PROPIONATE 50 MCG
2 SPRAY, SUSPENSION (ML) NASAL DAILY
Qty: 16 G | Refills: 0 | Status: SHIPPED | OUTPATIENT
Start: 2020-11-05 | End: 2021-12-20

## 2020-11-05 ASSESSMENT — ENCOUNTER SYMPTOMS
FEVER: 0
SHORTNESS OF BREATH: 0
NAUSEA: 0
COUGH: 1
HEADACHES: 1
WHEEZING: 1
SORE THROAT: 1
MYALGIAS: 0
SINUS PAIN: 0
VOMITING: 0
DIARRHEA: 0
EYE DISCHARGE: 0
CHILLS: 0
EYE REDNESS: 0

## 2020-11-05 ASSESSMENT — LIFESTYLE VARIABLES: SUBSTANCE_ABUSE: 0

## 2020-11-05 ASSESSMENT — FIBROSIS 4 INDEX: FIB4 SCORE: 0.81

## 2020-11-05 NOTE — PATIENT INSTRUCTIONS
Viral syndrome and Novel Coronavirus (COVID-19)       You have a viral syndrome which may include symptoms like muscle aches, fevers, chills, runny nose, cough, sneezing, sore throat, vomiting or diarrhea.  One of the potential viruses you may have is SARSCoV-2, the virus that causes COVID-19, also known as the novel coronavirus.  You may be just as likely to have a different viral infection such as the common cold or flu.  Most patients with COVID -19 have mild symptoms and recover on their own. Resting, staying hydrated, and sleeping are typically helpful.  As of today's visit, you are well enough to go home and treat your symptoms with oral fluids, medicines for fevers, cough, pain, etc.        COVID 19 testing is not performed on most people with mild symptoms who are being discharged from the Emergency Department or Clinic.      If COVID 19 testing was performed, the results will not be available for up to 4 days.  Please DO NOT CONTACT THE EMERGENCY DEPARTMENT OR CLINIC FOR RESULTS OF THIS TEST.       You will be contacted by a member of the [facility] team with your results and for further discussion.       Please follow the precautions below:      • Stay home except to get medical care.   • As advised by the Centers for Disease Control and Prevention (CDC), we recommend you stay in your home and minimize contact with others to avoid spreading this infection.   • The elderly or anyone with significant medical issues may have more severe symptoms from this infection. We recommend separation, also known as self-isolation, for at least 7 days after your first day of symptoms and several more after that if you are still sick. The most important action is wait for at least  a week and several more days after you feel well before returning to you regular activities, work or school. If you become sicker, like difficulty breathing, chest pain, you are unable to eat or drink enough, or have severe vomiting,  "diarrhea or weakness, you may need to return to the Emergency Department or contact your clinic provider for re-evaluation.    • You should restrict activities outside your home, except for getting medical care. Do not go to work, school, or public areas. Avoid using public transportation, ride-sharing, or taxis.   • Separate yourself from other people and animals in your home.   • As much as possible, you should stay in a specific room and away from other people in your home. Also, you should use a separate bathroom, if available.   • Avoid sharing personal household items. You should not share dishes, drinking glasses, cups, eating utensils, towels, or bedding with other people in your home. After using these items, they should be washed thoroughly with soap and water.         Precautions continued:    • Clean all \"high-touch\" surfaces every day high touch surfaces include counters, tabletops, doorknobs, bathroom fixtures, toilets, phones, keyboards, tablets, and bedside tables. Also, clean any surfaces that may have blood, stool, or body fluids on them. Use a household cleaning   spray or wipe, according to the label instructions. Labels contain instructions for safe and effective use of the cleaning product including precautions you should take when applying the product, such as wearing gloves and making sure you have good ventilation during use of the product.   • Clean your hands often. Wash your hands often with soap and water for at least 20 seconds. If soap and water are not available, clean your hands with an alcohol-based hand  that contains at least 60% alcohol, covering all surfaces of your hands and rubbing them together until they feel dry. Soap and water should be used preferentially if hands are visibly dirty. Avoid touching your eyes, nose, and mouth with unwashed hands.   • Cover your coughs and sneezes    • Cover your mouth and nose with a tissue when you cough or sneeze   • Throw used " tissues in a lined trash can; immediately wash your hands with soap and water for at least 20 seconds or clean your hands with an alcohol-based hand  that contains at least 60 to 95% alcohol, covering all surfaces of your hands and rubbing them together until they feel dry. Soap and water should be used preferentially if hands are visibly dirty.     • When seeking care at a healthcare facility:    · Seek prompt medical attention if your illness is worsening (e.g., difficulty breathing).    · Put on a facemask before you enter the facility.    · These steps will help the healthcare provider's office to keep other people in the office or waiting room from getting infected or exposed.    · If possible, put on a facemask before emergency medical services arrive.      Please see the resources below for more information.      CDC Corona Website https://www.cdc.gov/coronavirus/2019-ncov/index.html    General Information https://www.cdc.gov/coronavirus/2019-ncov/faq.html      [facility]: [phone]     [facility] Health Line [phone]

## 2020-11-05 NOTE — LETTER
November 5, 2020       Patient: Tanvi Krueger   YOB: 1965   Date of Visit: 11/5/2020     To Whom it May Concern,     Your employee was seen in our clinic today. A concern for COVID-19 has been identified and testing is in progress.    We are asking you to excuse absences while following self-isolation protocol per Center for Disease Control (CDC) guidelines. Your employee will be able to access test results through our electronic delivery system called [employer].     If the results of testing are negative, and once there has been no fever (temperature >100.4 F) for at least 72 hours without treatment, and no vomiting or diarrhea for at least 48 hours, then return to work is approved.     If the results of testing are positive then your employee will be contacted by the [facility] or [facility] department for further instructions on duration of self-isolation and return to work protocol. In general, this will also follow the CDC guidelines with a minimum of 10 days from the onset of symptoms and without fever, vomiting, or diarrhea as above.  In general, repeat testing is not necessary and not offered through our [facility].    This is the only note that will be provided from [facility] for this visit. Your employee will require an appointment with a primary care provider if FMLA or disability forms are required.     Sincerely,            AURY Coulter.  Electronically Signed

## 2020-11-05 NOTE — PROGRESS NOTES
"Subjective:      Tanvi Krueger is a 55 y.o. female who presents with Shortness of Breath (since 10/26/20) and Runny Nose    Reviewed past medical, surgical and family history. Reviewed prescription and OTC medications with patient in electronic health record today  Allergies: Doxycycline, Pcn [penicillins], and Keflex [cephalexin]                HPI this a new problem.  Tanvi is a 55-year-old female presents with runny nose, cough, intermittent wheezing and fatigue.  She does have a history of seasonal allergies reports that this feels slightly different.  She has been taking a homeopathic allergy medicine without relief of her symptoms.  She denies fever, chills, body aches.  She has no known exposure to any persons with Covid.  She has no recent travel.  She is getting ready to go to [place] to take care of her grandchildren and wants to know that she is not infectious with Covid infection.  If this is a common cold or environmental allergies she reports that she can take care of that by herself.  No other aggravating relieving factors.    Review of Systems   Constitutional: Positive for malaise/fatigue. Negative for chills and fever.   HENT: Positive for congestion and sore throat. Negative for ear pain and sinus pain.    Eyes: Negative for discharge and redness.   Respiratory: Positive for cough and wheezing. Negative for shortness of breath.    Gastrointestinal: Negative for diarrhea, nausea and vomiting.   Genitourinary: Negative for dysuria.   Musculoskeletal: Negative for joint pain and myalgias.   Neurological: Positive for headaches.   Psychiatric/Behavioral: Negative for substance abuse.          Objective:     /88   Pulse (!) 104   Temp 37.1 °C (98.8 °F) (Temporal)   Resp 18   Ht 1.74 m (5' 8.5\")   Wt 120.2 kg (265 lb)   LMP 03/01/1997   SpO2 97%   BMI 39.71 kg/m²      Physical Exam  Vitals signs and nursing note reviewed.   Constitutional:       General: She is not in acute " distress.     Appearance: Normal appearance. She is well-developed. She is not ill-appearing or toxic-appearing.   HENT:      Head: Normocephalic.      Right Ear: Hearing, ear canal and external ear normal. No middle ear effusion. Tympanic membrane is injected. Tympanic membrane is not erythematous.      Left Ear: Hearing, ear canal and external ear normal.  No middle ear effusion. Tympanic membrane is injected. Tympanic membrane is not erythematous.      Nose: Rhinorrhea present. No mucosal edema.      Right Sinus: No maxillary sinus tenderness or frontal sinus tenderness.      Left Sinus: No maxillary sinus tenderness or frontal sinus tenderness.      Mouth/Throat:      Mouth: Mucous membranes are moist.      Pharynx: Uvula midline. Posterior oropharyngeal erythema present. No oropharyngeal exudate.      Tonsils: No tonsillar abscesses.   Eyes:      Pupils: Pupils are equal, round, and reactive to light.   Neck:      Musculoskeletal: Full passive range of motion without pain, normal range of motion and neck supple.      Trachea: Trachea normal.   Cardiovascular:      Rate and Rhythm: Regular rhythm. Tachycardia present.      Chest Wall: PMI is not displaced.      Pulses: Normal pulses.      Comments: No peripheral edema  Pulmonary:      Effort: Pulmonary effort is normal. No respiratory distress.      Breath sounds: Wheezing (Mild exp whz) present. No decreased breath sounds, rhonchi or rales.   Abdominal:      Palpations: Abdomen is soft.      Tenderness: There is no abdominal tenderness.   Musculoskeletal: Normal range of motion.   Lymphadenopathy:      Cervical: No cervical adenopathy.      Upper Body:      Right upper body: No supraclavicular adenopathy.      Left upper body: No supraclavicular adenopathy.   Skin:     General: Skin is warm and dry.      Capillary Refill: Capillary refill takes less than 2 seconds.   Neurological:      Mental Status: She is alert and oriented to person, place, and time.       Gait: Gait normal.   Psychiatric:         Mood and Affect: Mood normal.         Speech: Speech normal.         Behavior: Behavior normal.         Thought Content: Thought content normal.         Judgment: Judgment normal.                 Assessment/Plan:        1. Acute rhinitis  COVID/SARS COV-2 PCR    fluticasone (FLONASE) 50 MCG/ACT nasal spray   2. Cough  COVID/SARS COV-2 PCR    fluticasone (FLONASE) 50 MCG/ACT nasal spray    albuterol 108 (90 Base) MCG/ACT Aero Soln inhalation aerosol   3. Fatigue, unspecified type  COVID/SARS COV-2 PCR    fluticasone (FLONASE) 50 MCG/ACT nasal spray   4. Wheezing  albuterol 108 (90 Base) MCG/ACT Aero Soln inhalation aerosol   5. Environmental allergies             COVID testing - pending  Self Quarantine per CDC guidelines  Educated in infection control practices.   Discussed that this illness was viral in nature. Did not see any evidence of a bacterial process.   OTC  analgesic of choice. Follow manufactures dosing and safety precautions.   OTC antihistamine of choice. Follow manufactures dosing and safety guidelines.    Humidifier at night prn   Keep well hydrated  Increase rest     Return to urgent care clinic or PCP prn  if current symptoms are not resolving in a satisfactory manner or sooner if new or worsening symptoms occur.   Differential diagnosis, natural history, supportive care, and indications for immediate follow-up. Advised of signs and symptoms which would warrant further evaluation and /or emergent evaluation in ER.    Verbalized agreement with this treatment plan and seemed to understand without barriers. Questions were encouraged and answered to satisfaction.

## 2020-11-06 DIAGNOSIS — R05.9 COUGH: ICD-10-CM

## 2020-11-06 DIAGNOSIS — R53.83 FATIGUE, UNSPECIFIED TYPE: ICD-10-CM

## 2020-11-06 DIAGNOSIS — J00 ACUTE RHINITIS: ICD-10-CM

## 2020-11-06 LAB
COVID ORDER STATUS COVID19: NORMAL
SARS-COV-2 RNA RESP QL NAA+PROBE: NOTDETECTED
SPECIMEN SOURCE: NORMAL

## 2020-11-07 ENCOUNTER — TELEPHONE (OUTPATIENT)
Dept: URGENT CARE | Facility: PHYSICIAN GROUP | Age: 55
End: 2020-11-07

## 2020-11-07 NOTE — TELEPHONE ENCOUNTER
Tried nasal spray last night which helped.   Still having runny nose.   Feeling overall better.     Notified of negative COVID test.

## 2020-11-09 ENCOUNTER — TELEPHONE (OUTPATIENT)
Dept: SLEEP MEDICINE | Facility: MEDICAL CENTER | Age: 55
End: 2020-11-09

## 2020-11-09 DIAGNOSIS — G47.31 CENTRAL SLEEP APNEA: ICD-10-CM

## 2020-11-09 NOTE — TELEPHONE ENCOUNTER
Patient left vm states [facility] cannot service machine. patient states has been experancing runny nose and sneezing. Believes due to the cpap machine. Per the patient's chart patient was a key medical patient. ASV was ordered by yareli Colón 10/19/16. Should l direct the patient to [facility]?

## 2020-11-11 NOTE — TELEPHONE ENCOUNTER
Spoke to pt and she convinced it is the ASV machine causing her runny nose and sneezing issues.  She was quite adamant and I opted not to argue this.  She stated she was told her machine is over 5 yrs old by [facility] and that would make her eligible for a new one    I did verify the last ordered machine was from 2016 but please go ahead and sign the order so we can see if it gets approved

## 2020-12-16 ENCOUNTER — APPOINTMENT (OUTPATIENT)
Dept: DERMATOLOGY | Facility: IMAGING CENTER | Age: 55
End: 2020-12-16
Payer: COMMERCIAL

## 2021-01-05 ENCOUNTER — OFFICE VISIT (OUTPATIENT)
Dept: DERMATOLOGY | Facility: IMAGING CENTER | Age: 56
End: 2021-01-05
Payer: COMMERCIAL

## 2021-01-05 DIAGNOSIS — L98.9 SKIN LESION: ICD-10-CM

## 2021-01-05 PROCEDURE — 99212 OFFICE O/P EST SF 10 MIN: CPT | Performed by: NURSE PRACTITIONER

## 2021-01-05 ASSESSMENT — ENCOUNTER SYMPTOMS
CHILLS: 0
FEVER: 0

## 2021-01-05 NOTE — PROGRESS NOTES
"Dermatology Return Patient Visit    Chief Complaint   Patient presents with   • Follow-Up       Subjective:     HPI:   Tanvi Krueger is a 55 y.o. female presenting for    Follow up Spot check on chest-cryo at last visit.   States no longer there.     History of skin cancer: No  History of precancers/actinic keratoses: No  History of biopsies:No  History of blistering/severe sunburns:Yes, Details: yes, childhood  Family history of skin cancer:Yes, Details: maternal grandfather, melanoma  Family history of atypical moles:No              Past Medical History:   Diagnosis Date   • Anemia    • Anesthesia     nausea/vomiting   • Anxiety    • Arthritis    • Asthma     allergy related   • Bowel habit changes     constipation   • Cholesteatoma of middle ear and mastoid(385.33) 1992 surgery    mastoidectomy, prosthesis   • Depression    • Dyslipidemia, goal LDL below 130    • Elevated glycohemoglobin    • Eosinophilic esophagitis    • Family history of early CAD    • Hiatus hernia syndrome     \"was told I had one\"   • History of chickenpox    • History of endometriosis    • History of partial thyroidectomy     partial thyroidectomy   • Hypothyroidism due to Hashimoto's thyroiditis    • Lumbar disc narrowing 7/14/2009   • Menopausal symptoms    • Oxygen desaturation during sleep     O2 2 liters HS   • Pneumonia     hx   • Recurrent sinus infections    • Seizure (HCC)    • Sleep apnea     ASV with 2L oxygen at night (sean)    • Tonsillitis    • Vitamin d deficiency        Current Outpatient Medications on File Prior to Visit   Medication Sig Dispense Refill   • fluticasone (FLONASE) 50 MCG/ACT nasal spray Spray 2 Sprays in nose every day. 16 g 0   • albuterol 108 (90 Base) MCG/ACT Aero Soln inhalation aerosol Inhale 2 Puffs by mouth every 6 hours as needed for Shortness of Breath. 8.5 g 0   • sertraline (ZOLOFT) 100 MG Tab Take 2 Tabs by mouth every day. 180 Tab 4   • levothyroxine (LEVOXYL) 125 MCG Tab 1 tab(s)     • " Patient ambulated to bathroom with steady gait. triamcinolone acetonide (KENALOG) 0.1 % Cream Apply to affected area twice daily for 10 days 1 Tube 0   • Misc. Devices Misc This is an order for overnight pulse ox on her ASV with 2.5 liter bleed in.  Dx;  Oxygen desaturation during sleep G47.34, Central sleep apnea G47.31, obstructive sleep apnea G47.33      MARIA DOLORES 99 months   NPI [id number] 1 Each 0   • Probiotic Product (PROBIOTIC DAILY PO) Take 1 tablet by mouth every day.     • ADK [id number] UNIT-MCG Cap Take 1 tablet by mouth.     • HM OMEGA-3-6-9 FATTY ACIDS Cap Take 1 Cap by mouth every day. 30 Cap 11   • B Complex Vitamins (VITAMIN B COMPLEX PO) Take  by mouth every day.     • Misc. Devices Misc This is an order for nocturnal oxygen, 2L nasal prongs.  Dx:  Nocturnal hypoxia, lowest 59% on overnight study.  G47.34           MARIA DOLORES 99 months   NPI [id number] 1 Each 0   • Cholecalciferol (VITAMIN D) 2000 UNIT CAPS Take 2,000 Units by mouth every day. (Patient taking differently: Take 2,000 Units by mouth 2 Times a Day.) 60 Cap 6     No current facility-administered medications on file prior to visit.        Allergies   Allergen Reactions   • Doxycycline      Wheezing; diarrhea   • Pcn [Penicillins] Unspecified     Unknown reaction as child   • Keflex [Cephalexin] Unspecified     Yeast Infection       Family History   Problem Relation Age of Onset   • Cancer Mother 61        lung, smoker   • Heart Disease Mother 44        early heart attack   • Sleep Apnea Mother         possibly   • Heart Disease Father 60        cabg x 3   • Hyperlipidemia Father    • Psychiatric Illness Father         schizophrenia   • Kidney Disease Father         renal failure, dialysis   • Dementia Father    • Hyperlipidemia Brother    • Cancer Maternal Grandmother 62        lung, non-smoker   • Psychiatric Illness Maternal Grandmother         depression, severe   • Psychiatric Illness Other         depression, great uncle   • Anxiety disorder Sister        Social History     Socioeconomic  History   • Marital status:      Spouse name: Not on file   • Number of children: Not on file   • Years of education: Not on file   • Highest education level: Not on file   Occupational History   • Not on file   Social Needs   • Financial resource strain: Not on file   • Food insecurity     Worry: Not on file     Inability: Not on file   • Transportation needs     Medical: Not on file     Non-medical: Not on file   Tobacco Use   • Smoking status: Passive Smoke Exposure - Never Smoker   • Smokeless tobacco: Never Used   • Tobacco comment: Patient grew up with 3 smokers in home    Substance and Sexual Activity   • Alcohol use: No     Alcohol/week: 0.0 oz   • Drug use: No   • Sexual activity: Yes     Partners: Male   Lifestyle   • Physical activity     Days per week: Not on file     Minutes per session: Not on file   • Stress: Not on file   Relationships   • Social connections     Talks on phone: Not on file     Gets together: Not on file     Attends [employer] service: Not on file     Active member of club or organization: Not on file     Attends meetings of clubs or organizations: Not on file     Relationship status: Not on file   • Intimate partner violence     Fear of current or ex partner: Not on file     Emotionally abused: Not on file     Physically abused: Not on file     Forced sexual activity: Not on file   Other Topics Concern   • Not on file   Social History Narrative   • Not on file       Review of Systems   Constitutional: Negative for chills and fever.   Skin: Negative for itching and rash.        Objective:     A focused cutaneous exam was completed including: ears, face, eyelids, conjunctiva, lips, neck, upper chest and back  and b/l lower ext with the following pertinent findings listed below. Remaining above-listed examined areas within normal limits / negative for rashes or lesions.    There were no vitals taken for this visit.    Physical Exam   Constitutional: She is oriented to person,  place, and time and well-developed, well-nourished, and in no distress. No distress.   HENT:   Head: Normocephalic.        Pulmonary/Chest: Effort normal.   Neurological: She is alert and oriented to person, place, and time.   Skin: Skin is warm and dry. No rash noted. No erythema.   Spot on chest no longer present.        Psychiatric: Mood normal.       DATA: none applicable to review    Assessment and Plan:     1. Skin lesion-resolved  Spot cyro at last visit resolved  - ABCDE's of melanoma discussed  Reminded to keep annual TSEs-sooner for any concerns    F/u: annually and for any new or changing skin lesions    I have performed a physical exam and reviewed and updated ROS and Plan today (1/5/2021). In review of dermatology visit (11/2/2020), there are no changes except as documented above.         Followup: Return if symptoms worsen or fail to improve.

## 2021-01-21 ENCOUNTER — HOSPITAL ENCOUNTER (OUTPATIENT)
Dept: RADIOLOGY | Facility: MEDICAL CENTER | Age: 56
End: 2021-01-21
Attending: FAMILY MEDICINE
Payer: COMMERCIAL

## 2021-01-21 DIAGNOSIS — Z12.31 VISIT FOR SCREENING MAMMOGRAM: ICD-10-CM

## 2021-01-21 PROCEDURE — 77063 BREAST TOMOSYNTHESIS BI: CPT

## 2021-02-25 ENCOUNTER — PATIENT MESSAGE (OUTPATIENT)
Dept: SLEEP MEDICINE | Facility: MEDICAL CENTER | Age: 56
End: 2021-02-25

## 2021-03-15 DIAGNOSIS — Z23 NEED FOR VACCINATION: ICD-10-CM

## 2021-03-30 ENCOUNTER — IMMUNIZATION (OUTPATIENT)
Dept: FAMILY PLANNING/WOMEN'S HEALTH CLINIC | Facility: IMMUNIZATION CENTER | Age: 56
End: 2021-03-30
Attending: INTERNAL MEDICINE
Payer: COMMERCIAL

## 2021-03-30 DIAGNOSIS — Z23 ENCOUNTER FOR VACCINATION: Primary | ICD-10-CM

## 2021-03-30 DIAGNOSIS — Z23 NEED FOR VACCINATION: ICD-10-CM

## 2021-03-30 PROCEDURE — 0001A PFIZER SARS-COV-2 VACCINE: CPT

## 2021-03-30 PROCEDURE — 91300 PFIZER SARS-COV-2 VACCINE: CPT

## 2021-04-16 DIAGNOSIS — R06.02 SHORTNESS OF BREATH: ICD-10-CM

## 2021-04-20 ENCOUNTER — HOSPITAL ENCOUNTER (OUTPATIENT)
Dept: RADIOLOGY | Facility: MEDICAL CENTER | Age: 56
End: 2021-04-20
Attending: FAMILY MEDICINE
Payer: COMMERCIAL

## 2021-04-20 DIAGNOSIS — R06.2 WHEEZING: ICD-10-CM

## 2021-04-20 DIAGNOSIS — R06.02 SHORTNESS OF BREATH: ICD-10-CM

## 2021-04-20 DIAGNOSIS — R05.9 COUGH: ICD-10-CM

## 2021-04-20 PROCEDURE — 71046 X-RAY EXAM CHEST 2 VIEWS: CPT

## 2021-04-20 RX ORDER — ALBUTEROL SULFATE 90 UG/1
2 AEROSOL, METERED RESPIRATORY (INHALATION) EVERY 6 HOURS PRN
Qty: 8.5 G | Refills: 6 | Status: SHIPPED | OUTPATIENT
Start: 2021-04-20 | End: 2022-05-19 | Stop reason: SDUPTHER

## 2021-04-22 ENCOUNTER — APPOINTMENT (OUTPATIENT)
Dept: FAMILY PLANNING/WOMEN'S HEALTH CLINIC | Facility: IMMUNIZATION CENTER | Age: 56
End: 2021-04-22
Attending: INTERNAL MEDICINE
Payer: COMMERCIAL

## 2021-04-22 PROCEDURE — 91300 PFIZER SARS-COV-2 VACCINE: CPT | Performed by: INTERNAL MEDICINE

## 2021-04-22 PROCEDURE — 0002A PFIZER SARS-COV-2 VACCINE: CPT | Performed by: INTERNAL MEDICINE

## 2021-04-23 ENCOUNTER — IMMUNIZATION (OUTPATIENT)
Dept: FAMILY PLANNING/WOMEN'S HEALTH CLINIC | Facility: IMMUNIZATION CENTER | Age: 56
End: 2021-04-23
Payer: COMMERCIAL

## 2021-04-23 DIAGNOSIS — Z23 ENCOUNTER FOR VACCINATION: Primary | ICD-10-CM

## 2021-04-25 ENCOUNTER — PATIENT MESSAGE (OUTPATIENT)
Dept: SLEEP MEDICINE | Facility: MEDICAL CENTER | Age: 56
End: 2021-04-25

## 2021-04-27 NOTE — PATIENT COMMUNICATION
Discussion/Summary   Low white count is stable   Chronic anemia is stable-- however schedule your colonoscopy and do your stool test   Low vit D-- i suggest starting vit D over the counter 2000 units daily   Mild risk of diabetes-- please avoid a high carb diet, sugary drinks and sweets.      Sincerely,   Dr Batool Laura.           Verified Results  CBC WITH AUTOMATED DIFFERENTIAL 17Aug2017 12:01AM [facility]     Test Name Result Flag Reference   WHITE BLOOD COUNT 3.7 K/mcL L 4.2-11.0   RED CELL COUNT 4.27 mil/mcL  4.00-5.20   HEMOGLOBIN 11.5 g/dl L 12.0-15.5   HEMATOCRIT 37.2 %  36.0-46.5   MEAN CORPUSCULAR VOLUME 87.1 fL  78.0-100.0   MEAN CORPUSCULAR HEMOGLOBIN 26.9 pg  26.0-34.0   MEAN CORPUSCULAR HGB CONC 30.9 g/dl L 32.0-36.5   RDW-CV 14.1 %  11.0-15.0   PLATELET COUNT 386 K/mcL  140-450   DIFF TYPE      AUTOMATED DIFFERENTIAL   KARL% 52 %     LYM% 35 %     MON% 8 %     EOS% 4 %     BASO% 1 %     KARL ABS 2.0 K/mcL  1.8-7.7   LYM ABS 1.3 K/mcL  1.0-4.0   MON ABS 0.3 K/mcL  0.3-0.9   EOS ABS 0.1 K/mcL  0.1-0.5   BASO ABS 0.0 K/mcL  0.0-0.3     IRON AND TIBC 17Aug2017 12:01AM [facility]     Test Name Result Flag Reference   IRON 56 mcg/dl     IRON BINDING CAPACITY 283 mcg/dl  250-450   % IRON SATURATION 20 %  15-45     VITAMIN D,25 HYDROXY 17Aug2017 12:01AM [facility]     Test Name Result Flag Reference   VIT D,25 HYDROXY 24.6 ng/ml L 30.0-100.0   <20  ng/mL=Vitamin D deficiency  20-29  ng/mL=Vitamin D insufficiency   ng/mL=Optimal Vitamin D  >150 ng/mL=Possible toxicity     HEMOGLOBIN A1C GLYCOSYLATED 17Aug2017 12:01AM [facility]     Test Name Result Flag Reference   HEMOGLOBIN A1C GLYH 5.7 % H 4.5-5.6   ----DIABETIC SCREENING---  NON DIABETIC                 <5.7%  INCREASED RISK                5.7-6.4%  DIAGNOSTIC FOR DIABETES      >6.4%     ----DIABETIC CONTROL---     A1C%           eAG mg/dL  6.0            126  6.5            140  7.0            154  7.5            169  8.0   Send message to [facility] management to verify their policy before responding              183  8.5            197  9.0            212  9.5            226  10.0           240

## 2021-05-04 ENCOUNTER — HOSPITAL ENCOUNTER (OUTPATIENT)
Dept: LAB | Facility: MEDICAL CENTER | Age: 56
End: 2021-05-04
Attending: PHYSICIAN ASSISTANT
Payer: COMMERCIAL

## 2021-05-04 LAB
T4 FREE SERPL-MCNC: 1.14 NG/DL (ref 0.93–1.7)
TSH SERPL DL<=0.005 MIU/L-ACNC: 1.15 UIU/ML (ref 0.38–5.33)

## 2021-05-04 PROCEDURE — 84439 ASSAY OF FREE THYROXINE: CPT

## 2021-05-04 PROCEDURE — 84443 ASSAY THYROID STIM HORMONE: CPT

## 2021-05-04 PROCEDURE — 36415 COLL VENOUS BLD VENIPUNCTURE: CPT

## 2021-05-06 ENCOUNTER — PATIENT MESSAGE (OUTPATIENT)
Dept: SLEEP MEDICINE | Facility: MEDICAL CENTER | Age: 56
End: 2021-05-06

## 2021-05-09 DIAGNOSIS — F32.A DEPRESSION, UNSPECIFIED DEPRESSION TYPE: ICD-10-CM

## 2021-05-10 RX ORDER — SERTRALINE HYDROCHLORIDE 100 MG/1
TABLET, FILM COATED ORAL
Qty: 180 TABLET | Refills: 3 | Status: SHIPPED | OUTPATIENT
Start: 2021-05-10 | End: 2021-09-04

## 2021-06-01 DIAGNOSIS — M72.0 DUPUYTREN'S CONTRACTURE OF LEFT HAND: ICD-10-CM

## 2021-06-14 ENCOUNTER — OFFICE VISIT (OUTPATIENT)
Dept: DERMATOLOGY | Facility: IMAGING CENTER | Age: 56
End: 2021-06-14
Payer: COMMERCIAL

## 2021-06-14 DIAGNOSIS — R21 RASH AND NONSPECIFIC SKIN ERUPTION: ICD-10-CM

## 2021-06-14 PROCEDURE — 99214 OFFICE O/P EST MOD 30 MIN: CPT | Performed by: NURSE PRACTITIONER

## 2021-06-14 RX ORDER — TRIAMCINOLONE ACETONIDE 1 MG/G
1 OINTMENT TOPICAL 2 TIMES DAILY
Qty: 60 G | Refills: 2 | Status: SHIPPED | OUTPATIENT
Start: 2021-06-14 | End: 2021-09-04

## 2021-06-14 NOTE — PROGRESS NOTES
DERMATOLOGY NOTE  FOLLOW UP VISIT       Chief complaint: Follow-Up and Rash     HPI: rash left arm , spreading very itchy and irritated -thinks it was pet dander at her daughters house.   Onset: 1 week   Aggravating factors:  Unknown   Alleviating factors:  None   New creams/topicals: cortisone 10 , benadryl itch cream no improvement   New medications (up to last 6 months):  no  New travel: no  Other exposures: no  Treatments: no     History of skin cancer: No  History of precancers/actinic keratoses: No  History of biopsies:No  History of blistering/severe sunburns:Yes, Details: yes, childhood  Family history of skin cancer:Yes, Details: maternal grandfather, melanoma  Family history of atypical moles:No          Allergies   Allergen Reactions   • Doxycycline      Wheezing; diarrhea   • Pcn [Penicillins] Unspecified     Unknown reaction as child   • Keflex [Cephalexin] Unspecified     Yeast Infection        MEDICATIONS:  Medications relevant to specialty reviewed.     REVIEW OF SYSTEMS:   Positive for skin (see HPI)  Negative for fevers and chills       EXAM:  LMP 03/01/1997   Constitutional: Well-developed, well-nourished, and in no distress.     A focused skin exam was performed including the affected areas of the bilateral upper extremities. Notable findings on exam today listed below and/or in assessment/plan.     Left forearm with ill defined pink scaly papules and plaques with excoriations      IMPRESSION / PLAN:    1. Rash and nonspecific skin eruption  Likely contact allergic dermatitis  - We reviewed dry skin care in detail, including short showers or baths with luke warm water, limited use of fragrance-free soap, generous use of bland fragrance-free emollients within 3 min of exiting the shower or bath, and fragrance free laundry products.  - Start Triamcinolone 0.1% ointment BID to affected areas of the body for 6 weeks  - We reviewed risks/benefits/expectations of treatment in detail, including side  effects of long term topical steroid use (such as striae, atrophy and telangiectasias).   Start Zyrtec 10mg bid for next 4 weeks, then once per day    - triamcinolone acetonide (KENALOG) 0.1 % Ointment; Apply 1 Application topically 2 times a day. To AA on body for 6 weeks  Dispense: 60 g; Refill: 2       Please note that this dictation was created using voice recognition software. I have made every reasonable attempt to correct obvious errors, but I expect that there are errors of grammar and possibly content that I did not discover before finalizing the note.      Return to clinic in: Return in about 6 weeks (around 7/26/2021) for 6 weeks. and as needed for any new or changing skin lesions.

## 2021-07-08 ENCOUNTER — OFFICE VISIT (OUTPATIENT)
Dept: SLEEP MEDICINE | Facility: MEDICAL CENTER | Age: 56
End: 2021-07-08
Payer: COMMERCIAL

## 2021-07-08 VITALS
WEIGHT: 278 LBS | DIASTOLIC BLOOD PRESSURE: 70 MMHG | OXYGEN SATURATION: 94 % | SYSTOLIC BLOOD PRESSURE: 128 MMHG | RESPIRATION RATE: 16 BRPM | BODY MASS INDEX: 41.18 KG/M2 | HEIGHT: 69 IN | HEART RATE: 75 BPM

## 2021-07-08 DIAGNOSIS — G47.31 CENTRAL SLEEP APNEA: ICD-10-CM

## 2021-07-08 DIAGNOSIS — G47.34 OXYGEN DESATURATION DURING SLEEP: Chronic | ICD-10-CM

## 2021-07-08 DIAGNOSIS — G47.34 NOCTURNAL HYPOXIA: ICD-10-CM

## 2021-07-08 DIAGNOSIS — Z78.9 NONSMOKER: ICD-10-CM

## 2021-07-08 PROCEDURE — 99214 OFFICE O/P EST MOD 30 MIN: CPT | Performed by: NURSE PRACTITIONER

## 2021-07-08 NOTE — PATIENT INSTRUCTIONS
Check with [facility] about portable O2 concentrator for use with travel with ASV device

## 2021-07-08 NOTE — PROGRESS NOTES
Chief Complaint   Patient presents with   • Apnea     last seen 9/2/2020        HPI:  Tanvi Krueger is a 56 y.o. year old female here today for follow-up on complex sleep apnea.  Last OV 9/2/20 with Pierre BELL    Patient is currently using ASV 9/4/2016 centimeters; Respironics; device obtained 2020.  Recall discussed with patient letter provided.  Patient has not had registered her device.  Benefit of treatment outweighs risk and she will continue using her device at this time.  Compliance report 6/8/2021 through 7/7/2021 notes 90% compliance, average nightly use 8 hours 8 minutes, minimal mask leak with reduced AHI 2.4/h.  Reviewed finds with patient.  She tolerated mask and pressure well.  She falls asleep quickly mainly wake 1 time per night and falls back asleep quickly.  She feels overall she is sleeping well.  She has questions about a travel oxygen concentrator.  She recently had a ground-level fall with some bruising to her left knee and abrasion to right knee.  She notes some back and chest pain since incident.  She did not have this evaluated in urgent care.  She denies significant cardiac or respiratory symptoms today.    Sleep hx:  PSG from split night study from 10/2/16 indicated Severe obstructive sleep apnea hypopnea was identified. The AHI was 81.4, the minimum saturation 79%, and saturations were less than 90% for 72.6% of the recording. Apneas comprised 8.5% of the total events. The patient had 200 respiratory events during the diagnostic analysis. The patient underwent a Pap titration. During treatment with Pap, central apneas comprised 64.4% of the total number of events. Neither CPAP nor bilevel were effective in normalizing the respiratory events secondary to treatment emergent central apneas.     PSG titration from 10/17/16 indicated a significant but incomplete response to servo adaptive BiPAP ventilation in a patient with previously demonstrated central sleep apnea or complex sleep  apnea. Specific treatment is difficult to determine from this limited study. Options include repeat polysomnography dedicated to titration at higher expiratory pressures using ASV.  Alternatively ASV might be initiated with a higher expiratory pressure perhaps 9 cm water with pressure support at 4-16 cm water. She did best with a small Air Fit P10 and apparatus and heated humidification.     PSG split night study from 6/27/17 indicated the patient had 3 apneas in total.  Of these, 0 were obstructive apneas, and 3 were central apneas.  This resulted in an apnea index (AI) of 0.9.  The patient had 69 hypopneas in total, which resulted in a hypopnea index of 21.0.  The overall AHI was 21.9, while the AHI during REM was 74.4.  The supine AHI = N/A. CPAP was initiated 6 cm and increased to a maximum of 9 cm. The patient did best on CPAP at 9 cm where supine REM sleep was achieved, the apnea hypopnea index was 0.0, the minimum saturation 85%, and the mean saturation 91.1%. Recommended was CPAP at 9 cmH2O.      OPO on ASV 9 cmH2O, PS 4/16 cmH2O from 8/11/17 indicated the basal arterial oxygen saturation is 91%. Saturation is reasonably well-maintained at 88% or above through most of the night with occasional drops to about 86%. Overall she spends 22% of the study time with a saturation below 90% but only four minutes with a saturation less than 88%. The average heart rate is 66 bpm.     PSG split night study from 1/9/19 indicated severe obstructive sleep apnea with AHI of 56.1/hr and O2 zuhair 82 %. Due to severity of the disease she met the split study protocol. The titration started with CPAP 6 cm and the best tolerated was BiPAP 15/11 cm. The AHI improved to 2.89/hr with improved O2 zuhair of 88% and average O2 saturation of 91 %.  Sleep-related hypoxia. BiPAP of 15/11 cm with medium eson 2 mask was recommended. Consider supplemental O2 bleed in and f/u with OPO on the recommended pressure due to residual sleep  "hypoxia.              ROS: As per HPI and otherwise negative if not stated.    Past Medical History:   Diagnosis Date   • Anemia    • Anesthesia     nausea/vomiting   • Anxiety    • Arthritis    • Asthma     allergy related   • Bowel habit changes     constipation   • Cholesteatoma of middle ear and mastoid(385.33) 1992 surgery    mastoidectomy, prosthesis   • Depression    • Dyslipidemia, goal LDL below 130    • Elevated glycohemoglobin    • Eosinophilic esophagitis    • Family history of early CAD    • Hiatus hernia syndrome     \"was told I had one\"   • History of chickenpox    • History of endometriosis    • History of partial thyroidectomy     partial thyroidectomy   • Hypothyroidism due to Hashimoto's thyroiditis    • Lumbar disc narrowing 7/14/2009   • Menopausal symptoms    • Oxygen desaturation during sleep     O2 2 liters HS   • Pneumonia     hx   • Recurrent sinus infections    • Seizure (KARINA)    • Sleep apnea     ASV with 2L oxygen at night (sean)    • Tonsillitis    • Vitamin d deficiency        Past Surgical History:   Procedure Laterality Date   • LARYNGOSCOPY N/A 3/31/2017    Procedure: LARYNGOSCOPY - DIRECT W/BIOPSY BASE OF TONGUE AND NASOPHARYNGOSCOPY W/BIOPSY;  Surgeon: Naresh Abdi M.D.;  Location: SURGERY SAME DAY [facility];  Service:    • THYROIDECTOMY TOTAL Left 10/14/2016    Procedure: LEFT PARTIAL THROIDECTOMY;  Surgeon: Naresh Abdi M.D.;  Location: SURGERY SAME DAY [facility];  Service:    • SEPTOPLASTY  9/9/2016    Procedure: SEPTOPLASTY;  Surgeon: Naresh Abdi M.D.;  Location: SURGERY SAME DAY [facility];  Service:    • TURBINOPLASTY Bilateral 9/9/2016    Procedure: TURBINOPLASTY;  Surgeon: Naresh Abdi M.D.;  Location: SURGERY SAME DAY [facility];  Service:    • OTHER  2016    sinus surgery   • EAR MIDDLE EXPLORATION  4/3/2015    Performed by Naresh Abdi M.D. at SURGERY SAME DAY [facility]   • OSSICULAR RECONSTRUCTION  4/3/2015    Performed by Naresh PACE" NELIDA Abdi at SURGERY SAME DAY [facility] ORS   • MYRINGOTOMY  4/20/2012    Performed by RYANNE ALICIA at SURGERY [facility]   • TYMPANOTOMY  6/24/2010    Performed by MAXIMUS WHITE at SURGERY SAME DAY [facility] ORS   • EXAM UNDER ANESTHESIA  6/24/2010    Performed by MAXIMUS WHITE at SURGERY SAME DAY [facility] ORS   • MYRINGOTOMY  4/30/2009    Performed by MAXIMUS WHITE at SURGERY SAME DAY [facility] OR[facility]   • ABDOMINAL HYSTERECTOMY TOTAL  1997    ovaries are gone   • ARTHROSCOPY, KNEE     • HYSTERECTOMY LAPAROSCOPY     • SINUSCOPE     • TONSILLECTOMY         Family History   Problem Relation Age of Onset   • Cancer Mother 61        lung, smoker   • Heart Disease Mother 44        early heart attack   • Sleep Apnea Mother         possibly   • Heart Disease Father 60        cabg x 3   • Hyperlipidemia Father    • Psychiatric Illness Father         schizophrenia   • Kidney Disease Father         renal failure, dialysis   • Dementia Father    • Hyperlipidemia Brother    • Cancer Maternal Grandmother 62        lung, non-smoker   • Psychiatric Illness Maternal Grandmother         depression, severe   • Psychiatric Illness Other         depression, great uncle   • Anxiety disorder Sister        Social History     Socioeconomic History   • Marital status:      Spouse name: Not on file   • Number of children: Not on file   • Years of education: Not on file   • Highest education level: Not on file   Occupational History   • Not on file   Tobacco Use   • Smoking status: Passive Smoke Exposure - Never Smoker   • Smokeless tobacco: Never Used   • Tobacco comment: Patient grew up with 3 smokers in home    Vaping Use   • Vaping Use: Never used   Substance and Sexual Activity   • Alcohol use: No     Alcohol/week: 0.0 oz   • Drug use: No   • Sexual activity: Yes     Partners: Male   Other Topics Concern   • Not on file   Social History Narrative   • Not on file     Social Determinants of Health     Financial  "Resource Strain:    • Difficulty of Paying Living Expenses:    Food Insecurity:    • Worried About Running Out of Food in the Last Year:    • Ran Out of Food in the Last Year:    Transportation Needs:    • Lack of Transportation (Medical):    • Lack of Transportation (Non-Medical):    Physical Activity:    • Days of Exercise per Week:    • Minutes of Exercise per Session:    Stress:    • Feeling of Stress :    Social Connections:    • Frequency of Communication with Friends and Family:    • Frequency of Social Gatherings with Friends and Family:    • Attends Religion Services:    • Active Member of Clubs or Organizations:    • Attends Club or Organization Meetings:    • Marital Status:    Intimate Partner Violence:    • Fear of Current or Ex-Partner:    • Emotionally Abused:    • Physically Abused:    • Sexually Abused:        Allergies as of 07/08/2021 - Reviewed 07/08/2021   Allergen Reaction Noted   • Doxycycline  01/29/2014   • Pcn [penicillins] Unspecified 07/09/2009   • Keflex [cephalexin] Unspecified 09/21/2016        Vitals:  /70 (BP Location: Left arm, Patient Position: Sitting, BP Cuff Size: Adult)   Pulse 75   Resp 16   Ht 1.74 m (5' 8.5\")   Wt (!) 126 kg (278 lb)   SpO2 94%     Current medications as of today   Current Outpatient Medications   Medication Sig Dispense Refill   • triamcinolone acetonide (KENALOG) 0.1 % Ointment Apply 1 Application topically 2 times a day. To AA on body for 6 weeks 60 g 2   • sertraline (ZOLOFT) 100 MG Tab TAKE 2 TABLETS DAILY 180 tablet 3   • albuterol 108 (90 Base) MCG/ACT Aero Soln inhalation aerosol Inhale 2 Puffs every 6 hours as needed for Shortness of Breath. 8.5 g 6   • fluticasone (FLONASE) 50 MCG/ACT nasal spray Spray 2 Sprays in nose every day. 16 g 0   • levothyroxine (LEVOXYL) 125 MCG Tab 1 tab(s)     • ADK 6429-4879-829 UNIT-MCG Cap Take 1 tablet by mouth.     • HM OMEGA-3-6-9 FATTY ACIDS Cap Take 1 Cap by mouth every day. 30 Cap 11   • B Complex " Vitamins (VITAMIN B COMPLEX PO) Take  by mouth every day.     • Misc. Devices Misc This is an order for nocturnal oxygen, 2L nasal prongs.  Dx:  Nocturnal hypoxia, lowest 59% on overnight study.  G47.34           MARIA DOLORES 99 months   NPI [id number] 1 Each 0     No current facility-administered medications for this visit.         Physical Exam:   Gen:           Alert and oriented, No apparent distress. Mood and affect appropriate, normal interaction with examiner.  Eyes:          PERRL, EOM intact, sclere white, conjunctive moist. Glasses.  Ears:          Not examined.   Hearing:     Grossly intact.  Nose:          Normal, no lesions or deformities.  Dentition:    mask  Oropharynx:   mask  Mallampati Classification: mask  Neck:        Supple, trachea midline, no masses.  Respiratory Effort: No intercostal retractions or use of accessory muscles.   Lung Auscultation:      Clear to auscultation bilaterally; no rales, rhonchi or wheezing.  CV:            Regular rate and rhythm. No murmurs, rubs or gallops.  Abd:           Not examined.   Lymphadenopathy: Not examined.  Gait and Station: Normal.  Digits and Nails: No clubbing, cyanosis, petechiae, or nodes.   Cranial Nerves: II-XII grossly intact.  Skin:        No rashes, lesions or ulcers noted.               Ext:           No cyanosis or edema.      Assessment:  1. Central sleep apnea  DME Mask and Supplies    Overnight Oximetry   2. Nocturnal hypoxia  Overnight Oximetry   3. Oxygen desaturation during sleep     4. BMI 40.0-44.9, adult (HCC)  Height And Weight   5. Nonsmoker         Immunizations:    Flu:11/2020  Pneumovax 23:2017  Prevnar 13: due age 63  COVID-19: 4/22/1, 3/30/21    Plan:  1. Patient's sleep apnea was well controlled; continue ASV nightly with 2LPM.  DME mask/supplies  RX of current settings/O2 use given to patient  2. Discussed sleep hygiene  3. CNOX on pap/O2 now to determine if nocturnal hypoxia is treated; pending results will make changes in  therapy  4. F/u with PCP for other health concerns  5.  Follow-up in 1 year's compliance report, sooner if needed.    Please note that this dictation was created using voice recognition software. I have made every reasonable attempt to correct obvious errors, but it is possible there are errors of grammar and possibly content that I did not discover before finalizing the note.

## 2021-07-12 ENCOUNTER — HOME STUDY (OUTPATIENT)
Dept: SLEEP MEDICINE | Facility: MEDICAL CENTER | Age: 56
End: 2021-07-12
Attending: NURSE PRACTITIONER
Payer: COMMERCIAL

## 2021-07-12 DIAGNOSIS — G47.34 NOCTURNAL HYPOXIA: ICD-10-CM

## 2021-07-12 DIAGNOSIS — G47.31 CENTRAL SLEEP APNEA: ICD-10-CM

## 2021-07-13 PROCEDURE — 94762 N-INVAS EAR/PLS OXIMTRY CONT: CPT | Performed by: FAMILY MEDICINE

## 2021-07-14 DIAGNOSIS — G47.31 CENTRAL SLEEP APNEA: ICD-10-CM

## 2021-07-14 DIAGNOSIS — G47.34 NOCTURNAL HYPOXIA: ICD-10-CM

## 2021-07-14 NOTE — PROCEDURES
Over Night Pulse Oximetry     Indication:To assess the efficacy of the current pressure .       Impression:   The study was done on ASV EPAP 9 cm with pressure support 16/4 centimeters with O2 bleed in at 2 L/min. The total analyzed time was 6 hrs 29 min. O2 Sat. zuhair was 83% and mean O2 sat was 86 % and baseline O2 at 89%. O2 sat was below 88% for 21 min of the flow evaluation time. Oxygen Desaturation (>=3%) Index was 2.9/hr.      Recommendation:  Mild residual hypoxia consider increasing O2 flow rate. Clinical correlation recommended.

## 2021-07-16 ENCOUNTER — TELEPHONE (OUTPATIENT)
Dept: SLEEP MEDICINE | Facility: MEDICAL CENTER | Age: 56
End: 2021-07-16

## 2021-07-16 NOTE — TELEPHONE ENCOUNTER
----- Message from GERBER Finney sent at 7/14/2021  8:10 AM PDT -----  Advise adjustment in therapy; 22min of low oxygen levels and <90% for 34% of study. Order to increase EPAP to 10cm and increase O2 to 3LPM.

## 2021-07-20 ENCOUNTER — APPOINTMENT (OUTPATIENT)
Dept: RADIOLOGY | Facility: MEDICAL CENTER | Age: 56
End: 2021-07-20
Payer: COMMERCIAL

## 2021-07-20 ENCOUNTER — SLEEP CENTER VISIT (OUTPATIENT)
Dept: SLEEP MEDICINE | Facility: MEDICAL CENTER | Age: 56
End: 2021-07-20
Payer: COMMERCIAL

## 2021-07-20 ENCOUNTER — HOSPITAL ENCOUNTER (EMERGENCY)
Facility: MEDICAL CENTER | Age: 56
End: 2021-07-20
Attending: EMERGENCY MEDICINE
Payer: COMMERCIAL

## 2021-07-20 ENCOUNTER — APPOINTMENT (OUTPATIENT)
Dept: RADIOLOGY | Facility: MEDICAL CENTER | Age: 56
End: 2021-07-20
Attending: EMERGENCY MEDICINE
Payer: COMMERCIAL

## 2021-07-20 VITALS
SYSTOLIC BLOOD PRESSURE: 124 MMHG | WEIGHT: 280 LBS | DIASTOLIC BLOOD PRESSURE: 82 MMHG | HEART RATE: 79 BPM | OXYGEN SATURATION: 91 % | RESPIRATION RATE: 16 BRPM | BODY MASS INDEX: 41.47 KG/M2 | HEIGHT: 69 IN

## 2021-07-20 VITALS
RESPIRATION RATE: 16 BRPM | HEART RATE: 68 BPM | DIASTOLIC BLOOD PRESSURE: 80 MMHG | WEIGHT: 280.2 LBS | SYSTOLIC BLOOD PRESSURE: 130 MMHG | HEIGHT: 69 IN | TEMPERATURE: 98.2 F | BODY MASS INDEX: 41.5 KG/M2 | OXYGEN SATURATION: 92 %

## 2021-07-20 DIAGNOSIS — R07.89 OTHER CHEST PAIN: ICD-10-CM

## 2021-07-20 DIAGNOSIS — G47.33 OSA (OBSTRUCTIVE SLEEP APNEA): ICD-10-CM

## 2021-07-20 LAB
ALBUMIN SERPL BCP-MCNC: 4.6 G/DL (ref 3.2–4.9)
ALBUMIN/GLOB SERPL: 1.5 G/DL
ALP SERPL-CCNC: 92 U/L (ref 30–99)
ALT SERPL-CCNC: 19 U/L (ref 2–50)
ANION GAP SERPL CALC-SCNC: 12 MMOL/L (ref 7–16)
AST SERPL-CCNC: 18 U/L (ref 12–45)
BASOPHILS # BLD AUTO: 0.7 % (ref 0–1.8)
BASOPHILS # BLD: 0.07 K/UL (ref 0–0.12)
BILIRUB SERPL-MCNC: 0.2 MG/DL (ref 0.1–1.5)
BUN SERPL-MCNC: 14 MG/DL (ref 8–22)
CALCIUM SERPL-MCNC: 9.5 MG/DL (ref 8.5–10.5)
CHLORIDE SERPL-SCNC: 104 MMOL/L (ref 96–112)
CO2 SERPL-SCNC: 26 MMOL/L (ref 20–33)
CREAT SERPL-MCNC: 0.91 MG/DL (ref 0.5–1.4)
EKG IMPRESSION: NORMAL
EOSINOPHIL # BLD AUTO: 0.26 K/UL (ref 0–0.51)
EOSINOPHIL NFR BLD: 2.5 % (ref 0–6.9)
ERYTHROCYTE [DISTWIDTH] IN BLOOD BY AUTOMATED COUNT: 47.4 FL (ref 35.9–50)
GLOBULIN SER CALC-MCNC: 3.1 G/DL (ref 1.9–3.5)
GLUCOSE SERPL-MCNC: 93 MG/DL (ref 65–99)
HCT VFR BLD AUTO: 45.8 % (ref 37–47)
HGB BLD-MCNC: 14 G/DL (ref 12–16)
IMM GRANULOCYTES # BLD AUTO: 0.06 K/UL (ref 0–0.11)
IMM GRANULOCYTES NFR BLD AUTO: 0.6 % (ref 0–0.9)
LYMPHOCYTES # BLD AUTO: 2.69 K/UL (ref 1–4.8)
LYMPHOCYTES NFR BLD: 25.6 % (ref 22–41)
MCH RBC QN AUTO: 26 PG (ref 27–33)
MCHC RBC AUTO-ENTMCNC: 30.6 G/DL (ref 33.6–35)
MCV RBC AUTO: 85.1 FL (ref 81.4–97.8)
MONOCYTES # BLD AUTO: 0.63 K/UL (ref 0–0.85)
MONOCYTES NFR BLD AUTO: 6 % (ref 0–13.4)
NEUTROPHILS # BLD AUTO: 6.79 K/UL (ref 2–7.15)
NEUTROPHILS NFR BLD: 64.6 % (ref 44–72)
NRBC # BLD AUTO: 0 K/UL
NRBC BLD-RTO: 0 /100 WBC
NT-PROBNP SERPL IA-MCNC: 141 PG/ML (ref 0–125)
PLATELET # BLD AUTO: 249 K/UL (ref 164–446)
PMV BLD AUTO: 10.3 FL (ref 9–12.9)
POTASSIUM SERPL-SCNC: 4.1 MMOL/L (ref 3.6–5.5)
PROT SERPL-MCNC: 7.7 G/DL (ref 6–8.2)
RBC # BLD AUTO: 5.38 M/UL (ref 4.2–5.4)
SODIUM SERPL-SCNC: 142 MMOL/L (ref 135–145)
TROPONIN T SERPL-MCNC: 10 NG/L (ref 6–19)
WBC # BLD AUTO: 10.5 K/UL (ref 4.8–10.8)

## 2021-07-20 PROCEDURE — 93005 ELECTROCARDIOGRAM TRACING: CPT

## 2021-07-20 PROCEDURE — 700102 HCHG RX REV CODE 250 W/ 637 OVERRIDE(OP): Performed by: EMERGENCY MEDICINE

## 2021-07-20 PROCEDURE — 71045 X-RAY EXAM CHEST 1 VIEW: CPT

## 2021-07-20 PROCEDURE — 84484 ASSAY OF TROPONIN QUANT: CPT

## 2021-07-20 PROCEDURE — 36415 COLL VENOUS BLD VENIPUNCTURE: CPT

## 2021-07-20 PROCEDURE — 93005 ELECTROCARDIOGRAM TRACING: CPT | Performed by: EMERGENCY MEDICINE

## 2021-07-20 PROCEDURE — 99214 OFFICE O/P EST MOD 30 MIN: CPT | Performed by: FAMILY MEDICINE

## 2021-07-20 PROCEDURE — 85025 COMPLETE CBC W/AUTO DIFF WBC: CPT

## 2021-07-20 PROCEDURE — A9270 NON-COVERED ITEM OR SERVICE: HCPCS | Performed by: EMERGENCY MEDICINE

## 2021-07-20 PROCEDURE — 80053 COMPREHEN METABOLIC PANEL: CPT

## 2021-07-20 PROCEDURE — 83880 ASSAY OF NATRIURETIC PEPTIDE: CPT

## 2021-07-20 PROCEDURE — 99284 EMERGENCY DEPT VISIT MOD MDM: CPT

## 2021-07-20 RX ORDER — ASPIRIN 325 MG
325 TABLET ORAL ONCE
Status: COMPLETED | OUTPATIENT
Start: 2021-07-20 | End: 2021-07-20

## 2021-07-20 RX ADMIN — ASPIRIN 325 MG ORAL TABLET 325 MG: 325 PILL ORAL at 16:57

## 2021-07-20 ASSESSMENT — PAIN DESCRIPTION - PAIN TYPE: TYPE: ACUTE PAIN

## 2021-07-20 NOTE — ED TRIAGE NOTES
"Chief Complaint   Patient presents with   • Chest Pain     Intermittent L side \"dull/ache\" since Sunday.     BP (!) 161/105   Pulse 94   Temp 37.4 °C (99.4 °F) (Temporal)   Resp 18   Ht 1.753 m (5' 9\")   Wt (!) 127 kg (280 lb 3.3 oz)   LMP 03/01/1997   SpO2 90%   BMI 41.38 kg/m²     Pt ambulated into triage, mask in place. EKG completed. NAD, encouraged to return to the triage nurse or tech with any new complaints or symptoms.  "

## 2021-07-20 NOTE — PROGRESS NOTES
[facility] Follow Up Note     Date: 7/20/2021 / Time: 10:17 AM    Patient ID:   Name:             Tanvi Krueger   YOB: 1965  Age:                 56 y.o.  female   MRN:               [id number]      Thank you for requesting a sleep medicine consultation on Tanvi Krueger at the sleep center. She presents today with the chief complaints of complex sleep apnea and chest pain follow up.     HISTORY OF PRESENT ILLNESS:       Pt is currently on ASV 9/9 cm PS 4/16 cm with O2 bleed in at 2LPM.She is getting about 8 hrs of sleep on a good night and about 5 hr of sleep on a bad night.Overall, she does finds her sleep refreshing. However her sleep has been disturbed in last week or so . She does not take regular naps.She denies any symptoms of RLS, narcolepsy or any symptoms to suggest parasomnias such as nightmares, sleep walking or acting out of dreams.She is using ASV most days of the week. Pt reports 8 hrs of average nightly use of ASV. Pt denies snoring, gasping,choking.Pt also denies significant mask leak that is interfering with sleep. The 30 day compliance was downloaded which shows adequate compliance with more that 4 hr usage about 90%. The AHI is has improved to 2.4/hr. The mask leak is normal. The symptoms of sleep apnea are well controlled with the current therapy.She is currently on Respironics system 1 ASV.  Denies any side effects mention in the recall.    She complained on left sided chest pain with radiation to the back. The pain is dul in nature. It is intermittent in nature. Denies worsening on deep breath. She also complained of SOB on mild exertion and walking couple 100 ft. Denies hx of smoking. Denies cough or fever.  She thinks her symptoms started when she has a fall on 7/8/2021.  Patient was strongly recommended that she should be sent to the ER due to the nature of her left sided radiating chest pain.  However patient refused to go to via [facility].  He also  stated that she would like to go to the urgent care rather than the ER.  The urgency was reiterated and she understands her risk and benefit for delaying medical care.  Patient wanted to go to the [facility] urgent care therefore I called in to give them a brief report.  The report was given to .     SLEEP HISTORY   PSG from split night study from 10/2/16 indicated Severe obstructive sleep apnea hypopnea was identified. The AHI was 81.4, the minimum saturation 79%, and saturations were less than 90% for 72.6% of the recording. Apneas comprised 8.5% of the total events. The patient had 200 respiratory events during the diagnostic analysis. The patient underwent a Pap titration. During treatment with Pap, central apneas comprised 64.4% of the total number of events. Neither CPAP nor bilevel were effective in normalizing the respiratory events secondary to treatment emergent central apneas.     PSG titration from 10/17/16 indicated a significant but incomplete response to servo adaptive BiPAP ventilation in a patient with previously demonstrated central sleep apnea or complex sleep apnea. Specific treatment is difficult to determine from this limited study. Options include repeat polysomnography dedicated to titration at higher expiratory pressures using ASV.  Alternatively ASV might be initiated with a higher expiratory pressure perhaps 9 cm water with pressure support at 4-16 cm water. She did best with a small Air Fit P10 and apparatus and heated humidification.     PSG split night study from 6/27/17 indicated the patient had 3 apneas in total.  Of these, 0 were obstructive apneas, and 3 were central apneas.  This resulted in an apnea index (AI) of 0.9.  The patient had 69 hypopneas in total, which resulted in a hypopnea index of 21.0.  The overall AHI was 21.9, while the AHI during REM was 74.4.  The supine AHI = N/A. CPAP was initiated 6 cm and increased to a maximum of 9 cm. The patient did best on CPAP  at 9 cm where supine REM sleep was achieved, the apnea hypopnea index was 0.0, the minimum saturation 85%, and the mean saturation 91.1%. Recommended was CPAP at 9 cmH2O.      OPO on ASV 9 cmH2O, PS 4/16 cmH2O from 8/11/17 indicated the basal arterial oxygen saturation is 91%. Saturation is reasonably well-maintained at 88% or above through most of the night with occasional drops to about 86%. Overall she spends 22% of the study time with a saturation below 90% but only four minutes with a saturation less than 88%. The average heart rate is 66 bpm.     PSG split night study from 1/9/19 indicated severe obstructive sleep apnea with AHI of 56.1/hr and O2 zuhair 82 %. Due to severity of the disease she met the split study protocol. The titration started with CPAP 6 cm and the best tolerated was BiPAP 15/11 cm. The AHI improved to 2.89/hr with improved O2 zuhair of 88% and average O2 saturation of 91 %.  Sleep-related hypoxia. BiPAP of 15/11 cm with medium eson 2 mask was recommended. Consider supplemental O2 bleed in and f/u with OPO on the recommended pressure due to residual sleep hypoxia.         She had an OP O on 7/12/2021.The study was done on ASV EPAP 9 cm with pressure support 16/4 centimeters with O2 bleed in at 2 L/min. The total analyzed time was 6 hrs 29 min. O2 Sat. zuhair was 83% and mean O2 sat was 86 % and baseline O2 at 89%. O2 sat was below 88% for 21 min of the flow evaluation time. Oxygen Desaturation (>=3%) Index was 2.9/hr      REVIEW OF SYSTEMS:       Constitutional: Denies fevers, Denies weight changes  Eyes: Denies changes in vision, no eye pain  Ears/Nose/Throat/Mouth: Denies nasal congestion or sore throat   Cardiovascular: Denies chest pain or palpitations   Respiratory: Denies shortness of breath , Denies cough  Gastrointestinal/Hepatic: Denies abdominal pain, nausea, vomiting, diarrhea, constipation or GI bleeding   Genitourinary: Deniesdysuria or frequency  Musculoskeletal/Rheum: Denies   joint pain and swelling   Skin/Breast: Denies rash,   Neurological: Denies headache, confusion, memory loss or focal weakness/parasthesias  Psychiatric: denies mood disorder   Sleep: denies snoring    Comprehensive review of systems form is reviewed with the patient and is attached in the EMR.     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  MEDS:   Current Outpatient Medications:   •  sertraline (ZOLOFT) 100 MG Tab, TAKE 2 TABLETS DAILY, Disp: 180 tablet, Rfl: 3  •  albuterol 108 (90 Base) MCG/ACT Aero Soln inhalation aerosol, Inhale 2 Puffs every 6 hours as needed for Shortness of Breath., Disp: 8.5 g, Rfl: 6  •  fluticasone (FLONASE) 50 MCG/ACT nasal spray, Spray 2 Sprays in nose every day., Disp: 16 g, Rfl: 0  •  levothyroxine (LEVOXYL) 125 MCG Tab, 1 tab(s), Disp: , Rfl:   •  HM OMEGA-3-6-9 FATTY ACIDS Cap, Take 1 Cap by mouth every day., Disp: 30 Cap, Rfl: 11  •  B Complex Vitamins (VITAMIN B COMPLEX PO), Take  by mouth every day., Disp: , Rfl:   •  triamcinolone acetonide (KENALOG) 0.1 % Ointment, Apply 1 Application topically 2 times a day. To AA on body for 6 weeks (Patient not taking: Reported on 7/20/2021), Disp: 60 g, Rfl: 2  •  ADK 8505-7610-255 UNIT-MCG Cap, Take 1 tablet by mouth., Disp: , Rfl:   •  Misc. Devices Misc, This is an order for nocturnal oxygen, 2L nasal prongs.  Dx:  Nocturnal hypoxia, lowest 59%  on overnight study.  G47.34           MARIA DOLORES 99 months   NPI [id number], Disp: 1 Each, Rfl: 0  ALLERGIES:   Allergies   Allergen Reactions   • Doxycycline      Wheezing; diarrhea   • Pcn [Penicillins] Unspecified     Unknown reaction as child   • Keflex [Cephalexin] Unspecified     Yeast Infection     SURGHX:   Past Surgical History:   Procedure Laterality Date   • LARYNGOSCOPY N/A 3/31/2017    Procedure: LARYNGOSCOPY - DIRECT W/BIOPSY BASE OF TONGUE AND NASOPHARYNGOSCOPY W/BIOPSY;  Surgeon: Naresh Abdi M.D.;  Location: SURGERY SAME DAY [facility] ORS;  Service:    • THYROIDECTOMY TOTAL Left 10/14/2016    Procedure: LEFT PARTIAL THROIDECTOMY;  Surgeon: Naresh Abdi M.D.;  Location: SURGERY SAME DAY [facility];  Service:    • SEPTOPLASTY  9/9/2016    Procedure: SEPTOPLASTY;  Surgeon: Naresh Abdi M.D.;  Location: SURGERY SAME DAY [facility];  Service:    • TURBINOPLASTY Bilateral 9/9/2016    Procedure: TURBINOPLASTY;  Surgeon: Naresh Abdi M.D.;  Location: SURGERY SAME DAY [facility];  Service:    • OTHER  2016    sinus surgery   • EAR MIDDLE EXPLORATION  4/3/2015    Performed by Naresh Abdi M.D. at SURGERY SAME DAY [facility] ORS   • OSSICULAR RECONSTRUCTION  4/3/2015    Performed by Naresh Abdi M.D. at SURGERY SAME DAY [facility]   • MYRINGOTOMY  4/20/2012    Performed by RYANNE ALICIA at SURGERY [facility] ORS   • TYMPANOTOMY  6/24/2010    Performed by MAXIMUS WHITE at SURGERY SAME DAY [facility] ORS   • EXAM UNDER ANESTHESIA  6/24/2010    Performed by MAXIMUS WHITE at SURGERY SAME DAY [facility]   • MYRINGOTOMY  4/30/2009    Performed by MAXIMUS WHITE at SURGERY SAME DAY [facility]   • ABDOMINAL HYSTERECTOMY TOTAL  1997    ovaries are gone   • ARTHROSCOPY, KNEE     • HYSTERECTOMY LAPAROSCOPY     • SINUSCOPE     • TONSILLECTOMY       SOCHX:  reports that she is a non-smoker but has been exposed to tobacco smoke. She has never used smokeless tobacco. She reports that she does not  "drink alcohol and does not use drugs..  FH:   Family History   Problem Relation Age of Onset   • Cancer Mother 61        lung, smoker   • Heart Disease Mother 44        early heart attack   • Sleep Apnea Mother         possibly   • Heart Disease Father 60        cabg x 3   • Hyperlipidemia Father    • Psychiatric Illness Father         schizophrenia   • Kidney Disease Father         renal failure, dialysis   • Dementia Father    • Hyperlipidemia Brother    • Cancer Maternal Grandmother 62        lung, non-smoker   • Psychiatric Illness Maternal Grandmother         depression, severe   • Psychiatric Illness Other         depression, great uncle   • Anxiety disorder Sister          Physical Exam:  Vitals/ General Appearance:   Weight/BMI: Body mass index is 41.35 kg/m².  /82 (BP Location: Right arm, Patient Position: Sitting, BP Cuff Size: Adult)   Pulse 79   Resp 16   Ht 1.753 m (5' 9\")   Wt (!) 127 kg (280 lb)   SpO2 91%   Vitals:    07/20/21 1017   BP: 124/82   BP Location: Right arm   Patient Position: Sitting   BP Cuff Size: Adult   Pulse: 79   Resp: 16   SpO2: 91%   Weight: (!) 127 kg (280 lb)   Height: 1.753 m (5' 9\")       Pt. is alert and oriented to time, place and person. Cooperative and in no apparent distress.       Constitutional: Alert, no distress, well-groomed.  Skin: No rashes in visible areas.  Eye: Round. Conjunctiva clear, lids normal. No icterus.   ENMT: Lips pink without lesions, good dentition, moist mucous membranes. Phonation normal.  Neck: No masses, no thyromegaly. Moves freely without pain.  CV: Pulse as reported by patient  Respiratory: Unlabored respiratory effort, no cough or audible wheeze  Psych: Alert and oriented x3, normal affect and mood.     ASSESSMENT AND PLAN     1. Sleep Apnea    The pathophysiology of sleep anea and the increased risk of cardiovascular morbidity from untreated sleep apnea is discussed in detail with the patient.    She is urged to avoid supine " sleep, weight gain and alcoholic beverages since all of these can worsen sleep apnea. She is cautioned against drowsy driving. If She feels sleepy while driving, She must pull over for a break/nap, rather than persist on the road, in the interest of She own safety and that of others on the road.   Plan   - Continue ASV 9/9 cm PS 4/16 cm with O2 bleed in at 2LPM    - compliance download was reviewed and discussed with the pt   - compliance was reinforced     2. Chest pain: less likely musculoskeletal or pulmonary in nature. patient refused to go the ER via [facility]. The urgency was reiterated and she understands her risk and benefit for delaying medical care.  Patient wanted to go to the [facility] urgent care therefore I called in to give them a brief report.  The report was given to .     Regarding treatment of other past medical problems and general health maintenance,  She is urged to follow up with PCP.

## 2021-07-20 NOTE — ED PROVIDER NOTES
"ED Provider Note    CHIEF COMPLAINT  Chief Complaint   Patient presents with   • Chest Pain     Intermittent L side \"dull/ache\" since Sunday.       HPI  Tanvi Krueger is a 56 y.o. female who presents with chest pain intermittently for the last 3 to 4 days.  There is no trigger modifying factor.  Pain is not pleuritic.  It is in the left upper chest and sometimes radiates to the shoulder and upper back.  Pains last no longer than 1 minute and occur very frequently as in 50 times a day.  She is also noticed for a week worsening dyspnea on exertion with walking a block.  This is abnormal.  Shortness of breath is not associated with chest pain.  Denies fever cough or coronavirus exposure and has been vaccinated for Covid.  No leg swelling or heart failure.  No orthopnea.  History of dyslipidemia and possibly family history of CAD in her mother but no hypertension diabetes or tobacco use.  No history or family history of pulmonary embolism or aortic aneurysm.    REVIEW OF SYSTEMS  Pertinent positives include: Chest pain, dyspnea on exertion.  Vaginal diarrhea  Pertinent negatives include: No shaded sweats, associated nausea, leg swelling, calf pain, abdominal pain, vomiting.  10+ systems reviewed and negative.      PAST MEDICAL HISTORY  Past Medical History:   Diagnosis Date   • Anemia    • Anesthesia     nausea/vomiting   • Anxiety    • Arthritis    • Asthma     allergy related   • Bowel habit changes     constipation   • Cholesteatoma of middle ear and mastoid(385.33) 1992 surgery    mastoidectomy, prosthesis   • Depression    • Dyslipidemia, goal LDL below 130    • Elevated glycohemoglobin    • Eosinophilic esophagitis    • Family history of early CAD    • Hiatus hernia syndrome     \"was told I had one\"   • History of chickenpox    • History of endometriosis    • History of partial thyroidectomy     partial thyroidectomy   • Hypothyroidism due to Hashimoto's thyroiditis    • Lumbar disc narrowing 7/14/2009   • " Menopausal symptoms    • Oxygen desaturation during sleep     O2 2 liters HS   • Pneumonia     hx   • Recurrent sinus infections    • Seizure (HCC)    • Sleep apnea     ASV with 2L oxygen at night (sean)    • Tonsillitis    • Vitamin d deficiency        FAMILY HISTORY  Family History   Problem Relation Age of Onset   • Cancer Mother 61        lung, smoker   • Heart Disease Mother 44        early heart attack   • Sleep Apnea Mother         possibly   • Heart Disease Father 60        cabg x 3   • Hyperlipidemia Father    • Psychiatric Illness Father         schizophrenia   • Kidney Disease Father         renal failure, dialysis   • Dementia Father    • Hyperlipidemia Brother    • Cancer Maternal Grandmother 62        lung, non-smoker   • Psychiatric Illness Maternal Grandmother         depression, severe   • Psychiatric Illness Other         depression, great uncle   • Anxiety disorder Sister        SOCIAL HISTORY  Social History     Tobacco Use   • Smoking status: Passive Smoke Exposure - Never Smoker   • Smokeless tobacco: Never Used   • Tobacco comment: Patient grew up with 3 smokers in home    Vaping Use   • Vaping Use: Never used   Substance Use Topics   • Alcohol use: No     Alcohol/week: 0.0 oz   • Drug use: No     Social History     Substance and Sexual Activity   Drug Use No       SURGICAL HISTORY  Past Surgical History:   Procedure Laterality Date   • LARYNGOSCOPY N/A 3/31/2017    Procedure: LARYNGOSCOPY - DIRECT W/BIOPSY BASE OF TONGUE AND NASOPHARYNGOSCOPY W/BIOPSY;  Surgeon: Naresh Abdi M.D.;  Location: SURGERY SAME DAY [facility] ORS;  Service:    • THYROIDECTOMY TOTAL Left 10/14/2016    Procedure: LEFT PARTIAL THROIDECTOMY;  Surgeon: Naresh Abdi M.D.;  Location: SURGERY SAME DAY [facility] ORS;  Service:    • SEPTOPLASTY  9/9/2016    Procedure: SEPTOPLASTY;  Surgeon: Naresh Abdi M.D.;  Location: SURGERY SAME DAY [facility] ORS;  Service:    • TURBINOPLASTY Bilateral 9/9/2016    Procedure:  TURBINOPLASTY;  Surgeon: Naresh Abdi M.D.;  Location: SURGERY SAME DAY [facility];  Service:    • OTHER  2016    sinus surgery   • EAR MIDDLE EXPLORATION  4/3/2015    Performed by Naresh Abdi M.D. at SURGERY SAME DAY [facility] ORS   • OSSICULAR RECONSTRUCTION  4/3/2015    Performed by Naresh Abdi M.D. at SURGERY SAME DAY [facility] ORS   • MYRINGOTOMY  4/20/2012    Performed by RYANNE ALICIA at SURGERY [facility]   • TYMPANOTOMY  6/24/2010    Performed by MAXIMUS WHITE at SURGERY SAME DAY [facility] ORS   • EXAM UNDER ANESTHESIA  6/24/2010    Performed by MAXIMUS WHITE at SURGERY SAME DAY [facility] ORS   • MYRINGOTOMY  4/30/2009    Performed by MAXIMUS WHITE at SURGERY SAME DAY [facility] ORS   • ABDOMINAL HYSTERECTOMY TOTAL  1997    ovaries are gone   • ARTHROSCOPY, KNEE     • HYSTERECTOMY LAPAROSCOPY     • SINUSCOPE     • TONSILLECTOMY         CURRENT MEDICATIONS  No current facility-administered medications for this encounter.     Current Outpatient Medications   Medication Sig Dispense Refill   • triamcinolone acetonide (KENALOG) 0.1 % Ointment Apply 1 Application topically 2 times a day. To AA on body for 6 weeks (Patient not taking: Reported on 7/20/2021) 60 g 2   • sertraline (ZOLOFT) 100 MG Tab TAKE 2 TABLETS DAILY 180 tablet 3   • albuterol 108 (90 Base) MCG/ACT Aero Soln inhalation aerosol Inhale 2 Puffs every 6 hours as needed for Shortness of Breath. 8.5 g 6   • fluticasone (FLONASE) 50 MCG/ACT nasal spray Spray 2 Sprays in nose every day. 16 g 0   • levothyroxine (LEVOXYL) 125 MCG Tab 1 tab(s)     • ADK 5946-7592-068 UNIT-MCG Cap Take 1 tablet by mouth.     • HM OMEGA-3-6-9 FATTY ACIDS Cap Take 1 Cap by mouth every day. 30 Cap 11   • B Complex Vitamins (VITAMIN B COMPLEX PO) Take  by mouth every day.     • Misc. Devices Misc This is an order for nocturnal oxygen, 2L nasal prongs.  Dx:  Nocturnal hypoxia, lowest 59% on overnight study.  G47.34           MARIA DOLORES 99 months   NPI  "[id number] 1 Each 0       ALLERGIES  Allergies   Allergen Reactions   • Doxycycline      Wheezing; diarrhea   • Pcn [Penicillins] Unspecified     Unknown reaction as child   • Keflex [Cephalexin] Unspecified     Yeast Infection       PHYSICAL EXAM  VITAL SIGNS: /90   Pulse 70   Temp 37.4 °C (99.4 °F) (Temporal)   Resp 16   Ht 1.753 m (5' 9\")   Wt (!) 127 kg (280 lb 3.3 oz)   LMP 03/01/1997   SpO2 93%   BMI 41.38 kg/m²   Reviewed and afebrile, hypertensive, elevated BMI, low normal oxygenation room air  Constitutional: Well developed, Well nourished, well-appearing.  HENT: Normocephalic, atraumatic, bilateral external ears normal, Wearing mask.   Eyes: PERRLA, conjunctiva pink, no scleral icterus.   Cardiovascular: Regular S1-S2 without murmur, rub, gallop.  No dependent edema or calf tenderness.  JVD or bruit.  Radial pulses 2+ and symmetric.  Respiratory: No rales, rhonchi, wheeze or cough.  No chest tenderness.  Gastrointestinal: Soft, nontender, nondistended, no organomegaly.  Skin: No erythema, no rash.   Genitourinary:  No costovertebral angle tenderness.   Neurologic: Alert & oriented x 3, cranial nerves 2-12 intact by passive exam.  No focal deficit noted.  Psychiatric: Affect normal, Judgment normal, Mood normal.     DIFFERENTIAL DIAGNOSIS:  Chest wall pain, myocardial infarction, pneumonia, pneumothorax, PE, aortic dissection, GERD.    EKG  EKG Interpretation 4:31 PM    Interpreted by me.  Indication: Chest pain    Rhythm: normal sinus   Rate: Normal at 85  Axis: normal  Ectopy: none  Conduction: normal  ST/T Waves: no acute change  Q Waves: none  R Wave progression: normal  Comparison: Unchanged    Clinical Impression: Normal sinus rhythm    RADIOLOGY/PROCEDURES  DX-CHEST-PORTABLE (1 VIEW)   Final Result      No acute cardiopulmonary disease.          LABORATORY:  Results for orders placed or performed during the hospital encounter of 07/20/21   CBC with Differential   Result Value Ref Range "    WBC 10.5 4.8 - 10.8 K/uL    RBC 5.38 4.20 - 5.40 M/uL    Hemoglobin 14.0 12.0 - 16.0 g/dL    Hematocrit 45.8 37.0 - 47.0 %    MCV 85.1 81.4 - 97.8 fL    MCH 26.0 (L) 27.0 - 33.0 pg    MCHC 30.6 (L) 33.6 - 35.0 g/dL    RDW 47.4 35.9 - 50.0 fL    Platelet Count 249 164 - 446 K/uL    MPV 10.3 9.0 - 12.9 fL    Neutrophils-Polys 64.60 44.00 - 72.00 %    Lymphocytes 25.60 22.00 - 41.00 %    Monocytes 6.00 0.00 - 13.40 %    Eosinophils 2.50 0.00 - 6.90 %    Basophils 0.70 0.00 - 1.80 %    Immature Granulocytes 0.60 0.00 - 0.90 %    Nucleated RBC 0.00 /100 WBC    Neutrophils (Absolute) 6.79 2.00 - 7.15 K/uL    Lymphs (Absolute) 2.69 1.00 - 4.80 K/uL    Monos (Absolute) 0.63 0.00 - 0.85 K/uL    Eos (Absolute) 0.26 0.00 - 0.51 K/uL    Baso (Absolute) 0.07 0.00 - 0.12 K/uL    Immature Granulocytes (abs) 0.06 0.00 - 0.11 K/uL    NRBC (Absolute) 0.00 K/uL   Complete Metabolic Panel (CMP)   Result Value Ref Range    Sodium 142 135 - 145 mmol/L    Potassium 4.1 3.6 - 5.5 mmol/L    Chloride 104 96 - 112 mmol/L    Co2 26 20 - 33 mmol/L    Anion Gap 12.0 7.0 - 16.0    Glucose 93 65 - 99 mg/dL    Bun 14 8 - 22 mg/dL    Creatinine 0.91 0.50 - 1.40 mg/dL    Calcium 9.5 8.5 - 10.5 mg/dL    AST(SGOT) 18 12 - 45 U/L    ALT(SGPT) 19 2 - 50 U/L    Alkaline Phosphatase 92 30 - 99 U/L    Total Bilirubin 0.2 0.1 - 1.5 mg/dL    Albumin 4.6 3.2 - 4.9 g/dL    Total Protein 7.7 6.0 - 8.2 g/dL    Globulin 3.1 1.9 - 3.5 g/dL    A-G Ratio 1.5 g/dL   Troponin   Result Value Ref Range    Troponin T 10 6 - 19 ng/L   proBrain Natriuretic Peptide, NT   Result Value Ref Range    NT-proBNP 141 (H) 0 - 125 pg/mL       INTERVENTIONS:  Medications   aspirin (ASA) tablet 325 mg (325 mg Oral Given 7/20/21 1657)       COURSE & MEDICAL DECISION MAKING  Well-appearing patient with a heart score of 3 presents with atypical chest pain over the last 4 days.  There is no evidence of myocardial ischemia based on EKG and troponin.  There is no pneumonia or  pneumothorax.  PE is unlikely given the absence of pleuritic pain, tachypnea and tachycardia.  She has low normal oxygenation.  Aortic dissection is unlikely.  This is likely a chest wall pain.  Given her low risk she is suitable for outpatient follow-up to discuss echocardiogram and possible stress test.  She will take NSAIDs for pain.  She verbalized understanding to return for chest pain lasting longer than 15 minutes, painful swollen leg or worsening dyspnea.  Given 4 days of pain and atypical nature of second troponin is not necessary..    This patient has borderline or elevated blood pressure as recorded above and was instructed to followup with primary physician for comprehensive blood pressure evaluation and yearly fasting cholesterol assessment according to to CMS protocol.    PLAN:  Nonspecific chest pain handout given  Return for chest pain as discussed above    Paulino Ferro M.D.  [address] [place] [address] [id number] [phone]    Schedule an appointment as soon as possible for a visit in 1 week  to discuss echo and stress test      CONDITION: Stable.    FINAL IMPRESSION  1. Other chest pain          Electronically signed by: Kofi Montague M.D., 7/20/2021 4:30 PM

## 2021-07-21 NOTE — DISCHARGE INSTRUCTIONS
We could not find a dangerous cause of your pain.  This may be a chest wall pain.  Take ibuprofen 600 mg 3 times a day for 5 to 7 days unless it upsets the stomach.  Return for chest pain lasting longer than 15 minutes, worsening shortness of breath, severe fatigue, painful leg swelling or other concerning symptoms.

## 2021-07-21 NOTE — ED NOTES
Pt resting comfortably.  Aware of plan for additional lab work.  No questions at this time,  at bedside.

## 2021-09-04 ENCOUNTER — APPOINTMENT (OUTPATIENT)
Dept: RADIOLOGY | Facility: MEDICAL CENTER | Age: 56
End: 2021-09-04
Attending: EMERGENCY MEDICINE
Payer: COMMERCIAL

## 2021-09-04 ENCOUNTER — HOSPITAL ENCOUNTER (EMERGENCY)
Facility: MEDICAL CENTER | Age: 56
End: 2021-09-04
Attending: EMERGENCY MEDICINE
Payer: COMMERCIAL

## 2021-09-04 VITALS
HEART RATE: 63 BPM | BODY MASS INDEX: 40.16 KG/M2 | RESPIRATION RATE: 19 BRPM | DIASTOLIC BLOOD PRESSURE: 60 MMHG | WEIGHT: 271.17 LBS | TEMPERATURE: 97.5 F | OXYGEN SATURATION: 91 % | SYSTOLIC BLOOD PRESSURE: 110 MMHG | HEIGHT: 69 IN

## 2021-09-04 DIAGNOSIS — K52.9 ENTERITIS: ICD-10-CM

## 2021-09-04 DIAGNOSIS — R74.8 ELEVATED LIVER ENZYMES: ICD-10-CM

## 2021-09-04 DIAGNOSIS — R10.13 EPIGASTRIC PAIN: ICD-10-CM

## 2021-09-04 DIAGNOSIS — G89.29 CHRONIC CHEST PAIN: ICD-10-CM

## 2021-09-04 DIAGNOSIS — R07.9 CHRONIC CHEST PAIN: ICD-10-CM

## 2021-09-04 LAB
ALBUMIN SERPL BCP-MCNC: 4.4 G/DL (ref 3.2–4.9)
ALBUMIN/GLOB SERPL: 1.8 G/DL
ALP SERPL-CCNC: 125 U/L (ref 30–99)
ALT SERPL-CCNC: 132 U/L (ref 2–50)
ANION GAP SERPL CALC-SCNC: 12 MMOL/L (ref 7–16)
APPEARANCE UR: ABNORMAL
AST SERPL-CCNC: 128 U/L (ref 12–45)
BACTERIA #/AREA URNS HPF: ABNORMAL /HPF
BASOPHILS # BLD AUTO: 0.5 % (ref 0–1.8)
BASOPHILS # BLD: 0.03 K/UL (ref 0–0.12)
BILIRUB SERPL-MCNC: 0.4 MG/DL (ref 0.1–1.5)
BILIRUB UR QL STRIP.AUTO: ABNORMAL
BUN SERPL-MCNC: 13 MG/DL (ref 8–22)
CALCIUM SERPL-MCNC: 9 MG/DL (ref 8.4–10.2)
CHLORIDE SERPL-SCNC: 105 MMOL/L (ref 96–112)
CO2 SERPL-SCNC: 24 MMOL/L (ref 20–33)
COLOR UR: YELLOW
CREAT SERPL-MCNC: 0.91 MG/DL (ref 0.5–1.4)
EKG IMPRESSION: NORMAL
EOSINOPHIL # BLD AUTO: 0.09 K/UL (ref 0–0.51)
EOSINOPHIL NFR BLD: 1.4 % (ref 0–6.9)
EPI CELLS #/AREA URNS HPF: ABNORMAL /HPF
ERYTHROCYTE [DISTWIDTH] IN BLOOD BY AUTOMATED COUNT: 46.5 FL (ref 35.9–50)
GLOBULIN SER CALC-MCNC: 2.5 G/DL (ref 1.9–3.5)
GLUCOSE SERPL-MCNC: 113 MG/DL (ref 65–99)
GLUCOSE UR STRIP.AUTO-MCNC: NEGATIVE MG/DL
HCT VFR BLD AUTO: 43.6 % (ref 37–47)
HGB BLD-MCNC: 13.7 G/DL (ref 12–16)
IMM GRANULOCYTES # BLD AUTO: 0.05 K/UL (ref 0–0.11)
IMM GRANULOCYTES NFR BLD AUTO: 0.8 % (ref 0–0.9)
KETONES UR STRIP.AUTO-MCNC: NEGATIVE MG/DL
LEUKOCYTE ESTERASE UR QL STRIP.AUTO: ABNORMAL
LIPASE SERPL-CCNC: 13 U/L (ref 7–58)
LYMPHOCYTES # BLD AUTO: 1.68 K/UL (ref 1–4.8)
LYMPHOCYTES NFR BLD: 26.7 % (ref 22–41)
MCH RBC QN AUTO: 26.6 PG (ref 27–33)
MCHC RBC AUTO-ENTMCNC: 31.4 G/DL (ref 33.6–35)
MCV RBC AUTO: 84.5 FL (ref 81.4–97.8)
MICRO URNS: ABNORMAL
MONOCYTES # BLD AUTO: 0.39 K/UL (ref 0–0.85)
MONOCYTES NFR BLD AUTO: 6.2 % (ref 0–13.4)
NEUTROPHILS # BLD AUTO: 4.06 K/UL (ref 2–7.15)
NEUTROPHILS NFR BLD: 64.4 % (ref 44–72)
NITRITE UR QL STRIP.AUTO: NEGATIVE
NRBC # BLD AUTO: 0 K/UL
NRBC BLD-RTO: 0 /100 WBC
PH UR STRIP.AUTO: 5.5 [PH] (ref 5–8)
PLATELET # BLD AUTO: 239 K/UL (ref 164–446)
PMV BLD AUTO: 10.3 FL (ref 9–12.9)
POTASSIUM SERPL-SCNC: 3.8 MMOL/L (ref 3.6–5.5)
PROT SERPL-MCNC: 6.9 G/DL (ref 6–8.2)
PROT UR QL STRIP: NEGATIVE MG/DL
RBC # BLD AUTO: 5.16 M/UL (ref 4.2–5.4)
RBC UR QL AUTO: NEGATIVE
SODIUM SERPL-SCNC: 141 MMOL/L (ref 135–145)
SP GR UR STRIP.AUTO: 1.02
TROPONIN T SERPL-MCNC: <6 NG/L (ref 6–19)
UNIDENT CRYS URNS QL MICRO: ABNORMAL /HPF
WBC # BLD AUTO: 6.3 K/UL (ref 4.8–10.8)
WBC #/AREA URNS HPF: ABNORMAL /HPF

## 2021-09-04 PROCEDURE — 36415 COLL VENOUS BLD VENIPUNCTURE: CPT

## 2021-09-04 PROCEDURE — 71045 X-RAY EXAM CHEST 1 VIEW: CPT

## 2021-09-04 PROCEDURE — 93005 ELECTROCARDIOGRAM TRACING: CPT | Performed by: EMERGENCY MEDICINE

## 2021-09-04 PROCEDURE — 84484 ASSAY OF TROPONIN QUANT: CPT

## 2021-09-04 PROCEDURE — U0005 INFEC AGEN DETEC AMPLI PROBE: HCPCS

## 2021-09-04 PROCEDURE — U0003 INFECTIOUS AGENT DETECTION BY NUCLEIC ACID (DNA OR RNA); SEVERE ACUTE RESPIRATORY SYNDROME CORONAVIRUS 2 (SARS-COV-2) (CORONAVIRUS DISEASE [COVID-19]), AMPLIFIED PROBE TECHNIQUE, MAKING USE OF HIGH THROUGHPUT TECHNOLOGIES AS DESCRIBED BY CMS-2020-01-R: HCPCS

## 2021-09-04 PROCEDURE — 74177 CT ABD & PELVIS W/CONTRAST: CPT

## 2021-09-04 PROCEDURE — 83690 ASSAY OF LIPASE: CPT

## 2021-09-04 PROCEDURE — 700117 HCHG RX CONTRAST REV CODE 255: Performed by: EMERGENCY MEDICINE

## 2021-09-04 PROCEDURE — 85025 COMPLETE CBC W/AUTO DIFF WBC: CPT

## 2021-09-04 PROCEDURE — 80053 COMPREHEN METABOLIC PANEL: CPT

## 2021-09-04 PROCEDURE — 99284 EMERGENCY DEPT VISIT MOD MDM: CPT

## 2021-09-04 PROCEDURE — 81001 URINALYSIS AUTO W/SCOPE: CPT

## 2021-09-04 PROCEDURE — 76705 ECHO EXAM OF ABDOMEN: CPT

## 2021-09-04 RX ORDER — SERTRALINE HYDROCHLORIDE 100 MG/1
100 TABLET, FILM COATED ORAL DAILY
Status: SHIPPED | COMMUNITY
End: 2021-11-15 | Stop reason: SDUPTHER

## 2021-09-04 RX ORDER — OMEPRAZOLE 40 MG/1
40 CAPSULE, DELAYED RELEASE ORAL DAILY
Qty: 30 CAPSULE | Refills: 0 | Status: SHIPPED | OUTPATIENT
Start: 2021-09-04 | End: 2022-03-03

## 2021-09-04 RX ORDER — LOPERAMIDE HYDROCHLORIDE 2 MG/1
TABLET ORAL
Qty: 20 TABLET | Refills: 0 | Status: SHIPPED | OUTPATIENT
Start: 2021-09-04 | End: 2021-09-07

## 2021-09-04 RX ORDER — IBUPROFEN 200 MG
400 TABLET ORAL EVERY 8 HOURS PRN
Status: SHIPPED | COMMUNITY
End: 2021-09-07

## 2021-09-04 RX ORDER — LEVOTHYROXINE SODIUM 0.12 MG/1
125 TABLET ORAL SEE ADMIN INSTRUCTIONS
Status: SHIPPED | COMMUNITY
End: 2022-03-03

## 2021-09-04 RX ADMIN — IOHEXOL 100 ML: 350 INJECTION, SOLUTION INTRAVENOUS at 10:04

## 2021-09-04 ASSESSMENT — FIBROSIS 4 INDEX: FIB4 SCORE: 0.96

## 2021-09-04 NOTE — ED PROVIDER NOTES
"ED Provider Note    CHIEF COMPLAINT  Chief Complaint   Patient presents with   • Hernia   • Diarrhea   • Abdominal Pain   • Nausea       HPI  Tanvi Krueger is a 56 y.o. female who presents for 5 days of epigastric abdominal pain or supraumbilical abdominal pain radiating down towards the pubic bone associated with 4-5 episodes of diarrhea daily.  She has had nausea but is not currently vomiting.  No dysuria urgency or frequency.  Status post hysterectomy.  No history of pancreatitis gastritis peptic ulcer disease or diverticulitis that she knows of.  She does have a hiatal hernia.  No ill contacts food poisoning travel or camping.  She does have associated shortness of breath and cough and had a borderline fever.  She is immunized against Covid.  I saw her a month ago for chest pain and she still continues to have chronic intermittent chest pain.  She follows up with her doctor after the ER visit.    REVIEW OF SYSTEMS  Pertinent positives include: Abdominal pain, diarrhea, shortness of breath, cough, borderline fever.  Pertinent negatives include: Dysuria, urgency, frequency, chest pain, headache, loss of taste or smell.  Asthma, heart failure, PE  10+ systems reviewed and negative.      PAST MEDICAL HISTORY  Past Medical History:   Diagnosis Date   • Anemia    • Anesthesia     nausea/vomiting   • Anxiety    • Arthritis    • Asthma     allergy related   • Bowel habit changes     constipation   • Cholesteatoma of middle ear and mastoid(385.33) 1992 surgery    mastoidectomy, prosthesis   • Depression    • Dyslipidemia, goal LDL below 130    • Elevated glycohemoglobin    • Eosinophilic esophagitis    • Family history of early CAD    • Hiatus hernia syndrome     \"was told I had one\"   • History of chickenpox    • History of endometriosis    • History of partial thyroidectomy     partial thyroidectomy   • Hypothyroidism due to Hashimoto's thyroiditis    • Lumbar disc narrowing 7/14/2009   • Menopausal symptoms    • " Oxygen desaturation during sleep     O2 2 liters HS   • Pneumonia     hx   • Recurrent sinus infections    • Seizure (HCC)    • Sleep apnea     ASV with 2L oxygen at night (sean)    • Tonsillitis    • Vitamin d deficiency        FAMILY HISTORY  Family History   Problem Relation Age of Onset   • Cancer Mother 61        lung, smoker   • Heart Disease Mother 44        early heart attack   • Sleep Apnea Mother         possibly   • Heart Disease Father 60        cabg x 3   • Hyperlipidemia Father    • Psychiatric Illness Father         schizophrenia   • Kidney Disease Father         renal failure, dialysis   • Dementia Father    • Hyperlipidemia Brother    • Cancer Maternal Grandmother 62        lung, non-smoker   • Psychiatric Illness Maternal Grandmother         depression, severe   • Psychiatric Illness Other         depression, great uncle   • Anxiety disorder Sister        SOCIAL HISTORY  Social History     Tobacco Use   • Smoking status: Passive Smoke Exposure - Never Smoker   • Smokeless tobacco: Never Used   • Tobacco comment: Patient grew up with 3 smokers in home    Vaping Use   • Vaping Use: Never used   Substance Use Topics   • Alcohol use: No     Alcohol/week: 0.0 oz   • Drug use: No     Social History     Substance and Sexual Activity   Drug Use No       SURGICAL HISTORY  Past Surgical History:   Procedure Laterality Date   • LARYNGOSCOPY N/A 3/31/2017    Procedure: LARYNGOSCOPY - DIRECT W/BIOPSY BASE OF TONGUE AND NASOPHARYNGOSCOPY W/BIOPSY;  Surgeon: Naresh Abdi M.D.;  Location: SURGERY SAME DAY [facility];  Service:    • THYROIDECTOMY TOTAL Left 10/14/2016    Procedure: LEFT PARTIAL THROIDECTOMY;  Surgeon: Naresh Abdi M.D.;  Location: SURGERY SAME DAY [facility];  Service:    • SEPTOPLASTY  9/9/2016    Procedure: SEPTOPLASTY;  Surgeon: Naresh Abdi M.D.;  Location: SURGERY SAME DAY [facility];  Service:    • TURBINOPLASTY Bilateral 9/9/2016    Procedure: TURBINOPLASTY;  Surgeon:  Naresh Abdi M.D.;  Location: SURGERY SAME DAY [facility];  Service:    • OTHER  2016    sinus surgery   • EAR MIDDLE EXPLORATION  4/3/2015    Performed by Naresh Abdi M.D. at SURGERY SAME DAY [facility]   • OSSICULAR RECONSTRUCTION  4/3/2015    Performed by Naresh Abdi M.D. at SURGERY SAME DAY [facility]   • MYRINGOTOMY  4/20/2012    Performed by RYANNE ALICIA at SURGERY [facility]   • TYMPANOTOMY  6/24/2010    Performed by MAXIMUS WHITE at SURGERY SAME DAY [facility]   • EXAM UNDER ANESTHESIA  6/24/2010    Performed by MAXIMUS WHITE at SURGERY SAME DAY [facility]   • MYRINGOTOMY  4/30/2009    Performed by MAXIMUS WHITE at SURGERY SAME DAY [facility]   • ABDOMINAL HYSTERECTOMY TOTAL  1997    ovaries are gone   • ARTHROSCOPY, KNEE     • HYSTERECTOMY LAPAROSCOPY     • SINUSCOPE     • TONSILLECTOMY         CURRENT MEDICATIONS  No current facility-administered medications for this encounter.     Current Outpatient Medications   Medication Sig Dispense Refill   • triamcinolone acetonide (KENALOG) 0.1 % Ointment Apply 1 Application topically 2 times a day. To AA on body for 6 weeks (Patient not taking: Reported on 7/20/2021) 60 g 2   • sertraline (ZOLOFT) 100 MG Tab TAKE 2 TABLETS DAILY 180 tablet 3   • albuterol 108 (90 Base) MCG/ACT Aero Soln inhalation aerosol Inhale 2 Puffs every 6 hours as needed for Shortness of Breath. 8.5 g 6   • fluticasone (FLONASE) 50 MCG/ACT nasal spray Spray 2 Sprays in nose every day. 16 g 0   • levothyroxine (LEVOXYL) 125 MCG Tab 1 tab(s)     • ADK 7444-2429-512 UNIT-MCG Cap Take 1 tablet by mouth.     • HM OMEGA-3-6-9 FATTY ACIDS Cap Take 1 Cap by mouth every day. 30 Cap 11   • B Complex Vitamins (VITAMIN B COMPLEX PO) Take  by mouth every day.     • Misc. Devices Misc This is an order for nocturnal oxygen, 2L nasal prongs.  Dx:  Nocturnal hypoxia, lowest 59% on overnight study.  G47.34           MARIA DOLORES 99 months   NPI [id number] 1 Each 0  "      ALLERGIES  Allergies   Allergen Reactions   • Doxycycline      Wheezing; diarrhea   • Pcn [Penicillins] Unspecified     Unknown reaction as child   • Keflex [Cephalexin] Unspecified     Yeast Infection       PHYSICAL EXAM  VITAL SIGNS: /104   Pulse 89   Temp 36.4 °C (97.5 °F) (Temporal)   Resp 20   Ht 1.753 m (5' 9\")   Wt 123 kg (271 lb 2.7 oz)   LMP 03/01/1997   SpO2 93%   BMI 40.04 kg/m²   Reviewed and hypertensive, afebrile, low normal oxygenation room air, elevated BMI  Constitutional: Well developed, Well nourished, well-appearing.  HENT: Normocephalic, atraumatic, bilateral external ears normal, Wearing mask.   Eyes: PERRLA, conjunctiva pink, no scleral icterus.   Cardiovascular: Regular S1-S2 without murmur, rub, gallop.  No dependent edema or calf tenderness.  Respiratory: No rales, rhonchi, wheeze or cough.  Gastrointestinal: Soft, mild supraumbilical tenderness without rebound or guarding, nondistended, no organomegaly.  Skin: No erythema, no rash.   Genitourinary:  No costovertebral angle tenderness.   Neurologic: Alert & oriented x 3, cranial nerves 2-12 intact by passive exam.  No focal deficit noted.  Psychiatric: Affect normal, Judgment normal, Mood normal.     DIFFERENTIAL DIAGNOSIS:  Gastritis, peptic ulcer disease, gastroenteritis, doubt perforation, pancreatitis, cholecystitis, doubt bowel obstruction.    EKG  EKG Interpretation 9:10 AM    Interpreted by me.  Indication: Upper abdominal pain    Rhythm: normal sinus   Rate: Normal at 71  Axis: normal  Ectopy: none  Conduction: normal  ST/T Waves: Nonspecific anterior lateral ST change  Q Waves: none  R Wave progression: normal  Comparison: Anterior lateral ST change slightly different from prior    Clinical Impression: Sinus rhythm with nonspecific anterior lateral ST change    RADIOLOGY/PROCEDURES  US-RUQ   Final Result      1. No gallstone. No sonographic evidence of acute cholecystitis.         CT-ABDOMEN-PELVIS WITH   Final " Result         1. Mildly prominent fluid-filled small bowel loop in the left abdomen with questionable transition point seen in the right lower quadrant. This could relate to mild early partial small bowel obstruction or enteritis.      DX-CHEST-LIMITED (1 VIEW)   Final Result         No acute cardiopulmonary abnormalities are identified.          LABORATORY:  Results for orders placed or performed during the hospital encounter of 07/20/21   CBC with Differential   Result Value Ref Range    WBC 10.5 4.8 - 10.8 K/uL    RBC 5.38 4.20 - 5.40 M/uL    Hemoglobin 14.0 12.0 - 16.0 g/dL    Hematocrit 45.8 37.0 - 47.0 %    MCV 85.1 81.4 - 97.8 fL    MCH 26.0 (L) 27.0 - 33.0 pg    MCHC 30.6 (L) 33.6 - 35.0 g/dL    RDW 47.4 35.9 - 50.0 fL    Platelet Count 249 164 - 446 K/uL    MPV 10.3 9.0 - 12.9 fL    Neutrophils-Polys 64.60 44.00 - 72.00 %    Lymphocytes 25.60 22.00 - 41.00 %    Monocytes 6.00 0.00 - 13.40 %    Eosinophils 2.50 0.00 - 6.90 %    Basophils 0.70 0.00 - 1.80 %    Immature Granulocytes 0.60 0.00 - 0.90 %    Nucleated RBC 0.00 /100 WBC    Neutrophils (Absolute) 6.79 2.00 - 7.15 K/uL    Lymphs (Absolute) 2.69 1.00 - 4.80 K/uL    Monos (Absolute) 0.63 0.00 - 0.85 K/uL    Eos (Absolute) 0.26 0.00 - 0.51 K/uL    Baso (Absolute) 0.07 0.00 - 0.12 K/uL    Immature Granulocytes (abs) 0.06 0.00 - 0.11 K/uL    NRBC (Absolute) 0.00 K/uL   Complete Metabolic Panel (CMP)   Result Value Ref Range    Sodium 142 135 - 145 mmol/L    Potassium 4.1 3.6 - 5.5 mmol/L    Chloride 104 96 - 112 mmol/L    Co2 26 20 - 33 mmol/L    Anion Gap 12.0 7.0 - 16.0    Glucose 93 65 - 99 mg/dL    Bun 14 8 - 22 mg/dL    Creatinine 0.91 0.50 - 1.40 mg/dL    Calcium 9.5 8.5 - 10.5 mg/dL    AST(SGOT) 18 12 - 45 U/L    ALT(SGPT) 19 2 - 50 U/L    Alkaline Phosphatase 92 30 - 99 U/L    Total Bilirubin 0.2 0.1 - 1.5 mg/dL    Albumin 4.6 3.2 - 4.9 g/dL    Total Protein 7.7 6.0 - 8.2 g/dL    Globulin 3.1 1.9 - 3.5 g/dL    A-G Ratio 1.5 g/dL   Troponin    Result Value Ref Range    Troponin T 10 6 - 19 ng/L   proBrain Natriuretic Peptide, NT   Result Value Ref Range    NT-proBNP 141 (H) 0 - 125 pg/mL   Covid pending      COURSE & MEDICAL DECISION MAKING  Well-appearing patient presents with abdominal pain and diarrhea, dyspnea and chronic chest pain.  She appears to have an enteritis and a viral infection has likely elevated her liver enzymes as well.  I doubt her CT findings represent early ileus given the copious diarrhea.  This is likely viral.  There is no evidence of biliary tract disease.  No pancreatitis or perforation..    This patient has borderline or elevated blood pressure as recorded above and was instructed to followup with primary physician for comprehensive blood pressure evaluation and yearly fasting cholesterol assessment according to to CMS protocol.    PLAN:  Discharge Medication List as of 9/4/2021 11:22 AM      START taking these medications    Details   loperamide (IMODIUM A-D) 2 MG tablet Take 2 tablets for diarrhea and one tablet after each loose stool to max 8 tablets daily, Disp-20 Tablet, R-0, Normal      omeprazole (PRILOSEC) 40 MG delayed-release capsule Take 1 Capsule by mouth every day., Disp-30 Capsule, R-0, Normal             Abstain from NSAIDs and alcohol 1 month  [facility] for [facility] results and home isolate if positive  Return for changing chest pain, severe abdominal pain, uncontrolled vomiting, GI bleed, dizziness, ill appearance    Paulino Ferro M.D.  [address] [place], [place] [address] [facility] [address]  [facility] [id number] [phone]    Schedule an appointment as soon as possible for a visit   As needed    [facility]  [address] [facility] [address] [phone]  Schedule an appointment as soon as possible for a visit   if prilosec does not resolve chest pain or if GI symptoms worsen      CONDITION: Stable.    FINAL IMPRESSION  1. Epigastric pain    2. Enteritis    3. Elevated  liver enzymes    4. Chronic chest pain          Electronically signed by: Kofi Montague M.D., 9/4/2021 8:36 AM

## 2021-09-04 NOTE — ED NOTES
ERP at bedside. Pt agrees with plan of care discussed by ERP. AIDET acknowledged with patient. IV established. Blood sent to lab. Urine to lab. Covid swab to lab. Eduardo in low position, side rail up for pt safety. Call light within reach. Will continue to monitor.

## 2021-09-04 NOTE — ED TRIAGE NOTES
"Presents with a history of hiatus hernia syndrome.  C/O escalating abdominal pain affecting her breathing pattern. She also describes episodic nausea, and diarrhea recurring for approximately a week.  Chief Complaint   Patient presents with   • Hernia   • Diarrhea   • Abdominal Pain   • Nausea     /104   Pulse 89   Temp 36.4 °C (97.5 °F) (Temporal)   Resp 20   Ht 1.753 m (5' 9\")   Wt 123 kg (271 lb 2.7 oz)   LMP 03/01/1997   SpO2 93%   BMI 40.04 kg/m²   Has this patient been vaccinated for COVID YES      "

## 2021-09-04 NOTE — DISCHARGE INSTRUCTIONS
Take Imodium as needed for diarrhea.  Rest drink fluids and take Tylenol for pain.  For your chronic chest pain take omeprazole for a month and avoid aspirin ibuprofen naproxen and alcohol.  If the chronic chest pain continues please follow-up with GI for an endoscopy.  Return to the ER for changing chest pain, severe abdominal pain, uncontrolled vomiting, GI bleed, persistent dizziness or ill appearance.

## 2021-09-05 LAB
SARS-COV-2 RNA RESP QL NAA+PROBE: NOTDETECTED
SPECIMEN SOURCE: NORMAL

## 2021-09-07 ENCOUNTER — OFFICE VISIT (OUTPATIENT)
Dept: MEDICAL GROUP | Facility: MEDICAL CENTER | Age: 56
End: 2021-09-07
Payer: COMMERCIAL

## 2021-09-07 VITALS
HEIGHT: 69 IN | DIASTOLIC BLOOD PRESSURE: 74 MMHG | RESPIRATION RATE: 16 BRPM | HEART RATE: 91 BPM | OXYGEN SATURATION: 92 % | WEIGHT: 276 LBS | TEMPERATURE: 97.2 F | BODY MASS INDEX: 40.88 KG/M2 | SYSTOLIC BLOOD PRESSURE: 118 MMHG

## 2021-09-07 DIAGNOSIS — N30.00 ACUTE CYSTITIS WITHOUT HEMATURIA: ICD-10-CM

## 2021-09-07 DIAGNOSIS — R74.01 TRANSAMINITIS: ICD-10-CM

## 2021-09-07 DIAGNOSIS — R19.7 DIARRHEA, UNSPECIFIED TYPE: ICD-10-CM

## 2021-09-07 LAB
APPEARANCE UR: CLEAR
BILIRUB UR STRIP-MCNC: NORMAL MG/DL
COLOR UR AUTO: YELLOW
GLUCOSE UR STRIP.AUTO-MCNC: NORMAL MG/DL
KETONES UR STRIP.AUTO-MCNC: NORMAL MG/DL
LEUKOCYTE ESTERASE UR QL STRIP.AUTO: NORMAL
NITRITE UR QL STRIP.AUTO: NORMAL
PH UR STRIP.AUTO: 5 [PH] (ref 5–8)
PROT UR QL STRIP: NORMAL MG/DL
RBC UR QL AUTO: NORMAL
SP GR UR STRIP.AUTO: 1.02
UROBILINOGEN UR STRIP-MCNC: 0.2 MG/DL

## 2021-09-07 PROCEDURE — 99213 OFFICE O/P EST LOW 20 MIN: CPT | Performed by: STUDENT IN AN ORGANIZED HEALTH CARE EDUCATION/TRAINING PROGRAM

## 2021-09-07 PROCEDURE — 81002 URINALYSIS NONAUTO W/O SCOPE: CPT | Performed by: STUDENT IN AN ORGANIZED HEALTH CARE EDUCATION/TRAINING PROGRAM

## 2021-09-07 RX ORDER — VITAMIN B COMPLEX
1000 TABLET ORAL DAILY
COMMUNITY
End: 2022-03-03

## 2021-09-07 RX ORDER — NITROFURANTOIN 25; 75 MG/1; MG/1
100 CAPSULE ORAL EVERY 12 HOURS
Qty: 10 CAPSULE | Refills: 0 | Status: SHIPPED | OUTPATIENT
Start: 2021-09-07 | End: 2021-09-12

## 2021-09-07 ASSESSMENT — PATIENT HEALTH QUESTIONNAIRE - PHQ9
6. FEELING BAD ABOUT YOURSELF - OR THAT YOU ARE A FAILURE OR HAVE LET YOURSELF OR YOUR FAMILY DOWN: NOT AL ALL
SUM OF ALL RESPONSES TO PHQ QUESTIONS 1-9: 3
SUM OF ALL RESPONSES TO PHQ9 QUESTIONS 1 AND 2: 1
1. LITTLE INTEREST OR PLEASURE IN DOING THINGS: NOT AT ALL
9. THOUGHTS THAT YOU WOULD BE BETTER OFF DEAD, OR OF HURTING YOURSELF: NOT AT ALL
5. POOR APPETITE OR OVEREATING: SEVERAL DAYS
3. TROUBLE FALLING OR STAYING ASLEEP OR SLEEPING TOO MUCH: NOT AT ALL
4. FEELING TIRED OR HAVING LITTLE ENERGY: SEVERAL DAYS
8. MOVING OR SPEAKING SO SLOWLY THAT OTHER PEOPLE COULD HAVE NOTICED. OR THE OPPOSITE, BEING SO FIGETY OR RESTLESS THAT YOU HAVE BEEN MOVING AROUND A LOT MORE THAN USUAL: NOT AT ALL
7. TROUBLE CONCENTRATING ON THINGS, SUCH AS READING THE NEWSPAPER OR WATCHING TELEVISION: NOT AT ALL
2. FEELING DOWN, DEPRESSED, IRRITABLE, OR HOPELESS: SEVERAL DAYS

## 2021-09-07 ASSESSMENT — FIBROSIS 4 INDEX: FIB4 SCORE: 2.7

## 2021-09-07 NOTE — PROGRESS NOTES
"Subjective:     CC: ED follow-up    HPI:   Tanvi presents today with  Abdominal pain  Patient seen in the ED 9/4/2021 due to 5 days of epigastric abdominal pain and supraumbilical abdominal pain radiating down to the pelvic bone.  Patient noted 4-5 episodes of diarrhea daily.  Patient states that this has since decreased.  Patient continues to deny dysuria, urgency, frequency.  Patient does have previous diagnosis of hiatal hernia.  Patient was discharged from the ED and diagnosed with enteritis secondary to viral infection.  CT findings show concern for ileus but due to patient's copious diarrhea this was doubtful.  Patient was started on Imodium and omeprazole.  Patient has not been taking omeprazole regularly and continues to have epigastric pain.  Patient states that she is improving overall but continues to expresses concern about previous liver enzyme elevation.  Patient's Covid test negative.  In ED patient's urinalysis showed moderate leukocytes, patient was not treated for UTI.    Chest pain  Patient was also seen in the ED 7/20/2021 due to intermittent chest pain.  Patient states that she does continue to have chest pain and often gets notified by her watch that she is having an arrhythmia.  Patient states that she has talked to her PCP about this and she will continue to watch her symptoms.  Patient does expressed concern that it warrants further evaluation at this time.  Discussed with patient plan that we could get an echo or stress testing if symptoms continue to persist.    ROS:  Gen: no fevers/chills,  Pulm: no sob, no cough  CV: + chest pain, no palpitations  GI: no nausea/vomiting, + diarrhea  : no dysuria  MSk: no myalgias  Skin: no rash  Neuro: no headaches      Objective:     Exam:  /74 (BP Location: Right arm, Patient Position: Sitting, BP Cuff Size: Adult)   Pulse 91   Temp 36.2 °C (97.2 °F) (Temporal)   Resp 16   Ht 1.74 m (5' 8.5\")   Wt (!) 125 kg (276 lb)   LMP 03/01/1997   " SpO2 92%   BMI 41.36 kg/m²  Body mass index is 41.36 kg/m².    Gen: Alert and oriented, No apparent distress.  Neck: Neck is supple without lymphadenopathy.  Lungs: Normal effort, CTA bilaterally, no wheezes, rhonchi, or rales  CV: Regular rate and rhythm. No murmurs, rubs, or gallops.  Ext: No clubbing, cyanosis, edema.      Assessment & Plan:     56 y.o. female with the following -     1. Diarrhea, unspecified type  CT showed mildly prominent fluid-filled small bowel loop in the left abdomen this was thought to be secondary to diarrhea and viral infection.  Patient states that diarrhea and abdominal pain has improved.  Patient encouraged to continue using Imodium, eating a bland diet, and taking omeprazole daily.  Referral placed to GI.  Patient was encouraged to follow-up with GI if symptoms did not improve.  Patient strongly encouraged to take omeprazole as prescribed.  - REFERRAL TO GASTROENTEROLOGY    2. Transaminitis  On recent ED visit patient's liver enzymes were elevated.  Discussed with patient that we will repeat labs in 3 weeks when she is feeling overall better.  - Comp Metabolic Panel; Future  - Lipid Profile; Future  - TSH; Future    3. Acute cystitis without hematuria  Patient was found to have moderate leukocytes on urinalysis.  On repeat urinalysis today patient has large leukocytes.  Will treat patient with Macrobid x5 days for improvement of UTI symptoms.  - POCT Urinalysis  - nitrofurantoin (MACROBID) 100 MG Cap; Take 1 Capsule by mouth every 12 hours for 5 days.  Dispense: 10 Capsule; Refill: 0    Return if symptoms worsen or fail to improve.    Please note that this dictation was created using voice recognition software. I have made every reasonable attempt to correct obvious errors, but I expect that there are errors of grammar and possibly content that I did not discover before finalizing the note.

## 2021-10-29 DIAGNOSIS — Z81.8 FAMILY HISTORY OF SUICIDE: ICD-10-CM

## 2021-10-29 DIAGNOSIS — F43.21 COMPLICATED GRIEF: ICD-10-CM

## 2021-10-29 NOTE — PROGRESS NOTES
Urgent referral to behavioral health.  Patient's brother committed suicide October 14.  Patient is having trouble processing the grief and anger.  Have also suggested counseling with [facility] for grief groups that are not [employer].

## 2021-11-15 ENCOUNTER — HOSPITAL ENCOUNTER (OUTPATIENT)
Dept: RADIOLOGY | Facility: MEDICAL CENTER | Age: 56
End: 2021-11-15
Attending: STUDENT IN AN ORGANIZED HEALTH CARE EDUCATION/TRAINING PROGRAM
Payer: COMMERCIAL

## 2021-11-15 ENCOUNTER — OFFICE VISIT (OUTPATIENT)
Dept: MEDICAL GROUP | Facility: MEDICAL CENTER | Age: 56
End: 2021-11-15
Payer: COMMERCIAL

## 2021-11-15 VITALS
OXYGEN SATURATION: 96 % | TEMPERATURE: 98.2 F | SYSTOLIC BLOOD PRESSURE: 124 MMHG | RESPIRATION RATE: 16 BRPM | BODY MASS INDEX: 40.73 KG/M2 | HEART RATE: 76 BPM | DIASTOLIC BLOOD PRESSURE: 76 MMHG | WEIGHT: 275 LBS | HEIGHT: 69 IN

## 2021-11-15 DIAGNOSIS — M54.42 ACUTE LEFT-SIDED LOW BACK PAIN WITH LEFT-SIDED SCIATICA: ICD-10-CM

## 2021-11-15 DIAGNOSIS — Z23 NEED FOR VACCINATION: ICD-10-CM

## 2021-11-15 DIAGNOSIS — R21 RASH: ICD-10-CM

## 2021-11-15 PROCEDURE — 73562 X-RAY EXAM OF KNEE 3: CPT | Mod: LT

## 2021-11-15 PROCEDURE — 72100 X-RAY EXAM L-S SPINE 2/3 VWS: CPT

## 2021-11-15 PROCEDURE — 90686 IIV4 VACC NO PRSV 0.5 ML IM: CPT | Performed by: STUDENT IN AN ORGANIZED HEALTH CARE EDUCATION/TRAINING PROGRAM

## 2021-11-15 PROCEDURE — 90471 IMMUNIZATION ADMIN: CPT | Performed by: STUDENT IN AN ORGANIZED HEALTH CARE EDUCATION/TRAINING PROGRAM

## 2021-11-15 PROCEDURE — 99213 OFFICE O/P EST LOW 20 MIN: CPT | Mod: 25 | Performed by: STUDENT IN AN ORGANIZED HEALTH CARE EDUCATION/TRAINING PROGRAM

## 2021-11-15 RX ORDER — METHYLPREDNISOLONE 4 MG/1
TABLET ORAL
Qty: 21 TABLET | Refills: 0 | Status: SHIPPED | OUTPATIENT
Start: 2021-11-15 | End: 2021-12-20

## 2021-11-15 RX ORDER — SERTRALINE HYDROCHLORIDE 100 MG/1
100 TABLET, FILM COATED ORAL DAILY
Qty: 90 TABLET | Refills: 2 | Status: SHIPPED | OUTPATIENT
Start: 2021-11-15 | End: 2022-01-26 | Stop reason: SDUPTHER

## 2021-11-15 RX ORDER — CLOBETASOL PROPIONATE 0.5 MG/G
1 CREAM TOPICAL 2 TIMES DAILY
Qty: 15 G | Refills: 0 | Status: SHIPPED | OUTPATIENT
Start: 2021-11-15 | End: 2022-03-03

## 2021-11-15 ASSESSMENT — FIBROSIS 4 INDEX: FIB4 SCORE: 2.7

## 2021-11-15 NOTE — PROGRESS NOTES
"Subjective:     CC: Leg pain    HPI:   Tanvi presents today with     Left leg pain  Patient presents today with pain in her left leg x1 week.  Patient states that she has slight pain in the glutes and more severe pain in the knee and down in the calf.  Patient states that the pain feels worse with movement, better with rest and elevation.  Patient states that she has chronic numbness to the lateral side of her left leg.  Patient denies recent injury or overuse.  Patient denies saddle anesthesia and bowel/bladder incontinence.    ROS:  Gen: no fevers/chills  Eyes: no changes in vision  ENT: no sore throat  Pulm: no sob, no cough  CV: no chest pain, no palpitations  GI: no nausea/vomiting, no diarrhea  Neuro: no headaches, no numbness/tingling      Objective:     Exam:  /76 (BP Location: Left arm, Patient Position: Sitting, BP Cuff Size: Adult)   Pulse 76   Temp 36.8 °C (98.2 °F) (Temporal)   Resp 16   Ht 1.74 m (5' 8.5\")   Wt 125 kg (275 lb)   LMP 03/01/1997   SpO2 96%   BMI 41.21 kg/m²  Body mass index is 41.21 kg/m².    Gen: Alert and oriented, No apparent distress.  Neck: Neck is supple without lymphadenopathy.  Lungs: Normal effort, CTA bilaterally, no wheezes, rhonchi, or rales  CV: Regular rate and rhythm. No murmurs, rubs, or gallops.  Ext: No clubbing, cyanosis, edema.      Assessment & Plan:     56 y.o. female with the following -     1. Need for vaccination  - INFLUENZA VACCINE QUAD INJ (PF)    2. Acute left-sided low back pain with left-sided sciatica  Patient presents with left-sided low back pain and left-sided sciatica x1 week.  Patient states that pain is worse behind her knee but states chronic problems with lower back.  Leg without swelling or erythema.  X-ray shows tricompartmental osteoarthritis of knee and multilevel facet arthropathy and degenerative subluxation of lumbar spine.  Referral placed to orthopedics for further evaluation.  Patient offered steroid in muscle relaxer to " decrease inflammation and tension.  Patient declined muscle relaxer but will trial steroid to decrease inflammation.  - DX-LUMBAR SPINE-2 OR 3 VIEWS; Future  - DX-KNEE 3 VIEWS LEFT; Future  - methylPREDNISolone (MEDROL DOSEPAK) 4 MG Tablet Therapy Pack; As directed on the packaging label.  Dispense: 21 Tablet; Refill: 0    3. Rash  Patient with rash on flexor surface of elbows and behind knee.  Patient states that rash is erythematous and pruritic.  Patient states she has tried several different creams in the past but nothing has helped.  We will try clobetasol cream twice daily for 7 days.  - clobetasol (TEMOVATE) 0.05 % Cream; Apply 1 g topically 2 times a day. For up to 7 days  Dispense: 15 g; Refill: 0          Return if symptoms worsen or fail to improve.    Please note that this dictation was created using voice recognition software. I have made every reasonable attempt to correct obvious errors, but I expect that there are errors of grammar and possibly content that I did not discover before finalizing the note.

## 2021-12-20 ENCOUNTER — HOSPITAL ENCOUNTER (OUTPATIENT)
Facility: MEDICAL CENTER | Age: 56
End: 2021-12-20
Attending: NURSE PRACTITIONER
Payer: COMMERCIAL

## 2021-12-20 ENCOUNTER — OFFICE VISIT (OUTPATIENT)
Dept: URGENT CARE | Facility: CLINIC | Age: 56
End: 2021-12-20
Payer: COMMERCIAL

## 2021-12-20 VITALS
WEIGHT: 265 LBS | TEMPERATURE: 97.7 F | BODY MASS INDEX: 39.25 KG/M2 | OXYGEN SATURATION: 95 % | DIASTOLIC BLOOD PRESSURE: 68 MMHG | RESPIRATION RATE: 20 BRPM | HEIGHT: 69 IN | HEART RATE: 80 BPM | SYSTOLIC BLOOD PRESSURE: 126 MMHG

## 2021-12-20 DIAGNOSIS — J02.9 SORE THROAT: ICD-10-CM

## 2021-12-20 DIAGNOSIS — J02.0 STREP THROAT: ICD-10-CM

## 2021-12-20 DIAGNOSIS — Z20.822 EXPOSURE TO COVID-19 VIRUS: ICD-10-CM

## 2021-12-20 DIAGNOSIS — J06.9 URI, ACUTE: ICD-10-CM

## 2021-12-20 LAB
EXTERNAL QUALITY CONTROL: NORMAL
INT CON NEG: NEGATIVE
INT CON POS: POSITIVE
S PYO AG THROAT QL: POSITIVE
SARS-COV+SARS-COV-2 AG RESP QL IA.RAPID: NEGATIVE

## 2021-12-20 PROCEDURE — 99214 OFFICE O/P EST MOD 30 MIN: CPT | Mod: CS | Performed by: NURSE PRACTITIONER

## 2021-12-20 PROCEDURE — U0003 INFECTIOUS AGENT DETECTION BY NUCLEIC ACID (DNA OR RNA); SEVERE ACUTE RESPIRATORY SYNDROME CORONAVIRUS 2 (SARS-COV-2) (CORONAVIRUS DISEASE [COVID-19]), AMPLIFIED PROBE TECHNIQUE, MAKING USE OF HIGH THROUGHPUT TECHNOLOGIES AS DESCRIBED BY CMS-2020-01-R: HCPCS

## 2021-12-20 PROCEDURE — 87426 SARSCOV CORONAVIRUS AG IA: CPT | Mod: QW | Performed by: NURSE PRACTITIONER

## 2021-12-20 PROCEDURE — 87880 STREP A ASSAY W/OPTIC: CPT | Mod: QW | Performed by: NURSE PRACTITIONER

## 2021-12-20 PROCEDURE — U0005 INFEC AGEN DETEC AMPLI PROBE: HCPCS

## 2021-12-20 RX ORDER — LEVOTHYROXINE SODIUM 137 UG/1
TABLET ORAL
COMMUNITY
Start: 2021-12-18 | End: 2022-05-19

## 2021-12-20 RX ORDER — AZITHROMYCIN 250 MG/1
TABLET, FILM COATED ORAL
Qty: 6 TABLET | Refills: 0 | Status: SHIPPED | OUTPATIENT
Start: 2021-12-20 | End: 2022-03-03

## 2021-12-20 ASSESSMENT — ENCOUNTER SYMPTOMS
CHILLS: 0
SORE THROAT: 1
FEVER: 0
SWOLLEN GLANDS: 1

## 2021-12-20 ASSESSMENT — FIBROSIS 4 INDEX: FIB4 SCORE: 2.7

## 2021-12-20 ASSESSMENT — PAIN SCALES - GENERAL: PAINLEVEL: 6=MODERATE PAIN

## 2021-12-20 NOTE — PROGRESS NOTES
"Subjective     Tanvi Krueger is a 56 y.o. female who presents with Sore Throat (irritated, headache on and off, Covid Exposure x 2 days)    Past Medical History:   Diagnosis Date   • Anemia    • Anesthesia     nausea/vomiting   • Anxiety    • Arthritis    • Asthma     allergy related   • Bowel habit changes     constipation   • Cholesteatoma of middle ear and mastoid(385.33) 1992 surgery    mastoidectomy, prosthesis   • Depression    • Dyslipidemia, goal LDL below 130    • Elevated glycohemoglobin    • Eosinophilic esophagitis    • Family history of early CAD    • Hiatus hernia syndrome     \"was told I had one\"   • History of chickenpox    • History of endometriosis    • History of partial thyroidectomy     partial thyroidectomy   • Hypothyroidism due to Hashimoto's thyroiditis    • Lumbar disc narrowing 7/14/2009   • Menopausal symptoms    • Oxygen desaturation during sleep     O2 2 liters HS   • Pneumonia     hx   • Recurrent sinus infections    • Seizure (HCC)    • Sleep apnea     ASV with 2L oxygen at night (sean)    • Tonsillitis    • Vitamin d deficiency      Social History     Socioeconomic History   • Marital status:      Spouse name: Not on file   • Number of children: Not on file   • Years of education: Not on file   • Highest education level: Not on file   Occupational History   • Not on file   Tobacco Use   • Smoking status: Passive Smoke Exposure - Never Smoker   • Smokeless tobacco: Never Used   • Tobacco comment: Patient grew up with 3 smokers in home    Vaping Use   • Vaping Use: Never used   Substance and Sexual Activity   • Alcohol use: No     Alcohol/week: 0.0 oz   • Drug use: No   • Sexual activity: Yes     Partners: Male   Other Topics Concern   • Not on file   Social History Narrative   • Not on file     Social Determinants of Health     Financial Resource Strain:    • Difficulty of Paying Living Expenses: Not on file   Food Insecurity:    • Worried About Running Out of Food in " the Last Year: Not on file   • Ran Out of Food in the Last Year: Not on file   Transportation Needs:    • Lack of Transportation (Medical): Not on file   • Lack of Transportation (Non-Medical): Not on file   Physical Activity:    • Days of Exercise per Week: Not on file   • Minutes of Exercise per Session: Not on file   Stress:    • Feeling of Stress : Not on file   Social Connections:    • Frequency of Communication with Friends and Family: Not on file   • Frequency of Social Gatherings with Friends and Family: Not on file   • Attends [employer] Services: Not on file   • Active Member of Clubs or Organizations: Not on file   • Attends Club or Organization Meetings: Not on file   • Marital Status: Not on file   Intimate Partner Violence:    • Fear of Current or Ex-Partner: Not on file   • Emotionally Abused: Not on file   • Physically Abused: Not on file   • Sexually Abused: Not on file   Housing Stability:    • Unable to Pay for Housing in the Last Year: Not on file   • Number of Places Lived in the Last Year: Not on file   • Unstable Housing in the Last Year: Not on file     Family History   Problem Relation Age of Onset   • Cancer Mother 61        lung, smoker   • Heart Disease Mother 44        early heart attack   • Sleep Apnea Mother         possibly   • Heart Disease Father 60        cabg x 3   • Hyperlipidemia Father    • Psychiatric Illness Father         schizophrenia   • Kidney Disease Father         renal failure, dialysis   • Dementia Father    • Hyperlipidemia Brother    • Suicide Attempts Brother         completed suicide 10/14/2021   • Cancer Maternal Grandmother 62        lung, non-smoker   • Psychiatric Illness Maternal Grandmother         depression, severe   • Psychiatric Illness Other         depression, great uncle   • Anxiety disorder Sister        Allergies: Doxycycline, Penicillin g, Cephalexin, and Pcn [penicillins]            Pharyngitis   This is a new problem. The current episode started  "in the past 7 days. The problem has been unchanged. Neither side of throat is experiencing more pain than the other. There has been no fever. Associated symptoms include swollen glands. She has tried nothing for the symptoms. The treatment provided no relief.       Review of Systems   Constitutional: Positive for malaise/fatigue. Negative for chills and fever.   HENT: Positive for sore throat.    Skin: Negative.    All other systems reviewed and are negative.             Objective     Ht 1.74 m (5' 8.5\")   Wt 120 kg (265 lb)   LMP 03/01/1997   BMI 39.71 kg/m²      Physical Exam  Vitals reviewed.   Constitutional:       Appearance: Normal appearance.   HENT:      Head: Normocephalic and atraumatic.      Right Ear: Ear canal and external ear normal.      Left Ear: Tympanic membrane, ear canal and external ear normal.      Nose: Nose normal.      Mouth/Throat:      Mouth: Mucous membranes are moist.   Eyes:      Extraocular Movements: Extraocular movements intact.      Conjunctiva/sclera: Conjunctivae normal.      Pupils: Pupils are equal, round, and reactive to light.   Cardiovascular:      Rate and Rhythm: Normal rate and regular rhythm.      Heart sounds: Normal heart sounds.   Pulmonary:      Effort: Pulmonary effort is normal. No respiratory distress.      Breath sounds: Normal breath sounds. No stridor. No wheezing, rhonchi or rales.   Chest:      Chest wall: No tenderness.   Musculoskeletal:         General: Normal range of motion.      Cervical back: Normal range of motion and neck supple.   Skin:     General: Skin is warm.      Capillary Refill: Capillary refill takes less than 2 seconds.   Neurological:      Mental Status: She is alert and oriented to person, place, and time.   Psychiatric:         Mood and Affect: Mood normal.         Behavior: Behavior normal.                poct strep: positive     poct covid: negative              Assessment & Plan   Sore throat  URI   Strep throat    Warm saltwater " gargles  zithromax  Tylenol/motrin PRN  Covid nasal swab obtained  Self quarantine until results received  Patient advised she will receive results via MyChart  Tylenol/Motrin as needed, over-the-counter cough/cold medication of choice as needed  Strict ER precautions for respiratory distress  Return to urgent care otherwise for any further questions or concerns            There are no diagnoses linked to this encounter.

## 2021-12-21 ENCOUNTER — TELEMEDICINE (OUTPATIENT)
Dept: BEHAVIORAL HEALTH | Facility: CLINIC | Age: 56
End: 2021-12-21
Payer: COMMERCIAL

## 2021-12-21 DIAGNOSIS — F41.1 GENERALIZED ANXIETY DISORDER: ICD-10-CM

## 2021-12-21 DIAGNOSIS — J06.9 URI, ACUTE: ICD-10-CM

## 2021-12-21 DIAGNOSIS — J02.9 SORE THROAT: ICD-10-CM

## 2021-12-21 DIAGNOSIS — Z20.822 EXPOSURE TO COVID-19 VIRUS: ICD-10-CM

## 2021-12-21 DIAGNOSIS — F33.41 RECURRENT MAJOR DEPRESSIVE DISORDER, IN PARTIAL REMISSION (HCC): ICD-10-CM

## 2021-12-21 PROCEDURE — 90791 PSYCH DIAGNOSTIC EVALUATION: CPT | Performed by: MARRIAGE & FAMILY THERAPIST

## 2021-12-21 ASSESSMENT — PATIENT HEALTH QUESTIONNAIRE - PHQ9
SUM OF ALL RESPONSES TO PHQ QUESTIONS 1-9: 15
5. POOR APPETITE OR OVEREATING: 0 - NOT AT ALL
CLINICAL INTERPRETATION OF PHQ2 SCORE: 5
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
## 2022-01-27 RX ORDER — SERTRALINE HYDROCHLORIDE 100 MG/1
100 TABLET, FILM COATED ORAL DAILY
Qty: 90 TABLET | Refills: 2 | Status: SHIPPED | OUTPATIENT
Start: 2022-01-27 | End: 2022-02-17

## 2022-01-28 ENCOUNTER — TELEMEDICINE (OUTPATIENT)
Dept: BEHAVIORAL HEALTH | Facility: CLINIC | Age: 57
End: 2022-01-28
Payer: COMMERCIAL

## 2022-01-28 DIAGNOSIS — F33.41 RECURRENT MAJOR DEPRESSIVE DISORDER, IN PARTIAL REMISSION (HCC): ICD-10-CM

## 2022-01-28 DIAGNOSIS — F41.1 GENERALIZED ANXIETY DISORDER: ICD-10-CM

## 2022-01-28 PROCEDURE — 90837 PSYTX W PT 60 MINUTES: CPT | Mod: 95 | Performed by: MARRIAGE & FAMILY THERAPIST

## 2022-02-11 ENCOUNTER — TELEMEDICINE (OUTPATIENT)
Dept: BEHAVIORAL HEALTH | Facility: CLINIC | Age: 57
End: 2022-02-11
Payer: COMMERCIAL

## 2022-02-11 DIAGNOSIS — F41.1 GENERALIZED ANXIETY DISORDER: ICD-10-CM

## 2022-02-11 PROCEDURE — 90837 PSYTX W PT 60 MINUTES: CPT | Mod: 95 | Performed by: MARRIAGE & FAMILY THERAPIST
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
## 2022-02-17 DIAGNOSIS — F33.41 RECURRENT MAJOR DEPRESSIVE DISORDER, IN PARTIAL REMISSION (HCC): ICD-10-CM

## 2022-02-17 RX ORDER — SERTRALINE HYDROCHLORIDE 100 MG/1
200 TABLET, FILM COATED ORAL DAILY
Qty: 180 TABLET | Refills: 2 | Status: SHIPPED | OUTPATIENT
Start: 2022-02-17 | End: 2022-05-19 | Stop reason: SDUPTHER

## 2022-03-03 ENCOUNTER — OFFICE VISIT (OUTPATIENT)
Dept: MEDICAL GROUP | Facility: MEDICAL CENTER | Age: 57
End: 2022-03-03
Payer: COMMERCIAL

## 2022-03-03 VITALS
WEIGHT: 251.99 LBS | TEMPERATURE: 97.4 F | HEIGHT: 69 IN | OXYGEN SATURATION: 93 % | DIASTOLIC BLOOD PRESSURE: 76 MMHG | BODY MASS INDEX: 37.32 KG/M2 | RESPIRATION RATE: 14 BRPM | SYSTOLIC BLOOD PRESSURE: 122 MMHG | HEART RATE: 82 BPM

## 2022-03-03 DIAGNOSIS — E78.5 DYSLIPIDEMIA, GOAL LDL BELOW 130: ICD-10-CM

## 2022-03-03 DIAGNOSIS — R79.82 ELEVATED C-REACTIVE PROTEIN (CRP): ICD-10-CM

## 2022-03-03 DIAGNOSIS — E55.9 VITAMIN D DEFICIENCY: ICD-10-CM

## 2022-03-03 DIAGNOSIS — J30.1 SEASONAL ALLERGIC RHINITIS DUE TO POLLEN: ICD-10-CM

## 2022-03-03 DIAGNOSIS — Z12.39 SCREENING BREAST EXAMINATION: ICD-10-CM

## 2022-03-03 PROCEDURE — 99213 OFFICE O/P EST LOW 20 MIN: CPT | Performed by: FAMILY MEDICINE

## 2022-03-03 ASSESSMENT — FIBROSIS 4 INDEX: FIB4 SCORE: 2.7

## 2022-03-03 ASSESSMENT — PATIENT HEALTH QUESTIONNAIRE - PHQ9: CLINICAL INTERPRETATION OF PHQ2 SCORE: 0
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
## 2022-03-08 ENCOUNTER — HOSPITAL ENCOUNTER (OUTPATIENT)
Dept: RADIOLOGY | Facility: MEDICAL CENTER | Age: 57
End: 2022-03-08
Attending: FAMILY MEDICINE
Payer: COMMERCIAL

## 2022-03-08 DIAGNOSIS — Z12.39 SCREENING BREAST EXAMINATION: ICD-10-CM

## 2022-03-08 PROCEDURE — 77063 BREAST TOMOSYNTHESIS BI: CPT

## 2022-03-18 ENCOUNTER — HOSPITAL ENCOUNTER (OUTPATIENT)
Dept: RADIOLOGY | Facility: MEDICAL CENTER | Age: 57
End: 2022-03-18
Attending: FAMILY MEDICINE
Payer: COMMERCIAL

## 2022-03-18 DIAGNOSIS — R92.8 ABNORMAL MAMMOGRAM: ICD-10-CM

## 2022-03-18 PROCEDURE — G0279 TOMOSYNTHESIS, MAMMO: HCPCS | Mod: RT

## 2022-03-18 PROCEDURE — 76642 ULTRASOUND BREAST LIMITED: CPT | Mod: RT

## 2022-04-01 ENCOUNTER — TELEPHONE (OUTPATIENT)
Dept: SLEEP MEDICINE | Facility: MEDICAL CENTER | Age: 57
End: 2022-04-01
Payer: COMMERCIAL

## 2022-04-01 NOTE — TELEPHONE ENCOUNTER
VOICEMAIL  1. Caller Name: Tanvi Krueger                      Call Back Number: [phone] (home)     2. Message: Patient LVM on 3/28/22 requesting a call back.  Unsure if she should schedule an appt has had machine 5+ years and has not received a new machine.  Please contact patient.    3. Patient approves office to leave a detailed voicemail/MyChart message: yes

## 2022-05-05 ENCOUNTER — TELEMEDICINE (OUTPATIENT)
Dept: MEDICAL GROUP | Facility: MEDICAL CENTER | Age: 57
End: 2022-05-05
Payer: COMMERCIAL

## 2022-05-05 VITALS
TEMPERATURE: 98.6 F | RESPIRATION RATE: 16 BRPM | HEIGHT: 69 IN | BODY MASS INDEX: 35.84 KG/M2 | WEIGHT: 242 LBS | OXYGEN SATURATION: 90 %

## 2022-05-05 DIAGNOSIS — R05.8 COUGH PRODUCTIVE OF YELLOW SPUTUM: ICD-10-CM

## 2022-05-05 DIAGNOSIS — R43.9 SMELL DISORDER: ICD-10-CM

## 2022-05-05 DIAGNOSIS — J02.9 SORE THROAT: ICD-10-CM

## 2022-05-05 DIAGNOSIS — U07.1 LAB TEST POSITIVE FOR DETECTION OF COVID-19 VIRUS: ICD-10-CM

## 2022-05-05 DIAGNOSIS — U07.1 COVID-19 VIRUS INFECTION: ICD-10-CM

## 2022-05-05 DIAGNOSIS — R09.81 NOSE CONGESTED: ICD-10-CM

## 2022-05-05 PROCEDURE — 99213 OFFICE O/P EST LOW 20 MIN: CPT | Mod: 95 | Performed by: FAMILY MEDICINE

## 2022-05-05 RX ORDER — DEXAMETHASONE 6 MG/1
6 TABLET ORAL DAILY
Qty: 7 TABLET | Refills: 0 | Status: SHIPPED | OUTPATIENT
Start: 2022-05-05 | End: 2022-05-12

## 2022-05-05 ASSESSMENT — FIBROSIS 4 INDEX: FIB4 SCORE: 2.7
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
## 2022-05-19 ENCOUNTER — OFFICE VISIT (OUTPATIENT)
Dept: MEDICAL GROUP | Facility: MEDICAL CENTER | Age: 57
End: 2022-05-19
Payer: COMMERCIAL

## 2022-05-19 VITALS
WEIGHT: 243.8 LBS | HEIGHT: 69 IN | TEMPERATURE: 98.9 F | RESPIRATION RATE: 16 BRPM | SYSTOLIC BLOOD PRESSURE: 116 MMHG | DIASTOLIC BLOOD PRESSURE: 78 MMHG | OXYGEN SATURATION: 92 % | HEART RATE: 77 BPM | BODY MASS INDEX: 36.11 KG/M2

## 2022-05-19 DIAGNOSIS — F33.41 RECURRENT MAJOR DEPRESSIVE DISORDER, IN PARTIAL REMISSION (HCC): ICD-10-CM

## 2022-05-19 DIAGNOSIS — R05.9 COUGH: ICD-10-CM

## 2022-05-19 DIAGNOSIS — R06.2 WHEEZING: ICD-10-CM

## 2022-05-19 DIAGNOSIS — J01.00 ACUTE NON-RECURRENT MAXILLARY SINUSITIS: ICD-10-CM

## 2022-05-19 PROBLEM — G40.909 SEIZURE CEREBRAL (HCC): Status: RESOLVED | Noted: 2017-06-20 | Resolved: 2022-05-19

## 2022-05-19 PROBLEM — G05.3 AUTOIMMUNE CEREBRITIS (HCC): Status: RESOLVED | Noted: 2018-01-09 | Resolved: 2022-05-19

## 2022-05-19 PROBLEM — M35.9 AUTOIMMUNE CEREBRITIS (HCC): Status: RESOLVED | Noted: 2018-01-09 | Resolved: 2022-05-19

## 2022-05-19 PROCEDURE — 99213 OFFICE O/P EST LOW 20 MIN: CPT | Performed by: FAMILY MEDICINE

## 2022-05-19 RX ORDER — AZITHROMYCIN 250 MG/1
TABLET, FILM COATED ORAL
Qty: 6 TABLET | Refills: 0 | Status: SHIPPED | OUTPATIENT
Start: 2022-05-19 | End: 2022-08-02

## 2022-05-19 RX ORDER — LEVOTHYROXINE SODIUM 125 UG/1
125 TABLET ORAL
Qty: 30 TABLET | Refills: 6
Start: 2022-05-19 | End: 2023-02-01 | Stop reason: SDUPTHER

## 2022-05-19 RX ORDER — SERTRALINE HYDROCHLORIDE 100 MG/1
200 TABLET, FILM COATED ORAL DAILY
Qty: 180 TABLET | Refills: 2 | Status: SHIPPED | OUTPATIENT
Start: 2022-05-19 | End: 2023-02-01 | Stop reason: SDUPTHER

## 2022-05-19 RX ORDER — ALBUTEROL SULFATE 90 UG/1
2 AEROSOL, METERED RESPIRATORY (INHALATION) EVERY 6 HOURS PRN
Qty: 8.5 G | Refills: 6 | Status: SHIPPED | OUTPATIENT
Start: 2022-05-19 | End: 2023-12-09 | Stop reason: SDUPTHER

## 2022-05-19 ASSESSMENT — FIBROSIS 4 INDEX: FIB4 SCORE: 2.7
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
## 2022-08-02 ENCOUNTER — OFFICE VISIT (OUTPATIENT)
Dept: MEDICAL GROUP | Facility: MEDICAL CENTER | Age: 57
End: 2022-08-02
Payer: COMMERCIAL

## 2022-08-02 VITALS
HEART RATE: 75 BPM | WEIGHT: 252.4 LBS | BODY MASS INDEX: 37.38 KG/M2 | OXYGEN SATURATION: 93 % | RESPIRATION RATE: 12 BRPM | HEIGHT: 69 IN | TEMPERATURE: 96.6 F | DIASTOLIC BLOOD PRESSURE: 74 MMHG | SYSTOLIC BLOOD PRESSURE: 126 MMHG

## 2022-08-02 DIAGNOSIS — M72.0 DUPUYTREN'S CONTRACTURE OF LEFT HAND: ICD-10-CM

## 2022-08-02 DIAGNOSIS — E55.9 VITAMIN D DEFICIENCY: ICD-10-CM

## 2022-08-02 DIAGNOSIS — R73.09 ELEVATED GLUCOSE LEVEL: ICD-10-CM

## 2022-08-02 DIAGNOSIS — E04.1 THYROID NODULE: ICD-10-CM

## 2022-08-02 DIAGNOSIS — S80.812A ABRASION, LEFT LOWER LEG, INITIAL ENCOUNTER: ICD-10-CM

## 2022-08-02 DIAGNOSIS — E03.9 IDIOPATHIC HYPOTHYROIDISM: ICD-10-CM

## 2022-08-02 DIAGNOSIS — E78.5 DYSLIPIDEMIA, GOAL LDL BELOW 130: ICD-10-CM

## 2022-08-02 DIAGNOSIS — F33.41 RECURRENT MAJOR DEPRESSIVE DISORDER, IN PARTIAL REMISSION (HCC): ICD-10-CM

## 2022-08-02 PROBLEM — U07.1 COVID-19 VIRUS INFECTION: Status: RESOLVED | Noted: 2022-05-05 | Resolved: 2022-08-02

## 2022-08-02 PROBLEM — Z97.4 WEARS HEARING AID IN BOTH EARS: Status: ACTIVE | Noted: 2022-08-02

## 2022-08-02 PROCEDURE — 99214 OFFICE O/P EST MOD 30 MIN: CPT | Performed by: FAMILY MEDICINE

## 2022-08-02 ASSESSMENT — FIBROSIS 4 INDEX: FIB4 SCORE: 2.75
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
## 2022-08-16 ENCOUNTER — PATIENT MESSAGE (OUTPATIENT)
Dept: SLEEP MEDICINE | Facility: MEDICAL CENTER | Age: 57
End: 2022-08-16
Payer: COMMERCIAL

## 2022-09-12 NOTE — ED AVS SNAPSHOT
6/13/2017    Tanvi Krueger  [address]  [place] [address]    Dear Tanvi:    [facility] wants to ensure your discharge home is safe and you or your loved ones have had all of your questions answered regarding your care after you leave the hospital.    Below is a list of resources and contact information should you have any questions regarding your hospital stay, follow-up instructions, or active medical symptoms.    Questions or Concerns Regarding… Contact   Medical Questions Related to Your Discharge  (7 days a week, 8am-5pm) Contact a Nurse Care Coordinator   [phone]   Medical Questions Not Related to Your Discharge  (24 hours a day / 7 days a week)  Contact the Nurse Health Line   [phone]    Medications or Discharge Instructions Refer to your discharge packet   or contact your [facility] Primary Care Provider   [phone]   Follow-up Appointment(s) Schedule your appointment via [employer]   or contact Scheduling [phone]   Billing Review your statement via [employer]  or contact Billing [phone]   Medical Records Review your records via [employer]   or contact Medical Records [phone]     You may receive a telephone call within two days of discharge. This call is to make certain you understand your discharge instructions and have the opportunity to have any questions answered. You can also easily access your medical information, test results and upcoming appointments via the [employer] free online health management tool. You can learn more and sign up at [email or web address] For assistance setting up your [employer] account, please call [phone].    Once again, we want to ensure your discharge home is safe and that you have a clear understanding of any next steps in your care. If you have any questions or concerns, please do not hesitate to contact us, we are here for you. Thank you for choosing [facility] for your healthcare needs.    Sincerely,    Your [facility] Healthcare Team          show

## 2022-09-15 ENCOUNTER — OFFICE VISIT (OUTPATIENT)
Dept: SLEEP MEDICINE | Facility: MEDICAL CENTER | Age: 57
End: 2022-09-15
Payer: COMMERCIAL

## 2022-09-15 VITALS
HEART RATE: 95 BPM | SYSTOLIC BLOOD PRESSURE: 124 MMHG | RESPIRATION RATE: 16 BRPM | DIASTOLIC BLOOD PRESSURE: 66 MMHG | OXYGEN SATURATION: 95 % | BODY MASS INDEX: 36.58 KG/M2 | WEIGHT: 247 LBS | HEIGHT: 69 IN

## 2022-09-15 DIAGNOSIS — Z77.22 SECONDHAND SMOKE EXPOSURE: ICD-10-CM

## 2022-09-15 DIAGNOSIS — R06.02 SHORTNESS OF BREATH: ICD-10-CM

## 2022-09-15 DIAGNOSIS — Z78.9 NONSMOKER: ICD-10-CM

## 2022-09-15 DIAGNOSIS — Z80.1 FAMILY HISTORY OF LUNG CANCER: ICD-10-CM

## 2022-09-15 DIAGNOSIS — G47.31 CENTRAL SLEEP APNEA: Chronic | ICD-10-CM

## 2022-09-15 DIAGNOSIS — G47.34 NOCTURNAL HYPOXIA: Chronic | ICD-10-CM

## 2022-09-15 DIAGNOSIS — Z23 NEED FOR VACCINATION: ICD-10-CM

## 2022-09-15 PROCEDURE — 90677 PCV20 VACCINE IM: CPT | Performed by: NURSE PRACTITIONER

## 2022-09-15 PROCEDURE — 90472 IMMUNIZATION ADMIN EACH ADD: CPT | Performed by: NURSE PRACTITIONER

## 2022-09-15 PROCEDURE — 90686 IIV4 VACC NO PRSV 0.5 ML IM: CPT | Performed by: NURSE PRACTITIONER

## 2022-09-15 PROCEDURE — 90471 IMMUNIZATION ADMIN: CPT | Performed by: NURSE PRACTITIONER

## 2022-09-15 PROCEDURE — 99215 OFFICE O/P EST HI 40 MIN: CPT | Mod: 25 | Performed by: NURSE PRACTITIONER

## 2022-09-15 ASSESSMENT — FIBROSIS 4 INDEX: FIB4 SCORE: 2.75

## 2022-09-15 NOTE — PATIENT INSTRUCTIONS
Complete overnight oxygen test using PAP with 3LPM O2    DME: [employer]  [address] [place] [id number]  #[phone]  Contact to see if they have suggestions for small travel O2 concentrator    Complete chest xray, breathing test and ECHO (ultrasound of heart) prior to next office visit

## 2022-09-19 ENCOUNTER — HOSPITAL ENCOUNTER (OUTPATIENT)
Dept: LAB | Facility: MEDICAL CENTER | Age: 57
End: 2022-09-19
Attending: INTERNAL MEDICINE
Payer: COMMERCIAL

## 2022-09-19 LAB
T4 FREE SERPL-MCNC: 1.39 NG/DL (ref 0.93–1.7)
TSH SERPL DL<=0.005 MIU/L-ACNC: 0.98 UIU/ML (ref 0.38–5.33)

## 2022-09-19 PROCEDURE — 84443 ASSAY THYROID STIM HORMONE: CPT

## 2022-09-19 PROCEDURE — 36415 COLL VENOUS BLD VENIPUNCTURE: CPT

## 2022-09-19 PROCEDURE — 84439 ASSAY OF FREE THYROXINE: CPT

## 2022-09-29 ENCOUNTER — PATIENT MESSAGE (OUTPATIENT)
Dept: SLEEP MEDICINE | Facility: MEDICAL CENTER | Age: 57
End: 2022-09-29
Payer: COMMERCIAL

## 2022-09-29 DIAGNOSIS — R06.02 SHORTNESS OF BREATH: ICD-10-CM

## 2022-09-29 DIAGNOSIS — G47.31 CENTRAL SLEEP APNEA: ICD-10-CM

## 2022-09-30 ENCOUNTER — HOSPITAL ENCOUNTER (OUTPATIENT)
Dept: RADIOLOGY | Facility: MEDICAL CENTER | Age: 57
End: 2022-09-30
Attending: NURSE PRACTITIONER
Payer: COMMERCIAL

## 2022-09-30 DIAGNOSIS — Z77.22 SECONDHAND SMOKE EXPOSURE: ICD-10-CM

## 2022-09-30 DIAGNOSIS — R06.02 SHORTNESS OF BREATH: ICD-10-CM

## 2022-09-30 PROCEDURE — 71046 X-RAY EXAM CHEST 2 VIEWS: CPT

## 2022-10-15 NOTE — TELEPHONE ENCOUNTER
From: Tanvi Krueger  To: Rebecca Ferro M.D.  Sent: 9/13/2017 2:18 AM PDT  Subject: RE:CT of the throat    He says all is good. He is concerned about how my right side of nose looks when he scoped it yesterday. Which was a concern back in March 2017. Biopsies have both been benign.squamous cells. I go back in 6 months .  ----- Message -----  From: Rebecca Ferro M.D.  Sent: 9/12/2017 6:14 PM PDT  To: Tanvi Krueger  Subject: CT of the throat  I have reviewed the CT. It does appear that the area is smaller. I am anxious to hear what Dr. Abdi thinks of the results.   none 72

## 2022-11-15 ENCOUNTER — HOME STUDY (OUTPATIENT)
Dept: SLEEP MEDICINE | Facility: MEDICAL CENTER | Age: 57
End: 2022-11-15
Attending: NURSE PRACTITIONER
Payer: COMMERCIAL

## 2022-11-15 DIAGNOSIS — G47.34 NOCTURNAL HYPOXIA: Chronic | ICD-10-CM

## 2022-11-15 DIAGNOSIS — G47.31 CENTRAL SLEEP APNEA: Chronic | ICD-10-CM

## 2022-11-15 PROCEDURE — 94762 N-INVAS EAR/PLS OXIMTRY CONT: CPT | Performed by: INTERNAL MEDICINE

## 2022-11-18 ENCOUNTER — HOSPITAL ENCOUNTER (OUTPATIENT)
Dept: CARDIOLOGY | Facility: MEDICAL CENTER | Age: 57
End: 2022-11-18
Attending: NURSE PRACTITIONER
Payer: COMMERCIAL

## 2022-11-18 DIAGNOSIS — G47.31 CENTRAL SLEEP APNEA: Chronic | ICD-10-CM

## 2022-11-18 DIAGNOSIS — R06.02 SHORTNESS OF BREATH: ICD-10-CM

## 2022-11-18 DIAGNOSIS — G47.34 NOCTURNAL HYPOXIA: Chronic | ICD-10-CM

## 2022-11-18 LAB
LV EJECT FRACT  99904: 65
LV EJECT FRACT MOD 2C 99903: 69.42
LV EJECT FRACT MOD 4C 99902: 62.18
LV EJECT FRACT MOD BP 99901: 67.54

## 2022-11-18 PROCEDURE — 93306 TTE W/DOPPLER COMPLETE: CPT

## 2022-11-18 PROCEDURE — 93306 TTE W/DOPPLER COMPLETE: CPT | Mod: 26 | Performed by: INTERNAL MEDICINE

## 2022-12-11 NOTE — PROCEDURES
This is an overnight oximetry study performed on November 15, 2022 for duration of 6 hours and 39 minutes on supplemental oxygen at 3 L/min.    Basal SPO2 was 92%.  No significant desaturation.  Adequate oxygenation on 3 L/min.

## 2023-02-01 ENCOUNTER — OFFICE VISIT (OUTPATIENT)
Dept: MEDICAL GROUP | Facility: MEDICAL CENTER | Age: 58
End: 2023-02-01
Payer: COMMERCIAL

## 2023-02-01 VITALS
HEART RATE: 63 BPM | HEIGHT: 69 IN | WEIGHT: 257.28 LBS | BODY MASS INDEX: 38.11 KG/M2 | DIASTOLIC BLOOD PRESSURE: 84 MMHG | TEMPERATURE: 97.8 F | SYSTOLIC BLOOD PRESSURE: 116 MMHG | OXYGEN SATURATION: 95 %

## 2023-02-01 DIAGNOSIS — F41.1 GENERALIZED ANXIETY DISORDER: ICD-10-CM

## 2023-02-01 DIAGNOSIS — E55.9 VITAMIN D DEFICIENCY: ICD-10-CM

## 2023-02-01 DIAGNOSIS — E03.8 HYPOTHYROIDISM DUE TO HASHIMOTO'S THYROIDITIS: ICD-10-CM

## 2023-02-01 DIAGNOSIS — K20.0 EOSINOPHILIC ESOPHAGITIS: ICD-10-CM

## 2023-02-01 DIAGNOSIS — F33.41 RECURRENT MAJOR DEPRESSIVE DISORDER, IN PARTIAL REMISSION (HCC): ICD-10-CM

## 2023-02-01 DIAGNOSIS — G47.34 NOCTURNAL HYPOXIA: Chronic | ICD-10-CM

## 2023-02-01 DIAGNOSIS — E06.3 HYPOTHYROIDISM DUE TO HASHIMOTO'S THYROIDITIS: ICD-10-CM

## 2023-02-01 DIAGNOSIS — G47.34 OXYGEN DESATURATION DURING SLEEP: Chronic | ICD-10-CM

## 2023-02-01 DIAGNOSIS — E78.5 DYSLIPIDEMIA, GOAL LDL BELOW 130: ICD-10-CM

## 2023-02-01 DIAGNOSIS — G47.31 CENTRAL SLEEP APNEA: Chronic | ICD-10-CM

## 2023-02-01 DIAGNOSIS — E04.1 THYROID NODULE: ICD-10-CM

## 2023-02-01 DIAGNOSIS — J45.20 MILD INTERMITTENT REACTIVE AIRWAY DISEASE WITH WHEEZING WITHOUT COMPLICATION: ICD-10-CM

## 2023-02-01 DIAGNOSIS — R73.01 ELEVATED FASTING GLUCOSE: ICD-10-CM

## 2023-02-01 PROCEDURE — 99214 OFFICE O/P EST MOD 30 MIN: CPT | Performed by: FAMILY MEDICINE

## 2023-02-01 RX ORDER — SERTRALINE HYDROCHLORIDE 100 MG/1
200 TABLET, FILM COATED ORAL DAILY
Qty: 180 TABLET | Refills: 2 | Status: SHIPPED | OUTPATIENT
Start: 2023-02-01 | End: 2024-01-31 | Stop reason: SDUPTHER

## 2023-02-01 RX ORDER — LEVOTHYROXINE SODIUM 125 UG/1
125 TABLET ORAL
Qty: 90 TABLET | Refills: 3 | Status: SHIPPED | OUTPATIENT
Start: 2023-02-01 | End: 2023-07-05

## 2023-02-01 ASSESSMENT — PATIENT HEALTH QUESTIONNAIRE - PHQ9
2. FEELING DOWN, DEPRESSED, IRRITABLE, OR HOPELESS: MORE THAN HALF THE DAYS
7. TROUBLE CONCENTRATING ON THINGS, SUCH AS READING THE NEWSPAPER OR WATCHING TELEVISION: MORE THAN HALF THE DAYS
SUM OF ALL RESPONSES TO PHQ QUESTIONS 1-9: 13
1. LITTLE INTEREST OR PLEASURE IN DOING THINGS: NEARLY EVERY DAY
4. FEELING TIRED OR HAVING LITTLE ENERGY: NEARLY EVERY DAY
3. TROUBLE FALLING OR STAYING ASLEEP OR SLEEPING TOO MUCH: SEVERAL DAYS
9. THOUGHTS THAT YOU WOULD BE BETTER OFF DEAD, OR OF HURTING YOURSELF: NOT AT ALL
6. FEELING BAD ABOUT YOURSELF - OR THAT YOU ARE A FAILURE OR HAVE LET YOURSELF OR YOUR FAMILY DOWN: SEVERAL DAYS
SUM OF ALL RESPONSES TO PHQ9 QUESTIONS 1 AND 2: 5
5. POOR APPETITE OR OVEREATING: SEVERAL DAYS
8. MOVING OR SPEAKING SO SLOWLY THAT OTHER PEOPLE COULD HAVE NOTICED. OR THE OPPOSITE, BEING SO FIGETY OR RESTLESS THAT YOU HAVE BEEN MOVING AROUND A LOT MORE THAN USUAL: NOT AT ALL

## 2023-02-01 ASSESSMENT — FIBROSIS 4 INDEX: FIB4 SCORE: 2.75
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
## 2023-02-02 ENCOUNTER — HOSPITAL ENCOUNTER (OUTPATIENT)
Dept: LAB | Facility: MEDICAL CENTER | Age: 58
End: 2023-02-02
Attending: FAMILY MEDICINE
Payer: COMMERCIAL

## 2023-02-02 DIAGNOSIS — E78.5 DYSLIPIDEMIA, GOAL LDL BELOW 130: ICD-10-CM

## 2023-02-02 DIAGNOSIS — R73.01 ELEVATED FASTING GLUCOSE: ICD-10-CM

## 2023-02-02 DIAGNOSIS — E55.9 VITAMIN D DEFICIENCY: ICD-10-CM

## 2023-02-02 DIAGNOSIS — K20.0 EOSINOPHILIC ESOPHAGITIS: ICD-10-CM

## 2023-02-02 LAB
25(OH)D3 SERPL-MCNC: 35 NG/ML (ref 30–100)
ALBUMIN SERPL BCP-MCNC: 4.4 G/DL (ref 3.2–4.9)
ALBUMIN/GLOB SERPL: 1.5 G/DL
ALP SERPL-CCNC: 86 U/L (ref 30–99)
ALT SERPL-CCNC: 16 U/L (ref 2–50)
ANION GAP SERPL CALC-SCNC: 10 MMOL/L (ref 7–16)
AST SERPL-CCNC: 16 U/L (ref 12–45)
BILIRUB SERPL-MCNC: 0.2 MG/DL (ref 0.1–1.5)
BUN SERPL-MCNC: 15 MG/DL (ref 8–22)
CALCIUM ALBUM COR SERPL-MCNC: 9.3 MG/DL (ref 8.5–10.5)
CALCIUM SERPL-MCNC: 9.6 MG/DL (ref 8.5–10.5)
CHLORIDE SERPL-SCNC: 106 MMOL/L (ref 96–112)
CHOLEST SERPL-MCNC: 308 MG/DL (ref 100–199)
CO2 SERPL-SCNC: 27 MMOL/L (ref 20–33)
CREAT SERPL-MCNC: 0.85 MG/DL (ref 0.5–1.4)
ERYTHROCYTE [DISTWIDTH] IN BLOOD BY AUTOMATED COUNT: 47.5 FL (ref 35.9–50)
EST. AVERAGE GLUCOSE BLD GHB EST-MCNC: 120 MG/DL
GFR SERPLBLD CREATININE-BSD FMLA CKD-EPI: 79 ML/MIN/1.73 M 2
GLOBULIN SER CALC-MCNC: 3 G/DL (ref 1.9–3.5)
GLUCOSE SERPL-MCNC: 98 MG/DL (ref 65–99)
HBA1C MFR BLD: 5.8 % (ref 4–5.6)
HCT VFR BLD AUTO: 45.2 % (ref 37–47)
HDLC SERPL-MCNC: 54 MG/DL
HGB BLD-MCNC: 13.8 G/DL (ref 12–16)
LDLC SERPL CALC-MCNC: 226 MG/DL
MCH RBC QN AUTO: 25.7 PG (ref 27–33)
MCHC RBC AUTO-ENTMCNC: 30.5 G/DL (ref 33.6–35)
MCV RBC AUTO: 84 FL (ref 81.4–97.8)
PLATELET # BLD AUTO: 246 K/UL (ref 164–446)
PMV BLD AUTO: 11 FL (ref 9–12.9)
POTASSIUM SERPL-SCNC: 4.4 MMOL/L (ref 3.6–5.5)
PROT SERPL-MCNC: 7.4 G/DL (ref 6–8.2)
RBC # BLD AUTO: 5.38 M/UL (ref 4.2–5.4)
SODIUM SERPL-SCNC: 143 MMOL/L (ref 135–145)
TRIGL SERPL-MCNC: 139 MG/DL (ref 0–149)
WBC # BLD AUTO: 6.1 K/UL (ref 4.8–10.8)

## 2023-02-02 PROCEDURE — 36415 COLL VENOUS BLD VENIPUNCTURE: CPT

## 2023-02-02 PROCEDURE — 80053 COMPREHEN METABOLIC PANEL: CPT

## 2023-02-02 PROCEDURE — 80061 LIPID PANEL: CPT

## 2023-02-02 PROCEDURE — 85027 COMPLETE CBC AUTOMATED: CPT

## 2023-02-02 PROCEDURE — 82306 VITAMIN D 25 HYDROXY: CPT

## 2023-02-02 PROCEDURE — 83036 HEMOGLOBIN GLYCOSYLATED A1C: CPT

## 2023-03-17 ENCOUNTER — HOSPITAL ENCOUNTER (OUTPATIENT)
Dept: LAB | Facility: MEDICAL CENTER | Age: 58
End: 2023-03-17
Attending: PHYSICIAN ASSISTANT
Payer: COMMERCIAL

## 2023-03-17 LAB
T4 FREE SERPL-MCNC: 1.6 NG/DL (ref 0.93–1.7)
TSH SERPL DL<=0.005 MIU/L-ACNC: 3.32 UIU/ML (ref 0.38–5.33)

## 2023-03-17 PROCEDURE — 36415 COLL VENOUS BLD VENIPUNCTURE: CPT

## 2023-03-17 PROCEDURE — 84443 ASSAY THYROID STIM HORMONE: CPT

## 2023-03-17 PROCEDURE — 84439 ASSAY OF FREE THYROXINE: CPT

## 2023-05-16 NOTE — ED NOTES
Pt for d/c upon removal of foreign body   Zoryve Counseling:  I discussed with the patient that Zoryve is not for use in the eyes, mouth or vagina. The most commonly reported side effects include diarrhea, headache, insomnia, application site pain, upper respiratory tract infections, and urinary tract infections.  All of the patient's questions and concerns were addressed.

## 2023-06-03 ENCOUNTER — HOSPITAL ENCOUNTER (OUTPATIENT)
Dept: LAB | Facility: MEDICAL CENTER | Age: 58
End: 2023-06-03
Attending: INTERNAL MEDICINE
Payer: COMMERCIAL

## 2023-06-03 LAB
T4 FREE SERPL-MCNC: 1.33 NG/DL (ref 0.93–1.7)
TSH SERPL DL<=0.005 MIU/L-ACNC: 2.32 UIU/ML (ref 0.38–5.33)

## 2023-06-03 PROCEDURE — 36415 COLL VENOUS BLD VENIPUNCTURE: CPT

## 2023-06-03 PROCEDURE — 84443 ASSAY THYROID STIM HORMONE: CPT

## 2023-06-03 PROCEDURE — 84439 ASSAY OF FREE THYROXINE: CPT

## 2023-07-05 ENCOUNTER — APPOINTMENT (OUTPATIENT)
Dept: RADIOLOGY | Facility: MEDICAL CENTER | Age: 58
End: 2023-07-05
Attending: FAMILY MEDICINE
Payer: COMMERCIAL

## 2023-07-05 ENCOUNTER — OFFICE VISIT (OUTPATIENT)
Dept: MEDICAL GROUP | Facility: MEDICAL CENTER | Age: 58
End: 2023-07-05
Payer: COMMERCIAL

## 2023-07-05 VITALS
DIASTOLIC BLOOD PRESSURE: 82 MMHG | TEMPERATURE: 98 F | WEIGHT: 267.42 LBS | HEART RATE: 65 BPM | HEIGHT: 68 IN | BODY MASS INDEX: 40.53 KG/M2 | OXYGEN SATURATION: 92 % | SYSTOLIC BLOOD PRESSURE: 116 MMHG

## 2023-07-05 DIAGNOSIS — M79.2 CERVICAL SPINE ARTHRITIS WITH NERVE PAIN: ICD-10-CM

## 2023-07-05 DIAGNOSIS — M25.512 CHRONIC PAIN OF BOTH SHOULDERS: ICD-10-CM

## 2023-07-05 DIAGNOSIS — M25.511 CHRONIC PAIN OF BOTH SHOULDERS: ICD-10-CM

## 2023-07-05 DIAGNOSIS — E78.5 DYSLIPIDEMIA, GOAL LDL BELOW 130: ICD-10-CM

## 2023-07-05 DIAGNOSIS — M54.12 CERVICAL RADICULOPATHY, CHRONIC: ICD-10-CM

## 2023-07-05 DIAGNOSIS — G89.29 CHRONIC PAIN OF BOTH SHOULDERS: ICD-10-CM

## 2023-07-05 DIAGNOSIS — E66.01 MORBID OBESITY WITH BMI OF 40.0-44.9, ADULT (HCC): ICD-10-CM

## 2023-07-05 DIAGNOSIS — M47.812 CERVICAL SPINE ARTHRITIS WITH NERVE PAIN: ICD-10-CM

## 2023-07-05 DIAGNOSIS — M47.812 PRIMARY OSTEOARTHRITIS OF CERVICAL SPINE: ICD-10-CM

## 2023-07-05 PROCEDURE — 3079F DIAST BP 80-89 MM HG: CPT | Performed by: FAMILY MEDICINE

## 2023-07-05 PROCEDURE — 73030 X-RAY EXAM OF SHOULDER: CPT | Mod: RT

## 2023-07-05 PROCEDURE — 99214 OFFICE O/P EST MOD 30 MIN: CPT | Performed by: FAMILY MEDICINE

## 2023-07-05 PROCEDURE — 3074F SYST BP LT 130 MM HG: CPT | Performed by: FAMILY MEDICINE

## 2023-07-05 PROCEDURE — 73030 X-RAY EXAM OF SHOULDER: CPT | Mod: LT

## 2023-07-05 RX ORDER — LEVOTHYROXINE SODIUM 137 UG/1
137 TABLET ORAL
COMMUNITY
End: 2023-12-18

## 2023-07-05 RX ORDER — MELOXICAM 15 MG/1
15 TABLET ORAL DAILY
Qty: 90 TABLET | Refills: 2 | Status: SHIPPED | OUTPATIENT
Start: 2023-07-05

## 2023-07-05 RX ORDER — ROSUVASTATIN CALCIUM 5 MG/1
5 TABLET, COATED ORAL EVERY EVENING
Qty: 90 TABLET | Refills: 0 | Status: SHIPPED | OUTPATIENT
Start: 2023-07-05 | End: 2023-10-02

## 2023-07-05 ASSESSMENT — FIBROSIS 4 INDEX: FIB4 SCORE: .975609756097560976
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
## 2023-07-18 ENCOUNTER — APPOINTMENT (OUTPATIENT)
Dept: RADIOLOGY | Facility: MEDICAL CENTER | Age: 58
End: 2023-07-18
Attending: FAMILY MEDICINE
Payer: COMMERCIAL

## 2023-07-18 DIAGNOSIS — M79.2 CERVICAL SPINE ARTHRITIS WITH NERVE PAIN: ICD-10-CM

## 2023-07-18 DIAGNOSIS — M54.12 CERVICAL RADICULOPATHY, CHRONIC: ICD-10-CM

## 2023-07-18 DIAGNOSIS — M47.812 CERVICAL SPINE ARTHRITIS WITH NERVE PAIN: ICD-10-CM

## 2023-07-18 PROCEDURE — 72141 MRI NECK SPINE W/O DYE: CPT

## 2023-09-05 ENCOUNTER — OFFICE VISIT (OUTPATIENT)
Dept: URGENT CARE | Facility: CLINIC | Age: 58
End: 2023-09-05
Payer: COMMERCIAL

## 2023-09-05 VITALS
OXYGEN SATURATION: 93 % | TEMPERATURE: 98.4 F | DIASTOLIC BLOOD PRESSURE: 70 MMHG | HEIGHT: 69 IN | HEART RATE: 70 BPM | WEIGHT: 264.8 LBS | SYSTOLIC BLOOD PRESSURE: 130 MMHG | RESPIRATION RATE: 16 BRPM | BODY MASS INDEX: 39.22 KG/M2

## 2023-09-05 DIAGNOSIS — J45.21 MILD INTERMITTENT ASTHMA WITH ACUTE EXACERBATION: ICD-10-CM

## 2023-09-05 DIAGNOSIS — R05.1 ACUTE COUGH: ICD-10-CM

## 2023-09-05 DIAGNOSIS — J01.40 ACUTE NON-RECURRENT PANSINUSITIS: ICD-10-CM

## 2023-09-05 LAB
FLUAV RNA SPEC QL NAA+PROBE: NEGATIVE
FLUBV RNA SPEC QL NAA+PROBE: NEGATIVE
RSV RNA SPEC QL NAA+PROBE: NEGATIVE
SARS-COV-2 RNA RESP QL NAA+PROBE: POSITIVE

## 2023-09-05 PROCEDURE — 3075F SYST BP GE 130 - 139MM HG: CPT | Performed by: NURSE PRACTITIONER

## 2023-09-05 PROCEDURE — 3078F DIAST BP <80 MM HG: CPT | Performed by: NURSE PRACTITIONER

## 2023-09-05 PROCEDURE — 0241U POCT CEPHEID COV-2, FLU A/B, RSV - PCR: CPT | Performed by: NURSE PRACTITIONER

## 2023-09-05 PROCEDURE — 99214 OFFICE O/P EST MOD 30 MIN: CPT | Performed by: NURSE PRACTITIONER

## 2023-09-05 RX ORDER — BENZONATATE 100 MG/1
200 CAPSULE ORAL 3 TIMES DAILY PRN
Qty: 60 CAPSULE | Refills: 0 | Status: SHIPPED | OUTPATIENT
Start: 2023-09-05 | End: 2023-09-15

## 2023-09-05 RX ORDER — PREDNISONE 20 MG/1
40 TABLET ORAL DAILY
Qty: 10 TABLET | Refills: 0 | Status: SHIPPED | OUTPATIENT
Start: 2023-09-05 | End: 2023-09-10

## 2023-09-05 RX ORDER — AZITHROMYCIN 250 MG/1
TABLET, FILM COATED ORAL
Qty: 6 TABLET | Refills: 0 | Status: SHIPPED | OUTPATIENT
Start: 2023-09-05 | End: 2024-03-04

## 2023-09-05 ASSESSMENT — FIBROSIS 4 INDEX: FIB4 SCORE: .975609756097560976

## 2023-09-05 NOTE — PROGRESS NOTES
"Date: 09/05/23     Chief Complaint:    Chief Complaint   Patient presents with    Headache     X11 days, \"Congestion, cough, SOB. Recently exposed to Covid.\"        History of Present Illness: 58 y.o. female  presents 11-day history of cough, nasal congestion and shortness of breath.  Patient does have a history of asthma she has not used her albuterol inhaler at this point.  She is not using any over-the-counter cold medications.  She believes she was exposed to COVID-19.  She recently got back from a cruise.  Total of 11 days of symptoms.  No recent fever in the past several days.  She presents to clinic today due to continued cough sinus congestion pain and pressure.  No severe shortness of breath chest pain or leg swelling.  No nausea vomiting diarrhea.        ROS:  As stated in HPI       Medical/SX/ Social History:  Reviewed per chart    Medications:    Current Outpatient Medications on File Prior to Visit   Medication Sig Dispense Refill    levothyroxine (LEVOXYL) 137 MCG Tab Take 137 mcg by mouth every morning on an empty stomach.      Multiple Vitamins-Minerals (STRESS FORMULA/IRON, MVI, PO) Take 1 Tablet by mouth every day.      rosuvastatin (CRESTOR) 5 MG Tab Take 1 Tablet by mouth every evening. 90 Tablet 0    sertraline (ZOLOFT) 100 MG Tab Take 2 Tablets by mouth every day. 180 Tablet 2    Vitamins A & D (VITAMIN A & D) 5000-400 units Cap 1      albuterol 108 (90 Base) MCG/ACT Aero Soln inhalation aerosol Inhale 2 Puffs every 6 hours as needed for Shortness of Breath. 8.5 g 6    Misc Natural Products (METABO-STYLE PO)       Cholecalciferol (VITAMIN D3) 125 MCG (5000 UT) Cap Take 1 Capsule by mouth every day. 30 Capsule 11    Multiple Vitamin (MULTIVITAMIN ADULT PO) Take 1 Tablet by mouth every day.      meloxicam (MOBIC) 15 MG tablet Take 1 Tablet by mouth every day. (Patient not taking: Reported on 9/5/2023) 90 Tablet 2     No current facility-administered medications on file prior to visit.    "     Allergies:    Doxycycline, Penicillin g, Cephalexin, and Pcn [penicillins]     Problem list, medications, and allergies reviewed by myself today in Epic       Physical Exam:  Vitals:    09/05/23 0819   BP: (!) 162/112   Pulse: 70   Resp: 16   Temp: 36.9 °C (98.4 °F)   SpO2: 93%        Physical Exam  Vitals reviewed.   Constitutional:       General: She is not in acute distress.     Appearance: Normal appearance. She is well-developed. She is not toxic-appearing.   HENT:      Head: Normocephalic and atraumatic.      Right Ear: Tympanic membrane, ear canal and external ear normal.      Left Ear: Tympanic membrane, ear canal and external ear normal.      Nose: Congestion and rhinorrhea present. Rhinorrhea is purulent.      Right Sinus: Maxillary sinus tenderness and frontal sinus tenderness present.      Left Sinus: Maxillary sinus tenderness and frontal sinus tenderness present.      Mouth/Throat:      Lips: Pink.      Mouth: Mucous membranes are moist.      Pharynx: Posterior oropharyngeal erythema present.   Eyes:      General: Lids are normal. Gaze aligned appropriately. No allergic shiner or scleral icterus.     Extraocular Movements: Extraocular movements intact.      Conjunctiva/sclera: Conjunctivae normal.      Pupils: Pupils are equal, round, and reactive to light.   Cardiovascular:      Rate and Rhythm: Normal rate and regular rhythm.      Pulses:           Radial pulses are 2+ on the right side and 2+ on the left side.      Heart sounds: Normal heart sounds.   Pulmonary:      Effort: Pulmonary effort is normal.      Breath sounds: Normal breath sounds. No wheezing.   Musculoskeletal:      Right lower leg: No edema.      Left lower leg: No edema.   Lymphadenopathy:      Cervical: No cervical adenopathy.   Skin:     General: Skin is warm.      Capillary Refill: Capillary refill takes less than 2 seconds.      Coloration: Skin is not cyanotic or pale.      Findings: No rash.   Neurological:      Mental  Status: She is alert.      Gait: Gait is intact.   Psychiatric:         Behavior: Behavior normal. Behavior is cooperative.          Diagnostics:    Covid- 19 positive   Diagnostics interpreted by myself.      Medical Decision Making / Plan :  I personally reviewed prior external notes and test results pertinent to today's visit. Pt is clinically stable at today's acute urgent care visit.  Patient appears nontoxic with no acute distress noted. Appropriate for outpatient care at this time. The patient remained stable during the urgent care visit.     Pleasant 58 y.o. female  presented clinic with 11 days of sinus congestion pain and pressure due to longevity of illness will treat for acute bacterial pansinusitis due to sinus tenderness on exam and purulent postnasal drip.  Did review patient's allergies did send for azithromycin.  Due to persistent cough history of asthma with acute exacerbation we will treat for acute on chronic asthma exacerbation with 5 days of prednisone due to advised to start using albuterol inhaler.  Did also send for Tessalon Perles.  COVID-19 testing is positive in clinic today although due to 11 days of symptoms likely not contagious at this time as she has had no fever in the past 48 hours.    Shared decision-making was utilized with patient for treatment plan. Differential Diagnosis, natural history, and supportive care discussed. Did advise patient on conservative measures for management of symptoms.  Patient is agreeable to pursue adequate rest, adequate hydration, saltwater gargle and Neti pot for any symptoms of upper respiratory congestion.  Over-the-counter analgesia and antipyretics on a p.r.n. basis as needed for pain and fever.  Did discuss over-the-counter cough medications.      1. Acute non-recurrent pansinusitis    - azithromycin (ZITHROMAX) 250 MG Tab; Take 2 tablets by mouth on day one. Take one tablet by mouth the remaining days until gone  Dispense: 6 Tablet; Refill:  0    2. Mild intermittent asthma with acute exacerbation    - predniSONE (DELTASONE) 20 MG Tab; Take 2 Tablets by mouth every day for 5 days.  Dispense: 10 Tablet; Refill: 0    3. Acute cough    - benzonatate (TESSALON) 100 MG Cap; Take 2 Capsules by mouth 3 times a day as needed for Cough for up to 10 days.  Dispense: 60 Capsule; Refill: 0  - POCT CoV-2, Flu A/B, RSV by PCR        Medication discussed included indication for use and the potential benefits and side effects. Education was provided regarding the aforementioned assessments.  All of the patient's questions were answered to their satisfaction at the time of discharge. Patient was encouraged to monitor symptoms closely. Those signs and symptoms which would warrant concern and mandate seeking a higher level of service through the emergency department discussed at length.  Patient stated agreement and understanding of this plan of care.        Disposition:  Patient was discharged in stable condition.    Voice Recognition Disclaimer: Portions of this document were created using voice recognition software. The software does have a chance of producing errors of grammar and possibly content. I have made every reasonable attempt to correct obvious errors, but there may be errors of grammar and possibly content that I did not discover before finalizing the documentation.    JANETT Jessica.

## 2023-09-15 ENCOUNTER — HOSPITAL ENCOUNTER (OUTPATIENT)
Dept: RADIOLOGY | Facility: MEDICAL CENTER | Age: 58
End: 2023-09-15
Attending: OTOLARYNGOLOGY
Payer: COMMERCIAL

## 2023-09-15 DIAGNOSIS — H90.6 MIXED HEARING LOSS, BILATERAL: ICD-10-CM

## 2023-09-15 PROCEDURE — 70480 CT ORBIT/EAR/FOSSA W/O DYE: CPT

## 2023-11-04 ENCOUNTER — HOSPITAL ENCOUNTER (OUTPATIENT)
Dept: LAB | Facility: MEDICAL CENTER | Age: 58
End: 2023-11-04
Attending: PHYSICIAN ASSISTANT
Payer: COMMERCIAL

## 2023-11-04 LAB
T4 FREE SERPL-MCNC: 1.22 NG/DL (ref 0.93–1.7)
TSH SERPL DL<=0.005 MIU/L-ACNC: 1.76 UIU/ML (ref 0.38–5.33)

## 2023-11-04 PROCEDURE — 84439 ASSAY OF FREE THYROXINE: CPT

## 2023-11-04 PROCEDURE — 36415 COLL VENOUS BLD VENIPUNCTURE: CPT

## 2023-11-04 PROCEDURE — 84443 ASSAY THYROID STIM HORMONE: CPT

## 2023-11-07 ENCOUNTER — PATIENT MESSAGE (OUTPATIENT)
Dept: MEDICAL GROUP | Facility: MEDICAL CENTER | Age: 58
End: 2023-11-07
Payer: COMMERCIAL

## 2023-12-09 DIAGNOSIS — R06.2 WHEEZING: ICD-10-CM

## 2023-12-09 DIAGNOSIS — R05.9 COUGH: ICD-10-CM

## 2023-12-11 RX ORDER — ALBUTEROL SULFATE 90 UG/1
2 AEROSOL, METERED RESPIRATORY (INHALATION) EVERY 6 HOURS PRN
Qty: 8.5 G | Refills: 6 | Status: SHIPPED | OUTPATIENT
Start: 2023-12-11

## 2023-12-18 ENCOUNTER — OFFICE VISIT (OUTPATIENT)
Dept: MEDICAL GROUP | Facility: MEDICAL CENTER | Age: 58
End: 2023-12-18
Payer: COMMERCIAL

## 2023-12-18 VITALS
RESPIRATION RATE: 18 BRPM | HEART RATE: 84 BPM | SYSTOLIC BLOOD PRESSURE: 142 MMHG | WEIGHT: 281 LBS | OXYGEN SATURATION: 92 % | DIASTOLIC BLOOD PRESSURE: 90 MMHG | HEIGHT: 69 IN | BODY MASS INDEX: 41.62 KG/M2 | TEMPERATURE: 98 F

## 2023-12-18 DIAGNOSIS — M79.2 CERVICAL SPINE ARTHRITIS WITH NERVE PAIN: ICD-10-CM

## 2023-12-18 DIAGNOSIS — M47.812 CERVICAL SPINE ARTHRITIS WITH NERVE PAIN: ICD-10-CM

## 2023-12-18 DIAGNOSIS — E03.9 HYPOTHYROIDISM, UNSPECIFIED TYPE: ICD-10-CM

## 2023-12-18 DIAGNOSIS — M62.838 MUSCLE SPASM OF LEFT SHOULDER AREA: ICD-10-CM

## 2023-12-18 PROCEDURE — 3080F DIAST BP >= 90 MM HG: CPT | Performed by: FAMILY MEDICINE

## 2023-12-18 PROCEDURE — 1125F AMNT PAIN NOTED PAIN PRSNT: CPT | Performed by: FAMILY MEDICINE

## 2023-12-18 PROCEDURE — 3077F SYST BP >= 140 MM HG: CPT | Performed by: FAMILY MEDICINE

## 2023-12-18 PROCEDURE — 99213 OFFICE O/P EST LOW 20 MIN: CPT | Performed by: FAMILY MEDICINE

## 2023-12-18 RX ORDER — CYCLOBENZAPRINE HCL 5 MG
5 TABLET ORAL 3 TIMES DAILY PRN
Qty: 60 TABLET | Refills: 2 | Status: SHIPPED | OUTPATIENT
Start: 2023-12-18 | End: 2024-03-04

## 2023-12-18 RX ORDER — LEVOTHYROXINE SODIUM 137 UG/1
137 TABLET ORAL
Qty: 90 TABLET | Refills: 1
Start: 2023-12-18 | End: 2024-01-31 | Stop reason: SDUPTHER

## 2023-12-18 ASSESSMENT — PAIN SCALES - GENERAL: PAINLEVEL: 8=MODERATE-SEVERE PAIN

## 2023-12-18 ASSESSMENT — FIBROSIS 4 INDEX: FIB4 SCORE: .975609756097560976
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
## 2024-01-23 ENCOUNTER — OFFICE VISIT (OUTPATIENT)
Dept: SLEEP MEDICINE | Facility: MEDICAL CENTER | Age: 59
End: 2024-01-23
Attending: NURSE PRACTITIONER
Payer: COMMERCIAL

## 2024-01-23 VITALS
HEART RATE: 95 BPM | OXYGEN SATURATION: 94 % | HEIGHT: 69 IN | BODY MASS INDEX: 40.43 KG/M2 | WEIGHT: 273 LBS | SYSTOLIC BLOOD PRESSURE: 138 MMHG | DIASTOLIC BLOOD PRESSURE: 76 MMHG | RESPIRATION RATE: 14 BRPM

## 2024-01-23 DIAGNOSIS — G47.34 NOCTURNAL HYPOXIA: Chronic | ICD-10-CM

## 2024-01-23 DIAGNOSIS — G47.31 CENTRAL SLEEP APNEA: Chronic | ICD-10-CM

## 2024-01-23 DIAGNOSIS — Z78.9 NONSMOKER: ICD-10-CM

## 2024-01-23 PROCEDURE — 3078F DIAST BP <80 MM HG: CPT | Performed by: NURSE PRACTITIONER

## 2024-01-23 PROCEDURE — 3075F SYST BP GE 130 - 139MM HG: CPT | Performed by: NURSE PRACTITIONER

## 2024-01-23 PROCEDURE — 99212 OFFICE O/P EST SF 10 MIN: CPT | Performed by: NURSE PRACTITIONER

## 2024-01-23 PROCEDURE — 99214 OFFICE O/P EST MOD 30 MIN: CPT | Performed by: NURSE PRACTITIONER

## 2024-01-23 ASSESSMENT — FIBROSIS 4 INDEX: FIB4 SCORE: .975609756097560976

## 2024-01-23 NOTE — PROGRESS NOTES
Chief Complaint   Patient presents with    Follow-Up     Apnea // last seen 9/15/2022    Results     OPO 11/15/2022  CXR 9/30/2022  Echo 11/18/2023        HPI:  Tanvi Krueger is a 58 y.o. year old female here today for follow-up on complex sleep apnea and test results.  Last OV 9/15/22     She is currently using ASV 9/4/16cm with 2.5L oxygen bleed in; Resp[employer]ics DreamStation BiPAP device obtained 2022.   Compliance report 12/24/2023 through 1/22/2024 indicates 100% compliance, average nightly use 7 hours 50 minutes, average EPAP 10 cm, minimal mask leak with reduced AHI of 1.5/h.  She tolerates mask and pressure well.  She currently uses a nasal cup.  She does note waking with right-sided frontal headaches that tend to resolve.  She feels overall she generally sleeps well but due to increased stress from family sometimes is difficult to fall asleep or stay asleep.  She generally goes to bed between 9-10 PM and sleeps through the night till 4 AM.    Last office visit patient had concerns with a family history of lung cancer and significant secondhand smoke exposure.  She completed echo 11/18/2022 noting LVEF 65% and RVSP unable to be calculated.  No significant valvular abnormalities.  Chest x-ray 9/30/2022 noted no acute cardiopulmonary process.  PFT was not updated.  Result sent via [facility] and again today.    Interval Events:  She continues to sleep on therapy.  She tolerates mask and pressure well.  She does need updated order for supplies.  She would like to obtain an oxygen concentrator for night use for travel for bleeding O2 with her BiPAP out-of-pocket.  She notes recently having some shortness of breath when doing stairs which is new to her but her brother pointed out that she was breathing heavy.  She is having increased stress and anxiety at home and feels this is related to that.  She generally sleeps slightly elevated in bed.  She does have albuterol HFA inhaler she has on hand for PCP in the  event of shortness of breath.  No regular cough, phlegm, chest pain, chest tightness or wheezing.  She is planning going on a 1 month cruise in the next month.    Sleep hx:  PSG from split night study from 10/2/16 indicated Severe obstructive sleep apnea hypopnea was identified. The AHI was 81.4, the minimum saturation 79%, and saturations were less than 90% for 72.6% of the recording. Apneas comprised 8.5% of the total events. The patient had 200 respiratory events during the diagnostic analysis. The patient underwent a Pap titration. During treatment with Pap, central apneas comprised 64.4% of the total number of events. Neither CPAP nor bilevel were effective in normalizing the respiratory events secondary to treatment emergent central apneas.     PSG titration from 10/17/16 indicated a significant but incomplete response to servo adaptive BiPAP ventilation in a patient with previously demonstrated central sleep apnea or complex sleep apnea. Specific treatment is difficult to determine from this limited study.      PSG split night study from 6/27/17 indicated the patient had 3 apneas in total.  Of these, 0 were obstructive apneas, and 3 were central apneas.  This resulted in an apnea index (AI) of 0.9.  The patient had 69 hypopneas in total, which resulted in a hypopnea index of 21.0.  The overall AHI was 21.9, while the AHI during REM was 74.4.  The supine AHI = N/A. CPAP was initiated 6 cm and increased to a maximum of 9 cm. The patient did best on CPAP at 9 cm where supine REM sleep was achieved, the apnea hypopnea index was 0.0, the minimum saturation 85%, and the mean saturation 91.1%. Recommended was CPAP at 9 cmH2O.      OPO on ASV 9 cmH2O, PS 4/16 cmH2O from 8/11/17 indicated the basal arterial oxygen saturation is 91%. Saturation is reasonably well-maintained at 88% or above through most of the night with occasional drops to about 86%. Overall she spends 22% of the study time with a saturation below 90%  "but only four minutes with a saturation less than 88%. The average heart rate is 66 bpm.     PSG split night study from 1/9/19 indicated severe obstructive sleep apnea with AHI of 56.1/hr and O2 zuhair 82 %. Due to severity of the disease she met the split study protocol. The titration started with CPAP 6 cm and the best tolerated was BiPAP 15/11 cm. The AHI improved to 2.89/hr with improved O2 zuhair of 88% and average O2 saturation of 91 %.  Sleep-related hypoxia.     Overnight oximetry 7/12/2021 indicated O2 zuhair of 83%, 22 minutes of low oxygen levels less than 88% and less than 90% for 34% of study. Recommendation was increasing EPAP to 10 cm and increasing O2 to 3 L/min.   CNOX 11/15/2022 using ASV with 3 L oxygen noted basal SpO2 of 92.4%, less than 88% for 0.1 minutes of testing.  Adequate oxygenation.      ROS: As per HPI and otherwise negative if not stated.    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    Bowel habit changes     constipation    Cholesteatoma of middle ear and mastoid(385.33) 1992 surgery    mastoidectomy, prosthesis    Depression     Dyslipidemia, goal LDL below 130     Elevated glycohemoglobin     Eosinophilic esophagitis     Family history of early CAD     Hiatus hernia syndrome     \"was told I had one\"    History of chickenpox     History of endometriosis     History of partial thyroidectomy     partial thyroidectomy    Hypothyroidism due to Hashimoto's thyroiditis     Lumbar disc narrowing 7/14/2009    Menopausal symptoms     Oxygen desaturation during sleep     O2 2 liters HS    Pneumonia     hx    Recurrent sinus infections     Seizure (HCC)     Seizure cerebral (HCC) 6/20/2017    Sleep apnea     ASV with 2L oxygen at night (sean)     Tonsillitis     Vitamin d deficiency     Wears hearing aid in both ears 8/2/2022       Past Surgical History: "   Procedure Laterality Date    LARYNGOSCOPY N/A 3/31/2017    Procedure: LARYNGOSCOPY - DIRECT W/BIOPSY BASE OF TONGUE AND NASOPHARYNGOSCOPY W/BIOPSY;  Surgeon: Naresh Abdi M.D.;  Location: SURGERY SAME DAY [facility];  Service:     THYROIDECTOMY TOTAL Left 10/14/2016    Procedure: LEFT PARTIAL THROIDECTOMY;  Surgeon: Naresh Abdi M.D.;  Location: SURGERY SAME DAY [facility];  Service:     SEPTOPLASTY  9/9/2016    Procedure: SEPTOPLASTY;  Surgeon: Naresh Abdi M.D.;  Location: SURGERY SAME DAY [facility];  Service:     TURBINOPLASTY Bilateral 9/9/2016    Procedure: TURBINOPLASTY;  Surgeon: Naresh Abdi M.D.;  Location: SURGERY SAME DAY [facility];  Service:     OTHER  2016    sinus surgery    EAR MIDDLE EXPLORATION  4/3/2015    Performed by Naresh Abdi M.D. at SURGERY SAME DAY [facility]    OSSICULAR RECONSTRUCTION  4/3/2015    Performed by Naresh Abdi M.D. at SURGERY SAME DAY [facility]    MYRINGOTOMY  4/20/2012    Performed by RYANNE ALICIA at SURGERY [facility] ORS    TYMPANOTOMY  6/24/2010    Performed by MAXIMUS WHITE at SURGERY SAME DAY [facility]    EXAM UNDER ANESTHESIA  6/24/2010    Performed by MAXIMUS WHITE at SURGERY SAME DAY [facility]    MYRINGOTOMY  4/30/2009    Performed by MAXIMUS WHITE at SURGERY SAME DAY [facility]    ABDOMINAL HYSTERECTOMY TOTAL  1997    ovaries are gone    ARTHROSCOPY, KNEE      HYSTERECTOMY LAPAROSCOPY      SINUSCOPE      TONSILLECTOMY         Family History   Problem Relation Age of Onset    Cancer Mother 61        lung, smoker    Heart Disease Mother 44        early heart attack    Sleep Apnea Mother         possibly    Heart Disease Father 60        cabg x 3    Hyperlipidemia Father     Psychiatric Illness Father         schizophrenia    Kidney Disease Father         renal failure, dialysis    Dementia Father     Hyperlipidemia Brother     Suicide Attempts Brother         completed suicide 10/14/2021    Cancer Maternal  "Grandmother 62        lung, non-smoker    Psychiatric Illness Maternal Grandmother         depression, severe    Psychiatric Illness Other         depression, great uncle    Anxiety disorder Sister        Social History     Socioeconomic History    Marital status:      Spouse name: Not on file    Number of children: Not on file    Years of education: Not on file    Highest education level: Not on file   Occupational History    Not on file   Tobacco Use    Smoking status: Never    Smokeless tobacco: Never    Tobacco comments:     Patient grew up with 3 smokers in home    Vaping Use    Vaping Use: Never used   Substance and Sexual Activity    Alcohol use: No     Alcohol/week: 0.0 oz    Drug use: No    Sexual activity: Yes     Partners: Male   Other Topics Concern    Not on file   Social History Narrative    Not on file     Social Determinants of Health     Financial Resource Strain: Not on file   Food Insecurity: Not on file   Transportation Needs: Not on file   Physical Activity: Not on file   Stress: Not on file   Social Connections: Not on file   Intimate Partner Violence: Not on file   Housing Stability: Not on file       Allergies as of 01/23/2024 - Reviewed 01/23/2024   Allergen Reaction Noted    Doxycycline Nausea and Unspecified 01/29/2014    Penicillin g Unspecified 04/30/2021    Cephalexin Diarrhea and Unspecified 09/21/2016    Pcn [penicillins] Unspecified 07/09/2009        Vitals:  /76 (BP Location: Left arm, Patient Position: Sitting, BP Cuff Size: Adult)   Pulse 95   Resp 14   Ht 1.753 m (5' 9\")   Wt 124 kg (273 lb)   SpO2 94%     Current medications as of today   Current Outpatient Medications   Medication Sig Dispense Refill    LEVOXYL 137 MCG Tab Take 1 Tablet by mouth every morning on an empty stomach. 90 Tablet 1    cyclobenzaprine (FLEXERIL) 5 mg tablet Take 1 Tablet by mouth 3 times a day as needed for Moderate Pain or Muscle Spasms. 60 Tablet 2    albuterol 108 (90 Base) MCG/ACT " Aero Soln inhalation aerosol Inhale 2 Puffs every 6 hours as needed for Shortness of Breath. 8.5 g 6    rosuvastatin (CRESTOR) 5 MG Tab TAKE 1 TABLET BY MOUTH EVERY DAY IN THE EVENING 90 Tablet 2    azithromycin (ZITHROMAX) 250 MG Tab Take 2 tablets by mouth on day one. Take one tablet by mouth the remaining days until gone 6 Tablet 0    Multiple Vitamins-Minerals (STRESS FORMULA/IRON, MVI, PO) Take 1 Tablet by mouth every day.      meloxicam (MOBIC) 15 MG tablet Take 1 Tablet by mouth every day. 90 Tablet 2    sertraline (ZOLOFT) 100 MG Tab Take 2 Tablets by mouth every day. 180 Tablet 2    Vitamins A & D (VITAMIN A & D) 5000-400 units Cap 1      Misc Natural Products (METABO-STYLE PO)       Cholecalciferol (VITAMIN D3) 125 MCG (5000 UT) Cap Take 1 Capsule by mouth every day. 30 Capsule 11    Multiple Vitamin (MULTIVITAMIN ADULT PO) Take 1 Tablet by mouth every day.       No current facility-administered medications for this visit.         Physical Exam:   Gen:           Alert and oriented, No apparent distress. Mood and affect appropriate, normal interaction with examiner.  Eyes:          PERRL, EOM intact, sclere white, conjunctive moist. Glasses.  Ears:          Not examined.   Hearing:     Grossly intact.  Nose:          Normal, no lesions or deformities.  Dentition:    Not examined.   Oropharynx:   Not examined.   Mallampati Classification: Not examined.   Neck:        Supple, trachea midline, no masses.  Respiratory Effort: No intercostal retractions or use of accessory muscles.   Lung Auscultation:      Clear to auscultation bilaterally; no rales, rhonchi or wheezing.  CV:            Regular rate and rhythm. No murmurs, rubs or gallops.  Abd:           Not examined.   Lymphadenopathy: Not examined.  Gait and Station: Normal.  Digits and Nails: No clubbing, cyanosis, petechiae, or nodes.   Cranial Nerves: II-XII grossly intact.  Skin:        No rashes, lesions or ulcers noted.               Ext:           No  cyanosis or edema.      Assessment:  1. Central sleep apnea        2. Nocturnal hypoxia        3. BMI 40.0-44.9, adult (HCC)  HEIGHT AND WEIGHT      4. Nonsmoker            Immunizations:    Flu:11/19/23  Pneumovax 23:2017  Prevnar 13:not due  PCV 20: 2022  COVID-19: 2023    Plan:  Patient central sleep apnea is well-controlled on therapy.  She will continue ASV with 2.5 L/min bleed in O2.   DME mask/supplies  Provided Rx for out-of-pocket travel concentrator for nighttime use for bleed in therapy.   RX DME O2 for out of pocket purchase given to patient  Encouraged weight loss or healthy diet and activity.  Follow-up with primary care for other health concerns.  Follow-up in 1 year with compliance report, sooner if needed.    Please note that this dictation was created using voice recognition software. I have made every reasonable attempt to correct obvious errors, but it is possible there are errors of grammar and possibly content that I did not discover before finalizing the note.

## 2024-01-23 NOTE — PATIENT INSTRUCTIONS
DME: [facility]  [address] [place] [address]  #[phone]    RX for travel O2 concentrator for night time use

## 2024-01-31 DIAGNOSIS — F33.41 RECURRENT MAJOR DEPRESSIVE DISORDER, IN PARTIAL REMISSION (HCC): ICD-10-CM

## 2024-01-31 DIAGNOSIS — E03.9 HYPOTHYROIDISM, UNSPECIFIED TYPE: ICD-10-CM

## 2024-01-31 NOTE — TELEPHONE ENCOUNTER
Patient comment: Need partial refill early going on vacation for 31 days     Received request via: Pharmacy    Was the patient seen in the last year in this department? Yes    Does the patient have an active prescription (recently filled or refills available) for medication(s) requested? No    Pharmacy Name: [facility]/pharmacy #[id number] - [place], [place] - [address]     Does the patient have shelter Plus and need 100 day supply (blood pressure, diabetes and cholesterol meds only)? Patient does not have SCP

## 2024-02-01 RX ORDER — LEVOTHYROXINE SODIUM 137 UG/1
137 TABLET ORAL
Qty: 90 TABLET | Refills: 1 | Status: SHIPPED | OUTPATIENT
Start: 2024-02-01

## 2024-02-01 RX ORDER — SERTRALINE HYDROCHLORIDE 100 MG/1
200 TABLET, FILM COATED ORAL DAILY
Qty: 180 TABLET | Refills: 2 | Status: SHIPPED | OUTPATIENT
Start: 2024-02-01

## 2024-03-04 ENCOUNTER — APPOINTMENT (OUTPATIENT)
Dept: RADIOLOGY | Facility: IMAGING CENTER | Age: 59
End: 2024-03-04
Attending: FAMILY MEDICINE
Payer: COMMERCIAL

## 2024-03-04 ENCOUNTER — OFFICE VISIT (OUTPATIENT)
Dept: URGENT CARE | Facility: CLINIC | Age: 59
End: 2024-03-04
Payer: COMMERCIAL

## 2024-03-04 VITALS
WEIGHT: 276 LBS | DIASTOLIC BLOOD PRESSURE: 80 MMHG | SYSTOLIC BLOOD PRESSURE: 128 MMHG | HEART RATE: 66 BPM | BODY MASS INDEX: 41.83 KG/M2 | HEIGHT: 68 IN | RESPIRATION RATE: 20 BRPM | OXYGEN SATURATION: 90 % | TEMPERATURE: 98 F

## 2024-03-04 DIAGNOSIS — J98.8 RTI (RESPIRATORY TRACT INFECTION): ICD-10-CM

## 2024-03-04 DIAGNOSIS — J45.901 EXACERBATION OF ASTHMA, UNSPECIFIED ASTHMA SEVERITY, UNSPECIFIED WHETHER PERSISTENT: ICD-10-CM

## 2024-03-04 LAB
FLUAV RNA SPEC QL NAA+PROBE: POSITIVE
FLUBV RNA SPEC QL NAA+PROBE: NEGATIVE
RSV RNA SPEC QL NAA+PROBE: NEGATIVE
SARS-COV-2 RNA RESP QL NAA+PROBE: NEGATIVE

## 2024-03-04 PROCEDURE — 3074F SYST BP LT 130 MM HG: CPT | Performed by: FAMILY MEDICINE

## 2024-03-04 PROCEDURE — 71046 X-RAY EXAM CHEST 2 VIEWS: CPT | Mod: TC,FY | Performed by: FAMILY MEDICINE

## 2024-03-04 PROCEDURE — 3079F DIAST BP 80-89 MM HG: CPT | Performed by: FAMILY MEDICINE

## 2024-03-04 PROCEDURE — 99214 OFFICE O/P EST MOD 30 MIN: CPT | Performed by: FAMILY MEDICINE

## 2024-03-04 PROCEDURE — 0241U POCT CEPHEID COV-2, FLU A/B, RSV - PCR: CPT | Performed by: FAMILY MEDICINE

## 2024-03-04 RX ORDER — PREDNISONE 20 MG/1
40 TABLET ORAL EVERY MORNING
Qty: 10 TABLET | Refills: 0 | Status: SHIPPED | OUTPATIENT
Start: 2024-03-04 | End: 2024-03-09

## 2024-03-04 RX ORDER — OSELTAMIVIR PHOSPHATE 75 MG/1
75 CAPSULE ORAL EVERY 12 HOURS
Qty: 10 CAPSULE | Refills: 0 | Status: SHIPPED | OUTPATIENT
Start: 2024-03-04 | End: 2024-03-09

## 2024-03-04 RX ORDER — AZITHROMYCIN 250 MG/1
TABLET, FILM COATED ORAL
Qty: 6 TABLET | Refills: 0 | Status: SHIPPED | OUTPATIENT
Start: 2024-03-04

## 2024-03-04 RX ORDER — BENZONATATE 200 MG/1
200 CAPSULE ORAL 3 TIMES DAILY PRN
Qty: 30 CAPSULE | Refills: 0 | Status: SHIPPED | OUTPATIENT
Start: 2024-03-04

## 2024-03-04 ASSESSMENT — FIBROSIS 4 INDEX: FIB4 SCORE: .975609756097560976

## 2024-03-04 ASSESSMENT — ENCOUNTER SYMPTOMS
MYALGIAS: 1
COUGH: 1

## 2024-03-04 NOTE — PROGRESS NOTES
"Subjective     Tanvi Krueger is a 58 y.o. female who presents with Cough (5 days)    - This is a very pleasant 58 y.o. who has come to the walk-in clinic today for approximately 5 days chest congestion coughing wheezing colorful sputum sometimes with a lot of blood.  Stuffy runny nose some sore throat.  No known fevers but felt maybe a little feverish.    History of asthma acts up from time to time has albuterol inhaler and uses as needed.  Feels like asthma is acting up.          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    Bowel habit changes     constipation    Cholesteatoma of middle ear and mastoid(385.33) 1992 surgery    mastoidectomy, prosthesis    Depression     Dyslipidemia, goal LDL below 130     Elevated glycohemoglobin     Eosinophilic esophagitis     Family history of early CAD     Hiatus hernia syndrome     \"was told I had one\"    History of chickenpox     History of endometriosis     History of partial thyroidectomy     partial thyroidectomy    Hypothyroidism due to Hashimoto's thyroiditis     Lumbar disc narrowing 7/14/2009    Menopausal symptoms     Oxygen desaturation during sleep     O2 2 liters HS    Pneumonia     hx    Recurrent sinus infections     Seizure (HCC)     Seizure cerebral (HCC) 6/20/2017    Sleep apnea     ASV with 2L oxygen at night (sean)     Tonsillitis     Vitamin d deficiency     Wears hearing aid in both ears 8/2/2022        PSH:  Past Surgical History:   Procedure Laterality Date    LARYNGOSCOPY N/A 3/31/2017    Procedure: LARYNGOSCOPY - DIRECT W/BIOPSY BASE OF TONGUE AND NASOPHARYNGOSCOPY W/BIOPSY;  Surgeon: Naresh Abdi M.D.;  Location: SURGERY SAME DAY [facility];  Service:     THYROIDECTOMY TOTAL Left 10/14/2016    Procedure: LEFT PARTIAL " THROIDECTOMY;  Surgeon: Naresh Abdi M.D.;  Location: SURGERY SAME DAY [facility];  Service:     SEPTOPLASTY  9/9/2016    Procedure: SEPTOPLASTY;  Surgeon: Naresh Abdi M.D.;  Location: SURGERY SAME DAY [facility];  Service:     TURBINOPLASTY Bilateral 9/9/2016    Procedure: TURBINOPLASTY;  Surgeon: Naresh Abdi M.D.;  Location: SURGERY SAME DAY [facility];  Service:     OTHER  2016    sinus surgery    EAR MIDDLE EXPLORATION  4/3/2015    Performed by Naresh Abdi M.D. at SURGERY SAME DAY [facility]    OSSICULAR RECONSTRUCTION  4/3/2015    Performed by Naresh Abdi M.D. at SURGERY SAME DAY [facility]    MYRINGOTOMY  4/20/2012    Performed by RYANNE ALICIA at SURGERY [facility]    TYMPANOTOMY  6/24/2010    Performed by MAXIMUS WHITE at SURGERY SAME DAY [facility]    EXAM UNDER ANESTHESIA  6/24/2010    Performed by MAXIMUS WHITE at SURGERY SAME DAY [facility]    MYRINGOTOMY  4/30/2009    Performed by MAXIMUS WHITE at SURGERY SAME DAY [facility]    ABDOMINAL HYSTERECTOMY TOTAL  1997    ovaries are gone    ARTHROSCOPY, KNEE      HYSTERECTOMY LAPAROSCOPY      SINUSCOPE      TONSILLECTOMY         MEDS:    Current Outpatient Medications:     azithromycin (ZITHROMAX) 250 MG Tab, Use as directed, Disp: 6 Tablet, Rfl: 0    benzonatate (TESSALON) 200 MG capsule, Take 1 Capsule by mouth 3 times a day as needed for Cough., Disp: 30 Capsule, Rfl: 0    predniSONE (DELTASONE) 20 MG Tab, Take 2 Tablets by mouth every morning for 5 days., Disp: 10 Tablet, Rfl: 0    oseltamivir (TAMIFLU) 75 MG Cap, Take 1 Capsule by mouth every 12 hours for 5 days., Disp: 10 Capsule, Rfl: 0    sertraline (ZOLOFT) 100 MG Tab, Take 2 Tablets by mouth every day., Disp: 180 Tablet, Rfl: 2    LEVOXYL 137 MCG Tab, Take 1 Tablet by mouth every morning on an empty stomach., Disp: 90 Tablet, Rfl: 1    albuterol 108 (90 Base) MCG/ACT Aero Soln inhalation aerosol, Inhale 2 Puffs every 6 hours as needed for Shortness  "of Breath., Disp: 8.5 g, Rfl: 6    rosuvastatin (CRESTOR) 5 MG Tab, TAKE 1 TABLET BY MOUTH EVERY DAY IN THE EVENING, Disp: 90 Tablet, Rfl: 2    Multiple Vitamins-Minerals (STRESS FORMULA/IRON, MVI, PO), Take 1 Tablet by mouth every day., Disp: , Rfl:     meloxicam (MOBIC) 15 MG tablet, Take 1 Tablet by mouth every day., Disp: 90 Tablet, Rfl: 2    Vitamins A & D (VITAMIN A & D) 5000-400 units Cap, 1, Disp: , Rfl:     Misc Natural Products (METABO-STYLE PO), , Disp: , Rfl:     Cholecalciferol (VITAMIN D3) 125 MCG (5000 UT) Cap, Take 1 Capsule by mouth every day., Disp: 30 Capsule, Rfl: 11    Multiple Vitamin (MULTIVITAMIN ADULT PO), Take 1 Tablet by mouth every day., Disp: , Rfl:     ** I have documented what I find to be significant in regards to past medical, social, family and surgical history  in my HPI or under PMH/PSH/FH review section, otherwise it is noncontributory **           HPI    Review of Systems   Constitutional:  Positive for malaise/fatigue.   HENT:  Positive for congestion.    Respiratory:  Positive for cough.    Musculoskeletal:  Positive for myalgias.   All other systems reviewed and are negative.             Objective     /80   Pulse 66   Temp 36.7 °C (98 °F) (Temporal)   Resp 20   Ht 1.727 m (5' 8\")   Wt (!) 125 kg (276 lb)   LMP 02/26/1995   SpO2 90%   BMI 41.97 kg/m²      Physical Exam  Vitals and nursing note reviewed.   Constitutional:       General: She is not in acute distress.     Appearance: Normal appearance. She is well-developed.   HENT:      Head: Normocephalic.      Mouth/Throat:      Mouth: Mucous membranes are moist.      Pharynx: Oropharynx is clear. No oropharyngeal exudate or posterior oropharyngeal erythema.   Cardiovascular:      Heart sounds: Normal heart sounds. No murmur heard.  Pulmonary:      Effort: Pulmonary effort is normal. No respiratory distress.      Breath sounds: Wheezing present.   Neurological:      Mental Status: She is alert.      Motor: No " abnormal muscle tone.   Psychiatric:         Mood and Affect: Mood normal.         Behavior: Behavior normal.                             Assessment & Plan     1. RTI (respiratory tract infection)  DX-CHEST-2 VIEWS    POCT CoV-2, Flu A/B, RSV by PCR    azithromycin (ZITHROMAX) 250 MG Tab    benzonatate (TESSALON) 200 MG capsule    oseltamivir (TAMIFLU) 75 MG Cap      2. Exacerbation of asthma, unspecified asthma severity, unspecified whether persistent  predniSONE (DELTASONE) 20 MG Tab        Acute bronchitis with asthma exacerbation.  Will treat per assessment and plan.  Continue to use albuterol inhaler as needed every 4-6 hours.    - Dx, plan & d/c instructions discussed   - Rest, stay hydrated, OTC Motrin and/or Tylenol as needed      Follow up with your regular primary care providers office within a week to keep them updated and informed of this visit and for regular routine health maintenance check-ups. ER if not improving in 2-3 days or if feeling/getting worse. (If you do not have a primary care provider and need to schedule one you may call [facility] at [phone] to do this).       Patient left in stable condition     POCT results reviewed/discussed    Today's radiology imaging personally reviewed by me today on day of visit and Radiology readings reviewed and discussed w/ patient today.     Pertinent prior lab work and/or imaging studies in Epic have been reviewed by me today on day of this visit and taken into account for my treatment and plan today    Pertinent PMH/PSH and/or chronic conditions and medications if any were reviewed today and taken into account for my treatment and plan today    Pertinent prior office visit notes in [facility] have been reviewed by me today on day of this visit.    Please note that this dictation may have been created using voice recognition software, if so I have made every reasonable attempt to correct obvious errors, but I expect that there are errors of grammar and possibly  content that I did not discover before finalizing the note.

## 2024-03-06 ENCOUNTER — OFFICE VISIT (OUTPATIENT)
Dept: MEDICAL GROUP | Facility: MEDICAL CENTER | Age: 59
End: 2024-03-06
Payer: COMMERCIAL

## 2024-03-06 VITALS
WEIGHT: 269 LBS | TEMPERATURE: 98.5 F | BODY MASS INDEX: 40.77 KG/M2 | OXYGEN SATURATION: 91 % | SYSTOLIC BLOOD PRESSURE: 138 MMHG | HEART RATE: 69 BPM | RESPIRATION RATE: 18 BRPM | HEIGHT: 68 IN | DIASTOLIC BLOOD PRESSURE: 80 MMHG

## 2024-03-06 DIAGNOSIS — J10.1 INFLUENZA A: ICD-10-CM

## 2024-03-06 DIAGNOSIS — J45.998 POST VIRAL ASTHMA: ICD-10-CM

## 2024-03-06 PROCEDURE — 3075F SYST BP GE 130 - 139MM HG: CPT | Performed by: FAMILY MEDICINE

## 2024-03-06 PROCEDURE — 3079F DIAST BP 80-89 MM HG: CPT | Performed by: FAMILY MEDICINE

## 2024-03-06 PROCEDURE — 99213 OFFICE O/P EST LOW 20 MIN: CPT | Performed by: FAMILY MEDICINE

## 2024-03-06 ASSESSMENT — PATIENT HEALTH QUESTIONNAIRE - PHQ9
6. FEELING BAD ABOUT YOURSELF - OR THAT YOU ARE A FAILURE OR HAVE LET YOURSELF OR YOUR FAMILY DOWN: NOT AL ALL
SUM OF ALL RESPONSES TO PHQ9 QUESTIONS 1 AND 2: 0
1. LITTLE INTEREST OR PLEASURE IN DOING THINGS: NOT AT ALL
5. POOR APPETITE OR OVEREATING: NOT AT ALL
4. FEELING TIRED OR HAVING LITTLE ENERGY: NOT AT ALL
3. TROUBLE FALLING OR STAYING ASLEEP OR SLEEPING TOO MUCH: NOT AT ALL
8. MOVING OR SPEAKING SO SLOWLY THAT OTHER PEOPLE COULD HAVE NOTICED. OR THE OPPOSITE, BEING SO FIGETY OR RESTLESS THAT YOU HAVE BEEN MOVING AROUND A LOT MORE THAN USUAL: NOT AT ALL
9. THOUGHTS THAT YOU WOULD BE BETTER OFF DEAD, OR OF HURTING YOURSELF: NOT AT ALL
2. FEELING DOWN, DEPRESSED, IRRITABLE, OR HOPELESS: NOT AT ALL
7. TROUBLE CONCENTRATING ON THINGS, SUCH AS READING THE NEWSPAPER OR WATCHING TELEVISION: NOT AT ALL
SUM OF ALL RESPONSES TO PHQ QUESTIONS 1-9: 0

## 2024-03-06 ASSESSMENT — FIBROSIS 4 INDEX: FIB4 SCORE: .975609756097560976
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
## 2024-03-09 ENCOUNTER — HOSPITAL ENCOUNTER (OUTPATIENT)
Dept: LAB | Facility: MEDICAL CENTER | Age: 59
End: 2024-03-09
Attending: PHYSICIAN ASSISTANT
Payer: COMMERCIAL

## 2024-03-09 LAB
T4 FREE SERPL-MCNC: 1.57 NG/DL (ref 0.93–1.7)
TSH SERPL DL<=0.005 MIU/L-ACNC: 4.77 UIU/ML (ref 0.38–5.33)

## 2024-03-09 PROCEDURE — 84439 ASSAY OF FREE THYROXINE: CPT

## 2024-03-09 PROCEDURE — 84443 ASSAY THYROID STIM HORMONE: CPT

## 2024-03-09 PROCEDURE — 36415 COLL VENOUS BLD VENIPUNCTURE: CPT

## 2024-04-03 ENCOUNTER — HOSPITAL ENCOUNTER (OUTPATIENT)
Dept: RADIOLOGY | Facility: MEDICAL CENTER | Age: 59
End: 2024-04-03
Attending: OTOLARYNGOLOGY
Payer: COMMERCIAL

## 2024-04-03 DIAGNOSIS — H69.80 OTHER SPECIFIED DISORDERS OF EUSTACHIAN TUBE, UNSPECIFIED EAR: ICD-10-CM

## 2024-04-03 PROCEDURE — 70480 CT ORBIT/EAR/FOSSA W/O DYE: CPT

## 2024-05-01 ENCOUNTER — APPOINTMENT (OUTPATIENT)
Dept: RADIOLOGY | Facility: MEDICAL CENTER | Age: 59
End: 2024-05-01
Attending: FAMILY MEDICINE
Payer: COMMERCIAL

## 2024-05-01 DIAGNOSIS — Z12.31 VISIT FOR SCREENING MAMMOGRAM: ICD-10-CM

## 2024-07-29 ENCOUNTER — OFFICE VISIT (OUTPATIENT)
Dept: DERMATOLOGY | Facility: IMAGING CENTER | Age: 59
End: 2024-07-29
Payer: COMMERCIAL

## 2024-07-29 DIAGNOSIS — L82.1 SK (SEBORRHEIC KERATOSIS): ICD-10-CM

## 2024-07-29 DIAGNOSIS — Z12.83 SKIN CANCER SCREENING: ICD-10-CM

## 2024-07-29 DIAGNOSIS — L82.1 STUCCO KERATOSES: ICD-10-CM

## 2024-07-29 DIAGNOSIS — L81.4 LENTIGINES: ICD-10-CM

## 2024-07-29 DIAGNOSIS — D18.01 CHERRY ANGIOMA: ICD-10-CM

## 2024-07-29 DIAGNOSIS — D22.9 MULTIPLE NEVI: ICD-10-CM

## 2024-09-12 ENCOUNTER — OFFICE VISIT (OUTPATIENT)
Dept: SPORTS MEDICINE | Facility: OTHER | Age: 59
End: 2024-09-12
Payer: COMMERCIAL

## 2024-09-12 VITALS
WEIGHT: 269 LBS | BODY MASS INDEX: 40.77 KG/M2 | TEMPERATURE: 98.3 F | OXYGEN SATURATION: 95 % | HEART RATE: 84 BPM | DIASTOLIC BLOOD PRESSURE: 80 MMHG | SYSTOLIC BLOOD PRESSURE: 124 MMHG | HEIGHT: 68 IN | RESPIRATION RATE: 18 BRPM

## 2024-09-12 DIAGNOSIS — M17.32 POST-TRAUMATIC OSTEOARTHRITIS OF ONE KNEE, LEFT: ICD-10-CM

## 2024-09-12 PROCEDURE — 99214 OFFICE O/P EST MOD 30 MIN: CPT | Performed by: FAMILY MEDICINE

## 2024-09-12 PROCEDURE — 3079F DIAST BP 80-89 MM HG: CPT | Performed by: FAMILY MEDICINE

## 2024-09-12 PROCEDURE — 3074F SYST BP LT 130 MM HG: CPT | Performed by: FAMILY MEDICINE

## 2024-09-12 ASSESSMENT — FIBROSIS 4 INDEX: FIB4 SCORE: 0.99
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
## 2024-10-11 ENCOUNTER — PATIENT MESSAGE (OUTPATIENT)
Dept: SLEEP MEDICINE | Facility: MEDICAL CENTER | Age: 59
End: 2024-10-11
Payer: COMMERCIAL

## 2024-10-23 DIAGNOSIS — R06.02 EXERTIONAL SHORTNESS OF BREATH: ICD-10-CM

## 2024-10-24 ENCOUNTER — HOSPITAL ENCOUNTER (OUTPATIENT)
Dept: RADIOLOGY | Facility: MEDICAL CENTER | Age: 59
End: 2024-10-24
Attending: FAMILY MEDICINE
Payer: COMMERCIAL

## 2024-10-24 DIAGNOSIS — R06.02 EXERTIONAL SHORTNESS OF BREATH: ICD-10-CM

## 2024-10-24 PROCEDURE — 71046 X-RAY EXAM CHEST 2 VIEWS: CPT

## 2024-12-21 DIAGNOSIS — F33.41 RECURRENT MAJOR DEPRESSIVE DISORDER, IN PARTIAL REMISSION (HCC): ICD-10-CM

## 2024-12-23 ENCOUNTER — OFFICE VISIT (OUTPATIENT)
Dept: URGENT CARE | Facility: CLINIC | Age: 59
End: 2024-12-23
Payer: COMMERCIAL

## 2024-12-23 VITALS
WEIGHT: 286 LBS | HEIGHT: 69 IN | OXYGEN SATURATION: 98 % | HEART RATE: 94 BPM | TEMPERATURE: 97.8 F | BODY MASS INDEX: 42.36 KG/M2 | SYSTOLIC BLOOD PRESSURE: 140 MMHG | DIASTOLIC BLOOD PRESSURE: 88 MMHG

## 2024-12-23 DIAGNOSIS — L03.011 PARONYCHIA OF FINGER OF RIGHT HAND: ICD-10-CM

## 2024-12-23 PROCEDURE — 3077F SYST BP >= 140 MM HG: CPT | Performed by: NURSE PRACTITIONER

## 2024-12-23 PROCEDURE — 3079F DIAST BP 80-89 MM HG: CPT | Performed by: NURSE PRACTITIONER

## 2024-12-23 PROCEDURE — 99213 OFFICE O/P EST LOW 20 MIN: CPT | Performed by: NURSE PRACTITIONER

## 2024-12-23 RX ORDER — SERTRALINE HYDROCHLORIDE 100 MG/1
200 TABLET, FILM COATED ORAL DAILY
Qty: 60 TABLET | Refills: 8 | Status: SHIPPED | OUTPATIENT
Start: 2024-12-23

## 2024-12-23 RX ORDER — SULFAMETHOXAZOLE AND TRIMETHOPRIM 800; 160 MG/1; MG/1
1 TABLET ORAL 2 TIMES DAILY
Qty: 10 TABLET | Refills: 0 | Status: SHIPPED | OUTPATIENT
Start: 2024-12-23 | End: 2024-12-28

## 2024-12-23 ASSESSMENT — FIBROSIS 4 INDEX: FIB4 SCORE: 0.99

## 2024-12-23 NOTE — PROGRESS NOTES
"Subjective     Tanvi Krueger is a 59 y.o. female who presents with Nail Problem (Middle finger infection)            Pt reports new onset of swelling and fluid collection to the tip of her right middle finger. She denies any injury. Unknown if there is a splinter present. Pain and swelling are worse today. She has not tried any treatments for this.         Review of Systems   Musculoskeletal:         Right middle finger with pain and swelling   All other systems reviewed and are negative.         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    Bowel habit changes     constipation    Cholesteatoma of middle ear and mastoid(385.33) 1992 surgery    mastoidectomy, prosthesis    Depression     Dyslipidemia, goal LDL below 130     Elevated glycohemoglobin     Eosinophilic esophagitis     Family history of early CAD     Hiatus hernia syndrome     \"was told I had one\"    History of chickenpox     History of endometriosis     History of partial thyroidectomy     partial thyroidectomy    Hypothyroidism due to Hashimoto's thyroiditis     Lumbar disc narrowing 7/14/2009    Menopausal symptoms     Oxygen desaturation during sleep     O2 2 liters HS    Pneumonia     hx    Recurrent sinus infections     Seizure (HCC)     Seizure cerebral (HCC) 6/20/2017    Sleep apnea     ASV with 2L oxygen at night (sean)     Tonsillitis     Vitamin d deficiency     Wears hearing aid in both ears 8/2/2022      Past Surgical History:   Procedure Laterality Date    LARYNGOSCOPY N/A 3/31/2017    Procedure: LARYNGOSCOPY - DIRECT W/BIOPSY BASE OF TONGUE AND NASOPHARYNGOSCOPY W/BIOPSY;  Surgeon: Naresh Abdi M.D.;  Location: SURGERY SAME DAY [facility];  Service:     THYROIDECTOMY TOTAL Left 10/14/2016    Procedure: LEFT PARTIAL THROIDECTOMY;  Surgeon: Naresh Abdi M.D.;  Location: SURGERY SAME DAY " [facility];  Service:     SEPTOPLASTY  9/9/2016    Procedure: SEPTOPLASTY;  Surgeon: Naresh Abdi M.D.;  Location: SURGERY SAME DAY [facility];  Service:     TURBINOPLASTY Bilateral 9/9/2016    Procedure: TURBINOPLASTY;  Surgeon: Naresh Abdi M.D.;  Location: SURGERY SAME DAY [facility];  Service:     OTHER  2016    sinus surgery    EAR MIDDLE EXPLORATION  4/3/2015    Performed by Naresh Abdi M.D. at SURGERY SAME DAY [facility]    OSSICULAR RECONSTRUCTION  4/3/2015    Performed by Naresh Abdi M.D. at SURGERY SAME DAY [facility]S    MYRINGOTOMY  4/20/2012    Performed by RYANNE ALICIA at SURGERY [facility]    TYMPANOTOMY  6/24/2010    Performed by MAXIMUS WHITE at SURGERY SAME DAY [facility] ORS    EXAM UNDER ANESTHESIA  6/24/2010    Performed by MAXIMUS WHITE at SURGERY SAME DAY [facility] ORS    MYRINGOTOMY  4/30/2009    Performed by MAXIMUS WHITE at SURGERY SAME DAY [facility]    ABDOMINAL HYSTERECTOMY TOTAL  1997    ovaries are gone    ARTHROSCOPY, KNEE      HYSTERECTOMY LAPAROSCOPY      SINUSCOPE      TONSILLECTOMY        Social History     Socioeconomic History    Marital status:      Spouse name: Not on file    Number of children: Not on file    Years of education: Not on file    Highest education level: Not on file   Occupational History    Not on file   Tobacco Use    Smoking status: Never    Smokeless tobacco: Never    Tobacco comments:     Patient grew up with 3 smokers in home    Vaping Use    Vaping status: Never Used   Substance and Sexual Activity    Alcohol use: No     Alcohol/week: 0.0 oz    Drug use: No    Sexual activity: Yes     Partners: Male   Other Topics Concern    Not on file   Social History Narrative    Not on file     Social Drivers of Health     Financial Resource Strain: Not on file   Food Insecurity: Not on file   Transportation Needs: Not on file   Physical Activity: Not on file   Stress: Not on file   Social Connections: Not on file  "  Intimate Partner Violence: Not on file   Housing Stability: Not on file         Objective     BP (!) 140/88 (BP Location: Left arm, Patient Position: Sitting, BP Cuff Size: Large adult)   Pulse 94   Temp 36.6 °C (97.8 °F) (Temporal)   Ht 1.753 m (5' 9\")   Wt (!) 130 kg (286 lb)   LMP 02/26/1995   SpO2 98%   BMI 42.23 kg/m²      Physical Exam  Vitals and nursing note reviewed.   Constitutional:       Appearance: Normal appearance. She is normal weight.   HENT:      Head: Normocephalic and atraumatic.      Nose: Nose normal.      Mouth/Throat:      Mouth: Mucous membranes are moist.      Pharynx: Oropharynx is clear.   Eyes:      Extraocular Movements: Extraocular movements intact.      Pupils: Pupils are equal, round, and reactive to light.   Cardiovascular:      Rate and Rhythm: Normal rate and regular rhythm.   Pulmonary:      Effort: Pulmonary effort is normal.   Musculoskeletal:         General: Normal range of motion.        Hands:       Cervical back: Normal range of motion.   Skin:     General: Skin is warm and dry.      Capillary Refill: Capillary refill takes less than 2 seconds.   Neurological:      General: No focal deficit present.      Mental Status: She is alert and oriented to person, place, and time. Mental status is at baseline.   Psychiatric:         Mood and Affect: Mood normal.         Speech: Speech normal.         Thought Content: Thought content normal.         Judgment: Judgment normal.                  Procedure: I & D  Paronychia    Risks and benefits discussed at length  Anesthesia: topical anesthesia spray  Clean technique using sterile instruments  18ga needle was advanced at the nail border until purulent material expressed.   Topical antibiotic and dressing applied.  Patient tolerated the  procedure well.             Assessment & Plan        Assessment & Plan  Paronychia of finger of right hand    Orders:    sulfamethoxazole-trimethoprim (BACTRIM DS) 800-160 MG tablet; Take 1 " Tablet by mouth 2 times a day for 5 days.          Contingent antibiotic prescription given to patient to fill upon meeting criteria of guidelines discussed.   Warm epsom salt soaks TID  Keep clean and dry  Tylenol and ibuprofen as needed for pain  Supportive care, differential diagnoses, and indications for immediate follow-up discussed with patient.    Pathogenesis of diagnosis discussed including typical length and natural progression.    Instructed to return to  or nearest emergency department if symptoms fail to improve, for any change in condition, further concerns, or new concerning symptoms.  Patient states understanding of the plan of care and discharge instructions.

## 2024-12-23 NOTE — TELEPHONE ENCOUNTER
Received request via: Pharmacy    Was the patient seen in the last year in this department? Yes    Does the patient have an active prescription (recently filled or refills available) for medication(s) requested? No    Pharmacy Name: [facility]/pharmacy #[id number] - [place], [place] - [address]     Does the patient have FDC Plus and need 100-day supply? (This applies to ALL medications) Patient does not have SCP

## 2025-01-08 ENCOUNTER — OFFICE VISIT (OUTPATIENT)
Dept: URGENT CARE | Facility: CLINIC | Age: 60
End: 2025-01-08
Payer: COMMERCIAL

## 2025-01-08 VITALS
RESPIRATION RATE: 16 BRPM | DIASTOLIC BLOOD PRESSURE: 76 MMHG | HEART RATE: 60 BPM | WEIGHT: 283 LBS | TEMPERATURE: 97.8 F | HEIGHT: 69 IN | OXYGEN SATURATION: 97 % | BODY MASS INDEX: 41.92 KG/M2 | SYSTOLIC BLOOD PRESSURE: 118 MMHG

## 2025-01-08 DIAGNOSIS — H66.003 NON-RECURRENT ACUTE SUPPURATIVE OTITIS MEDIA OF BOTH EARS WITHOUT SPONTANEOUS RUPTURE OF TYMPANIC MEMBRANES: ICD-10-CM

## 2025-01-08 DIAGNOSIS — J01.90 ACUTE NON-RECURRENT SINUSITIS, UNSPECIFIED LOCATION: ICD-10-CM

## 2025-01-08 PROCEDURE — 3078F DIAST BP <80 MM HG: CPT

## 2025-01-08 PROCEDURE — 99213 OFFICE O/P EST LOW 20 MIN: CPT

## 2025-01-08 PROCEDURE — 3074F SYST BP LT 130 MM HG: CPT

## 2025-01-08 RX ORDER — CEFDINIR 300 MG/1
300 CAPSULE ORAL 2 TIMES DAILY
Qty: 14 CAPSULE | Refills: 0 | Status: SHIPPED | OUTPATIENT
Start: 2025-01-08 | End: 2025-01-15

## 2025-01-08 ASSESSMENT — ENCOUNTER SYMPTOMS
COUGH: 0
FEVER: 0

## 2025-01-08 ASSESSMENT — FIBROSIS 4 INDEX: FIB4 SCORE: 0.99
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
## 2025-01-20 ENCOUNTER — APPOINTMENT (OUTPATIENT)
Dept: MEDICAL GROUP | Facility: MEDICAL CENTER | Age: 60
End: 2025-01-20
Payer: COMMERCIAL

## 2025-01-20 VITALS
OXYGEN SATURATION: 93 % | BODY MASS INDEX: 42.12 KG/M2 | DIASTOLIC BLOOD PRESSURE: 88 MMHG | HEIGHT: 69 IN | WEIGHT: 284.39 LBS | HEART RATE: 84 BPM | TEMPERATURE: 98.6 F | SYSTOLIC BLOOD PRESSURE: 132 MMHG

## 2025-01-20 DIAGNOSIS — G47.31 CENTRAL SLEEP APNEA: Chronic | ICD-10-CM

## 2025-01-20 DIAGNOSIS — E55.9 VITAMIN D DEFICIENCY: ICD-10-CM

## 2025-01-20 DIAGNOSIS — R09.81 SINUS CONGESTION: ICD-10-CM

## 2025-01-20 DIAGNOSIS — Z86.16 HISTORY OF 2019 NOVEL CORONAVIRUS DISEASE (COVID-19): ICD-10-CM

## 2025-01-20 DIAGNOSIS — R05.9 COUGH IN ADULT: ICD-10-CM

## 2025-01-20 DIAGNOSIS — K20.0 EOSINOPHILIC ESOPHAGITIS: ICD-10-CM

## 2025-01-20 DIAGNOSIS — E04.1 THYROID NODULE: ICD-10-CM

## 2025-01-20 DIAGNOSIS — R73.02 IMPAIRED GLUCOSE TOLERANCE: ICD-10-CM

## 2025-01-20 DIAGNOSIS — B35.1 ONYCHOMYCOSIS OF RIGHT GREAT TOE: ICD-10-CM

## 2025-01-20 DIAGNOSIS — E78.5 DYSLIPIDEMIA, GOAL LDL BELOW 130: ICD-10-CM

## 2025-01-20 PROBLEM — U07.1 LAB TEST POSITIVE FOR DETECTION OF COVID-19 VIRUS: Status: RESOLVED | Noted: 2022-05-05 | Resolved: 2025-01-20

## 2025-01-20 LAB
FLUAV RNA SPEC QL NAA+PROBE: NEGATIVE
FLUBV RNA SPEC QL NAA+PROBE: NEGATIVE
RSV RNA SPEC QL NAA+PROBE: NEGATIVE
SARS-COV-2 RNA RESP QL NAA+PROBE: NEGATIVE

## 2025-01-20 PROCEDURE — 3075F SYST BP GE 130 - 139MM HG: CPT | Performed by: FAMILY MEDICINE

## 2025-01-20 PROCEDURE — 0241U POCT CEPHEID COV-2, FLU A/B, RSV - PCR: CPT | Performed by: FAMILY MEDICINE

## 2025-01-20 PROCEDURE — 99214 OFFICE O/P EST MOD 30 MIN: CPT | Performed by: FAMILY MEDICINE

## 2025-01-20 PROCEDURE — 3079F DIAST BP 80-89 MM HG: CPT | Performed by: FAMILY MEDICINE

## 2025-01-20 RX ORDER — ROSUVASTATIN CALCIUM 5 MG/1
5 TABLET, COATED ORAL EVERY EVENING
Qty: 90 TABLET | Refills: 3 | Status: SHIPPED | OUTPATIENT
Start: 2025-01-20 | End: 2026-02-24

## 2025-01-20 ASSESSMENT — FIBROSIS 4 INDEX: FIB4 SCORE: 0.99

## 2025-01-20 ASSESSMENT — PATIENT HEALTH QUESTIONNAIRE - PHQ9
SUM OF ALL RESPONSES TO PHQ QUESTIONS 1-9: 8
5. POOR APPETITE OR OVEREATING: 1 - SEVERAL DAYS
CLINICAL INTERPRETATION OF PHQ2 SCORE: 4

## 2025-01-20 ASSESSMENT — PAIN SCALES - GENERAL: PAINLEVEL_OUTOF10: 3=SLIGHT PAIN
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
## 2025-01-27 DIAGNOSIS — F33.41 RECURRENT MAJOR DEPRESSIVE DISORDER, IN PARTIAL REMISSION (HCC): ICD-10-CM

## 2025-01-28 ENCOUNTER — HOSPITAL ENCOUNTER (OUTPATIENT)
Dept: LAB | Facility: MEDICAL CENTER | Age: 60
End: 2025-01-28
Attending: FAMILY MEDICINE
Payer: COMMERCIAL

## 2025-01-28 DIAGNOSIS — E04.1 THYROID NODULE: ICD-10-CM

## 2025-01-28 DIAGNOSIS — E78.5 DYSLIPIDEMIA, GOAL LDL BELOW 130: ICD-10-CM

## 2025-01-28 DIAGNOSIS — K20.0 EOSINOPHILIC ESOPHAGITIS: ICD-10-CM

## 2025-01-28 DIAGNOSIS — E55.9 VITAMIN D DEFICIENCY: ICD-10-CM

## 2025-01-28 DIAGNOSIS — R73.02 IMPAIRED GLUCOSE TOLERANCE: ICD-10-CM

## 2025-01-28 LAB
25(OH)D3 SERPL-MCNC: 32 NG/ML (ref 30–100)
ALBUMIN SERPL BCP-MCNC: 4.4 G/DL (ref 3.2–4.9)
ALBUMIN/GLOB SERPL: 1.5 G/DL
ALP SERPL-CCNC: 87 U/L (ref 30–99)
ALT SERPL-CCNC: 17 U/L (ref 2–50)
ANION GAP SERPL CALC-SCNC: 12 MMOL/L (ref 7–16)
AST SERPL-CCNC: 18 U/L (ref 12–45)
BILIRUB SERPL-MCNC: 0.2 MG/DL (ref 0.1–1.5)
BUN SERPL-MCNC: 16 MG/DL (ref 8–22)
CALCIUM ALBUM COR SERPL-MCNC: 8.9 MG/DL (ref 8.5–10.5)
CALCIUM SERPL-MCNC: 9.2 MG/DL (ref 8.5–10.5)
CHLORIDE SERPL-SCNC: 104 MMOL/L (ref 96–112)
CHOLEST SERPL-MCNC: 210 MG/DL (ref 100–199)
CO2 SERPL-SCNC: 26 MMOL/L (ref 20–33)
CREAT SERPL-MCNC: 0.9 MG/DL (ref 0.5–1.4)
ERYTHROCYTE [DISTWIDTH] IN BLOOD BY AUTOMATED COUNT: 46.5 FL (ref 35.9–50)
EST. AVERAGE GLUCOSE BLD GHB EST-MCNC: 120 MG/DL
GFR SERPLBLD CREATININE-BSD FMLA CKD-EPI: 72 ML/MIN/1.73 M 2
GLOBULIN SER CALC-MCNC: 2.9 G/DL (ref 1.9–3.5)
GLUCOSE SERPL-MCNC: 94 MG/DL (ref 65–99)
HBA1C MFR BLD: 5.8 % (ref 4–5.6)
HCT VFR BLD AUTO: 44.2 % (ref 37–47)
HDLC SERPL-MCNC: 53 MG/DL
HGB BLD-MCNC: 13.5 G/DL (ref 12–16)
LDLC SERPL CALC-MCNC: 128 MG/DL
MCH RBC QN AUTO: 26.5 PG (ref 27–33)
MCHC RBC AUTO-ENTMCNC: 30.5 G/DL (ref 32.2–35.5)
MCV RBC AUTO: 86.7 FL (ref 81.4–97.8)
PLATELET # BLD AUTO: 214 K/UL (ref 164–446)
PMV BLD AUTO: 10.9 FL (ref 9–12.9)
POTASSIUM SERPL-SCNC: 4.2 MMOL/L (ref 3.6–5.5)
PROT SERPL-MCNC: 7.3 G/DL (ref 6–8.2)
RBC # BLD AUTO: 5.1 M/UL (ref 4.2–5.4)
SODIUM SERPL-SCNC: 142 MMOL/L (ref 135–145)
TRIGL SERPL-MCNC: 144 MG/DL (ref 0–149)
TSH SERPL-ACNC: 4.6 UIU/ML (ref 0.35–5.5)
WBC # BLD AUTO: 7.2 K/UL (ref 4.8–10.8)

## 2025-01-28 PROCEDURE — 80061 LIPID PANEL: CPT

## 2025-01-28 PROCEDURE — 83036 HEMOGLOBIN GLYCOSYLATED A1C: CPT

## 2025-01-28 PROCEDURE — 82306 VITAMIN D 25 HYDROXY: CPT

## 2025-01-28 PROCEDURE — 84443 ASSAY THYROID STIM HORMONE: CPT

## 2025-01-28 PROCEDURE — 36415 COLL VENOUS BLD VENIPUNCTURE: CPT

## 2025-01-28 PROCEDURE — 85027 COMPLETE CBC AUTOMATED: CPT

## 2025-01-28 PROCEDURE — 80053 COMPREHEN METABOLIC PANEL: CPT

## 2025-01-29 ENCOUNTER — OFFICE VISIT (OUTPATIENT)
Dept: BEHAVIORAL HEALTH | Facility: CLINIC | Age: 60
End: 2025-01-29
Payer: COMMERCIAL

## 2025-01-29 DIAGNOSIS — Z60.0 PHASE OF LIFE PROBLEM: ICD-10-CM

## 2025-01-29 DIAGNOSIS — F33.0 MAJOR DEPRESSIVE DISORDER, RECURRENT EPISODE, MILD (HCC): ICD-10-CM

## 2025-01-29 SDOH — SOCIAL STABILITY - SOCIAL INSECURITY: PROBLEMS OF ADJUSTMENT TO LIFE-CYCLE TRANSITIONS: Z60.0

## 2025-01-29 NOTE — PROGRESS NOTES
[facility]   Initial Assessment    Name: Tanvi Krueger  MRN: [id number]  : 1965  Age: 59 y.o.  Date of assessment: 2025  PCP: Paulino Ferro M.D.  Persons in attendance: Patient    CHIEF COMPLAINT AND HISTORY OF PRESENTING PROBLEM:  Tanvi Krueger is a 59 y.o., White female referred for assessment by Paulino Ferro M.D.    Pt reports an 'ongoing, on-again off-again, recurrence of problems.' She explained that 'I get depressed, there are things I could be doing to feel better, but I talk myself out of doing those things.' She is having great difficulty finding meaning, defining herself and her roles, now that she's retired, and her relationships with her adult daughters are changing. Pt endorsed depressive Sx that are affecting her role functioning. She stated 'I should feel the adilia, I have a good life' Prior therapist was not a good fit for Pt. 'I feel better, then I stop seeing therapist, then I start feeling confused and frustrated again.'    Pt anticipating loss, sadness, when her daughter eventually moves to [place] [to live with her  who is in [employer]]    'I'm trying to figure out what it is I'm supposed to be doing now that I'm retired.' 'I feel like I don't have a voice...as FCI evolves.'    'I need to talk to someone to talk to!'    BEHAVIORAL HEALTH TREATMENT HISTORY  Does patient/parent report a history of prior behavioral health treatment for patient? Yes:  Prior rounds of talk therapy which were minimally useful. Takes Rx's sertraline [Zoloft] 200mg.    FAMILY/SOCIAL HISTORY  Current living situation/household members: , explaining that she loves her , but he's not apt to be one to 'talk about things' with her. Adult daughter presently living with Pt, is going to be moving out of state in near future, to join , who is in . Another daughter lives elsewhere too. Pt describes good relationships with both daughters.    Does  patient/parent report a family history of behavioral health issues, diagnoses, or treatment? Yes, sertraline as prescribed by her PCP; Pt questioning its effectiveness  Family History   Problem Relation Age of Onset    Cancer Mother 61        lung, smoker    Heart Disease Mother 44        early heart attack    Sleep Apnea Mother         possibly    Heart Disease Father 60        cabg x 3    Hyperlipidemia Father     Psychiatric Illness Father         schizophrenia    Kidney Disease Father         renal failure, dialysis    Dementia Father     Hyperlipidemia Brother     Suicide Attempts Brother         completed suicide 10/14/2021    Cancer Maternal Grandmother 62        lung, non-smoker    Psychiatric Illness Maternal Grandmother         depression, severe    Psychiatric Illness Other         depression, great uncle    Anxiety disorder Sister         EMPLOYMENT/RESOURCES  Is the patient currently employed? Retired, both Pt and her  are retired, Pt was ,  retired from computer work    Does the patient/parent report adequate financial resources? Yes    ABUSE/NEGLECT/TRAUMA SCREENING  Does patient report feeling “unsafe” in his/her home, or afraid of anyone? No  Does patient report any history of physical, sexual, or emotional abuse? No  Is there evidence of neglect by self? No                      SAFETY ASSESSMENT - SELF  Does patient acknowledge current or past symptoms of dangerousness to self? No, stating 'I should feel the adilia, I have a good life.'   Recent change in frequency/specificity/intensity of suicidal thoughts or self-harm behavior? No  Current access to firearms, medications, or other identified means of suicide/self-harm? Yes  If yes, willing to restrict access to means of suicide/self-harm? Yes  Current Suicide Risk: Low  Crisis Safety Plan completed and copy given to patient: no    SAFETY ASSESSMENT - OTHERS  Recent change in frequency/specificity/intensity of thoughts or  threats to harm others? No  Current Homicide Risk:  Low    SUBSTANCE USE/ADDICTION HISTORY  No,  none currently and no Hx of any substance use, per Pt    MENTAL STATUS/OBSERVATIONS              Participation: Active verbal participation  Grooming: Casual  Orientation:Alert   Behavior: Tense  Eye contact: Good          Mood:Euthymic  Affect:Flexible  Thought process: Logical  Speech: Rate within normal limits  Memory: No gross evidence of memory deficits  Insight: Adequate  Judgment:  Good    Patient's motivation/readiness for change: Pt stated 'I need to talk to someone to talk to!'    Care plan completed: Yes. There appears to be longstanding undercurrent of a depressive disorder, exacerbated more recently by significant life stressors and changes.     I encouraged Pt to revaluate her sertraline; stated she'd make appt with her prescriber.    Pt wants 'to feel the adilia, find my voice, regain my confidence.'    Pt explained that she 'wants coaching, not to be told what to do,' Eg:  -To be asked questions, such as 'have you thought about...?'  -Wanting help trying to understand why she's feeling 'confused and frustrated,' that is, not being able to prioritize, or take action, trying to understand why I feel this way, from a coaching perspective.    In areas of her life such as:  -parenting x2 adult daughters, setting limits, their financial struggles  -Not pursuing her own interest and hobbies; Pt withdrawals, secludes, disengages    Does patient express agreement with the plan? Yes     Diagnosis: F33.0 MDD, recurrent, mild; Z60.0 Phase of life problem    JOSELINE Torres

## 2025-01-31 ENCOUNTER — OFFICE VISIT (OUTPATIENT)
Dept: SLEEP MEDICINE | Facility: MEDICAL CENTER | Age: 60
End: 2025-01-31
Attending: PHYSICIAN ASSISTANT
Payer: COMMERCIAL

## 2025-01-31 ENCOUNTER — TELEPHONE (OUTPATIENT)
Dept: SLEEP MEDICINE | Facility: MEDICAL CENTER | Age: 60
End: 2025-01-31
Payer: COMMERCIAL

## 2025-01-31 VITALS
DIASTOLIC BLOOD PRESSURE: 86 MMHG | SYSTOLIC BLOOD PRESSURE: 128 MMHG | HEART RATE: 68 BPM | HEIGHT: 68 IN | BODY MASS INDEX: 40.92 KG/M2 | OXYGEN SATURATION: 93 % | WEIGHT: 270 LBS | RESPIRATION RATE: 18 BRPM

## 2025-01-31 DIAGNOSIS — G47.39 COMPLEX SLEEP APNEA SYNDROME: ICD-10-CM

## 2025-01-31 PROCEDURE — 3079F DIAST BP 80-89 MM HG: CPT | Performed by: PHYSICIAN ASSISTANT

## 2025-01-31 PROCEDURE — 99213 OFFICE O/P EST LOW 20 MIN: CPT | Performed by: PHYSICIAN ASSISTANT

## 2025-01-31 PROCEDURE — 3074F SYST BP LT 130 MM HG: CPT | Performed by: PHYSICIAN ASSISTANT

## 2025-01-31 ASSESSMENT — ENCOUNTER SYMPTOMS
SPUTUM PRODUCTION: 0
CHILLS: 0
ROS GI COMMENTS: NO DENTURES, NO MISSING TEETH, OCCASIONAL SWALLOWING ISSUES
COUGH: 0
SINUS PAIN: 0
PALPITATIONS: 1
DIZZINESS: 0
HEADACHES: 1
WHEEZING: 1
INSOMNIA: 0
WEIGHT LOSS: 0
SORE THROAT: 0
ORTHOPNEA: 0
TREMORS: 0
FEVER: 0
HEARTBURN: 0
SHORTNESS OF BREATH: 0

## 2025-01-31 ASSESSMENT — FIBROSIS 4 INDEX: FIB4 SCORE: 1.24

## 2025-01-31 NOTE — PATIENT INSTRUCTIONS
1-reviewed compliance which is excellent  2-demonstrating use and benefit  3-mild headaches on 2-2.5L should be 3L  4-recheck overnight oximetry on ASV + 3L  5-updated order for mask and supplies to [facility]  6-reach out via my chart with results  7-As a reminder use distilled water only in humidifier chamber.  Fresh fill daily  8-Today we reviewed equipment cleaning  once weekly minimum  mask, tubing and water chamber  use dedicated container  use mild soap and water  SoClean or other ozone  are not recommended  white vinegar and water solution is no longer recommended  hang tubing to dry  mask sanitizing wipes are an option for use   9-Equipment replacement schedule : Mask cushion every month, Head gear every 6 months, Tubing every 3 months, Foam filter every 6 months, Humidifier chamber every 6 months  10-one year follow up sooner if needed

## 2025-01-31 NOTE — PROGRESS NOTES
"Chief Complaint   Patient presents with    Apnea     Last Office Visit 1/23/24 with JANETT Finney.    PAP/O2/OAT: ASV 9/4/16cm with 2.5L oxygen bleed in         HPI:  Tanvi Krueger is a 59 y.o. year old female here today for follow-up on obstructive sleep apnea.  Last seen in clinic 1/23/2024 by RAY Fontana.  Previously evaluated by Dr. Konrad Barcenas 9/21/2016.      Past Medical History: Central sleep apnea, nocturnal hypoxia, recurrent major depressive disorder, vitamin D deficiency, reactive airway disease, hypertrophy nasal turbinates, thyroid nodule, eosinophilic esophagitis, generalized anxiety disorder, morbid obesity, environmental allergies, hearing loss, cervical radiculopathy.    Vitals:  /86 (BP Location: Left arm, Patient Position: Sitting, BP Cuff Size: Large adult)   Pulse 68   Resp 18   Ht 1.727 m (5' 8\")   Wt 122 kg (270 lb)   SpO2 93% BMI 41 kg/m².    Recent Imaging: Chest x-ray obtained 10/24/2024 demonstrating no acute cardiopulmonary process.    Echocardiogram obtained 11/18/2022 demonstrating normal left ventricular chamber size, wall thickness, systolic and diastolic function.  LVEF estimated 65%.  Normal right ventricular size and systolic function.  Mild mitral annular calcification, trace tricuspid regurgitation.  Unable to estimate RVSP due to inadequate tricuspid regurgitant jet.  Trace pulmonic insufficiency.    Currently using  [employer]ics  Bi-PAP ASV @EPAP 10, pressure support 4-15 cm H20 pressure; compliance reviewed for 1/1/2025 through 1/30/2025, days used 30/30, average daily usage 7 hours 51 minutes, 96.7% of days greater than or equal to 4 hours, mask leak at 11 minutes 46 seconds, AHI 1.6 per hour.  See media for full report.    Device obtained 8/17/2022  DME provider [facility]  Mask interface nasal mask    Polysomnogram obtained 1/9/2019 demonstrated severe sleep fragmentation overall AHI of 56.1 events per hour consistent with " severe NIDHI, low O2 sat of 82% with sats less than or equal to 88% for 98 minutes of recorded diagnostic time.  Patient was trialed on CPAP and BiPAP therapy with recommendation for BiPAP at 15/11 cm H2O pressure.  Improvement in O2 sats but persistent hypoxia noted with low of 88% on BiPAP therapy.  Recommendation for BiPAP at 15/11 cm H2O pressure and 2 L O2 bleed in.    Overnight oximetry obtained 11/15/2022 demonstrated adequate O2 sats on BiPAP and 3 L O2 bleed in.  Patient is now on ASV therapy, reassess O2 saturations on ASV with 3 L O2.    Sleep schedule goes to bed 9 PM-12 AM, wakens 6 AM , and gets up during the night between 3 and 4 AM   Symptoms denies day time somnolence and reports morning headaches states improved but persistent morning headaches    Marlow Sleepiness Scale reported as 6/24 on 9/21/2016.        Review of Systems   Constitutional:  Positive for malaise/fatigue (mild). Negative for chills, fever and weight loss.   HENT:  Positive for congestion, hearing loss (hearing aids) and tinnitus (intermittent but last few days). Negative for nosebleeds, sinus pain and sore throat.    Eyes:         Presc glasses   Respiratory:  Positive for wheezing (occasional). Negative for cough, sputum production and shortness of breath.    Cardiovascular:  Positive for palpitations (occasionally). Negative for chest pain, orthopnea and leg swelling.   Gastrointestinal:  Negative for heartburn.        No dentures, no missing teeth, occasional swallowing issues    Neurological:  Positive for headaches (first thing in am but improved). Negative for dizziness and tremors.   Psychiatric/Behavioral:  The patient does not have insomnia.        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  "surgery    mastoidectomy, prosthesis    Depression     Dyslipidemia, goal LDL below 130     Elevated glycohemoglobin     Eosinophilic esophagitis     Family history of early CAD     Hiatus hernia syndrome     \"was told I had one\"    History of 2019 novel coronavirus disease (COVID-19) 01/20/2025    History of chickenpox     History of endometriosis     History of partial thyroidectomy     partial thyroidectomy    Hypothyroidism due to Hashimoto's thyroiditis     Lab test positive for detection of COVID-19 virus 05/05/2022 5/4/2022      Lumbar disc narrowing 07/14/2009    Menopausal symptoms     Oxygen desaturation during sleep     O2 2 liters HS    Pneumonia     hx    Recurrent sinus infections     Seizure (HCC)     Seizure cerebral (HCC) 06/20/2017    Sleep apnea     ASV with 2L oxygen at night (estrada)     Tonsillitis     Vitamin d deficiency     Wears hearing aid in both ears 08/02/2022       Past Surgical History:   Procedure Laterality Date    LARYNGOSCOPY N/A 3/31/2017    Procedure: LARYNGOSCOPY - DIRECT W/BIOPSY BASE OF TONGUE AND NASOPHARYNGOSCOPY W/BIOPSY;  Surgeon: Naresh Abdi M.D.;  Location: SURGERY SAME DAY [facility];  Service:     THYROIDECTOMY TOTAL Left 10/14/2016    Procedure: LEFT PARTIAL THROIDECTOMY;  Surgeon: Naresh Abdi M.D.;  Location: SURGERY SAME DAY [facility];  Service:     SEPTOPLASTY  9/9/2016    Procedure: SEPTOPLASTY;  Surgeon: Naresh Abdi M.D.;  Location: SURGERY SAME DAY [facility];  Service:     TURBINOPLASTY Bilateral 9/9/2016    Procedure: TURBINOPLASTY;  Surgeon: Naresh Abdi M.D.;  Location: SURGERY SAME DAY [facility];  Service:     OTHER  2016    sinus surgery    EAR MIDDLE EXPLORATION  4/3/2015    Performed by Naresh Abdi M.D. at SURGERY SAME DAY [facility]    OSSICULAR RECONSTRUCTION  4/3/2015    Performed by Naresh Abdi M.D. at SURGERY SAME DAY [facility]    MYRINGOTOMY  4/20/2012    Performed by RYANNE ALICIA at [facility]    TYMPANOTOMY  6/24/2010    Performed by MAXIMUS WHITE at SURGERY SAME DAY [facility] ORS    EXAM UNDER ANESTHESIA  6/24/2010    Performed by MAXIMUS WHITE at SURGERY SAME DAY [facility] ORS    MYRINGOTOMY  4/30/2009    Performed by MAXIMUS WHITE at SURGERY SAME DAY [facility] ORS    ABDOMINAL HYSTERECTOMY TOTAL  1997    ovaries are gone    ARTHROSCOPY, KNEE      HYSTERECTOMY LAPAROSCOPY      SINUSCOPE      TONSILLECTOMY         Family History   Problem Relation Age of Onset    Cancer Mother 61        lung, smoker    Heart Disease Mother 44        early heart attack    Sleep Apnea Mother         possibly    Heart Disease Father 60        cabg x 3    Hyperlipidemia Father     Psychiatric Illness Father         schizophrenia    Kidney Disease Father         renal failure, dialysis    Dementia Father     Hyperlipidemia Brother     Suicide Attempts Brother         completed suicide 10/14/2021    Cancer Maternal Grandmother 62        lung, non-smoker    Psychiatric Illness Maternal Grandmother         depression, severe    Psychiatric Illness Other         depression, great uncle    Anxiety disorder Sister        Social History     Socioeconomic History    Marital status:      Spouse name: Not on file    Number of children: Not on file    Years of education: Not on file    Highest education level: Not on file   Occupational History    Not on file   Tobacco Use    Smoking status: Never    Smokeless tobacco: Never    Tobacco comments:     Patient grew up with 3 smokers in home    Vaping Use    Vaping status: Never Used   Substance and Sexual Activity    Alcohol use: No     Alcohol/week: 0.0 oz    Drug use: No    Sexual activity: Yes     Partners: Male   Other Topics Concern    Not on file   Social History Narrative    Not on file     Social Drivers of Health     Financial Resource Strain: Not on file   Food Insecurity: Not on file   Transportation Needs: Not on file   Physical Activity: Not on file    Stress: Not on file   Social Connections: Not on file   Intimate Partner Violence: Not on file   Housing Stability: Not on file       Allergies as of 01/31/2025 - Reviewed 01/31/2025   Allergen Reaction Noted    Doxycycline Nausea and Unspecified 01/29/2014    Penicillin g Unspecified 04/30/2021    Cephalexin Diarrhea and Unspecified 09/21/2016    Pcn [penicillins] Unspecified 07/09/2009          Current medications as of today   Current Outpatient Medications   Medication Sig Dispense Refill    COENZYME Q10 PO Take  by mouth.      rosuvastatin (CRESTOR) 5 MG Tab Take 1 Tablet by mouth every evening. 90 Tablet 3    sertraline (ZOLOFT) 100 MG Tab TAKE 2 TABLETS BY MOUTH EVERY DAY 60 Tablet 8    LEVOXYL 137 MCG Tab Take 1 Tablet by mouth every morning on an empty stomach. 90 Tablet 1    albuterol 108 (90 Base) MCG/ACT Aero Soln inhalation aerosol Inhale 2 Puffs every 6 hours as needed for Shortness of Breath. 8.5 g 6    Misc Natural Products (METABO-STYLE PO)        No current facility-administered medications for this visit.         Physical Exam:   Gen:           Alert and oriented, No apparent distress. Mood and affect appropriate, normal interaction with examiner.   Hearing:     Grossly intact.  Nose:          Normal, no lesions or deformities.  Dentition:    Good dentition.   Oropharynx:   Tongue normal, posterior pharynx without erythema or exudate.  Mallampati Classification: III  Neck:        Supple, trachea midline, no masses.  Respiratory Effort: No intercostal retractions or use of accessory muscles.   Gait and Station: Normal.  Digits and Nails: No clubbing, cyanosis, petechiae, or nodes.   Skin:        No rashes, lesions or ulcers noted.               Ext:           No cyanosis or edema.      Immunizations:  Flu: Annual recommended  Pneumovax 23: 10/2/2017  PCV 20: 9/15/2022  SARS CoV2 Vaccine: 11/19/2023, 9/27/2022, 11/19/2021, 4/22/2021, 3/30/2021    Assessment / Plan:  1. Complex sleep apnea  syndrome  - Overnight Oximetry; Future  - DME Mask and Supplies    Reviewed compliance which is excellent, demonstrating use and benefit.  Patient experiencing mild headaches on 2-2.5 L bleed in O2 previously ordered at 3 L.  Will recheck overnight oximetry on ASV and 3 L O2.  Updated order for mask and supplies to [facility].  Will reach out to patient with results of overnight oximetry via MyChart.  Patient was reminded to use distilled water only in humidifier chamber, reviewed equipment cleaning as well as equipment/supply replacement recommendations.  Follow-up in 1 year, sooner if needed.      Follow-up:   Return in about 1 year (around 1/31/2026).    Please note that this dictation was created using voice recognition software. I have made every reasonable attempt to correct obvious errors, but it is possible there are errors of grammar and possibly content that I did not discover before finalizing the note.

## 2025-02-04 ENCOUNTER — HOME STUDY (OUTPATIENT)
Dept: SLEEP MEDICINE | Facility: MEDICAL CENTER | Age: 60
End: 2025-02-04
Attending: PHYSICIAN ASSISTANT
Payer: COMMERCIAL

## 2025-02-04 DIAGNOSIS — G47.39 COMPLEX SLEEP APNEA SYNDROME: ICD-10-CM

## 2025-02-04 PROCEDURE — 94762 N-INVAS EAR/PLS OXIMTRY CONT: CPT | Performed by: INTERNAL MEDICINE

## 2025-02-06 NOTE — PROCEDURES
Overnight Pulse Oximetry    Study was performed on ASV with 2.5 L/min bleed in.    Impression:    20.2 minutes below 88% and SpO2 desaturation index is 3.2. This is consistent with nocturnal hypoxia due to an underlying lung condition. Recommend prescription for nocturnal supplemental oxygen via nasal cannula.     I, Mando Enrique DO, am the author of this note.     Mando Enrique DO, [facility]  Staff Pulmonologist and Intensivist  Critical access hospital     Please note that this dictation was created using voice recognition software. The accuracy of the dictation is limited to the abilities of the software. I have made every reasonable attempt to correct obvious errors, but I expect that there are errors of grammar and possibly content that I did not discover before finalizing the note.

## 2025-02-15 DIAGNOSIS — R06.02 SHORTNESS OF BREATH: ICD-10-CM

## 2025-02-16 NOTE — PROGRESS NOTES
Patient would like to see pulmonology regarding her shortness of breath.  She does have a sleep apnea diagnosis.  Supplemental O2 was recommended not sure if it was prescribed.

## 2025-02-19 NOTE — Clinical Note
REFERRAL APPROVAL NOTICE         Sent on February 19, 2025                   Tanvi Krueger  [address]  [place] [address]                   Dear Ms. Krueger,    After a careful review of the medical information and benefit coverage, [facility] has processed your referral. See below for additional details.    If applicable, you must be actively enrolled with your insurance for coverage of the authorized service. If you have any questions regarding your coverage, please contact your insurance directly.    REFERRAL INFORMATION   Referral #:  [id number]  Referred-To Department    Referred-By Provider:  Pulmonary and Sleep Medicine    Paulino Ferro M.D.   Pulmonary/sleep [facility]      [address]  [address]  [place] [address]  [phone] [address]  [place] [id number] [phone]    Referral Start Date:  02/15/2025  Referral End Date:   02/15/2026           SCHEDULING  If you do not already have an appointment, please call [phone] to make an appointment.   MORE INFORMATION  As a reminder, [facility] ownership has changed, meaning this location is now owned and operated by [facility]. As such, we want to clarify that our patients should expect to receive two separate bills for the services received at [facility] - one representing the [facility] facility fees as the owner of the establishment, and the other to represent the physician's services and subsequent fees. You can speak with your insurance carrier for a pricing estimate by calling the customer service number on the back of your card and ask about charges for a hospital outpatient visit.  If you do not already have a [facility] account, sign up at: [email or web address]  You can access your medical information, make appointments, see lab results, billing information, and  more.  If you have questions regarding this referral, please contact  the [facility] department at:             [phone]. Monday - Friday 7:30AM - 5:00PM.      Sincerely,  [facility]

## 2025-02-25 ENCOUNTER — OFFICE VISIT (OUTPATIENT)
Dept: BEHAVIORAL HEALTH | Facility: CLINIC | Age: 60
End: 2025-02-25
Payer: COMMERCIAL

## 2025-02-25 DIAGNOSIS — Z60.0 PHASE OF LIFE PROBLEM: ICD-10-CM

## 2025-02-25 DIAGNOSIS — F33.0 MAJOR DEPRESSIVE DISORDER, RECURRENT EPISODE, MILD (HCC): ICD-10-CM

## 2025-02-25 SDOH — SOCIAL STABILITY - SOCIAL INSECURITY: PROBLEMS OF ADJUSTMENT TO LIFE-CYCLE TRANSITIONS: Z60.0

## 2025-02-25 NOTE — PROGRESS NOTES
[facility]   Therapy Progress Note    Name: Tanvi Krueger  MRN: [id number]  : 1965  Age: 59 y.o.  Date of assessment: 2025  PCP: Paulino Ferro M.D.  Persons in attendance: Patient  Total session time: 55 min     Gerard  Daughter Emily moving to [place]   Daughter Erin in [facility]    Pt reports: she's been enjoying quilting. Chose to stop volunteering at [facility], she likes [employer] sales    Pt wanted to process her feelings of not wanting to visit, nor care for her MIL, any longer. MIL has been rude, impolite, ungrateful, accusatory toward Pt. Pt is tired of it. 'If my  doesn't want to see his mother any more, then I shouldn't have to see her either.' She has very conflicted feelings about this. I supported her in processing these feelings.    Discussed 'what,' and 'how' of evolving Tx Plan. That is, Pt is thinking about, deciding, engaging those things that are important to her, as she transitions into skilled nursing, acknowledging it can and does affect relationships, and she utilizes sessions to discuss how she can best do these things. The things she's identified as important to her are:  -Individual interest/hobbies  -Family relationships  -Friendships  -Her marriage relationship too    Objective Observations:   Participation:Active verbal participation   Grooming:Casual   Cognition:Alert, but stuck in now ineffective, unfulfilling Bx patterns   Eye Contact:Good   Mood:Euthymic   Affect:Flexible   Thought Process:Logical   Speech:Volume within normal limits    Current Risk:   Suicide: low   Homicide: low   Self-Harm: low   Safety Plan Reviewed: no    Care Plan Updated: Yes    Does patient express agreement with the treatment plan? Yes     Diagnosis: F33.0 MDD, recurrent, mild; Z60.0 Phase of life problem     JOSELINE Torres LMFT

## 2025-03-10 ENCOUNTER — OFFICE VISIT (OUTPATIENT)
Dept: MEDICAL GROUP | Facility: MEDICAL CENTER | Age: 60
End: 2025-03-10
Payer: COMMERCIAL

## 2025-03-10 VITALS
HEIGHT: 69 IN | HEART RATE: 64 BPM | WEIGHT: 288.58 LBS | BODY MASS INDEX: 42.74 KG/M2 | DIASTOLIC BLOOD PRESSURE: 84 MMHG | TEMPERATURE: 97.6 F | OXYGEN SATURATION: 93 % | SYSTOLIC BLOOD PRESSURE: 126 MMHG

## 2025-03-10 DIAGNOSIS — R73.02 IMPAIRED GLUCOSE TOLERANCE: ICD-10-CM

## 2025-03-10 DIAGNOSIS — R05.8 PRODUCTIVE COUGH: ICD-10-CM

## 2025-03-10 DIAGNOSIS — R06.2 WHEEZING: ICD-10-CM

## 2025-03-10 DIAGNOSIS — J45.20 MILD INTERMITTENT REACTIVE AIRWAY DISEASE WITH WHEEZING WITHOUT COMPLICATION: ICD-10-CM

## 2025-03-10 DIAGNOSIS — F33.41 RECURRENT MAJOR DEPRESSIVE DISORDER, IN PARTIAL REMISSION (HCC): ICD-10-CM

## 2025-03-10 DIAGNOSIS — E66.01 MORBID OBESITY WITH BMI OF 40.0-44.9, ADULT (HCC): ICD-10-CM

## 2025-03-10 PROBLEM — J10.1 INFLUENZA A: Status: RESOLVED | Noted: 2024-03-06 | Resolved: 2025-03-10

## 2025-03-10 PROBLEM — R68.83 SHIVERING: Status: RESOLVED | Noted: 2017-08-10 | Resolved: 2025-03-10

## 2025-03-10 PROCEDURE — 3074F SYST BP LT 130 MM HG: CPT | Performed by: FAMILY MEDICINE

## 2025-03-10 PROCEDURE — 3079F DIAST BP 80-89 MM HG: CPT | Performed by: FAMILY MEDICINE

## 2025-03-10 PROCEDURE — 99213 OFFICE O/P EST LOW 20 MIN: CPT | Performed by: FAMILY MEDICINE

## 2025-03-10 RX ORDER — ALBUTEROL SULFATE 90 UG/1
2 INHALANT RESPIRATORY (INHALATION) EVERY 6 HOURS PRN
Qty: 8.5 G | Refills: 6 | Status: SHIPPED | OUTPATIENT
Start: 2025-03-10

## 2025-03-10 RX ORDER — AZITHROMYCIN 250 MG/1
TABLET, FILM COATED ORAL
Qty: 6 TABLET | Refills: 0 | Status: SHIPPED | OUTPATIENT
Start: 2025-03-10

## 2025-03-10 RX ORDER — SERTRALINE HYDROCHLORIDE 100 MG/1
200 TABLET, FILM COATED ORAL DAILY
Qty: 180 TABLET | Refills: 4 | Status: SHIPPED | OUTPATIENT
Start: 2025-03-10

## 2025-03-10 RX ORDER — FLUTICASONE PROPIONATE 50 MCG
2 SPRAY, SUSPENSION (ML) NASAL 2 TIMES DAILY
COMMUNITY
Start: 2025-02-18

## 2025-03-10 ASSESSMENT — FIBROSIS 4 INDEX: FIB4 SCORE: 1.24
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
## 2025-03-11 ENCOUNTER — OFFICE VISIT (OUTPATIENT)
Dept: BEHAVIORAL HEALTH | Facility: CLINIC | Age: 60
End: 2025-03-11
Payer: COMMERCIAL

## 2025-03-11 DIAGNOSIS — Z60.0 PHASE OF LIFE PROBLEM: ICD-10-CM

## 2025-03-11 DIAGNOSIS — F33.0 MAJOR DEPRESSIVE DISORDER, RECURRENT EPISODE, MILD (HCC): ICD-10-CM

## 2025-03-11 PROCEDURE — 90837 PSYTX W PT 60 MINUTES: CPT | Performed by: MARRIAGE & FAMILY THERAPIST

## 2025-03-11 SDOH — SOCIAL STABILITY - SOCIAL INSECURITY: PROBLEMS OF ADJUSTMENT TO LIFE-CYCLE TRANSITIONS: Z60.0

## 2025-03-11 NOTE — PROGRESS NOTES
[facility]   Therapy Progress Note    Name: Tanvi Krueger  MRN: [id number]  : 1965  Age: 59 y.o.  Date of assessment: 3/11/2025  PCP: Paulino Ferro M.D.  Persons in attendance: Patient  Total session time: 55 m    Pt reports: she was asked by former employer if she wanted to return to work, PT, as a contract employee. She's starting tomorrow. Will have a flexible schedule, and expects to work as needed. Pt quite excited about it.    Does this -- at least in part answer the question --'what am I supposed to be doing in residential?' Pt states 'I feel this might be a good thing for me, I want to cont to work. It's called being wanted and needed.' On the other hand, 'my MIL doesn't need me that much anymore, yet Pt still wishes to be involved in her MIL's life. She cont to be conflicted about what her role is vis-a-vis her MIL.    Family is important to Pt, she 'feels alone' as her siblings are spread out geographically, quickly f/u by saying 'I'm not sure I want to reconnect' I queried 'Are there other things you can do to not 'feel alone?' Others to connect with?    Pt's original proposition: 'I'm trying to figure out what it is I'm supposed to be doing now that I'm retired.' Also does quilting. I message 2 cousins, I'm taking a self defense class, my  and I do connect: eating out, TV, travel, 'on a day to day, we don't do much together [said with some sadness].     Objective Observations:              Participation:Active verbal participation              Grooming:Casual              Cognition:Alert, but stuck in now ineffective, unfulfilling Bx patterns              Eye Contact:Good              Mood:Euthymic              Affect:Flexible              Thought Process:Logical              Speech:Volume within normal limits     Current Risk:              Suicide: low              Homicide: low              Self-Harm: low              Safety Plan Reviewed: no    Evaluation: Does Pt want  to discuss marriage concerns, some unhappiness, some lack of fulfillment, not feeling appreciated by her . 'I have so much love for him, but we're not on the same path.' Pt indicated she may want to further this conversation, but she'll let me know next session    Diagnosis: F33.0 MDD, recurrent, mild; Z60.0 Phase of life problem     Jesús Sherman LMFT

## 2025-03-25 ENCOUNTER — APPOINTMENT (OUTPATIENT)
Dept: BEHAVIORAL HEALTH | Facility: CLINIC | Age: 60
End: 2025-03-25
Payer: COMMERCIAL

## 2025-04-01 ENCOUNTER — TELEPHONE (OUTPATIENT)
Dept: CARDIOLOGY | Facility: MEDICAL CENTER | Age: 60
End: 2025-04-01
Payer: COMMERCIAL

## 2025-04-01 NOTE — TELEPHONE ENCOUNTER
Called patient to request records for NP appointment with Dr. Hong. Called to confirm if this will be first time patient is seeing a cardiologist. No answer, left voicemail to call back.

## 2025-04-05 ENCOUNTER — HOSPITAL ENCOUNTER (OUTPATIENT)
Dept: LAB | Facility: MEDICAL CENTER | Age: 60
End: 2025-04-05
Attending: PHYSICIAN ASSISTANT
Payer: COMMERCIAL

## 2025-04-05 LAB
T4 FREE SERPL-MCNC: 0.96 NG/DL (ref 0.93–1.7)
TSH SERPL-ACNC: 4.65 UIU/ML (ref 0.38–5.33)

## 2025-04-05 PROCEDURE — 36415 COLL VENOUS BLD VENIPUNCTURE: CPT

## 2025-04-05 PROCEDURE — 84443 ASSAY THYROID STIM HORMONE: CPT

## 2025-04-05 PROCEDURE — 84439 ASSAY OF FREE THYROXINE: CPT

## 2025-04-07 ENCOUNTER — OFFICE VISIT (OUTPATIENT)
Dept: CARDIOLOGY | Facility: MEDICAL CENTER | Age: 60
End: 2025-04-07
Attending: STUDENT IN AN ORGANIZED HEALTH CARE EDUCATION/TRAINING PROGRAM
Payer: COMMERCIAL

## 2025-04-07 VITALS
HEART RATE: 92 BPM | RESPIRATION RATE: 16 BRPM | SYSTOLIC BLOOD PRESSURE: 130 MMHG | BODY MASS INDEX: 43.4 KG/M2 | WEIGHT: 293 LBS | OXYGEN SATURATION: 97 % | DIASTOLIC BLOOD PRESSURE: 82 MMHG | HEIGHT: 69 IN

## 2025-04-07 DIAGNOSIS — R06.09 DYSPNEA ON EXERTION: ICD-10-CM

## 2025-04-07 DIAGNOSIS — E78.5 DYSLIPIDEMIA: ICD-10-CM

## 2025-04-07 DIAGNOSIS — Z82.49 FAMILY HISTORY OF EARLY CAD: ICD-10-CM

## 2025-04-07 LAB — EKG IMPRESSION: NORMAL

## 2025-04-07 PROCEDURE — 99212 OFFICE O/P EST SF 10 MIN: CPT | Performed by: STUDENT IN AN ORGANIZED HEALTH CARE EDUCATION/TRAINING PROGRAM

## 2025-04-07 PROCEDURE — 3075F SYST BP GE 130 - 139MM HG: CPT | Performed by: STUDENT IN AN ORGANIZED HEALTH CARE EDUCATION/TRAINING PROGRAM

## 2025-04-07 PROCEDURE — 3079F DIAST BP 80-89 MM HG: CPT | Performed by: STUDENT IN AN ORGANIZED HEALTH CARE EDUCATION/TRAINING PROGRAM

## 2025-04-07 PROCEDURE — 99204 OFFICE O/P NEW MOD 45 MIN: CPT | Performed by: STUDENT IN AN ORGANIZED HEALTH CARE EDUCATION/TRAINING PROGRAM

## 2025-04-07 PROCEDURE — 93005 ELECTROCARDIOGRAM TRACING: CPT | Mod: TC | Performed by: STUDENT IN AN ORGANIZED HEALTH CARE EDUCATION/TRAINING PROGRAM

## 2025-04-07 PROCEDURE — 93010 ELECTROCARDIOGRAM REPORT: CPT | Performed by: STUDENT IN AN ORGANIZED HEALTH CARE EDUCATION/TRAINING PROGRAM

## 2025-04-07 ASSESSMENT — ENCOUNTER SYMPTOMS
PALPITATIONS: 0
PND: 0
WHEEZING: 0
FOCAL WEAKNESS: 0
DYSPNEA ON EXERTION: 0
NEAR-SYNCOPE: 0
ORTHOPNEA: 0
SYNCOPE: 0
IRREGULAR HEARTBEAT: 0
WEAKNESS: 0
VOMITING: 0
DIZZINESS: 0
FEVER: 0
NIGHT SWEATS: 0
NAUSEA: 0
SHORTNESS OF BREATH: 0
COUGH: 0
DIARRHEA: 0
ABDOMINAL PAIN: 0

## 2025-04-07 ASSESSMENT — FIBROSIS 4 INDEX: FIB4 SCORE: 1.24

## 2025-04-07 NOTE — PROGRESS NOTES
Cardiology Initial Consultation Note    Date of note:    4/7/2025    Primary Care Provider: Paulino Ferro M.D.  Referring Provider: Self-referred    Patient Name: Tanvi Krueger   YOB: 1965  MRN:              [id number]    Chief Complaint: Dyspnea on exertion    History of Present Illness: Ms. Tanvi Krueger is a 59 y.o. female whose current medical problems include prediabetes, dyslipidemia, family history of early CAD, obesity, and sleep apnea who is here for cardiac consultation for dyspnea on exertion.    The patient presents today to establish care. The patient reports that he started noticing shortness of breath on exertion when she went to [place] in October 2024.  Since then, she continues to have shortness of breath on exertion.  As such, she has not been as active.  She denies any orthopnea, PND, or leg swelling.  No palpitations.  No syncope or presyncopal episodes.    Cardiovascular Risk Factors:  1. Smoking status: Never smoker  2. Type II Diabetes Mellitus: Prediabetes, diet controlled  Lab Results   Component Value Date/Time    HBA1C 5.8 (H) 01/28/2025 10:20 AM    HBA1C 5.8 (H) 02/02/2023 10:45 AM     3. Hypertension: None  4. Dyslipidemia: On statin  Cholesterol,Tot   Date Value Ref Range Status   01/28/2025 210 (H) 100 - 199 mg/dL Final     LDL   Date Value Ref Range Status   01/28/2025 128 (H) <100 mg/dL Final     HDL   Date Value Ref Range Status   01/28/2025 53 >=40 mg/dL Final     Triglycerides   Date Value Ref Range Status   01/28/2025 144 0 - 149 mg/dL Final     5. Family history of early Coronary Artery Disease in a first degree relative (Male less than 55 years of age; Female less than 65 years of age): Mother with MI  6.  Obesity and/or Metabolic Syndrome: Body mass index is 44.15 kg/m².  7. Sedentary lifestyle: Not active     Review of Systems   Constitutional: Negative for fever, malaise/fatigue and night sweats.   Cardiovascular:  Negative for chest pain, dyspnea  "on exertion, irregular heartbeat, leg swelling, near-syncope, orthopnea, palpitations, paroxysmal nocturnal dyspnea and syncope.   Respiratory:  Negative for cough, shortness of breath and wheezing.    Gastrointestinal:  Negative for abdominal pain, diarrhea, nausea and vomiting.   Neurological:  Negative for dizziness, focal weakness and weakness.         All other systems reviewed and are negative.         Current Outpatient Medications   Medication Sig Dispense Refill    sertraline (ZOLOFT) 100 MG Tab Take 2 Tablets by mouth every day. 180 Tablet 4    albuterol 108 (90 Base) MCG/ACT Aero Soln inhalation aerosol Inhale 2 Puffs every 6 hours as needed for Shortness of Breath. 8.5 g 6    fluticasone (FLONASE) 50 MCG/ACT nasal spray Administer 2 Sprays into affected nostril(S) 2 times a day.      COENZYME Q10 PO Take  by mouth.      rosuvastatin (CRESTOR) 5 MG Tab Take 1 Tablet by mouth every evening. 90 Tablet 3    LEVOXYL 137 MCG Tab Take 1 Tablet by mouth every morning on an empty stomach. 90 Tablet 1    Misc Natural Products (METABO-STYLE PO)        No current facility-administered medications for this visit.         Allergies   Allergen Reactions    Cephalexin Diarrhea and Unspecified     Diarrhea    Doxycycline Nausea and Unspecified     Nausea and Diarrhea    Penicillin G Unspecified    Pcn [Penicillins] Unspecified     Unknown reaction as child         Physical Exam:  Ambulatory Vitals  /82 (BP Location: Left arm, Patient Position: Sitting, BP Cuff Size: Adult)   Pulse 92   Resp 16   Ht 1.753 m (5' 9\")   Wt (!) 136 kg (299 lb)   SpO2 97%    Oxygen Therapy:  Pulse Oximetry: 97 %  BP Readings from Last 4 Encounters:   04/07/25 130/82   03/10/25 126/84   01/31/25 128/86   01/20/25 132/88       Weight/BMI: Body mass index is 44.15 kg/m².  Wt Readings from Last 4 Encounters:   04/07/25 (!) 136 kg (299 lb)   03/10/25 (!) 131 kg (288 lb 9.3 oz)   01/31/25 122 kg (270 lb)   01/20/25 (!) 129 kg (284 lb 6.3 " oz)         General: In no apparent distress  Eyes: nl conjunctiva, no icteric sclera  ENT: normal external appearance of ears, nose, and throat  Neck: no visible JVP,  no carotid bruits  Lungs: normal respiratory effort, CTAB  Heart: RRR, no murmurs, no rubs or gallops,  no edema bilateral lower extremities. No LV/RV heave on cardiac palpatation. + bilateral radial pulses.  + bilateral dp pulses.   Abdomen: soft, non tender, non distended, no masses, normal bowel sounds.  No HSM.  Extremities/MSK: no clubbing, no cyanosis  Neurological: No focal sensory deficits  Psychiatric: Appropriate affect, A/O x 3, intact judgement and insight  Skin: Warm extremities      Lab Data Review:  Lab Results   Component Value Date/Time    CHOLSTRLTOT 210 (H) 01/28/2025 10:20 AM     (H) 01/28/2025 10:20 AM    HDL 53 01/28/2025 10:20 AM    TRIGLYCERIDE 144 01/28/2025 10:20 AM       Lab Results   Component Value Date/Time    SODIUM 142 01/28/2025 10:20 AM    POTASSIUM 4.2 01/28/2025 10:20 AM    CHLORIDE 104 01/28/2025 10:20 AM    CO2 26 01/28/2025 10:20 AM    GLUCOSE 94 01/28/2025 10:20 AM    BUN 16 01/28/2025 10:20 AM    CREATININE 0.90 01/28/2025 10:20 AM    CREATININE 0.94 01/13/2011 11:35 AM    BUNCREATRAT 11 01/13/2011 11:35 AM    GLOMRATE >59 01/13/2011 11:35 AM     Lab Results   Component Value Date/Time    ALKPHOSPHAT 87 01/28/2025 10:20 AM    ASTSGOT 18 01/28/2025 10:20 AM    ALTSGPT 17 01/28/2025 10:20 AM    TBILIRUBIN 0.2 01/28/2025 10:20 AM      Lab Results   Component Value Date/Time    WBC 7.2 01/28/2025 10:20 AM    WBC 5.0 01/13/2011 11:35 AM    HEMOGLOBIN 13.5 01/28/2025 10:20 AM     Lab Results   Component Value Date/Time    HBA1C 5.8 (H) 01/28/2025 10:20 AM    HBA1C 5.8 (H) 02/02/2023 10:45 AM         Cardiac Imaging and Procedures Review:    EKG dated 4/7/2025: My personal interpretation is sinus rhythm    Echo dated 11/18/2022:   CONCLUSIONS  The left ventricular ejection fraction is visually estimated to be  65%.  Unable to estimate right ventricular systolic pressure due to an   inadequate tricuspid regurgitant jet.      Assessment & Plan     1. Dyslipidemia  EKG    Lipid Profile      2. Family history of early CAD  Lipid Profile      3. Dyspnea on exertion  NM-CARDIAC STRESS TEST            Shared Medical Decision Making:    Dyslipidemia  Family history of early CAD  Dyspnea on exertion  The patient has known cardiovascular risk factors as well as symptoms concerning for angina.  -Will plan for nulcear stress test to assess for any prior MI or resversible ischemia  -Continue rosuvastatin 5 mg daily  -Repeat lipid panel prior to next visit    All of the patient's excellent questions were answered to the best of my knowledge and to her satisfaction.  It was a pleasure seeing Ms. Tanvi Krueger in my clinic today. Return in about 2 months (around 6/7/2025). Patient is aware to call the cardiology clinic with any questions or concerns.      Karma Hong MD  [facility]  [facility], [address]  [address] [address]  Phone: [phone]  Fax: [phone]

## 2025-04-08 ENCOUNTER — APPOINTMENT (OUTPATIENT)
Dept: BEHAVIORAL HEALTH | Facility: CLINIC | Age: 60
End: 2025-04-08
Payer: COMMERCIAL

## 2025-04-21 ENCOUNTER — HOSPITAL ENCOUNTER (OUTPATIENT)
Dept: LAB | Facility: MEDICAL CENTER | Age: 60
End: 2025-04-21
Attending: FAMILY MEDICINE
Payer: COMMERCIAL

## 2025-04-21 ENCOUNTER — OFFICE VISIT (OUTPATIENT)
Dept: MEDICAL GROUP | Facility: MEDICAL CENTER | Age: 60
End: 2025-04-21
Payer: COMMERCIAL

## 2025-04-21 VITALS
HEART RATE: 65 BPM | BODY MASS INDEX: 42.98 KG/M2 | OXYGEN SATURATION: 91 % | WEIGHT: 290.2 LBS | HEIGHT: 69 IN | RESPIRATION RATE: 16 BRPM | SYSTOLIC BLOOD PRESSURE: 138 MMHG | TEMPERATURE: 97 F | DIASTOLIC BLOOD PRESSURE: 84 MMHG

## 2025-04-21 DIAGNOSIS — Z12.39 SCREENING BREAST EXAMINATION: ICD-10-CM

## 2025-04-21 DIAGNOSIS — E78.5 DYSLIPIDEMIA, GOAL LDL BELOW 130: ICD-10-CM

## 2025-04-21 DIAGNOSIS — R59.0 ANTERIOR CERVICAL ADENOPATHY: ICD-10-CM

## 2025-04-21 DIAGNOSIS — D34 HURTHLE CELL ADENOMA OF THYROID: ICD-10-CM

## 2025-04-21 LAB
BASOPHILS # BLD AUTO: 0.6 % (ref 0–1.8)
BASOPHILS # BLD: 0.04 K/UL (ref 0–0.12)
CHOLEST SERPL-MCNC: 224 MG/DL (ref 100–199)
EOSINOPHIL # BLD AUTO: 0.14 K/UL (ref 0–0.51)
EOSINOPHIL NFR BLD: 2.1 % (ref 0–6.9)
ERYTHROCYTE [DISTWIDTH] IN BLOOD BY AUTOMATED COUNT: 46.9 FL (ref 35.9–50)
HCT VFR BLD AUTO: 41.6 % (ref 37–47)
HDLC SERPL-MCNC: 48 MG/DL
HGB BLD-MCNC: 12.6 G/DL (ref 12–16)
IMM GRANULOCYTES # BLD AUTO: 0.04 K/UL (ref 0–0.11)
IMM GRANULOCYTES NFR BLD AUTO: 0.6 % (ref 0–0.9)
LDLC SERPL CALC-MCNC: 140 MG/DL
LYMPHOCYTES # BLD AUTO: 1.95 K/UL (ref 1–4.8)
LYMPHOCYTES NFR BLD: 28.9 % (ref 22–41)
MCH RBC QN AUTO: 25.8 PG (ref 27–33)
MCHC RBC AUTO-ENTMCNC: 30.3 G/DL (ref 32.2–35.5)
MCV RBC AUTO: 85.2 FL (ref 81.4–97.8)
MONOCYTES # BLD AUTO: 0.38 K/UL (ref 0–0.85)
MONOCYTES NFR BLD AUTO: 5.6 % (ref 0–13.4)
NEUTROPHILS # BLD AUTO: 4.19 K/UL (ref 1.82–7.42)
NEUTROPHILS NFR BLD: 62.2 % (ref 44–72)
NRBC # BLD AUTO: 0 K/UL
NRBC BLD-RTO: 0 /100 WBC (ref 0–0.2)
PLATELET # BLD AUTO: 222 K/UL (ref 164–446)
PMV BLD AUTO: 10.8 FL (ref 9–12.9)
RBC # BLD AUTO: 4.88 M/UL (ref 4.2–5.4)
TRIGL SERPL-MCNC: 180 MG/DL (ref 0–149)
WBC # BLD AUTO: 6.7 K/UL (ref 4.8–10.8)

## 2025-04-21 PROCEDURE — 80061 LIPID PANEL: CPT

## 2025-04-21 PROCEDURE — 85025 COMPLETE CBC W/AUTO DIFF WBC: CPT

## 2025-04-21 PROCEDURE — 36415 COLL VENOUS BLD VENIPUNCTURE: CPT

## 2025-04-21 PROCEDURE — 99214 OFFICE O/P EST MOD 30 MIN: CPT | Performed by: FAMILY MEDICINE

## 2025-04-21 PROCEDURE — 3075F SYST BP GE 130 - 139MM HG: CPT | Performed by: FAMILY MEDICINE

## 2025-04-21 PROCEDURE — 3079F DIAST BP 80-89 MM HG: CPT | Performed by: FAMILY MEDICINE

## 2025-04-21 RX ORDER — AZITHROMYCIN 250 MG/1
TABLET, FILM COATED ORAL
Qty: 6 TABLET | Refills: 0 | Status: SHIPPED | OUTPATIENT
Start: 2025-04-21

## 2025-04-21 ASSESSMENT — FIBROSIS 4 INDEX: FIB4 SCORE: 1.24
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
## 2025-04-23 ENCOUNTER — RESULTS FOLLOW-UP (OUTPATIENT)
Dept: MEDICAL GROUP | Facility: MEDICAL CENTER | Age: 60
End: 2025-04-23

## 2025-04-24 ENCOUNTER — HOSPITAL ENCOUNTER (OUTPATIENT)
Dept: RADIOLOGY | Facility: MEDICAL CENTER | Age: 60
End: 2025-04-24
Attending: STUDENT IN AN ORGANIZED HEALTH CARE EDUCATION/TRAINING PROGRAM
Payer: COMMERCIAL

## 2025-04-24 DIAGNOSIS — R06.09 DYSPNEA ON EXERTION: ICD-10-CM

## 2025-04-24 PROCEDURE — A9502 TC99M TETROFOSMIN: HCPCS

## 2025-04-25 ENCOUNTER — RESULTS FOLLOW-UP (OUTPATIENT)
Dept: CARDIOLOGY | Facility: MEDICAL CENTER | Age: 60
End: 2025-04-25

## 2025-04-25 PROCEDURE — 78452 HT MUSCLE IMAGE SPECT MULT: CPT | Mod: 26 | Performed by: STUDENT IN AN ORGANIZED HEALTH CARE EDUCATION/TRAINING PROGRAM

## 2025-04-25 PROCEDURE — 93018 CV STRESS TEST I&R ONLY: CPT | Performed by: STUDENT IN AN ORGANIZED HEALTH CARE EDUCATION/TRAINING PROGRAM

## 2025-04-29 ENCOUNTER — HOSPITAL ENCOUNTER (OUTPATIENT)
Dept: RADIOLOGY | Facility: MEDICAL CENTER | Age: 60
End: 2025-04-29
Attending: FAMILY MEDICINE
Payer: COMMERCIAL

## 2025-04-29 DIAGNOSIS — R59.0 ANTERIOR CERVICAL ADENOPATHY: ICD-10-CM

## 2025-04-29 DIAGNOSIS — D34 HURTHLE CELL ADENOMA OF THYROID: ICD-10-CM

## 2025-04-29 PROCEDURE — 76536 US EXAM OF HEAD AND NECK: CPT

## 2025-05-03 ENCOUNTER — OFFICE VISIT (OUTPATIENT)
Dept: URGENT CARE | Facility: CLINIC | Age: 60
End: 2025-05-03
Payer: COMMERCIAL

## 2025-05-03 ENCOUNTER — RESULTS FOLLOW-UP (OUTPATIENT)
Dept: MEDICAL GROUP | Facility: MEDICAL CENTER | Age: 60
End: 2025-05-03

## 2025-05-03 VITALS
SYSTOLIC BLOOD PRESSURE: 134 MMHG | OXYGEN SATURATION: 93 % | BODY MASS INDEX: 43.19 KG/M2 | DIASTOLIC BLOOD PRESSURE: 82 MMHG | RESPIRATION RATE: 20 BRPM | WEIGHT: 291.6 LBS | TEMPERATURE: 98.9 F | HEIGHT: 69 IN | HEART RATE: 73 BPM

## 2025-05-03 DIAGNOSIS — S65.511A LACERATION OF BLOOD VESSEL OF LEFT INDEX FINGER, INITIAL ENCOUNTER: ICD-10-CM

## 2025-05-03 PROCEDURE — 12041 INTMD RPR N-HF/GENIT 2.5CM/<: CPT | Performed by: FAMILY MEDICINE

## 2025-05-03 ASSESSMENT — PAIN SCALES - GENERAL: PAINLEVEL_OUTOF10: 4=SLIGHT-MODERATE PAIN

## 2025-05-03 ASSESSMENT — FIBROSIS 4 INDEX: FIB4 SCORE: 1.2

## 2025-05-03 NOTE — PROCEDURES
Laceration Repair    Date/Time: 5/3/2025 10:49 AM    Performed by: Matthew Rader M.D.  Authorized by: Matthew Rader M.D.  Body area: upper extremity  Location details: left long finger  Laceration length: 2 cm  Foreign bodies: no foreign bodies  Tendon involvement: none  Nerve involvement: none  Vascular damage: yes  Irrigation solution: tap water  Amount of cleaning: standard  Debridement: none  Degree of undermining: none  Skin closure: Steri-Strips  Approximation: close  Approximation difficulty: simple  Dressing: antibiotic ointment  Patient tolerance: patient tolerated the procedure well with no immediate complications

## 2025-05-03 NOTE — PROGRESS NOTES
"Subjective:   Tanvi Krueger is a 59 y.o. female who presents for Laceration (Cut on left index finger. Cut last night on a sewing tool and would like to see if it requires stitches. )      Laceration         ROS    Medications, Allergies, and current problem list reviewed today in Epic.     Objective:     /82 (BP Location: Left arm, Patient Position: Sitting, BP Cuff Size: Adult)   Pulse 73   Temp 37.2 °C (98.9 °F) (Temporal)   Resp 20   Ht 1.753 m (5' 9\")   Wt (!) 132 kg (291 lb 9.6 oz)   SpO2 93%     Physical Exam    Assessment/Plan:     Diagnosis and associated orders:     No diagnosis found.   Comments/MDM:              Differential diagnosis, natural history, supportive care, and indications for immediate follow-up discussed.    Advised the patient to follow-up with the primary care physician for recheck, reevaluation, and consideration of further management.    Please note that this dictation was created using voice recognition software. I have made a reasonable attempt to correct obvious errors, but I expect that there are errors of grammar and possibly content that I did not discover before finalizing the note.    This note was electronically signed by Matthew Rader M.D.  "

## 2025-05-29 ENCOUNTER — HOSPITAL ENCOUNTER (OUTPATIENT)
Dept: RADIOLOGY | Facility: MEDICAL CENTER | Age: 60
End: 2025-05-29
Attending: FAMILY MEDICINE
Payer: COMMERCIAL

## 2025-05-29 DIAGNOSIS — Z12.39 SCREENING BREAST EXAMINATION: ICD-10-CM

## 2025-05-29 PROCEDURE — 77067 SCR MAMMO BI INCL CAD: CPT

## 2025-06-13 ENCOUNTER — OFFICE VISIT (OUTPATIENT)
Dept: CARDIOLOGY | Facility: MEDICAL CENTER | Age: 60
End: 2025-06-13
Attending: NURSE PRACTITIONER
Payer: COMMERCIAL

## 2025-06-13 VITALS
WEIGHT: 292 LBS | OXYGEN SATURATION: 93 % | BODY MASS INDEX: 43.25 KG/M2 | HEART RATE: 67 BPM | HEIGHT: 69 IN | RESPIRATION RATE: 18 BRPM | DIASTOLIC BLOOD PRESSURE: 92 MMHG | SYSTOLIC BLOOD PRESSURE: 148 MMHG

## 2025-06-13 DIAGNOSIS — I10 ESSENTIAL HYPERTENSION, BENIGN: ICD-10-CM

## 2025-06-13 DIAGNOSIS — R06.09 DYSPNEA ON EXERTION: Primary | ICD-10-CM

## 2025-06-13 DIAGNOSIS — E78.5 DYSLIPIDEMIA, GOAL LDL BELOW 130: ICD-10-CM

## 2025-06-13 PROCEDURE — 3080F DIAST BP >= 90 MM HG: CPT | Performed by: NURSE PRACTITIONER

## 2025-06-13 PROCEDURE — 3075F SYST BP GE 130 - 139MM HG: CPT | Performed by: NURSE PRACTITIONER

## 2025-06-13 PROCEDURE — 99212 OFFICE O/P EST SF 10 MIN: CPT | Performed by: NURSE PRACTITIONER

## 2025-06-13 PROCEDURE — 99214 OFFICE O/P EST MOD 30 MIN: CPT | Performed by: NURSE PRACTITIONER

## 2025-06-13 RX ORDER — ROSUVASTATIN CALCIUM 5 MG/1
10 TABLET, COATED ORAL EVERY EVENING
Qty: 90 TABLET | Refills: 3 | Status: SHIPPED | OUTPATIENT
Start: 2025-06-13 | End: 2026-07-18

## 2025-06-13 RX ORDER — LOSARTAN POTASSIUM 25 MG/1
25 TABLET ORAL DAILY
Qty: 90 TABLET | Refills: 3 | Status: SHIPPED | OUTPATIENT
Start: 2025-06-13

## 2025-06-13 ASSESSMENT — FIBROSIS 4 INDEX: FIB4 SCORE: 1.2

## 2025-06-13 NOTE — PROGRESS NOTES
CC:   Chief Complaint   Patient presents with    Shortness of Breath       HPI:      Ms. Tanvi Krueger is a 59 y.o. female whose current medical problems include prediabetes, dyslipidemia, family history of early CAD, obesity, and sleep apnea who is here for follow-up of dyspnea on exertion.    She is a patient of Dr. Hong and was initially seen on 4/7/2025 for evaluation of shortness of breath.  Given cardiovascular risk factors as well as symptoms concerning for angina, nuclear stress test was ordered.     Per previous note: The patient reports that she started noticing shortness of breath on exertion when she went to [place] in October 2024.  Since then, she continues to have shortness of breath on exertion.  As such, she has not been as active.      Today she reports shortness of breath with walking up 1 flight of stairs. Reports uses BiPAP at night. No chest pain, orthopnea, pnd, abdominal bloating/distension, palpitations, light-headedness/dizziness, pre-syncope, or syncope. No lower extremity edema.    Wt at home:287 lb-288 lb  BP at home: 131//91 (needs bigger cuff)    Current list of administered Medications:  Current Medications[1]    Past Medical History[2]    Past Surgical History[3]    Family History   Problem Relation Age of Onset    Cancer Mother 61        lung, smoker    Heart Disease Mother 44        early heart attack    Sleep Apnea Mother         possibly    Heart Disease Father 60        cabg x 3    Hyperlipidemia Father     Psychiatric Illness Father         schizophrenia    Kidney Disease Father         renal failure, dialysis    Dementia Father     Hyperlipidemia Brother     Suicide Attempts Brother         completed suicide 10/14/2021    Cancer Maternal Grandmother 62        lung, non-smoker    Psychiatric Illness Maternal Grandmother         depression, severe    Psychiatric Illness Other         depression, great uncle    Anxiety disorder Sister      Patient family history was  "personally reviewed, no pertinent family history to current presentation    Social History[4]    ALLERGIES:  Allergies[5]    Review of systems:  ALL OTHERS reviewed and negative    Physical exam:    BP (!) 148/92 (BP Location: Left arm, Patient Position: Sitting, BP Cuff Size: Adult) Comment: Home BP reading  Pulse 67   Resp 18   Ht 1.74 m (5' 8.5\")   Wt (!) 132 kg (292 lb)   LMP 02/26/1995   SpO2 93%   BMI 43.75 kg/m²        General: No acute distress.   EYES: no jaundice  HEENT: Normocephalic and atraumatic.  Neck: No bruits No JVD.   CVS: RRR. S1 + S2. No M/R/G  Resp: CTAB. No wheezing or crackles/rhonchi.  Abdomen: Soft, NT, ND, +BS  Skin: Skin is warm and dry.  Neurological: Alert, Moves all extremities  Extremities:  No lower extremity edema. No cyanosis.       LABS:  Lab Results   Component Value Date/Time    SODIUM 142 01/28/2025 1020    POTASSIUM 4.2 01/28/2025 1020    CHLORIDE 104 01/28/2025 1020    CO2 26 01/28/2025 1020    ANION 12.0 01/28/2025 1020    GLUCOSE 94 01/28/2025 1020    BUN 16 01/28/2025 1020    CREATININE 0.90 01/28/2025 1020    GFRCKD 72 01/28/2025 1020    CALCIUM 9.2 01/28/2025 1020    CORRCALC 8.9 01/28/2025 1020    ASTSGOT 18 01/28/2025 1020    ALTSGPT 17 01/28/2025 1020    ALKPHOSPHAT 87 01/28/2025 1020    TBILIRUBIN 0.2 01/28/2025 1020    ALBUMIN 4.4 01/28/2025 1020    TOTPROTEIN 7.3 01/28/2025 1020    GLOBULIN 2.9 01/28/2025 1020    AGRATIO 1.5 01/28/2025 1020     NT-proBNP   Date Value Ref Range Status   07/20/2021 141 (H) 0 - 125 pg/mL Final     Comment:     As of July 9th 2019 at 0900, the BNP test has been replaced with the  NT-proBNP test.  Please note new analyte, methodology, and reference range.       Lab Results   Component Value Date/Time    CHOLSTRLTOT 224 (H) 04/21/2025 0959    CHOLSTRLTOT 210 (H) 01/28/2025 1020    TRIGLYCERIDE 180 (H) 04/21/2025 0959    TRIGLYCERIDE 144 01/28/2025 1020    HDL 48 04/21/2025 0959    HDL 53 01/28/2025 1020     (H) 04/21/2025 " 0959     (H) 01/28/2025 1020    CHOLHDLRAT 4.3 07/09/2009 0000     Lab Results   Component Value Date/Time    WBC 6.7 04/21/2025 0959    WBC 7.2 01/28/2025 1020    HEMOGLOBIN 12.6 04/21/2025 0959    HEMOGLOBIN 13.5 01/28/2025 1020    HEMATOCRIT 41.6 04/21/2025 0959    HEMATOCRIT 44.2 01/28/2025 1020    PLATELETCT 222 04/21/2025 0959    PLATELETCT 214 01/28/2025 1020     Imaging:    MPI: 4/24/2025  NUCLEAR IMAGING INTERPRETATION   No evidence of significant jeopardized viable myocardium or prior myocardial infarction.   Normal left ventricular size, ejection fraction, and wall motion.   ECG INTERPRETATION   Negative treadmill EKG for ischemia.    The patient exercised for 5 minutes, 05 seconds, and 4.7 METs.   Below average exercise capacity for age and sex.   Hypertensive blood pressure response to exercise.      Echo dated 11/18/2022:   CONCLUSIONS  The left ventricular ejection fraction is visually estimated to be 65%.  Unable to estimate right ventricular systolic pressure due to an   inadequate tricuspid regurgitant jet.    Left Ventricle  Normal left ventricular size, thickness, and systolic function. The   left ventricular ejection fraction is visually estimated to be 65%.   Normal regional wall motion. Normal diastolic function.    TTE: 9/16/2017  CONCLUSIONS  No prior study is available for comparison.   Normal left ventricular chamber size.  Left ventricular ejection fraction is visually estimated to be 60%.  Normal pericardium without effusion.  Ascending aorta diameter is 3.1 cm.    EKG : 4/7/2025:   Sinus rhythm     Assessment / Plan:    1. Dyspnea on exertion        2. Essential hypertension, benign  losartan (COZAAR) 25 MG Tab      3. Dyslipidemia, goal LDL below 130  rosuvastatin (CRESTOR) 5 MG Tab        Dyslipidemia  Family history of early CAD  Dyspnea on exertion  -MPI on 4/24/2025 showed no evidence of significant jeopardized viable myocardium or prior myocardial infarction.  -dyspnea  multifactorial: obesity, pulmonary etiology  -await pulmonary input  - on 4/21/2025  -Will increase rosuvastatin to 10 mg daily and repeat lipid panel in 2 months.  -Advised on regular exercise    HTN  -BP at home 131//91  -repeat BP today left arm/large cuff 156/94, left forearm/regular cuff 148/92  -Will start losartan 25 mg daily  -continue home BP monitoring    NIDHI  -tolerating BiPAP    Follow-up in 3 months.    Cale Mustafa, Cardiology NP  [facility]  [address], [facility] [address]  [place] [address]  Phone: [phone]  Fax: [phone]     Please note that this dictation was created using voice recognition software. There may be errors I did not discover before finalizing the note.          [1]   Current Outpatient Medications:     losartan (COZAAR) 25 MG Tab, Take 1 Tablet by mouth every day., Disp: 90 Tablet, Rfl: 3    rosuvastatin (CRESTOR) 5 MG Tab, Take 2 Tablets by mouth every evening., Disp: 90 Tablet, Rfl: 3    sertraline (ZOLOFT) 100 MG Tab, Take 2 Tablets by mouth every day., Disp: 180 Tablet, Rfl: 4    albuterol 108 (90 Base) MCG/ACT Aero Soln inhalation aerosol, Inhale 2 Puffs every 6 hours as needed for Shortness of Breath., Disp: 8.5 g, Rfl: 6    fluticasone (FLONASE) 50 MCG/ACT nasal spray, Administer 2 Sprays into affected nostril(S) 2 times a day., Disp: , Rfl:     LEVOXYL 137 MCG Tab, Take 1 Tablet by mouth every morning on an empty stomach., Disp: 90 Tablet, Rfl: 1    azithromycin (ZITHROMAX) 250 MG Tab, As directed on pack, Disp: 6 Tablet, Rfl: 0    COENZYME Q10 PO, Take  by mouth. 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    Bowel habit  "changes     constipation    Cholesteatoma of middle ear and mastoid(385.33) 1992 surgery    mastoidectomy, prosthesis    Depression     Dyslipidemia, goal LDL below 130     Elevated glycohemoglobin     Eosinophilic esophagitis     Family history of early CAD     Hiatus hernia syndrome     \"was told I had one\"    History of 2019 novel coronavirus disease (COVID-19) 01/20/2025    History of chickenpox     History of endometriosis     History of partial thyroidectomy     partial thyroidectomy    Hypothyroidism due to Hashimoto's thyroiditis     Influenza A 03/06/2024    3/4/24      Lab test positive for detection of COVID-19 virus 05/05/2022 5/4/2022      Lumbar disc narrowing 07/14/2009    Menopausal symptoms     Oxygen desaturation during sleep     O2 2 liters HS    Pneumonia     hx    Recurrent sinus infections     Seizure (HCC)     Seizure cerebral (HCC) 06/20/2017    Sleep apnea     ASV with 2L oxygen at night (sean)     Tonsillitis     Vitamin d deficiency     Wears hearing aid in both ears 08/02/2022   [3]   Past Surgical History:  Procedure Laterality Date    LARYNGOSCOPY N/A 3/31/2017    Procedure: LARYNGOSCOPY - DIRECT W/BIOPSY BASE OF TONGUE AND NASOPHARYNGOSCOPY W/BIOPSY;  Surgeon: Naresh Abdi M.D.;  Location: SURGERY SAME DAY [facility];  Service:     THYROIDECTOMY TOTAL Left 10/14/2016    Procedure: LEFT PARTIAL THROIDECTOMY;  Surgeon: Naresh Abdi M.D.;  Location: SURGERY SAME DAY [facility];  Service:     SEPTOPLASTY  9/9/2016    Procedure: SEPTOPLASTY;  Surgeon: Naresh Abdi M.D.;  Location: SURGERY SAME DAY [facility];  Service:     TURBINOPLASTY Bilateral 9/9/2016    Procedure: TURBINOPLASTY;  Surgeon: Naresh Abdi M.D.;  Location: SURGERY SAME DAY [facility];  Service:     OTHER  2016    sinus surgery    EAR MIDDLE EXPLORATION  4/3/2015    Performed by Naresh Abdi M.D. at SURGERY SAME DAY [facility]    OSSICULAR RECONSTRUCTION  4/3/2015    Performed by Naresh PACE" NELIDA Abdi at SURGERY SAME DAY [facility] ORS    MYRINGOTOMY  4/20/2012    Performed by RYANNE NORIEGA  at SURGERY [facility] ORS    TYMPANOTOMY  6/24/2010    Performed by MAXIMUS WHITE at SURGERY SAME DAY [facility] ORS    EXAM UNDER ANESTHESIA  6/24/2010    Performed by MAXIMUS WHITE at SURGERY SAME DAY [facility] ORS    MYRINGOTOMY  4/30/2009    [facility] MAXIMUS WHITE [facility]    ABDOMINAL HYSTERECTOMY TOTAL  1997    ovaries are gone    ARTHROSCOPY, KNEE      HYSTERECTOMY LAPAROSCOPY      SINUSCOPE      TONSILLECTOMY     [4]   Social History  Tobacco Use    Smoking status: Never    Smokeless tobacco: Never    Tobacco comments:     Patient grew up with 3 smokers in home    Vaping Use    Vaping status: Never Used   Substance Use Topics    Alcohol use: No     Alcohol/week: 0.0 oz    Drug use: No   [5]   Allergies  Allergen Reactions    Cephalexin Diarrhea and Unspecified     Diarrhea    Doxycycline Nausea and Unspecified     Nausea and Diarrhea    Penicillin G Unspecified    Pcn [Penicillins] Unspecified     Unknown reaction as child

## 2025-06-25 ENCOUNTER — OFFICE VISIT (OUTPATIENT)
Dept: SLEEP MEDICINE | Facility: MEDICAL CENTER | Age: 60
End: 2025-06-25
Attending: FAMILY MEDICINE
Payer: COMMERCIAL

## 2025-06-25 VITALS
WEIGHT: 292 LBS | DIASTOLIC BLOOD PRESSURE: 74 MMHG | SYSTOLIC BLOOD PRESSURE: 126 MMHG | OXYGEN SATURATION: 95 % | HEART RATE: 71 BPM | BODY MASS INDEX: 43.25 KG/M2 | HEIGHT: 69 IN

## 2025-06-25 DIAGNOSIS — R06.00 DYSPNEA, UNSPECIFIED TYPE: ICD-10-CM

## 2025-06-25 DIAGNOSIS — R13.19 ESOPHAGEAL DYSPHAGIA: ICD-10-CM

## 2025-06-25 DIAGNOSIS — J45.20 MILD INTERMITTENT ASTHMA WITHOUT COMPLICATION: Primary | ICD-10-CM

## 2025-06-25 DIAGNOSIS — G47.33 OSA (OBSTRUCTIVE SLEEP APNEA): ICD-10-CM

## 2025-06-25 DIAGNOSIS — E66.813 CLASS 3 SEVERE OBESITY WITH BODY MASS INDEX (BMI) OF 40.0 TO 44.9 IN ADULT, UNSPECIFIED OBESITY TYPE, UNSPECIFIED WHETHER SERIOUS COMORBIDITY PRESENT: ICD-10-CM

## 2025-06-25 PROCEDURE — 3074F SYST BP LT 130 MM HG: CPT | Performed by: STUDENT IN AN ORGANIZED HEALTH CARE EDUCATION/TRAINING PROGRAM

## 2025-06-25 PROCEDURE — 3078F DIAST BP <80 MM HG: CPT | Performed by: STUDENT IN AN ORGANIZED HEALTH CARE EDUCATION/TRAINING PROGRAM

## 2025-06-25 PROCEDURE — 99215 OFFICE O/P EST HI 40 MIN: CPT | Performed by: STUDENT IN AN ORGANIZED HEALTH CARE EDUCATION/TRAINING PROGRAM

## 2025-06-25 PROCEDURE — 99213 OFFICE O/P EST LOW 20 MIN: CPT | Performed by: STUDENT IN AN ORGANIZED HEALTH CARE EDUCATION/TRAINING PROGRAM

## 2025-06-25 PROCEDURE — 99417 PROLNG OP E/M EACH 15 MIN: CPT | Performed by: STUDENT IN AN ORGANIZED HEALTH CARE EDUCATION/TRAINING PROGRAM

## 2025-06-25 RX ORDER — MELOXICAM 15 MG/1
TABLET ORAL
COMMUNITY
End: 2025-06-25

## 2025-06-25 RX ORDER — BUDESONIDE AND FORMOTEROL FUMARATE DIHYDRATE 80; 4.5 UG/1; UG/1
1 AEROSOL RESPIRATORY (INHALATION) 2 TIMES DAILY
Qty: 1 EACH | Refills: 1 | Status: SHIPPED | OUTPATIENT
Start: 2025-06-25

## 2025-06-25 ASSESSMENT — FIBROSIS 4 INDEX: FIB4 SCORE: 1.2

## 2025-06-25 ASSESSMENT — ENCOUNTER SYMPTOMS
SHORTNESS OF BREATH: 1
WHEEZING: 1

## 2025-06-25 NOTE — PROGRESS NOTES
Pulmonary Clinic- Initial Consult    Date of Service: 6/25/25    Referring Physician: Paulino Ferro M.D.    Reason for Consult: Dyspnea    Chief Complaint: No chief complaint on file.      HPI:   Tanvi Krueger is a very pleasant 59 y.o. female with obesity (BMI 44), EoE, allergic rhinitis, and NIDHI/CSA w/ nocturnal hypoxia on BiPAP w/ 2.5L O2 who presents to pulmonary clinic for evaluation of dyspnea.     Shortness of breath with exertion since October of 2024. She endorses wheezing and coughing in association with this. We reviewed her PFTs together. They are suggestive of asthma or reactive airways disease. She has multiple comorbidities that could also could be contributing, including severe obesity (BMI 44), allergic rhinitis, NIDHI/CSA on BIPAP w/ 2.5L NC and a history of eosinophilic esophagitis.     Patient was a little defensive when discussing her weight, but I was honest with her that I think her weight is contributing to her dyspnea degree. We also discussed the strong link between obesity and reactive airway disease/asthma. I suggested that she discuss GLP-1 agonists with her PCP as I am confident this will benefit her dyspnea, sleep apnea/nocturnal hypoxia    We discussed her history of EoE. She is not sure when she was diagnosed with this and denies significant GERD symptoms. She does have dysphagia to solids, however, and has had this for at least a year. We discussed the strong link between asthma and EoE. I will place a referral to GI as I think she would benefit from an EGD given her ongoing dysphagia.     Finally we discussed her allergies. Temporally, her dyspnea seems to have started around the same time that her daughter moved in to her house with 2 cats and 2 dogs. She thinks she may be allergic to her daughters cats. Her daughter is moving out in 1 month and taking her cats and dogs with her thankfully.       Relevant Imaging:   NM-Cardiac Stress Test - 4/4/25 - WNL     CXR from 10/2024  reviewed and WNL     Echo 11/2022 personally reviewed - normal LV systolic function, no elevation in RVSP     Relevant Labs:   Has never had significant peripheral eosinophilia >300     Prior PFTs:   PFTs from 2017 personally reviewed - show normal spirometry with BDR, air trapping, and normal DLCO    Relevant Social, Family or Exposure Hx:   Never been a smoker   Worked in [employer] here in [place] with her spouse and daughter - 2 dogs and 2 cats       Past Medical History[1]    Past Surgical History[2]    Social History     Socioeconomic History    Marital status:      Spouse name: Not on file    Number of children: Not on file    Years of education: Not on file    Highest education level: Not on file   Occupational History    Not on file   Tobacco Use    Smoking status: Never    Smokeless tobacco: Never    Tobacco comments:     Patient grew up with 3 smokers in home    Vaping Use    Vaping status: Never Used   Substance and Sexual Activity    Alcohol use: No     Alcohol/week: 0.0 oz    Drug use: No    Sexual activity: Yes     Partners: Male   Other Topics Concern    Not on file   Social History Narrative    Not on file     Social Drivers of Health     Financial Resource Strain: Not on file   Food Insecurity: Not on file   Transportation Needs: Not on file   Physical Activity: Not on file   Stress: Not on file   Social Connections: Not on file   Intimate Partner Violence: Not on file   Housing Stability: Not on file          Family History   Problem Relation Age of Onset    Cancer Mother 61        lung, smoker    Heart Disease Mother 44        early heart attack    Sleep Apnea Mother         possibly    Heart Disease Father 60        cabg x 3    Hyperlipidemia Father     Psychiatric Illness Father         schizophrenia    Kidney Disease Father         renal failure, dialysis    Dementia Father     Hyperlipidemia Brother     Suicide Attempts Brother         completed suicide 10/14/2021    Cancer  Maternal Grandmother 62        lung, non-smoker    Psychiatric Illness Maternal Grandmother         depression, severe    Psychiatric Illness Other         depression, great uncle    Anxiety disorder Sister        Medications Ordered Prior to Encounter[3]    Allergies: Cephalexin, Doxycycline, Penicillin g, and Pcn [penicillins]      ROS:   Review of Systems   Respiratory:  Positive for shortness of breath and wheezing.        Vitals:  There were no vitals taken for this visit.    Physical Exam:  Physical Exam  Constitutional:       Appearance: Normal appearance. She is obese.   Eyes:      Extraocular Movements: Extraocular movements intact.      Conjunctiva/sclera: Conjunctivae normal.   Cardiovascular:      Rate and Rhythm: Normal rate and regular rhythm.   Pulmonary:      Effort: Pulmonary effort is normal.      Breath sounds: Normal breath sounds. No wheezing or rales.   Abdominal:      Palpations: Abdomen is soft.      Tenderness: There is no abdominal tenderness.   Musculoskeletal:      Right lower leg: No edema.      Left lower leg: No edema.   Skin:     General: Skin is warm and dry.   Neurological:      General: No focal deficit present.      Mental Status: She is oriented to person, place, and time.         Assessment/Plan:  Tanvi Krueger is a very pleasant 59 y.o. female with obesity (BMI 44), EoE, allergic rhinitis, and NIDHI/CSA w/ nocturnal hypoxia on BiPAP w/ 2.5L O2 who presents to pulmonary clinic for evaluation of dyspnea. Suspect dyspnea is multifactorial, including obesity and possible asthma. She is having dysphagia to solids and with her history of EoE, I will refer her to GI. We will trial symbicort for her suspected asthma. She is having ongoing nocturnal hypoxia despite treatment with BIPAP and 2.5L O2 - I will refer her to sleep for management of this. Finally I suggested that she speak with her PCP about potential prescription of a GLP-1 agonist for weight loss as I feel confident this  "will help with many of her comorbid conditions.     #Dyspnea on Exertion  #Obesity  #Dysphagia w/ Hx of EoE  #Suspected Asthma  #NIDHI/CSA w/ ongoing nocturnal hypoxia despite BIPAP and 2.5L o2    Plan  Referral to GI doctor   GLP-1 agonist with PCP   Start symbicort   Refer to sleep     Follow-up in 6 mo     1. Mild intermittent asthma without complication  budesonide-formoterol (SYMBICORT) 80-4.5 MCG/ACT Aerosol      2. Esophageal dysphagia  Referral to Gastroenterology          Return in about 6 months (around 12/25/2025) for asthma.       Time spent in record review prior to patient arrival, reviewing results, and in face-to-face encounter totaled 60 min, excluding any procedures if performed.  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    Bowel habit changes     constipation    Cholesteatoma of middle ear and mastoid(385.33) 1992 surgery    mastoidectomy, prosthesis    Depression     Dyslipidemia, goal LDL below 130     Elevated glycohemoglobin     Eosinophilic esophagitis     Family history of early CAD     Hiatus hernia syndrome     \"was told I had one\"    History of 2019 novel coronavirus disease (COVID-19) 01/20/2025    History of chickenpox     History of endometriosis     History of partial thyroidectomy     partial thyroidectomy    Hypothyroidism due to Hashimoto's thyroiditis     Influenza A 03/06/2024    3/4/24      Lab test positive for detection of COVID-19 virus 05/05/2022 5/4/2022      Lumbar disc narrowing 07/14/2009    Menopausal symptoms     Oxygen desaturation during sleep     O2 2 liters HS    Pneumonia     hx    Recurrent sinus infections     Seizure (HCC)     Seizure cerebral (HCC) 06/20/2017    Sleep apnea     ASV with 2L oxygen at night (sean)     " Tonsillitis     Vitamin d deficiency     Wears hearing aid in both ears 08/02/2022   [2]   Past Surgical History:  Procedure Laterality Date    LARYNGOSCOPY N/A 3/31/2017    Procedure: LARYNGOSCOPY - DIRECT W/BIOPSY BASE OF TONGUE AND NASOPHARYNGOSCOPY W/BIOPSY;  Surgeon: Naresh Abdi M.D.;  Location: SURGERY SAME DAY [facility];  Service:     THYROIDECTOMY TOTAL Left 10/14/2016    Procedure: LEFT PARTIAL THROIDECTOMY;  Surgeon: Naresh Abdi M.D.;  Location: SURGERY SAME DAY [facility];  Service:     SEPTOPLASTY  9/9/2016    Procedure: SEPTOPLASTY;  Surgeon: Naresh Abdi M.D.;  Location: SURGERY SAME DAY [facility];  Service:     TURBINOPLASTY Bilateral 9/9/2016    Procedure: TURBINOPLASTY;  Surgeon: Naresh Abdi M.D.;  Location: SURGERY SAME DAY [facility];  Service:     OTHER  2016    sinus surgery    EAR MIDDLE EXPLORATION  4/3/2015    Performed by Naresh Abdi M.D. at SURGERY SAME DAY [facility]    OSSICULAR RECONSTRUCTION  4/3/2015    Performed by Naresh Abdi M.D. at SURGERY SAME DAY [facility] ORS    MYRINGOTOMY  4/20/2012    Performed by RYANNE ALICIA at SURGERY [facility]    TYMPANOTOMY  6/24/2010    Performed by MAXIMUS WHITE at SURGERY SAME DAY [facility]    EXAM UNDER ANESTHESIA  6/24/2010    Performed by MAXIMUS WHITE at SURGERY SAME DAY [facility]    MYRINGOTOMY  4/30/2009    Performed by MAXIMUS WHITE at SURGERY SAME DAY [facility] ORS    ABDOMINAL HYSTERECTOMY TOTAL  1997    ovaries are gone    ARTHROSCOPY, KNEE      HYSTERECTOMY LAPAROSCOPY      SINUSCOPE      TONSILLECTOMY     [3]   Current Outpatient Medications on File Prior to Visit   Medication Sig Dispense Refill    losartan (COZAAR) 25 MG Tab Take 1 Tablet by mouth every day. 90 Tablet 3    rosuvastatin (CRESTOR) 5 MG Tab Take 2 Tablets by mouth every evening. 90 Tablet 3    azithromycin (ZITHROMAX) 250 MG Tab As directed on pack 6 Tablet 0    sertraline (ZOLOFT) 100 MG Tab Take 2 Tablets by mouth  every day. 180 Tablet 4    albuterol 108 (90 Base) MCG/ACT Aero Soln inhalation aerosol Inhale 2 Puffs every 6 hours as needed for Shortness of Breath. 8.5 g 6    fluticasone (FLONASE) 50 MCG/ACT nasal spray Administer 2 Sprays into affected nostril(S) 2 times a day.      COENZYME Q10 PO Take  by mouth. (Patient not taking: Reported on 6/13/2025)      LEVOXYL 137 MCG Tab Take 1 Tablet by mouth every morning on an empty stomach. 90 Tablet 1    Misc Natural Products (METABO-STYLE PO)        No current facility-administered medications on file prior to visit.

## 2025-06-27 NOTE — Clinical Note
REFERRAL APPROVAL NOTICE         Sent on June 27, 2025                   Tanvi Jessycatrachita Krueger  [address] [facility] [address]                   Dear Ms. Krueger,    After a careful review of the medical information and benefit coverage, [facility] has processed your referral. See below for additional details.    If applicable, you must be actively enrolled with your insurance for coverage of the authorized service. If you have any questions regarding your coverage, please contact your insurance directly.    REFERRAL INFORMATION   Referral #:  [id number]  Referred-To Provider    Referred-By Provider:  Gastroenterology    Tani Layton M.D.   [facility]      [address] [facility] [address]  [phone] [address] [facility] [address]  [phone]    Referral Start Date:  06/25/2025  Referral End Date:   06/25/2026             SCHEDULING  If you do not already have an appointment, please call [phone] to make an appointment.     MORE INFORMATION  If you do not already have a [employer] account, sign up at: [email or web address]  You can access your medical information, make appointments, see lab results, billing information, and more.  If you have questions regarding this referral, please contact  the [facility] Referrals department at:             [phone]. Monday - Friday 8:00AM - 5:00PM.     Sincerely,    [facility]

## 2025-08-19 ENCOUNTER — RESULTS FOLLOW-UP (OUTPATIENT)
Dept: URGENT CARE | Facility: CLINIC | Age: 60
End: 2025-08-19

## 2025-08-19 ENCOUNTER — OFFICE VISIT (OUTPATIENT)
Dept: URGENT CARE | Facility: CLINIC | Age: 60
End: 2025-08-19
Payer: COMMERCIAL

## 2025-08-19 VITALS
BODY MASS INDEX: 41.77 KG/M2 | HEIGHT: 69 IN | DIASTOLIC BLOOD PRESSURE: 82 MMHG | TEMPERATURE: 98.7 F | RESPIRATION RATE: 17 BRPM | WEIGHT: 282 LBS | OXYGEN SATURATION: 93 % | HEART RATE: 73 BPM | SYSTOLIC BLOOD PRESSURE: 142 MMHG

## 2025-08-19 DIAGNOSIS — Z20.822 EXPOSURE TO COVID-19 VIRUS: ICD-10-CM

## 2025-08-19 DIAGNOSIS — Z20.822 SUSPECTED COVID-19 VIRUS INFECTION: Primary | ICD-10-CM

## 2025-08-19 PROCEDURE — 87637 SARSCOV2&INF A&B&RSV AMP PRB: CPT | Performed by: PHYSICIAN ASSISTANT

## 2025-08-19 PROCEDURE — 99213 OFFICE O/P EST LOW 20 MIN: CPT | Performed by: PHYSICIAN ASSISTANT

## 2025-08-19 ASSESSMENT — FIBROSIS 4 INDEX: FIB4 SCORE: 1.22

## 2025-08-21 DIAGNOSIS — K20.0 EOSINOPHILIC ESOPHAGITIS: Primary | ICD-10-CM

## 2025-08-22 ENCOUNTER — HOSPITAL ENCOUNTER (OUTPATIENT)
Dept: LAB | Facility: MEDICAL CENTER | Age: 60
End: 2025-08-22
Attending: FAMILY MEDICINE
Payer: COMMERCIAL

## 2025-08-22 DIAGNOSIS — K20.0 EOSINOPHILIC ESOPHAGITIS: ICD-10-CM

## 2025-08-22 LAB
ALBUMIN SERPL BCP-MCNC: 4.2 G/DL (ref 3.2–4.9)
ALBUMIN/GLOB SERPL: 1.4 G/DL
ALP SERPL-CCNC: 85 U/L (ref 30–99)
ALT SERPL-CCNC: 19 U/L (ref 2–50)
ANION GAP SERPL CALC-SCNC: 11 MMOL/L (ref 7–16)
AST SERPL-CCNC: 16 U/L (ref 12–45)
BASOPHILS # BLD AUTO: 0.9 % (ref 0–1.8)
BASOPHILS # BLD: 0.07 K/UL (ref 0–0.12)
BILIRUB SERPL-MCNC: 0.3 MG/DL (ref 0.1–1.5)
BUN SERPL-MCNC: 18 MG/DL (ref 8–22)
CALCIUM ALBUM COR SERPL-MCNC: 9.3 MG/DL (ref 8.5–10.5)
CALCIUM SERPL-MCNC: 9.5 MG/DL (ref 8.5–10.5)
CHLORIDE SERPL-SCNC: 107 MMOL/L (ref 96–112)
CO2 SERPL-SCNC: 25 MMOL/L (ref 20–33)
CREAT SERPL-MCNC: 1.02 MG/DL (ref 0.5–1.4)
EOSINOPHIL # BLD AUTO: 0.27 K/UL (ref 0–0.51)
EOSINOPHIL NFR BLD: 3.5 % (ref 0–6.9)
ERYTHROCYTE [DISTWIDTH] IN BLOOD BY AUTOMATED COUNT: 46 FL (ref 35.9–50)
GFR SERPLBLD CREATININE-BSD FMLA CKD-EPI: 62 ML/MIN/1.73 M 2
GLOBULIN SER CALC-MCNC: 3 G/DL (ref 1.9–3.5)
GLUCOSE SERPL-MCNC: 97 MG/DL (ref 65–99)
HCT VFR BLD AUTO: 44.2 % (ref 37–47)
HGB BLD-MCNC: 14 G/DL (ref 12–16)
IMM GRANULOCYTES # BLD AUTO: 0.03 K/UL (ref 0–0.11)
IMM GRANULOCYTES NFR BLD AUTO: 0.4 % (ref 0–0.9)
LYMPHOCYTES # BLD AUTO: 2.39 K/UL (ref 1–4.8)
LYMPHOCYTES NFR BLD: 30.6 % (ref 22–41)
MCH RBC QN AUTO: 26.8 PG (ref 27–33)
MCHC RBC AUTO-ENTMCNC: 31.7 G/DL (ref 32.2–35.5)
MCV RBC AUTO: 84.5 FL (ref 81.4–97.8)
MONOCYTES # BLD AUTO: 0.45 K/UL (ref 0–0.85)
MONOCYTES NFR BLD AUTO: 5.8 % (ref 0–13.4)
NEUTROPHILS # BLD AUTO: 4.61 K/UL (ref 1.82–7.42)
NEUTROPHILS NFR BLD: 58.8 % (ref 44–72)
NRBC # BLD AUTO: 0 K/UL
NRBC BLD-RTO: 0 /100 WBC (ref 0–0.2)
PLATELET # BLD AUTO: 213 K/UL (ref 164–446)
PMV BLD AUTO: 10.4 FL (ref 9–12.9)
POTASSIUM SERPL-SCNC: 4.1 MMOL/L (ref 3.6–5.5)
PROT SERPL-MCNC: 7.2 G/DL (ref 6–8.2)
RBC # BLD AUTO: 5.23 M/UL (ref 4.2–5.4)
SODIUM SERPL-SCNC: 143 MMOL/L (ref 135–145)
WBC # BLD AUTO: 7.8 K/UL (ref 4.8–10.8)

## 2025-08-22 PROCEDURE — 80053 COMPREHEN METABOLIC PANEL: CPT

## 2025-08-22 PROCEDURE — 36415 COLL VENOUS BLD VENIPUNCTURE: CPT

## 2025-08-22 PROCEDURE — 85025 COMPLETE CBC W/AUTO DIFF WBC: CPT

## 2025-08-22 PROCEDURE — 83013 H PYLORI (C-13) BREATH: CPT

## 2025-08-23 LAB — UREA BREATH TEST QL: NEGATIVE

## (undated) DEVICE — LACTATED RINGERS INJ 1000 ML - (14EA/CA 60CA/PF)

## (undated) DEVICE — CANISTER SUCTION RIGID RED 1500CC (40EA/CA)

## (undated) DEVICE — ELECTRODE DUAL RETURN W/ CORD - (50/PK)

## (undated) DEVICE — CANISTER SUCTION 3000ML MECHANICAL FILTER AUTO SHUTOFF MEDI-VAC NONSTERILE LF DISP  (40EA/CA)

## (undated) DEVICE — ELECTRODE 850 FOAM ADHESIVE - HYDROGEL RADIOTRNSPRNT (50/PK)

## (undated) DEVICE — LEAD SET 6 DISP. EKG NIHON KOHDEN

## (undated) DEVICE — PATTIES SURG NEURO X-RAY 1/2X1/2 (10EA/PK 20PK/CS)

## (undated) DEVICE — MASK ANESTHESIA ADULT  - (100/CA)

## (undated) DEVICE — SUCTION INSTRUMENT YANKAUER BULBOUS TIP W/O VENT (50EA/CA)

## (undated) DEVICE — PACK ENT OR - (2EA/CA)

## (undated) DEVICE — HEAD HOLDER JUNIOR/ADULT

## (undated) DEVICE — CONTAINER, SPECIMEN, STERILE

## (undated) DEVICE — CATHETER IV 20 GA X 1-1/4 ---SURG.& SDS ONLY--- (50EA/BX)

## (undated) DEVICE — ANTI-FOG SOLUTION - 60BTL/CA

## (undated) DEVICE — TUBE E-T HI-LO CUFF 7.0MM (10EA/PK)

## (undated) DEVICE — SUTURE GENERAL

## (undated) DEVICE — PATTIES SURG X-RAYCOTTONOID - 1/2 X 3 IN (200/CA)

## (undated) DEVICE — TUBING CLEARLINK DUO-VENT - C-FLO (48EA/CA)

## (undated) DEVICE — WATER IRRIGATION STERILE 1000ML (12EA/CA)

## (undated) DEVICE — SENSOR SPO2 NEO LNCS ADHESIVE (20/BX) SEE USER NOTES

## (undated) DEVICE — KIT  I.V. START (100EA/CA)

## (undated) DEVICE — TEETHGUARD ENT -2BX MIN ORDER- (6EA/BX)

## (undated) DEVICE — KIT ANESTHESIA W/CIRCUIT & 3/LT BAG W/FILTER (20EA/CA)

## (undated) DEVICE — SET LEADWIRE 5 LEAD BEDSIDE DISPOSABLE ECG (1SET OF 5/EA)

## (undated) DEVICE — PROTECTOR ULNA NERVE - (36PR/CA)

## (undated) DEVICE — GLOVE BIOGEL SZ 7.5 SURGICAL PF LTX - (50PR/BX 4BX/CA)

## (undated) DEVICE — NEPTUNE 4 PORT MANIFOLD - (20/PK)

## (undated) DEVICE — TUBE CONNECTING SUCTION - CLEAR PLASTIC STERILE 72 IN (50EA/CA)

## (undated) DEVICE — SODIUM CHL IRRIGATION 0.9% 1000ML (12EA/CA)